# Patient Record
Sex: MALE | Race: WHITE | NOT HISPANIC OR LATINO | Employment: OTHER | URBAN - METROPOLITAN AREA
[De-identification: names, ages, dates, MRNs, and addresses within clinical notes are randomized per-mention and may not be internally consistent; named-entity substitution may affect disease eponyms.]

---

## 2020-04-09 ENCOUNTER — TELEPHONE (OUTPATIENT)
Dept: UROLOGY | Facility: CLINIC | Age: 60
End: 2020-04-09

## 2020-04-09 NOTE — TELEPHONE ENCOUNTER
[facility] Call Center    Phone Message    May a detailed message be left on voicemail: yes     Reason for Call: Other: Pt wife wants to know if our doctors do fusion-biopsy.  Pt wife is an MD and is looking for a doctor for her  who has abnormal PSA but has several very specific questions regarding our doctors.  Please call Pt wife asap tp try to answer as many as possible.      Action Taken: Message routed to:  [facility] urology    Travel Screening: Not Applicable

## 2020-04-13 NOTE — TELEPHONE ENCOUNTER
Spoke with patient's sister not wife who is a Dr and got patient scheduled with Dr Price next Monday for a video conference. This would be a 2nd opinion. Gave her the angella that needs to be downloaded.

## 2020-04-14 NOTE — TELEPHONE ENCOUNTER
MEDICAL RECORDS REQUEST   [facility]  Urology Clinic  [address] [place] [address]  PHONE: [phone]  Fax: [phone]        FUTURE VISIT INFORMATION                                                   Adam Sahni, : 1960 scheduled for future visit at [facility]    APPOINTMENT INFORMATION:    Date: 20 3PM     Provider:  Steven Price MD    Reason for Visit/Diagnosis: Discuss fusion BX    REFERRAL INFORMATION:    Referring provider:  Self    Specialty: N/A    Referring providers clinic:  N/A    Clinic contact number:  N/A    RECORDS REQUESTED FOR VISIT                                                     NOTES  STATUS/DETAILS   OFFICE NOTE from referring provider  no   OFFICE NOTE from other specialist  yes   DISCHARGE SUMMARY from hospital  no   DISCHARGE REPORT from the ER  no   OPERATIVE REPORT  no   MEDICATION LIST  yes   LABS     URINALYSIS (UA)/ PSA  yes- CE     PRE-VISIT CHECKLIST      [facility]   Appointment appropriately scheduled           (right time/right provider) Yes   MyChart activation If no, please explain: In process    Questionnaire complete If no, please explain: In process      Completed by: Ruchi Kang

## 2020-04-20 ENCOUNTER — PRE VISIT (OUTPATIENT)
Dept: UROLOGY | Facility: CLINIC | Age: 60
End: 2020-04-20

## 2020-04-20 ENCOUNTER — TELEPHONE (OUTPATIENT)
Dept: UROLOGY | Facility: CLINIC | Age: 60
End: 2020-04-20

## 2020-04-20 NOTE — TELEPHONE ENCOUNTER
Chief Complaint : Discuss fusion biopsy     Records/Orders: self referring     Pt Contacted: YES left a message to talk abut appointment     At Rooming: video appointment

## 2020-04-23 ENCOUNTER — DOCUMENTATION ONLY (OUTPATIENT)
Dept: CARE COORDINATION | Facility: CLINIC | Age: 60
End: 2020-04-23

## 2020-05-19 NOTE — TELEPHONE ENCOUNTER
MEDICAL RECORDS REQUEST   [facility]  Urology Clinic  [address] [place] [address]  PHONE: [phone]  Fax: [phone]        FUTURE VISIT INFORMATION                                                   Adam Sahni, : 1960 scheduled for future visit at [facility]    APPOINTMENT INFORMATION:    Date: 20 3PM     Provider:  Steven Felix MD    Reason for Visit/Diagnosis: Discuss fusion BX    REFERRAL INFORMATION:    Referring provider:  Self    Specialty: N/A    Referring providers clinic:  N/A    Clinic contact number:  N/A    RECORDS REQUESTED FOR VISIT                                                     NOTES  STATUS/DETAILS   OFFICE NOTE from referring provider  no   OFFICE NOTE from other specialist  yes   DISCHARGE SUMMARY from hospital  no   DISCHARGE REPORT from the ER  no   OPERATIVE REPORT  no   MEDICATION LIST  yes   LABS     URINALYSIS (UA)/ PSA  Pathology slides  yes- CE  In process      PRE-VISIT CHECKLIST      Record collection complete Yes- [facility] recs in CE  IMGS IN [facility] PACS  NM 20  CT 20  MR 20  Fax to [facility] to fed -ex BX's -  2:10PM  (Case: [id number])    Tracking #: [id number]     Appointment appropriately scheduled           (right time/right provider) Yes   MyChart activation If no, please explain: In process    Questionnaire complete If no, please explain: In process      Action Ruchi 20 10:13AM   Action Taken CSS called [facility] Path lab to check on status on BX's , Per path lab staff they have not received req. Re-fax to alt fax [phone]     Action 20 10:08 AM - Linda   Action Taken  Imaging disc received from [facility] and sent to be reviewed with filled out pathology form.       Completed by: Ruchi Kang

## 2020-05-26 ENCOUNTER — PRE VISIT (OUTPATIENT)
Dept: UROLOGY | Facility: CLINIC | Age: 60
End: 2020-05-26

## 2020-06-03 PROCEDURE — 00000346 ZZHCL STATISTIC REVIEW OUTSIDE SLIDES TC 88321: Performed by: UROLOGY

## 2020-06-04 ENCOUNTER — PRE VISIT (OUTPATIENT)
Dept: UROLOGY | Facility: CLINIC | Age: 60
End: 2020-06-04

## 2020-06-04 ENCOUNTER — VIRTUAL VISIT (OUTPATIENT)
Dept: UROLOGY | Facility: CLINIC | Age: 60
End: 2020-06-04
Payer: COMMERCIAL

## 2020-06-04 DIAGNOSIS — C61 PROSTATE CANCER (H): Primary | ICD-10-CM

## 2020-06-04 LAB — COPATH REPORT: NORMAL

## 2020-06-04 RX ORDER — SERTRALINE HYDROCHLORIDE 100 MG/1
200 TABLET, FILM COATED ORAL DAILY
COMMUNITY
Start: 2020-03-06 | End: 2023-09-02

## 2020-06-04 RX ORDER — GABAPENTIN 300 MG/1
300 CAPSULE ORAL
Status: ON HOLD | COMMUNITY
Start: 2020-03-06 | End: 2023-06-09

## 2020-06-04 RX ORDER — ASPIRIN 81 MG/1
81 TABLET ORAL
Status: ON HOLD | COMMUNITY
Start: 2020-03-06 | End: 2023-06-09

## 2020-06-04 RX ORDER — ERGOCALCIFEROL 1.25 MG/1
50000 CAPSULE, LIQUID FILLED ORAL
Status: ON HOLD | COMMUNITY
Start: 2020-02-21 | End: 2023-06-09

## 2020-06-04 RX ORDER — ATORVASTATIN CALCIUM 80 MG/1
80 TABLET, FILM COATED ORAL AT BEDTIME
COMMUNITY
Start: 2020-03-06 | End: 2023-09-02

## 2020-06-04 RX ORDER — CILOSTAZOL 100 MG/1
100 TABLET ORAL 2 TIMES DAILY
COMMUNITY
Start: 2020-03-06 | End: 2024-08-22

## 2020-06-04 ASSESSMENT — PAIN SCALES - GENERAL: PAINLEVEL: NO PAIN (0)

## 2020-06-04 NOTE — PROGRESS NOTES
"Adam Sahni is a 59 year old male who is being evaluated via a billable video visit.      The patient has been notified of following:     \"This video visit will be conducted via a call between you and your physician/provider. We have found that certain health care needs can be provided without the need for an in-person physical exam.  This service lets us provide the care you need with a video conversation.  If a prescription is necessary we can send it directly to your pharmacy.  If lab work is needed we can place an order for that and you can then stop by our lab to have the test done at a later time.    Video visits are billed at different rates depending on your insurance coverage.  Please reach out to your insurance provider with any questions.    If during the course of the call the physician/provider feels a video visit is not appropriate, you will not be charged for this service.\"    Patient has given verbal consent for Video visit? Yes    How would you like to obtain your AVS? Mail a copy    Patient would like the video invitation sent by: Send to e-mail at: [email or web address]    Will anyone else be joining your video visit? Sis was on call with him        Video-Visit Details    Type of service:  Video Visit    Video Start Time: Video time was 53 minues    Originating Location (pt. Location): Home    Distant Location (provider location):  [facility] UROLOGY AND [facility] FOR PROSTATE AND UROLOGIC CANCERS     Platform used for Video Visit: Juvencio Felix MD      "

## 2020-06-04 NOTE — LETTER
"6/4/2020       RE: Adam Sahni  [address]   [facility] [address]     Dear Colleague,    Thank you for referring your patient, Adam Sahni, to the [facility] UROLOGY AND [facility] FOR PROSTATE AND UROLOGIC CANCERS at [facility]. Please see a copy of my visit note below.    Adam Sahni is a 59 year old male who is being evaluated via a billable video visit.      The patient has been notified of following:     \"This video visit will be conducted via a call between you and your physician/provider. We have found that certain health care needs can be provided without the need for an in-person physical exam.  This service lets us provide the care you need with a video conversation.  If a prescription is necessary we can send it directly to your pharmacy.  If lab work is needed we can place an order for that and you can then stop by our lab to have the test done at a later time.    Video visits are billed at different rates depending on your insurance coverage.  Please reach out to your insurance provider with any questions.    If during the course of the call the physician/provider feels a video visit is not appropriate, you will not be charged for this service.\"    Patient has given verbal consent for Video visit? Yes    How would you like to obtain your AVS? Mail a copy    Patient would like the video invitation sent by: Send to e-mail at: [email or web address]    Will anyone else be joining your video visit? Sis was on call with him        Video-Visit Details    Type of service:  Video Visit    Video Start Time: Video time was 53 minues    Originating Location (pt. Location): Home    Distant Location (provider location):  [facility] UROLOGY AND [facility] FOR PROSTATE AND UROLOGIC CANCERS     Platform used for Video Visit: Juvencio Felix MD              Chief Complaint:    Localized Prostate Cancer         Consult or Referral:     Mr. Adam Sahni " is a 59 year old male seen in consultation         History of Present Illness:    Adam Sahni is a very pleasant 59 year old male who presents with a history of Prostate Cancer.   The clinical stage is cT3 vs. T4 with bladder neck invasion, with likely epe also into the NVBs on MRI, initial PSA was 34 and biopsy luis a score is 4+5 and 4+3 with some cancer in all cores.  [place] grade group 5.   The prostate size was measured and estimated to be to be ~27 grams.  PSA density is 1.3.  Bone scan negative, possible RETROPERITONEAL nodes, but unceratain    Was scoped by Dr. Asencio and noted to have invasion into the bladder, but not involving the trigone or the UO's    Scheduled to undergo a robotic radical prostatectomy with Dr. Asencio in a few weeks.    His physician sister is present for the video chat.           Past Medical History:   No past medical history on file.         Past Surgical History:   No past surgical history on file.         Medications     Current Outpatient Medications   Medication     aspirin 81 MG EC tablet     atorvastatin (LIPITOR) 80 MG tablet     cilostazol (PLETAL) 100 MG tablet     gabapentin (NEURONTIN) 300 MG capsule     sertraline (ZOLOFT) 50 MG tablet     vitamin D2 (ERGOCALCIFEROL) 10168 units (1250 mcg) capsule     No current facility-administered medications for this visit.             Family History:   No family history on file.         Social History:     Social History     Socioeconomic History     Marital status: Single     Spouse name: Not on file     Number of children: Not on file     Years of education: Not on file     Highest education level: Not on file   Occupational History     Not on file   Social Needs     Financial resource strain: Not on file     Food insecurity     Worry: Not on file     Inability: Not on file     Transportation needs     Medical: Not on file     Non-medical: Not on file   Tobacco Use     Smoking status: Not on file   Substance and Sexual Activity      Alcohol use: Not on file     Drug use: Not on file     Sexual activity: Not on file   Lifestyle     Physical activity     Days per week: Not on file     Minutes per session: Not on file     Stress: Not on file   Relationships     Social connections     Talks on phone: Not on file     Gets together: Not on file     Attends Zoroastrianism service: Not on file     Active member of club or organization: Not on file     Attends meetings of clubs or organizations: Not on file     Relationship status: Not on file     Intimate partner violence     Fear of current or ex partner: Not on file     Emotionally abused: Not on file     Physically abused: Not on file     Forced sexual activity: Not on file   Other Topics Concern     Not on file   Social History Narrative     Not on file            Allergies:   Penicillins         Review of Systems:  From intake questionnaire     Negative 14 system review except as noted on HPI, nurse's note.         Physical Exam:  VIDEO     Patient is a 59 year old  male   General Appearance Adult: Alert, no acute distress, oriented  HENT: throat/mouth:normal, good dentition  Neck: No adenopathy,masses or thyromegaly  Lungs: no respiratory distress, or pursed lip breathing  Heart: No obvious jugular venous distension present  Musculoskeltal: extremities normal, no peripheral edema noted  Skin: no suspicious lesions or rashes  Neuro: Alert, oriented, speech and mentation normal  Psych: affect and mood normal  Gait: Normal      Labs and Pathology:    I reviewed all applicable laboratory and pathology data and went over findings with patient  Significant for   PSA 34        Imaging:    I reviewed all applicable imaging and went over findings with patient.  Significant for MRI showing possible bladder invasion, possible NVB invasion, but no clearly enlarged nodes      Outside and Past Medical records:    I spent 10 minutes reviewing outside and past medical records.           Assessment and Plan:      Assessment:   High Risk Locally Advanced Prostate Cancer    Plan:  There is a very high chance he has micrometastatic disease    We discussed the options for treatment of a localized prostate cancer including active surveillance (reviewing the [place] experience), brachytherapy, external beam, and  proton beam radiation therapy, as well as surgical extirpation.  We briefly mentioned cryotherapy, but the results are not great in the primary setting and they almost uniformly lead to loss of erections.  We discussed the fact that all prostate cancer treatments leads to the loss or decrease in sexual function.  This loss usually occurs immediately with surgery and then improves as the patient heals and with radiation there is usually no effect, then it drops off at a faster than expected pace such that 2-3 years down the road, the number of men suffering from ED after treatment for their prostate cancer is approximately equal.    We also discussed the advantages and disadvantages and roles of open surgery vs. laparoscopic (and Da Marylu assisted) surgery.  I noted that though robotic surgery has been associated with shorter hospitalization, shorter time to complete recovery, fewer blood transfusions and lower risk of bladder neck contractures, in the most important domains, cancer control, preservation of urinary function and preservation of sexual function, the outcomes have been quite comparable.    The anticipated post-operative course was explained, including the anticipated hospital stay.  With surgery we discussed the need for a catheter post-operatively for between 7-10 days to serve as a scaffolding for the bladder neck to heal to the urethra.  We also discussed the risk of post operative urinary incontinence once the cathter is removed.  We discussed that we would recommend pelvic floor physical therapy and that sometimes urinary recovery takes several months to up to a year to recover control.   Approximately 90-95% of men are continent at one year after surgery, but most men describe that their continence is different after surgery.    I suggested a referral to radiation oncology which I believe is helpful to get adequate information to make the best decision that is best for the patient.  He is not interested in a radiation consult at this time, but I suggested almost certainly will need adjuvant or salvage radiation and hormones.  We discussed national survey results suggesting that for identical patients, surgeons and radiation oncologists suggest their respective method of treating prostate cancer as the most appropriate the majority of the time.  But for most cases of prostate cancer there appears to be clinical equipoise between these approaches and this allows the patient preferences to be expressed.      Given the high risk nature of his prostate cancer, I would  that he best option is to get surgery and radiation and hormones and the easiest way to sequence these treatments is surgery first.    I reassured him Dr. Asencio and Michael are great surgeons and he should feel comfortable with him    Post surgery would consider a Axumin PET or PSMA if available.    Also would recommend good full lymph node dissection including nodes in and around the external iliac vessels to the internal and genitalfermoral nerve.    He's planning to return and have surgery at [facility]    Steven Felix MD  Department of Urology Staff  [facility]    Note, dictation software may have been used for this note and the content was not proofread for accuracy    CC  Patient Care Team:  Steven Felix MD as MD (Urology)  Catrachita Mar, RN as Registered Nurse (Oncology)  SELF, REFERRED    Copy to patient  JONATHAN BABB  [address] [facility]   [facility] [id number]

## 2020-06-04 NOTE — PROGRESS NOTES
Chief Complaint:    Localized Prostate Cancer         Consult or Referral:     Mr. Adam Sahni is a 59 year old male seen in consultation         History of Present Illness:    Adam Sahni is a very pleasant 59 year old male who presents with a history of Prostate Cancer.   The clinical stage is cT3 vs. T4 with bladder neck invasion, with likely epe also into the NVBs on MRI, initial PSA was 34 and biopsy luis a score is 4+5 and 4+3 with some cancer in all cores.  [place] grade group 5.   The prostate size was measured and estimated to be to be ~27 grams.  PSA density is 1.3.  Bone scan negative, possible RETROPERITONEAL nodes, but unceratain    Was scoped by Dr. Asencio and noted to have invasion into the bladder, but not involving the trigone or the UO's    Scheduled to undergo a robotic radical prostatectomy with Dr. Asencio in a few weeks.    His physician sister is present for the video chat.           Past Medical History:   No past medical history on file.         Past Surgical History:   No past surgical history on file.         Medications     Current Outpatient Medications   Medication     aspirin 81 MG EC tablet     atorvastatin (LIPITOR) 80 MG tablet     cilostazol (PLETAL) 100 MG tablet     gabapentin (NEURONTIN) 300 MG capsule     sertraline (ZOLOFT) 50 MG tablet     vitamin D2 (ERGOCALCIFEROL) 51774 units (1250 mcg) capsule     No current facility-administered medications for this visit.             Family History:   No family history on file.         Social History:     Social History     Socioeconomic History     Marital status: Single     Spouse name: Not on file     Number of children: Not on file     Years of education: Not on file     Highest education level: Not on file   Occupational History     Not on file   Social Needs     Financial resource strain: Not on file     Food insecurity     Worry: Not on file     Inability: Not on file     Transportation needs     Medical: Not on file      Non-medical: Not on file   Tobacco Use     Smoking status: Not on file   Substance and Sexual Activity     Alcohol use: Not on file     Drug use: Not on file     Sexual activity: Not on file   Lifestyle     Physical activity     Days per week: Not on file     Minutes per session: Not on file     Stress: Not on file   Relationships     Social connections     Talks on phone: Not on file     Gets together: Not on file     Attends Zoroastrianism service: Not on file     Active member of club or organization: Not on file     Attends meetings of clubs or organizations: Not on file     Relationship status: Not on file     Intimate partner violence     Fear of current or ex partner: Not on file     Emotionally abused: Not on file     Physically abused: Not on file     Forced sexual activity: Not on file   Other Topics Concern     Not on file   Social History Narrative     Not on file            Allergies:   Penicillins         Review of Systems:  From intake questionnaire     Negative 14 system review except as noted on HPI, nurse's note.         Physical Exam:  VIDEO     Patient is a 59 year old  male   General Appearance Adult: Alert, no acute distress, oriented  HENT: throat/mouth:normal, good dentition  Neck: No adenopathy,masses or thyromegaly  Lungs: no respiratory distress, or pursed lip breathing  Heart: No obvious jugular venous distension present  Musculoskeltal: extremities normal, no peripheral edema noted  Skin: no suspicious lesions or rashes  Neuro: Alert, oriented, speech and mentation normal  Psych: affect and mood normal  Gait: Normal      Labs and Pathology:    I reviewed all applicable laboratory and pathology data and went over findings with patient  Significant for   PSA 34        Imaging:    I reviewed all applicable imaging and went over findings with patient.  Significant for MRI showing possible bladder invasion, possible NVB invasion, but no clearly enlarged nodes      Outside and Past Medical  records:    I spent 10 minutes reviewing outside and past medical records.           Assessment and Plan:     Assessment:   High Risk Locally Advanced Prostate Cancer    Plan:  There is a very high chance he has micrometastatic disease    We discussed the options for treatment of a localized prostate cancer including active surveillance (reviewing the [place] experience), brachytherapy, external beam, and  proton beam radiation therapy, as well as surgical extirpation.  We briefly mentioned cryotherapy, but the results are not great in the primary setting and they almost uniformly lead to loss of erections.  We discussed the fact that all prostate cancer treatments leads to the loss or decrease in sexual function.  This loss usually occurs immediately with surgery and then improves as the patient heals and with radiation there is usually no effect, then it drops off at a faster than expected pace such that 2-3 years down the road, the number of men suffering from ED after treatment for their prostate cancer is approximately equal.    We also discussed the advantages and disadvantages and roles of open surgery vs. laparoscopic (and Da Marylu assisted) surgery.  I noted that though robotic surgery has been associated with shorter hospitalization, shorter time to complete recovery, fewer blood transfusions and lower risk of bladder neck contractures, in the most important domains, cancer control, preservation of urinary function and preservation of sexual function, the outcomes have been quite comparable.    The anticipated post-operative course was explained, including the anticipated hospital stay.  With surgery we discussed the need for a catheter post-operatively for between 7-10 days to serve as a scaffolding for the bladder neck to heal to the urethra.  We also discussed the risk of post operative urinary incontinence once the cathter is removed.  We discussed that we would recommend pelvic floor physical therapy  and that sometimes urinary recovery takes several months to up to a year to recover control.  Approximately 90-95% of men are continent at one year after surgery, but most men describe that their continence is different after surgery.    I suggested a referral to radiation oncology which I believe is helpful to get adequate information to make the best decision that is best for the patient.  He is not interested in a radiation consult at this time, but I suggested almost certainly will need adjuvant or salvage radiation and hormones.  We discussed national survey results suggesting that for identical patients, surgeons and radiation oncologists suggest their respective method of treating prostate cancer as the most appropriate the majority of the time.  But for most cases of prostate cancer there appears to be clinical equipoise between these approaches and this allows the patient preferences to be expressed.      Given the high risk nature of his prostate cancer, I would  that he best option is to get surgery and radiation and hormones and the easiest way to sequence these treatments is surgery first.    I reassured him Dr. Asencio and Michael are great surgeons and he should feel comfortable with him    Post surgery would consider a Axumin PET or PSMA if available.    Also would recommend good full lymph node dissection including nodes in and around the external iliac vessels to the internal and genitalfermoral nerve.    He's planning [place] [facility]    Steven Felix MD  Department of Urology Staff  [facility]    Note, dictation software may have been used for this note and the content was not proofread for accuracy    CC  Patient Care Team:  Steven Felix MD as MD (Urology)  Catrachita Mar, RN as Registered Nurse (Oncology)  SELF, REFERRED    Copy to patient  JONATHAN BABB  [address] [facility]   [facility] [id number]

## 2020-06-09 ENCOUNTER — PRE VISIT (OUTPATIENT)
Dept: UROLOGY | Facility: CLINIC | Age: 60
End: 2020-06-09

## 2020-06-09 NOTE — TELEPHONE ENCOUNTER
Chief Complaint : Consult    Hx/Sx: Prostate cancer    Records/Orders: Available    Pt Contacted: N/a    At Rooming: In person    Keven Saba, EMT

## 2020-06-15 ENCOUNTER — PRE VISIT (OUTPATIENT)
Dept: UROLOGY | Facility: CLINIC | Age: 60
End: 2020-06-15

## 2020-09-23 NOTE — TELEPHONE ENCOUNTER
ONCOLOGY INTAKE: Records Information      APPT INFORMATION:  Referring provider:  N/a  Referring provider s clinic:  N/a  Reason for visit/diagnosis: Prostate Cancer  Has patient been notified of appointment date and time?: Yes    RECORDS INFORMATION:  Were the records received with the referral (via [employer])? No    Has patient been seen for any external appt for this diagnosis? No    If yes, where? N/a    Has patient had any imaging or procedures outside of [facility] for this condition? No      If Yes, where? N/a    ADDITIONAL INFORMATION:  Per IB Request     No

## 2021-01-10 ENCOUNTER — HEALTH MAINTENANCE LETTER (OUTPATIENT)
Age: 61
End: 2021-01-10

## 2021-06-19 ENCOUNTER — APPOINTMENT (OUTPATIENT)
Dept: ULTRASOUND IMAGING | Facility: CLINIC | Age: 61
End: 2021-06-19
Attending: EMERGENCY MEDICINE
Payer: COMMERCIAL

## 2021-06-19 ENCOUNTER — HOSPITAL ENCOUNTER (EMERGENCY)
Facility: CLINIC | Age: 61
Discharge: HOME | End: 2021-06-19
Attending: EMERGENCY MEDICINE | Admitting: EMERGENCY MEDICINE
Payer: COMMERCIAL

## 2021-06-19 VITALS
OXYGEN SATURATION: 98 % | HEART RATE: 94 BPM | BODY MASS INDEX: 27.92 KG/M2 | TEMPERATURE: 98.6 F | RESPIRATION RATE: 18 BRPM | WEIGHT: 195 LBS | DIASTOLIC BLOOD PRESSURE: 105 MMHG | HEIGHT: 70 IN | SYSTOLIC BLOOD PRESSURE: 139 MMHG

## 2021-06-19 DIAGNOSIS — I73.9 PAD (PERIPHERAL ARTERY DISEASE) (H): ICD-10-CM

## 2021-06-19 LAB
ANION GAP SERPL CALCULATED.3IONS-SCNC: 9 MMOL/L (ref 3–14)
BASOPHILS # BLD AUTO: 0 10E9/L (ref 0–0.2)
BASOPHILS NFR BLD AUTO: 0.5 %
BUN SERPL-MCNC: 18 MG/DL (ref 7–30)
CALCIUM SERPL-MCNC: 9 MG/DL (ref 8.5–10.1)
CHLORIDE SERPL-SCNC: 104 MMOL/L (ref 94–109)
CO2 SERPL-SCNC: 25 MMOL/L (ref 20–32)
CREAT SERPL-MCNC: 0.82 MG/DL (ref 0.66–1.25)
D DIMER PPP FEU-MCNC: 1.9 UG/ML FEU (ref 0–0.5)
DIFFERENTIAL METHOD BLD: ABNORMAL
EOSINOPHIL # BLD AUTO: 0.1 10E9/L (ref 0–0.7)
EOSINOPHIL NFR BLD AUTO: 1.7 %
ERYTHROCYTE [DISTWIDTH] IN BLOOD BY AUTOMATED COUNT: 14.3 % (ref 10–15)
GFR SERPL CREATININE-BSD FRML MDRD: >90 ML/MIN/{1.73_M2}
GLUCOSE SERPL-MCNC: 94 MG/DL (ref 70–99)
HCT VFR BLD AUTO: 38.3 % (ref 40–53)
HGB BLD-MCNC: 12.7 G/DL (ref 13.3–17.7)
IMM GRANULOCYTES # BLD: 0 10E9/L (ref 0–0.4)
IMM GRANULOCYTES NFR BLD: 0.4 %
LYMPHOCYTES # BLD AUTO: 2.2 10E9/L (ref 0.8–5.3)
LYMPHOCYTES NFR BLD AUTO: 27.1 %
MCH RBC QN AUTO: 30.2 PG (ref 26.5–33)
MCHC RBC AUTO-ENTMCNC: 33.2 G/DL (ref 31.5–36.5)
MCV RBC AUTO: 91 FL (ref 78–100)
MONOCYTES # BLD AUTO: 0.8 10E9/L (ref 0–1.3)
MONOCYTES NFR BLD AUTO: 10 %
NEUTROPHILS # BLD AUTO: 5 10E9/L (ref 1.6–8.3)
NEUTROPHILS NFR BLD AUTO: 60.3 %
NRBC # BLD AUTO: 0 10*3/UL
NRBC BLD AUTO-RTO: 0 /100
PLATELET # BLD AUTO: 223 10E9/L (ref 150–450)
POTASSIUM SERPL-SCNC: 4.1 MMOL/L (ref 3.4–5.3)
RBC # BLD AUTO: 4.21 10E12/L (ref 4.4–5.9)
SODIUM SERPL-SCNC: 138 MMOL/L (ref 133–144)
WBC # BLD AUTO: 8.2 10E9/L (ref 4–11)

## 2021-06-19 PROCEDURE — 80048 BASIC METABOLIC PNL TOTAL CA: CPT | Performed by: EMERGENCY MEDICINE

## 2021-06-19 PROCEDURE — 85379 FIBRIN DEGRADATION QUANT: CPT | Performed by: EMERGENCY MEDICINE

## 2021-06-19 PROCEDURE — 99284 EMERGENCY DEPT VISIT MOD MDM: CPT | Mod: 25

## 2021-06-19 PROCEDURE — 93971 EXTREMITY STUDY: CPT | Mod: LT

## 2021-06-19 PROCEDURE — 85025 COMPLETE CBC W/AUTO DIFF WBC: CPT | Performed by: EMERGENCY MEDICINE

## 2021-06-19 RX ORDER — POLYETHYLENE GLYCOL 3350 17 G/17G
1 POWDER, FOR SOLUTION ORAL DAILY
Status: ON HOLD | COMMUNITY
End: 2023-06-09

## 2021-06-19 RX ORDER — ALENDRONATE SODIUM 70 MG/1
70 TABLET ORAL
COMMUNITY

## 2021-06-19 ASSESSMENT — ENCOUNTER SYMPTOMS
SHORTNESS OF BREATH: 0
PALPITATIONS: 0
SLEEP DISTURBANCE: 1

## 2021-06-19 ASSESSMENT — MIFFLIN-ST. JEOR: SCORE: 1700.76

## 2021-06-20 NOTE — ED TRIAGE NOTES
Patient having right foot and leg pain. Pt states that this is an ongoing issue but that the pain is getting worse. Patient's left foot is red in color and the veins are protruding more than the right leg. Pt concerned for DVT. Pt denies any SOB.

## 2021-06-20 NOTE — ED PROVIDER NOTES
"  History   Chief Complaint:  Leg Pain    HPI   Adam Sahni is a 60 year old male with a history of PAD, hyperlipidemia, and prostate cancer who presents for evaluation of right foot and leg pain. The patient reports that his pain has been especially bothering him for the last 2 weeks. Over the past few days, his pain has worsened making it difficult for him to walk. He also reports difficulty sleeping due to pain. He notes that his left foot is red in color and veins are protruding more than the right. He is most concerned about DVT. He denies chest pain, shortness of breath, or palpitations. He currently smokes 1/2 pack per day.     Review of Systems   Respiratory: Negative for shortness of breath.    Cardiovascular: Positive for leg swelling. Negative for chest pain and palpitations.   Musculoskeletal: Positive for gait problem.        Right leg and foot pain   Psychiatric/Behavioral: Positive for sleep disturbance.   All other systems reviewed and are negative.    Allergies:  Penicillins    Medications:  Fosamax  Aspirin 81 mg  Lipitor  Pletal   Neurontin  Zoloft    Past Medical History:    Prostate cancer  Stricture of male urethra  Chronic intractable headache  Major depression  PAD   Adenomatous polyp of colon  Hyperlipidemia  Tobacco dependence  Lumbosacral radiculopathy at L5  Rotator cuff injury   Migraine  Nephrolithiasis    Past Surgical History:    Prostate biopsy   Cystoscopy   Appendectomy     Family History:    Dementia - father  CABG - mother  Osteoporosis - father    Social History:  Presents to the ED: with his sisterShelby  Current Every Day Smoker, 1PPD    Physical Exam     Patient Vitals for the past 24 hrs:   BP Temp Temp src Pulse Resp SpO2 Height Weight   06/19/21 2026 (!) 139/105 98.6  F (37  C) Oral 94 18 98 % 1.778 m (5' 10\") 88.5 kg (195 lb)       Physical Exam  Constitutional: Well developed, forlorn nontox appearance  Head: Atraumatic.   Neck:  no stridor  Eyes: no scleral " icterus  Cardiovascular: RRR, 2+ bilat radial pulses  Pulmonary/Chest: nml resp effort  Ext: Warm, well perfused, mild LLE edema   LLE: full ROM, warm and well perfused, sensation intact  Neurological: A&O, symmetric facies, moves ext x4, steady gait  Skin: Skin is warm and dry.   Psychiatric: Behavior is normal. Thought content normal.   Nursing note and vitals reviewed.       Emergency Department Course     Imaging:  US Lower Extremity Venous Duplex Left  No evidence of thrombus in the major veins of the left lower extremity.     Reading per radiology.    Laboratory:  CBC: WBC: 8.2, HGB: 12.7 (L), PLT: 223    BMP: Glucose 94, o/w WNL (Creatinine: 0.82)    D-dimer: 1.9 (H)    Emergency Department Course:    Reviewed:  I reviewed the patient's nursing notes, vitals and past medical history.     Assessments:  2130 I briefly spoke with the patient and his sister who is bedside.  2315 I performed an exam of the patient in room ED15 as documented above. I updated patient on results and discussed plan of care.     Disposition:  The patient was discharged to home.     Impression & Plan     CMS Diagnoses: None    Medical Decision Making:  Adam Sahni is a 60 year old male presenting w/ Left leg pain, concern for DVT     Differential diagnosis includes peripheral arterial disease, arterial claudication, arterial insufficiency, the patient has no significant pain at rest, motor function is intact, doubt acute ischemic limb.  DVT ultrasound negative.  He has follow-up ultrasounds scheduled from cardiology clinic visit on 6/15/2021 for further evaluation management.  At this time feel the patient is safe for discharge with plan for outpatient follow-up.   Recommendations given regarding follow up as previously instructed and return to the emergency department as needed for new or worsening symptoms.  He was also counseled on smoking cessation.  Pt counseled on all results, disposition and diagnosis.  They are understanding  and agreeable to plan. Patient discharged in stable condition.      Diagnosis:    ICD-10-CM    1. PAD (peripheral artery disease) (H)  I73.9      Scribe Disclosure:  I, Khadijah Jamil, am serving as a scribe at 11:09 PM on 6/19/2021 to document services personally performed by Steven Jiménez MD based on my observations and the provider's statements to me.    This note was completed in part using Dragon voice recognition software. Although reviewed after completion, some word and grammatical errors may occur.      Steven Jiménez MD  06/20/21 0107

## 2021-06-20 NOTE — DISCHARGE INSTRUCTIONS
Please follow-up as previously scheduled to have your ultrasounds done.    Please follow-up with your primary team at [facility] for further evaluation and management.    Please return to the emergency department as needed for new or worsening symptoms including severe and uncontrollable pain, loss of motor function that persists, cold limb, any other concerning symptoms.    Please stop smoking.

## 2021-07-21 ENCOUNTER — APPOINTMENT (OUTPATIENT)
Dept: CT IMAGING | Facility: CLINIC | Age: 61
End: 2021-07-21
Attending: EMERGENCY MEDICINE
Payer: COMMERCIAL

## 2021-07-21 ENCOUNTER — HOSPITAL ENCOUNTER (EMERGENCY)
Facility: CLINIC | Age: 61
Discharge: HOME | End: 2021-07-22
Attending: EMERGENCY MEDICINE | Admitting: EMERGENCY MEDICINE
Payer: COMMERCIAL

## 2021-07-21 DIAGNOSIS — R10.84 ABDOMINAL PAIN, GENERALIZED: ICD-10-CM

## 2021-07-21 LAB
ALBUMIN SERPL-MCNC: 3.4 G/DL (ref 3.4–5)
ALP SERPL-CCNC: 159 U/L (ref 40–150)
ALT SERPL W P-5'-P-CCNC: 35 U/L (ref 0–70)
ANION GAP SERPL CALCULATED.3IONS-SCNC: 5 MMOL/L (ref 3–14)
AST SERPL W P-5'-P-CCNC: 18 U/L (ref 0–45)
BILIRUB SERPL-MCNC: 0.6 MG/DL (ref 0.2–1.3)
BUN SERPL-MCNC: 15 MG/DL (ref 7–30)
CALCIUM SERPL-MCNC: 9.4 MG/DL (ref 8.5–10.1)
CHLORIDE BLD-SCNC: 105 MMOL/L (ref 94–109)
CO2 SERPL-SCNC: 29 MMOL/L (ref 20–32)
CREAT SERPL-MCNC: 0.97 MG/DL (ref 0.66–1.25)
GFR SERPL CREATININE-BSD FRML MDRD: 84 ML/MIN/1.73M2
GLUCOSE BLD-MCNC: 115 MG/DL (ref 70–99)
LACTATE SERPL-SCNC: 1 MMOL/L (ref 0.7–2)
LIPASE SERPL-CCNC: 67 U/L (ref 73–393)
POTASSIUM BLD-SCNC: 3.9 MMOL/L (ref 3.4–5.3)
PROT SERPL-MCNC: 7.4 G/DL (ref 6.8–8.8)
SODIUM SERPL-SCNC: 139 MMOL/L (ref 133–144)

## 2021-07-21 PROCEDURE — 83690 ASSAY OF LIPASE: CPT | Performed by: EMERGENCY MEDICINE

## 2021-07-21 PROCEDURE — 250N000011 HC RX IP 250 OP 636: Performed by: EMERGENCY MEDICINE

## 2021-07-21 PROCEDURE — 83605 ASSAY OF LACTIC ACID: CPT | Performed by: EMERGENCY MEDICINE

## 2021-07-21 PROCEDURE — 85004 AUTOMATED DIFF WBC COUNT: CPT | Performed by: EMERGENCY MEDICINE

## 2021-07-21 PROCEDURE — 82374 ASSAY BLOOD CARBON DIOXIDE: CPT | Performed by: EMERGENCY MEDICINE

## 2021-07-21 PROCEDURE — 250N000009 HC RX 250: Performed by: EMERGENCY MEDICINE

## 2021-07-21 PROCEDURE — 99285 EMERGENCY DEPT VISIT HI MDM: CPT | Mod: 25

## 2021-07-21 PROCEDURE — 258N000003 HC RX IP 258 OP 636: Performed by: EMERGENCY MEDICINE

## 2021-07-21 PROCEDURE — 82040 ASSAY OF SERUM ALBUMIN: CPT | Performed by: EMERGENCY MEDICINE

## 2021-07-21 PROCEDURE — 36415 COLL VENOUS BLD VENIPUNCTURE: CPT | Performed by: PHYSICIAN ASSISTANT

## 2021-07-21 PROCEDURE — 71260 CT THORAX DX C+: CPT

## 2021-07-21 RX ORDER — IOPAMIDOL 755 MG/ML
80 INJECTION, SOLUTION INTRAVASCULAR ONCE
Status: COMPLETED | OUTPATIENT
Start: 2021-07-21 | End: 2021-07-21

## 2021-07-21 RX ADMIN — IOPAMIDOL 80 ML: 755 INJECTION, SOLUTION INTRAVENOUS at 23:57

## 2021-07-21 RX ADMIN — SODIUM CHLORIDE 1000 ML: 9 INJECTION, SOLUTION INTRAVENOUS at 23:30

## 2021-07-21 RX ADMIN — SODIUM CHLORIDE 80 ML: 9 INJECTION, SOLUTION INTRAVENOUS at 23:57

## 2021-07-22 VITALS
WEIGHT: 195 LBS | SYSTOLIC BLOOD PRESSURE: 109 MMHG | OXYGEN SATURATION: 97 % | DIASTOLIC BLOOD PRESSURE: 75 MMHG | BODY MASS INDEX: 27.98 KG/M2 | RESPIRATION RATE: 14 BRPM | TEMPERATURE: 96.7 F | HEART RATE: 89 BPM

## 2021-07-22 LAB
ALBUMIN UR-MCNC: NEGATIVE MG/DL
APPEARANCE UR: CLEAR
BASOPHILS # BLD AUTO: 0 10E3/UL (ref 0–0.2)
BASOPHILS NFR BLD AUTO: 1 %
BILIRUB UR QL STRIP: NEGATIVE
COLOR UR AUTO: YELLOW
EOSINOPHIL # BLD AUTO: 0.2 10E3/UL (ref 0–0.7)
EOSINOPHIL NFR BLD AUTO: 3 %
ERYTHROCYTE [DISTWIDTH] IN BLOOD BY AUTOMATED COUNT: 14.3 % (ref 10–15)
GLUCOSE UR STRIP-MCNC: NEGATIVE MG/DL
HCT VFR BLD AUTO: 34.6 % (ref 40–53)
HGB BLD-MCNC: 11.8 G/DL (ref 13.3–17.7)
HGB UR QL STRIP: NEGATIVE
HOLD SPECIMEN: NORMAL
IMM GRANULOCYTES # BLD: 0 10E3/UL
IMM GRANULOCYTES NFR BLD: 1 %
KETONES UR STRIP-MCNC: NEGATIVE MG/DL
LEUKOCYTE ESTERASE UR QL STRIP: NEGATIVE
LYMPHOCYTES # BLD AUTO: 1.5 10E3/UL (ref 0.8–5.3)
LYMPHOCYTES NFR BLD AUTO: 22 %
MCH RBC QN AUTO: 30.7 PG (ref 26.5–33)
MCHC RBC AUTO-ENTMCNC: 34.1 G/DL (ref 31.5–36.5)
MCV RBC AUTO: 90 FL (ref 78–100)
MONOCYTES # BLD AUTO: 0.5 10E3/UL (ref 0–1.3)
MONOCYTES NFR BLD AUTO: 8 %
MUCOUS THREADS #/AREA URNS LPF: PRESENT /LPF
NEUTROPHILS # BLD AUTO: 4.2 10E3/UL (ref 1.6–8.3)
NEUTROPHILS NFR BLD AUTO: 65 %
NITRATE UR QL: NEGATIVE
NRBC # BLD AUTO: 0 10E3/UL
NRBC BLD AUTO-RTO: 0 /100
PH UR STRIP: 5.5 [PH] (ref 5–7)
PLAT MORPH BLD: NORMAL
PLATELET # BLD AUTO: 235 10E3/UL (ref 150–450)
RBC # BLD AUTO: 3.84 10E6/UL (ref 4.4–5.9)
RBC MORPH BLD: NORMAL
RBC URINE: 2 /HPF
SP GR UR STRIP: 1.02 (ref 1–1.03)
SQUAMOUS EPITHELIAL: <1 /HPF
UROBILINOGEN UR STRIP-MCNC: NORMAL MG/DL
WBC # BLD AUTO: 6.5 10E3/UL (ref 4–11)
WBC URINE: 0 /HPF

## 2021-07-22 PROCEDURE — 81001 URINALYSIS AUTO W/SCOPE: CPT | Performed by: EMERGENCY MEDICINE

## 2021-07-22 RX ORDER — ONDANSETRON 4 MG/1
4 TABLET, ORALLY DISINTEGRATING ORAL EVERY 8 HOURS PRN
Qty: 10 TABLET | Refills: 0 | Status: SHIPPED | OUTPATIENT
Start: 2021-07-22 | End: 2021-07-25

## 2021-07-22 NOTE — ED PROVIDER NOTES
History   Chief Complaint:  Abdominal Pain       HPI   Adam Sahni is a 61 year old male with history of recent iliac stent placement who presents with abdominal pain.  Patient had recent iliac stent placement as well as TPA at [facility].  Patient comes into the emergency department today with abdominal pain.  Has nausea but no vomiting.  No fever.  No urinary symptoms.  No change in bowel movements.  Feels that his left leg is improved after the procedure.      Review of Systems  +abdominal pain, nausea  Denies fever, urinary symptoms, fever  10 point review of systems was obtained and negative other than mentioned above.    Allergies:    Penicillins    Medications:    alendronate (FOSAMAX) 35 MG tablet  aspirin 81 MG EC tablet  atorvastatin (LIPITOR) 80 MG tablet  cilostazol (PLETAL) 100 MG tablet  gabapentin (NEURONTIN) 300 MG capsule  polyethylene glycol (MIRALAX) 17 g packet  sertraline (ZOLOFT) 50 MG tablet  vitamin D2 (ERGOCALCIFEROL) 10932 units (1250 mcg) capsule      Past Medical History:    Atherosclerosis    Past Surgical History:    Recent angiogram and stent placement    Family History:    No pertinent family history    Social History:  +brought in by sister    Physical Exam     Patient Vitals for the past 24 hrs:   BP Temp Temp src Pulse Resp SpO2 Weight   07/22/21 0015 117/73 -- -- 90 17 98 % --   07/21/21 2345 131/64 -- -- 90 21 97 % --   07/21/21 2330 (!) 109/96 -- -- 93 17 97 % --   07/21/21 2315 99/68 -- -- 104 18 95 % --   07/21/21 2217 138/85 (!) 96.7  F (35.9  C) Temporal (!) 121 18 98 % 88.5 kg (195 lb)       Physical Exam  General: Sitting up in bed  Eyes:  The pupils are equal and round    Conjunctivae and sclerae are normal  ENT:    Wearing a mask  Neck:  Normal range of motion  CV:  Regular rate, regular rhythm     Skin warm and well perfused   Resp:  Non labored breathing on room air    No tachypnea    No cough heard    Lungs clear bilaterally  GI:  Abdomen is soft, there is no  rigidity    No distension    No rebound tenderness     Mild diffuse abdominal tenderness    Ecchymosis on right groin with no swelling    Ecchymosis and mild swelling on left groin, no pulsatile mass  MS:  Normal muscular tone, bilateral LE have good color, warm lower extremities  Skin:  No rash or acute skin lesions noted  Neuro:   Awake, alert.      Speech is normal and fluent.    Face is symmetric.     Moves all extremities equally    SILT on bilateral LE  Psych: Normal affect.  Appropriate interactions.    Emergency Department Course     Imaging:  CT Aortic Survey w Contrast   Final Result   Addendum 1 of 1   At the request of Dr. Leblanc, this study was reviewed to exclude    pseudoaneurysm. There is evidence of bilateral common femoral approach    catheterization. No pseudoaneurysm is seen. No active extravasation. Small    amount of post catheterization hematoma and    stranding.       Final   IMPRESSION:   1.  No aortic dissection, aneurysm, or pulmonary embolus.   2.  Status post right aortoiliac stent graft placement with patent vasculature. Nonspecific stranding about the iliac vessels, likely postprocedural.   3.  Short segment occlusion of the left proximal deep femoral artery with distal reconstitution.   4.  Atherosclerotic vascular disease.   5.  Diverticulosis. No diverticulitis, colitis, or obstruction.           Emergency Department Course:    Reviewed:    I reviewed nursing notes, vitals and past medical history    Assessments:   I obtained history and examined the patient as noted above.     Disposition:  The patient was discharged to home.       Impression & Plan     Medical Decision Making:  Adam Sahni is a 61-year-old male who presented to the emergency department with abdominal pain.  Has mild diffuse abdominal tenderness.  Initially tachycardic but this resolved on own.  Lactate is normal.  Hemoglobin slightly lower than most recent one though not significant drop in hemoglobin. UA  showed no infection. CT aortic survey done given recent procedure. This showed stents in place. Has some mild stranding that is expected post procedure. No pseudoaneurysm seen. No acute findings on imaging. Recommended follow-up with PCP     Diagnosis:    ICD-10-CM    1. Abdominal pain, generalized  R10.84          Scribe Disclosure:  I, Marilyn Leblanc MD, am serving as a scribe at 12:29 AM on 7/22/2021 to document services personally performed by Marilyn Leblanc MD based on my observations and the provider's statements to me.            Marilyn Leblanc MD  07/22/21 0756

## 2021-07-22 NOTE — DISCHARGE INSTRUCTIONS
Watch for fever, worsening pain, worsening swelling in groins  Use zofran for nausea as needed

## 2021-10-23 ENCOUNTER — HEALTH MAINTENANCE LETTER (OUTPATIENT)
Age: 61
End: 2021-10-23

## 2022-01-24 ENCOUNTER — APPOINTMENT (OUTPATIENT)
Dept: GENERAL RADIOLOGY | Facility: CLINIC | Age: 62
End: 2022-01-24
Attending: EMERGENCY MEDICINE
Payer: COMMERCIAL

## 2022-01-24 ENCOUNTER — HOSPITAL ENCOUNTER (EMERGENCY)
Facility: CLINIC | Age: 62
Discharge: HOME | End: 2022-01-24
Attending: EMERGENCY MEDICINE | Admitting: EMERGENCY MEDICINE
Payer: COMMERCIAL

## 2022-01-24 VITALS
DIASTOLIC BLOOD PRESSURE: 78 MMHG | TEMPERATURE: 98.7 F | SYSTOLIC BLOOD PRESSURE: 133 MMHG | HEART RATE: 105 BPM | OXYGEN SATURATION: 99 % | RESPIRATION RATE: 15 BRPM

## 2022-01-24 DIAGNOSIS — S05.12XA PERIORBITAL CONTUSION OF LEFT EYE, INITIAL ENCOUNTER: ICD-10-CM

## 2022-01-24 DIAGNOSIS — S01.112A LACERATION OF LEFT EYEBROW, INITIAL ENCOUNTER: ICD-10-CM

## 2022-01-24 PROCEDURE — 73562 X-RAY EXAM OF KNEE 3: CPT | Mod: RT

## 2022-01-24 PROCEDURE — 12011 RPR F/E/E/N/L/M 2.5 CM/<: CPT

## 2022-01-24 PROCEDURE — 99283 EMERGENCY DEPT VISIT LOW MDM: CPT

## 2022-01-24 ASSESSMENT — ENCOUNTER SYMPTOMS
FACIAL SWELLING: 1
WOUND: 1

## 2022-01-24 NOTE — ED TRIAGE NOTES
Patient was carrying a bucket of water when he fell into the wall, injuring his right knee and left eye. Pt's glasses caused a laceration lateral to left eye, laceration also noted above left eye. Denies LOC. Denies vision changes, no redness or tearing noted to left eye.

## 2022-01-24 NOTE — ED PROVIDER NOTES
History   Chief Complaint:  Facial Laceration    The history is provided by the patient.   History supplemented by electronic chart review       Adam Sahni is a 61 year old male with history of idiopathic peripheral neuropathy who presents with facial laceration. He was carrying a bucket of water at home when the bucket of water hit the back of his leg which he did not feel well due to baseline neuropathy. The bucket cause him to fall face first into a plaster wall. His glasses broke causing him to have a laceration to his left eye. He used neosporin before coming to the ED.  He does not think he sustained any serious new injury to his leg, and remains ambulatory since the accident.  No eye pain or visual disturbance.  No blood thinner use beyond aspirin.    Review of Systems   HENT: Positive for facial swelling.    Skin: Positive for wound.   All other systems reviewed and are negative.    Allergies:  Penicillins    Medications:  alendronate   aspirin 81 MG   atorvastatin   cilostazol   gabapentin   sertraline    Past Medical History:     Major depression, single episode  PAD (peripheral artery disease)  Hyperlipidemia   Lumbosacral radiculopathy  Idiopathic peripheral neuropathy    Past Surgical History:    LUMBAR FORAMINOTOMY  APPENDECTOMY    Family History:    Dementia  Myocardial infarction    Social History:  Presents alone  PCP: No Ref-Primary, Physician    Physical Exam     Patient Vitals for the past 24 hrs:   BP Temp Temp src Pulse Resp SpO2   01/24/22 1715 133/78 -- -- 105 -- 99 %   01/24/22 1344 -- 98.7  F (37.1  C) Oral -- -- --   01/24/22 1342 (!) 147/71 -- -- 86 15 99 %       Physical Exam  General: Nontoxic-appearing male sitting upright in room 3  Eyes: PERRL, no proptosis, no scleral injection, no evidence of actual globe injury  CV: rate as above, no active bleeding, extremities well perfused  Resp: normal respiratory effort  MSK: no surrounding bony tenderness to face, good range of motion  of both knees without acute bony deformity  Skin: 1.5 cm linear slightly gaping laceration to left lateral eyebrow, no visible bone or tendon or foreign body  Small avulsion injury, very superficial, lateral to left eye without active bleeding  Small amount of ecchymosis to left eyebrow  Neuro: alert, surrounding sensation and motor function intact  Psych: cooperative      Emergency Department Course     Imaging:  XR Knee Right 3 Views   Final Result   IMPRESSION:   Anatomic alignment right knee. No acute displaced right knee fracture.   Mild medial and patellofemoral compartment right knee osteoarthritis.   No significant right knee joint effusion or anterior knee soft tissue   swelling. Arterial calcification.      ESTHELA FERNANDEZ MD            SYSTEM ID:  [id number]        Report per radiology    Laboratory:  Labs Ordered and Resulted from Time of ED Arrival to Time of ED Departure - No data to display     Procedures    Procedure Note: Laceration Repair   Performed by: Agusto Lee MD    Verbal consent given by patient who confirms understanding of the procedure being performed after discussing the risks, benefits and alternatives.    Preparation: Patient was prepped and draped in usual sterile fashion, with assistance from ERT.  Irrigation solution: saline    Body area: L eyebrow  Laceration length: 1.5 cm  Contamination: The wound is not grossly contaminated.  Foreign bodies: none  Tendon involvement: none  Anesthesia: Local    Local anesthetic: Bupivacaine 0.5% with epinephrine    Debridement: none   Skin closure: Closed with 3 x 5.0 Ethilon     Technique: interrupted  Approximation: close  Approximation difficulty: simple    Patient tolerated the procedure well with no immediate complications.      Emergency Department Course:    Reviewed:  I reviewed nursing notes, vitals and past medical history    Assessments:  1619 I obtained history and examined the patient as noted above.   1655 I placed  performed a laceration procedure.     Disposition:  The patient was discharged to home.     Impression & Plan     Medical Decision Making:  His eyebrow laceration was sutured with good wound edge approximation achieved, hemostasis maintained.  He has a small avulsion injury lateral to the left eye, and the skin here was so thin that I did not feel suture would hold, nor was indicated.  Skin glue considered but risk-benefit favors avoiding this due to risk of getting it in his eye.  This was discussed with him.  I think it will heal satisfactorily with supportive care but no formal wound closure.  X-ray of the knee sent from triage.  No evidence of acute major knee injury.  Follow-up through clinic in 5 to 7 days for suture removal and wound recheck, sooner than expected event of acute worsening.  No evidence of actual ocular injury or imminent threat to vision.  Discharged home after discussion of the above.    Diagnosis:    ICD-10-CM    1. Laceration of left eyebrow, initial encounter  S01.112A    2. Periorbital contusion of left eye, initial encounter  S05.12XA      Scribe Disclosure:  I, Jose Armando Constantino, am serving as a scribe at 4:19 PM on 1/24/2022 to document services personally performed by Agusto Lee MD based on my observations and the provider's statements to me.     This note was completed in part using Dragon voice recognition software. Although reviewed after completion, some word and grammatical errors may occur.             Agusto Lee MD  01/24/22 1959

## 2022-02-12 ENCOUNTER — HEALTH MAINTENANCE LETTER (OUTPATIENT)
Age: 62
End: 2022-02-12

## 2022-10-09 ENCOUNTER — HEALTH MAINTENANCE LETTER (OUTPATIENT)
Age: 62
End: 2022-10-09

## 2023-02-18 ENCOUNTER — HEALTH MAINTENANCE LETTER (OUTPATIENT)
Age: 63
End: 2023-02-18

## 2023-05-01 ENCOUNTER — HOSPITAL ENCOUNTER (EMERGENCY)
Facility: CLINIC | Age: 63
Discharge: HOME | End: 2023-05-01
Attending: PHYSICIAN ASSISTANT | Admitting: PHYSICIAN ASSISTANT
Payer: COMMERCIAL

## 2023-05-01 ENCOUNTER — APPOINTMENT (OUTPATIENT)
Dept: GENERAL RADIOLOGY | Facility: CLINIC | Age: 63
End: 2023-05-01
Attending: EMERGENCY MEDICINE
Payer: COMMERCIAL

## 2023-05-01 VITALS
HEART RATE: 98 BPM | BODY MASS INDEX: 30.36 KG/M2 | TEMPERATURE: 96.9 F | WEIGHT: 205 LBS | HEIGHT: 69 IN | SYSTOLIC BLOOD PRESSURE: 132 MMHG | RESPIRATION RATE: 18 BRPM | OXYGEN SATURATION: 100 % | DIASTOLIC BLOOD PRESSURE: 85 MMHG

## 2023-05-01 DIAGNOSIS — S52.502A CLOSED FRACTURE OF DISTAL END OF LEFT RADIUS, UNSPECIFIED FRACTURE MORPHOLOGY, INITIAL ENCOUNTER: ICD-10-CM

## 2023-05-01 PROCEDURE — 73110 X-RAY EXAM OF WRIST: CPT | Mod: LT

## 2023-05-01 PROCEDURE — 99284 EMERGENCY DEPT VISIT MOD MDM: CPT

## 2023-05-01 ASSESSMENT — ACTIVITIES OF DAILY LIVING (ADL): ADLS_ACUITY_SCORE: 35

## 2023-05-01 NOTE — ED TRIAGE NOTES
Pt complains of left writst pain and swelling after a mechanical fall yesterday.     Triage Assessment     Row Name 05/01/23 7252       Triage Assessment (Adult)    Airway WDL WDL       Respiratory WDL    Respiratory WDL WDL       Skin Circulation/Temperature WDL    Skin Circulation/Temperature WDL WDL       Cardiac WDL    Cardiac WDL WDL       Peripheral/Neurovascular WDL    Peripheral Neurovascular WDL WDL       Cognitive/Neuro/Behavioral WDL    Cognitive/Neuro/Behavioral WDL WDL

## 2023-05-01 NOTE — ED NOTES
Emergency Department Attending Supervision Note  5/1/2023  6:55 PM      I evaluated this patient in conjunction with Tomi Self      Briefly, the patient presented with fall on outstretched left wrist.      On my exam, ecchymosis over the volar surface.  No normal distal capillary refill and sensation.    Results:    ED course:    My impression is nondisplaced distal radius fracture recommend splint in the form of Velcro wrist splint and follow-up with orthopedics for reassessment.        Diagnosis    ICD-10-CM    1. Closed fracture of distal end of left radius, unspecified fracture morphology, initial encounter  S52.502A             Tomi Self, *        Sree Tavares MD  05/13/23 2057

## 2023-05-01 NOTE — ED PROVIDER NOTES
"  Chief Complaint:  Wrist Pain       HPI   Adam Sahni is a 62 year old male who presents with L wrist injury.  The patient lost his balance and fell onto his wrist 2 days ago.  He is not sure exactly which way it bent. No other injuries.  He is not on blood thinners.  No numbness and he can still feel and move the fingers normally.    Independent Historian: None    Review of external notes: None    Allergies:  Penicillins     Medications:    alendronate (FOSAMAX) 35 MG tablet  aspirin 81 MG EC tablet  atorvastatin (LIPITOR) 80 MG tablet  cilostazol (PLETAL) 100 MG tablet  gabapentin (NEURONTIN) 300 MG capsule  polyethylene glycol (MIRALAX) 17 g packet  sertraline (ZOLOFT) 50 MG tablet  vitamin D2 (ERGOCALCIFEROL) 15537 units (1250 mcg) capsule        Past Medical History:    No past medical history on file.    There are no problems to display for this patient.       Past Surgical History:    No past surgical history on file.     Family History:    family history is not on file.    Social History:   reports that he has never smoked. He has never used smokeless tobacco.  Review of Systems    Physical Exam     Patient Vitals for the past 24 hrs:   BP Temp Temp src Pulse Resp SpO2 Height Weight   05/01/23 1448 132/85 96.9  F (36.1  C) Temporal 98 18 100 % 1.753 m (5' 9\") 93 kg (205 lb)        Physical Exam  General: Alert and oriented.    Head: Normocephalic.  External ears and nose normal.    Eyes: Pupils equal and round.  Normal tracking.    Pulmonary/Chest: Effort and rate normal    MSK: There is bruising with mild swelling and tenderness over the distal left radius.  Ranging wrist, elbow normally.  No tenderness to palpation over anatomic snuffbox or base of thenar eminence.  No pain with axial loading of the thumb.    SKIN:  Warm and dry with strong radial pulse and cap refill <2 seconds    NEURO:  Normal strength of flexion and extension at MCP, PIP, and DIP joints.  Normal sensation to touch BL in " fingers.    PSYCH:  Normal affect    Emergency Department Course   No results found for this or any previous visit.    Imaging:  XR Wrist Left G/E 3 Views   Final Result   IMPRESSION:   1.  Minimally displaced fracture of the left distal radius dorsal lip.   2.  Normal radiocarpal joint spacing and alignment.   3.  Moderate thumb CMC degenerative arthrosis.   4.  Soft tissue swelling in the dorsal wrist.      MAKI ZAYAS MD            SYSTEM ID:  [id number]           Laboratory:  Labs Ordered and Resulted from Time of ED Arrival to Time of ED Departure - No data to display       Independent Interpretation (X-rays, CTs, rhythm strip):  I independently reviewed the patient's wrist x-ray and note evidence of minimally displaced distal radius fracture.    Consultations/Discussion of Management or Tests:  none     Social Determinants of Health affecting care:  none    Assessments:  I performed an evaluation of the patient as above.    Disposition:  discharged    Impression & Plan      Medical Decision Makin year old male presents with L wrist pain and bruising after mechanical fall.  X-rays demonstrate evidence of non-displaced distal radius fracture.  Placed in velcro wrist splint.  CMS intact, no evidence of neurovascular compromise.  No indication for emergent reduction or orthopedic consultation from the ED.  Head to toe trauma exam otherwise non-concerning.  Discussed orthopedic follow-up and patient will call to schedule appointment for follow-up.  Discussed RICE and pain control.  Strict return precautions given for signs of compartment syndrome or neurovascular compromise and these were provided in writing.    Critical Care time:  0 minutes.    Diagnosis:    ICD-10-CM    1. Closed fracture of distal end of left radius, unspecified fracture morphology, initial encounter  S52.502A            Discharge Medications:  New Prescriptions    No medications on file        2023   Tomi Self, *           Tomi Self PA-C  05/01/23 6266

## 2023-06-09 ENCOUNTER — APPOINTMENT (OUTPATIENT)
Dept: GENERAL RADIOLOGY | Facility: CLINIC | Age: 63
End: 2023-06-09
Attending: EMERGENCY MEDICINE
Payer: COMMERCIAL

## 2023-06-09 ENCOUNTER — APPOINTMENT (OUTPATIENT)
Dept: ULTRASOUND IMAGING | Facility: CLINIC | Age: 63
End: 2023-06-09
Attending: NURSE PRACTITIONER
Payer: COMMERCIAL

## 2023-06-09 ENCOUNTER — HOSPITAL ENCOUNTER (INPATIENT)
Facility: CLINIC | Age: 63
LOS: 2 days | Discharge: HOME | End: 2023-06-11
Attending: EMERGENCY MEDICINE | Admitting: HOSPITALIST
Payer: COMMERCIAL

## 2023-06-09 ENCOUNTER — APPOINTMENT (OUTPATIENT)
Dept: CARDIOLOGY | Facility: CLINIC | Age: 63
End: 2023-06-09
Attending: NURSE PRACTITIONER
Payer: COMMERCIAL

## 2023-06-09 ENCOUNTER — APPOINTMENT (OUTPATIENT)
Dept: GENERAL RADIOLOGY | Facility: CLINIC | Age: 63
End: 2023-06-09
Attending: NURSE PRACTITIONER
Payer: COMMERCIAL

## 2023-06-09 DIAGNOSIS — E78.5 HYPERLIPIDEMIA WITH TARGET LOW DENSITY LIPOPROTEIN (LDL) CHOLESTEROL LESS THAN 100 MG/DL: ICD-10-CM

## 2023-06-09 DIAGNOSIS — F17.200 TOBACCO DEPENDENCE: Primary | ICD-10-CM

## 2023-06-09 DIAGNOSIS — I21.3 ST ELEVATION MI (STEMI) (H): ICD-10-CM

## 2023-06-09 DIAGNOSIS — I73.9 PERIPHERAL ARTERY DISEASE (H): ICD-10-CM

## 2023-06-09 DIAGNOSIS — I21.3 ST ELEVATION MYOCARDIAL INFARCTION (STEMI), UNSPECIFIED ARTERY (H): ICD-10-CM

## 2023-06-09 DIAGNOSIS — Z71.6 ENCOUNTER FOR TOBACCO USE CESSATION COUNSELING: ICD-10-CM

## 2023-06-09 PROBLEM — G60.9 IDIOPATHIC PERIPHERAL NEUROPATHY: Status: ACTIVE | Noted: 2023-06-09

## 2023-06-09 PROBLEM — F33.2 SEVERE EPISODE OF RECURRENT MAJOR DEPRESSIVE DISORDER, WITHOUT PSYCHOTIC FEATURES (H): Status: ACTIVE | Noted: 2023-03-09

## 2023-06-09 PROBLEM — R97.20 ELEVATED PSA, GREATER THAN OR EQUAL TO 20 NG/ML: Status: ACTIVE | Noted: 2020-03-24

## 2023-06-09 LAB
ACT BLD: 228 SECONDS (ref 74–150)
ACT BLD: 318 SECONDS (ref 74–150)
ACT BLD: 348 SECONDS (ref 74–150)
ALBUMIN SERPL BCG-MCNC: 3.2 G/DL (ref 3.5–5.2)
ALBUMIN SERPL BCG-MCNC: 4.1 G/DL (ref 3.5–5.2)
ALBUMIN UR-MCNC: NEGATIVE MG/DL
ALP SERPL-CCNC: 101 U/L (ref 40–129)
ALP SERPL-CCNC: 134 U/L (ref 40–129)
ALT SERPL W P-5'-P-CCNC: 51 U/L (ref 10–50)
ALT SERPL W P-5'-P-CCNC: 62 U/L (ref 10–50)
ANION GAP SERPL CALCULATED.3IONS-SCNC: 13 MMOL/L (ref 7–15)
ANION GAP SERPL CALCULATED.3IONS-SCNC: 16 MMOL/L (ref 7–15)
APPEARANCE UR: CLEAR
AST SERPL W P-5'-P-CCNC: 278 U/L (ref 10–50)
AST SERPL W P-5'-P-CCNC: 369 U/L (ref 10–50)
ATRIAL RATE - MUSE: 70 BPM
ATRIAL RATE - MUSE: 73 BPM
ATRIAL RATE - MUSE: 79 BPM
ATRIAL RATE - MUSE: 89 BPM
BASOPHILS # BLD AUTO: 0 10E3/UL (ref 0–0.2)
BASOPHILS # BLD AUTO: 0 10E3/UL (ref 0–0.2)
BASOPHILS NFR BLD AUTO: 0 %
BASOPHILS NFR BLD AUTO: 0 %
BILIRUB SERPL-MCNC: 0.6 MG/DL
BILIRUB SERPL-MCNC: 0.7 MG/DL
BILIRUB UR QL STRIP: NEGATIVE
BUN SERPL-MCNC: 10.2 MG/DL (ref 8–23)
BUN SERPL-MCNC: 12.2 MG/DL (ref 8–23)
CA-I BLD-MCNC: 4.3 MG/DL (ref 4.4–5.2)
CALCIUM SERPL-MCNC: 8.6 MG/DL (ref 8.8–10.2)
CALCIUM SERPL-MCNC: 9.4 MG/DL (ref 8.8–10.2)
CHLORIDE SERPL-SCNC: 101 MMOL/L (ref 98–107)
CHLORIDE SERPL-SCNC: 93 MMOL/L (ref 98–107)
CHOLEST SERPL-MCNC: 194 MG/DL
CHOLEST SERPL-MCNC: 247 MG/DL
COLOR UR AUTO: NORMAL
CREAT SERPL-MCNC: 0.79 MG/DL (ref 0.67–1.17)
CREAT SERPL-MCNC: 0.87 MG/DL (ref 0.67–1.17)
DEPRECATED HCO3 PLAS-SCNC: 20 MMOL/L (ref 22–29)
DEPRECATED HCO3 PLAS-SCNC: 21 MMOL/L (ref 22–29)
DIASTOLIC BLOOD PRESSURE - MUSE: NORMAL MMHG
EOSINOPHIL # BLD AUTO: 0 10E3/UL (ref 0–0.7)
EOSINOPHIL # BLD AUTO: 0 10E3/UL (ref 0–0.7)
EOSINOPHIL NFR BLD AUTO: 0 %
EOSINOPHIL NFR BLD AUTO: 0 %
ERYTHROCYTE [DISTWIDTH] IN BLOOD BY AUTOMATED COUNT: 14.6 % (ref 10–15)
ERYTHROCYTE [DISTWIDTH] IN BLOOD BY AUTOMATED COUNT: 14.7 % (ref 10–15)
GFR SERPL CREATININE-BSD FRML MDRD: >90 ML/MIN/1.73M2
GFR SERPL CREATININE-BSD FRML MDRD: >90 ML/MIN/1.73M2
GLUCOSE SERPL-MCNC: 118 MG/DL (ref 70–99)
GLUCOSE SERPL-MCNC: 153 MG/DL (ref 70–99)
GLUCOSE UR STRIP-MCNC: NEGATIVE MG/DL
HBA1C MFR BLD: 5.7 %
HCT VFR BLD AUTO: 32.8 % (ref 40–53)
HCT VFR BLD AUTO: 38.2 % (ref 40–53)
HDLC SERPL-MCNC: 35 MG/DL
HDLC SERPL-MCNC: 39 MG/DL
HGB BLD-MCNC: 10.5 G/DL (ref 13.3–17.7)
HGB BLD-MCNC: 10.9 G/DL (ref 13.3–17.7)
HGB BLD-MCNC: 13 G/DL (ref 13.3–17.7)
HGB UR QL STRIP: NEGATIVE
HOLD SPECIMEN: NORMAL
HOLD SPECIMEN: NORMAL
IMM GRANULOCYTES # BLD: 0 10E3/UL
IMM GRANULOCYTES # BLD: 0 10E3/UL
IMM GRANULOCYTES NFR BLD: 0 %
IMM GRANULOCYTES NFR BLD: 0 %
INTERPRETATION ECG - MUSE: NORMAL
KETONES UR STRIP-MCNC: NEGATIVE MG/DL
LACTATE SERPL-SCNC: 1.6 MMOL/L (ref 0.7–2)
LDLC SERPL CALC-MCNC: 144 MG/DL
LDLC SERPL CALC-MCNC: 160 MG/DL
LEUKOCYTE ESTERASE UR QL STRIP: NEGATIVE
LVEF ECHO: NORMAL
LYMPHOCYTES # BLD AUTO: 1.1 10E3/UL (ref 0.8–5.3)
LYMPHOCYTES # BLD AUTO: 1.2 10E3/UL (ref 0.8–5.3)
LYMPHOCYTES NFR BLD AUTO: 11 %
LYMPHOCYTES NFR BLD AUTO: 12 %
MAGNESIUM SERPL-MCNC: 1.7 MG/DL (ref 1.7–2.3)
MCH RBC QN AUTO: 30 PG (ref 26.5–33)
MCH RBC QN AUTO: 30.5 PG (ref 26.5–33)
MCHC RBC AUTO-ENTMCNC: 33.3 G/DL (ref 31.5–36.5)
MCHC RBC AUTO-ENTMCNC: 34 G/DL (ref 31.5–36.5)
MCV RBC AUTO: 88 FL (ref 78–100)
MCV RBC AUTO: 90 FL (ref 78–100)
MONOCYTES # BLD AUTO: 0.7 10E3/UL (ref 0–1.3)
MONOCYTES # BLD AUTO: 0.9 10E3/UL (ref 0–1.3)
MONOCYTES NFR BLD AUTO: 7 %
MONOCYTES NFR BLD AUTO: 8 %
NEUTROPHILS # BLD AUTO: 8.2 10E3/UL (ref 1.6–8.3)
NEUTROPHILS # BLD AUTO: 8.4 10E3/UL (ref 1.6–8.3)
NEUTROPHILS NFR BLD AUTO: 80 %
NEUTROPHILS NFR BLD AUTO: 82 %
NITRATE UR QL: NEGATIVE
NONHDLC SERPL-MCNC: 159 MG/DL
NONHDLC SERPL-MCNC: 208 MG/DL
NRBC # BLD AUTO: 0 10E3/UL
NRBC # BLD AUTO: 0 10E3/UL
NRBC BLD AUTO-RTO: 0 /100
NRBC BLD AUTO-RTO: 0 /100
P AXIS - MUSE: 37 DEGREES
P AXIS - MUSE: 42 DEGREES
P AXIS - MUSE: 60 DEGREES
P AXIS - MUSE: 72 DEGREES
PH UR STRIP: 5 [PH] (ref 5–7)
PHOSPHATE SERPL-MCNC: 2.4 MG/DL (ref 2.5–4.5)
PLATELET # BLD AUTO: 161 10E3/UL (ref 150–450)
PLATELET # BLD AUTO: 195 10E3/UL (ref 150–450)
PLATELET # BLD AUTO: 216 10E3/UL (ref 150–450)
POTASSIUM SERPL-SCNC: 3.7 MMOL/L (ref 3.4–5.3)
POTASSIUM SERPL-SCNC: 3.9 MMOL/L (ref 3.4–5.3)
PR INTERVAL - MUSE: 176 MS
PR INTERVAL - MUSE: 178 MS
PR INTERVAL - MUSE: 180 MS
PR INTERVAL - MUSE: 190 MS
PROT SERPL-MCNC: 6 G/DL (ref 6.4–8.3)
PROT SERPL-MCNC: 7.2 G/DL (ref 6.4–8.3)
QRS DURATION - MUSE: 86 MS
QRS DURATION - MUSE: 88 MS
QRS DURATION - MUSE: 92 MS
QRS DURATION - MUSE: 92 MS
QT - MUSE: 364 MS
QT - MUSE: 382 MS
QT - MUSE: 392 MS
QT - MUSE: 404 MS
QTC - MUSE: 417 MS
QTC - MUSE: 423 MS
QTC - MUSE: 445 MS
QTC - MUSE: 464 MS
R AXIS - MUSE: 13 DEGREES
R AXIS - MUSE: 14 DEGREES
R AXIS - MUSE: 21 DEGREES
R AXIS - MUSE: 8 DEGREES
RBC # BLD AUTO: 3.64 10E6/UL (ref 4.4–5.9)
RBC # BLD AUTO: 4.33 10E6/UL (ref 4.4–5.9)
SODIUM SERPL-SCNC: 130 MMOL/L (ref 136–145)
SODIUM SERPL-SCNC: 134 MMOL/L (ref 136–145)
SODIUM SERPL-SCNC: 137 MMOL/L (ref 136–145)
SP GR UR STRIP: 1.03 (ref 1–1.03)
SYSTOLIC BLOOD PRESSURE - MUSE: NORMAL MMHG
T AXIS - MUSE: -27 DEGREES
T AXIS - MUSE: -43 DEGREES
T AXIS - MUSE: -51 DEGREES
T AXIS - MUSE: -57 DEGREES
TRIGL SERPL-MCNC: 242 MG/DL
TRIGL SERPL-MCNC: 76 MG/DL
TROPONIN T SERPL HS-MCNC: 6127 NG/L
TROPONIN T SERPL HS-MCNC: 7340 NG/L
TROPONIN T SERPL HS-MCNC: 7977 NG/L
TROPONIN T SERPL HS-MCNC: 9570 NG/L
UROBILINOGEN UR STRIP-MCNC: NORMAL MG/DL
VENTRICULAR RATE- MUSE: 70 BPM
VENTRICULAR RATE- MUSE: 73 BPM
VENTRICULAR RATE- MUSE: 79 BPM
VENTRICULAR RATE- MUSE: 89 BPM
WBC # BLD AUTO: 10 10E3/UL (ref 4–11)
WBC # BLD AUTO: 10.5 10E3/UL (ref 4–11)

## 2023-06-09 PROCEDURE — 82330 ASSAY OF CALCIUM: CPT | Performed by: NURSE PRACTITIONER

## 2023-06-09 PROCEDURE — 84450 TRANSFERASE (AST) (SGOT): CPT | Performed by: NURSE PRACTITIONER

## 2023-06-09 PROCEDURE — 85347 COAGULATION TIME ACTIVATED: CPT

## 2023-06-09 PROCEDURE — 99291 CRITICAL CARE FIRST HOUR: CPT | Performed by: NURSE PRACTITIONER

## 2023-06-09 PROCEDURE — 36415 COLL VENOUS BLD VENIPUNCTURE: CPT | Performed by: INTERNAL MEDICINE

## 2023-06-09 PROCEDURE — 93005 ELECTROCARDIOGRAM TRACING: CPT | Mod: 76

## 2023-06-09 PROCEDURE — 93454 CORONARY ARTERY ANGIO S&I: CPT | Performed by: INTERNAL MEDICINE

## 2023-06-09 PROCEDURE — 36415 COLL VENOUS BLD VENIPUNCTURE: CPT | Performed by: EMERGENCY MEDICINE

## 2023-06-09 PROCEDURE — 96375 TX/PRO/DX INJ NEW DRUG ADDON: CPT

## 2023-06-09 PROCEDURE — 99207 PR NO BILLABLE SERVICE THIS VISIT: CPT | Performed by: HOSPITALIST

## 2023-06-09 PROCEDURE — C1874 STENT, COATED/COV W/DEL SYS: HCPCS | Performed by: INTERNAL MEDICINE

## 2023-06-09 PROCEDURE — 99153 MOD SED SAME PHYS/QHP EA: CPT | Performed by: INTERNAL MEDICINE

## 2023-06-09 PROCEDURE — 85025 COMPLETE CBC W/AUTO DIFF WBC: CPT | Performed by: EMERGENCY MEDICINE

## 2023-06-09 PROCEDURE — 250N000011 HC RX IP 250 OP 636: Performed by: EMERGENCY MEDICINE

## 2023-06-09 PROCEDURE — 84484 ASSAY OF TROPONIN QUANT: CPT | Mod: 91 | Performed by: HOSPITALIST

## 2023-06-09 PROCEDURE — 93010 ELECTROCARDIOGRAM REPORT: CPT | Mod: 77 | Performed by: INTERNAL MEDICINE

## 2023-06-09 PROCEDURE — 84484 ASSAY OF TROPONIN QUANT: CPT | Performed by: EMERGENCY MEDICINE

## 2023-06-09 PROCEDURE — 84155 ASSAY OF PROTEIN SERUM: CPT | Performed by: NURSE PRACTITIONER

## 2023-06-09 PROCEDURE — 250N000011 HC RX IP 250 OP 636: Performed by: NURSE PRACTITIONER

## 2023-06-09 PROCEDURE — 93970 EXTREMITY STUDY: CPT

## 2023-06-09 PROCEDURE — C1887 CATHETER, GUIDING: HCPCS | Performed by: INTERNAL MEDICINE

## 2023-06-09 PROCEDURE — 210N000001 HC R&B IMCU HEART CARE

## 2023-06-09 PROCEDURE — 71045 X-RAY EXAM CHEST 1 VIEW: CPT

## 2023-06-09 PROCEDURE — 71045 X-RAY EXAM CHEST 1 VIEW: CPT | Mod: 77

## 2023-06-09 PROCEDURE — 36415 COLL VENOUS BLD VENIPUNCTURE: CPT | Performed by: NURSE PRACTITIONER

## 2023-06-09 PROCEDURE — 96374 THER/PROPH/DIAG INJ IV PUSH: CPT

## 2023-06-09 PROCEDURE — 272N000001 HC OR GENERAL SUPPLY STERILE: Performed by: INTERNAL MEDICINE

## 2023-06-09 PROCEDURE — 258N000003 HC RX IP 258 OP 636: Performed by: EMERGENCY MEDICINE

## 2023-06-09 PROCEDURE — 84484 ASSAY OF TROPONIN QUANT: CPT | Mod: 91 | Performed by: INTERNAL MEDICINE

## 2023-06-09 PROCEDURE — 250N000011 HC RX IP 250 OP 636: Performed by: INTERNAL MEDICINE

## 2023-06-09 PROCEDURE — C9600 PERC DRUG-EL COR STENT SING: HCPCS | Performed by: INTERNAL MEDICINE

## 2023-06-09 PROCEDURE — 250N000013 HC RX MED GY IP 250 OP 250 PS 637: Performed by: EMERGENCY MEDICINE

## 2023-06-09 PROCEDURE — 96361 HYDRATE IV INFUSION ADD-ON: CPT

## 2023-06-09 PROCEDURE — 93799 UNLISTED CV SVC/PROCEDURE: CPT | Performed by: INTERNAL MEDICINE

## 2023-06-09 PROCEDURE — 250N000009 HC RX 250: Performed by: INTERNAL MEDICINE

## 2023-06-09 PROCEDURE — C1769 GUIDE WIRE: HCPCS | Performed by: INTERNAL MEDICINE

## 2023-06-09 PROCEDURE — 85049 AUTOMATED PLATELET COUNT: CPT | Performed by: INTERNAL MEDICINE

## 2023-06-09 PROCEDURE — 81003 URINALYSIS AUTO W/O SCOPE: CPT | Performed by: NURSE PRACTITIONER

## 2023-06-09 PROCEDURE — 84295 ASSAY OF SERUM SODIUM: CPT | Performed by: NURSE PRACTITIONER

## 2023-06-09 PROCEDURE — 85049 AUTOMATED PLATELET COUNT: CPT | Performed by: NURSE PRACTITIONER

## 2023-06-09 PROCEDURE — 80061 LIPID PANEL: CPT | Performed by: HOSPITALIST

## 2023-06-09 PROCEDURE — C1757 CATH, THROMBECTOMY/EMBOLECT: HCPCS | Performed by: INTERNAL MEDICINE

## 2023-06-09 PROCEDURE — 93005 ELECTROCARDIOGRAM TRACING: CPT

## 2023-06-09 PROCEDURE — 250N000013 HC RX MED GY IP 250 OP 250 PS 637: Performed by: NURSE PRACTITIONER

## 2023-06-09 PROCEDURE — 4A023N7 MEASUREMENT OF CARDIAC SAMPLING AND PRESSURE, LEFT HEART, PERCUTANEOUS APPROACH: ICD-10-PCS | Performed by: INTERNAL MEDICINE

## 2023-06-09 PROCEDURE — 80053 COMPREHEN METABOLIC PANEL: CPT | Performed by: EMERGENCY MEDICINE

## 2023-06-09 PROCEDURE — 83735 ASSAY OF MAGNESIUM: CPT | Performed by: NURSE PRACTITIONER

## 2023-06-09 PROCEDURE — 83036 HEMOGLOBIN GLYCOSYLATED A1C: CPT | Performed by: HOSPITALIST

## 2023-06-09 PROCEDURE — 85018 HEMOGLOBIN: CPT | Performed by: NURSE PRACTITIONER

## 2023-06-09 PROCEDURE — 83605 ASSAY OF LACTIC ACID: CPT | Performed by: NURSE PRACTITIONER

## 2023-06-09 PROCEDURE — 250N000013 HC RX MED GY IP 250 OP 250 PS 637: Performed by: INTERNAL MEDICINE

## 2023-06-09 PROCEDURE — C1894 INTRO/SHEATH, NON-LASER: HCPCS | Performed by: INTERNAL MEDICINE

## 2023-06-09 PROCEDURE — 027034Z DILATION OF CORONARY ARTERY, ONE ARTERY WITH DRUG-ELUTING INTRALUMINAL DEVICE, PERCUTANEOUS APPROACH: ICD-10-PCS | Performed by: INTERNAL MEDICINE

## 2023-06-09 PROCEDURE — 99291 CRITICAL CARE FIRST HOUR: CPT | Mod: 25

## 2023-06-09 PROCEDURE — 250N000013 HC RX MED GY IP 250 OP 250 PS 637: Performed by: HOSPITALIST

## 2023-06-09 PROCEDURE — B2111ZZ FLUOROSCOPY OF MULTIPLE CORONARY ARTERIES USING LOW OSMOLAR CONTRAST: ICD-10-PCS | Performed by: INTERNAL MEDICINE

## 2023-06-09 PROCEDURE — C1725 CATH, TRANSLUMIN NON-LASER: HCPCS | Performed by: INTERNAL MEDICINE

## 2023-06-09 PROCEDURE — 93010 ELECTROCARDIOGRAM REPORT: CPT | Performed by: INTERNAL MEDICINE

## 2023-06-09 PROCEDURE — 96376 TX/PRO/DX INJ SAME DRUG ADON: CPT

## 2023-06-09 PROCEDURE — 99152 MOD SED SAME PHYS/QHP 5/>YRS: CPT | Performed by: INTERNAL MEDICINE

## 2023-06-09 PROCEDURE — 99291 CRITICAL CARE FIRST HOUR: CPT | Mod: 25 | Performed by: INTERNAL MEDICINE

## 2023-06-09 PROCEDURE — 258N000003 HC RX IP 258 OP 636: Performed by: HOSPITALIST

## 2023-06-09 PROCEDURE — 84100 ASSAY OF PHOSPHORUS: CPT | Performed by: NURSE PRACTITIONER

## 2023-06-09 DEVICE — STENT CORONARY DES SYNERGY XD MR US 3.50X38MM H7493941838350: Type: IMPLANTABLE DEVICE | Status: FUNCTIONAL

## 2023-06-09 RX ORDER — NITROGLYCERIN 0.4 MG/1
0.4 TABLET SUBLINGUAL EVERY 5 MIN PRN
Status: DISCONTINUED | OUTPATIENT
Start: 2023-06-09 | End: 2023-06-11 | Stop reason: HOSPADM

## 2023-06-09 RX ORDER — METOPROLOL TARTRATE 1 MG/ML
5 INJECTION, SOLUTION INTRAVENOUS
Status: DISCONTINUED | OUTPATIENT
Start: 2023-06-09 | End: 2023-06-11 | Stop reason: HOSPADM

## 2023-06-09 RX ORDER — FERROUS SULFATE 325(65) MG
325 TABLET ORAL EVERY OTHER DAY
Status: DISCONTINUED | OUTPATIENT
Start: 2023-06-09 | End: 2023-06-11 | Stop reason: HOSPADM

## 2023-06-09 RX ORDER — NALOXONE HYDROCHLORIDE 0.4 MG/ML
0.4 INJECTION, SOLUTION INTRAMUSCULAR; INTRAVENOUS; SUBCUTANEOUS
Status: ACTIVE | OUTPATIENT
Start: 2023-06-09 | End: 2023-06-09

## 2023-06-09 RX ORDER — CILOSTAZOL 100 MG/1
100 TABLET ORAL 2 TIMES DAILY
Status: DISCONTINUED | OUTPATIENT
Start: 2023-06-09 | End: 2023-06-11 | Stop reason: HOSPADM

## 2023-06-09 RX ORDER — NALOXONE HYDROCHLORIDE 0.4 MG/ML
0.2 INJECTION, SOLUTION INTRAMUSCULAR; INTRAVENOUS; SUBCUTANEOUS
Status: ACTIVE | OUTPATIENT
Start: 2023-06-09 | End: 2023-06-09

## 2023-06-09 RX ORDER — ATORVASTATIN CALCIUM 40 MG/1
40 TABLET, FILM COATED ORAL DAILY
Status: DISCONTINUED | OUTPATIENT
Start: 2023-06-09 | End: 2023-06-09 | Stop reason: DRUGHIGH

## 2023-06-09 RX ORDER — ONDANSETRON 2 MG/ML
4 INJECTION INTRAMUSCULAR; INTRAVENOUS EVERY 6 HOURS PRN
Status: DISCONTINUED | OUTPATIENT
Start: 2023-06-09 | End: 2023-06-11 | Stop reason: HOSPADM

## 2023-06-09 RX ORDER — ASPIRIN 81 MG/1
81 TABLET, CHEWABLE ORAL ONCE
Status: DISCONTINUED | OUTPATIENT
Start: 2023-06-09 | End: 2023-06-09

## 2023-06-09 RX ORDER — ASCORBIC ACID 125 MG
1 TABLET,CHEWABLE ORAL DAILY
COMMUNITY
End: 2023-09-02

## 2023-06-09 RX ORDER — ASPIRIN 81 MG/1
324 TABLET, CHEWABLE ORAL ONCE
Status: COMPLETED | OUTPATIENT
Start: 2023-06-09 | End: 2023-06-09

## 2023-06-09 RX ORDER — ASPIRIN 81 MG/1
81 TABLET ORAL DAILY
Status: DISCONTINUED | OUTPATIENT
Start: 2023-06-10 | End: 2023-06-11 | Stop reason: HOSPADM

## 2023-06-09 RX ORDER — CHLORAL HYDRATE 500 MG
2 CAPSULE ORAL DAILY
COMMUNITY
End: 2023-09-02

## 2023-06-09 RX ORDER — ACETAMINOPHEN 325 MG/1
650 TABLET ORAL EVERY 4 HOURS PRN
Status: DISCONTINUED | OUTPATIENT
Start: 2023-06-09 | End: 2023-06-11 | Stop reason: HOSPADM

## 2023-06-09 RX ORDER — ONDANSETRON 4 MG/1
4 TABLET, ORALLY DISINTEGRATING ORAL EVERY 6 HOURS PRN
Status: DISCONTINUED | OUTPATIENT
Start: 2023-06-09 | End: 2023-06-09

## 2023-06-09 RX ORDER — ATROPINE SULFATE 0.1 MG/ML
0.5 INJECTION INTRAVENOUS
Status: ACTIVE | OUTPATIENT
Start: 2023-06-09 | End: 2023-06-09

## 2023-06-09 RX ORDER — CALCIUM GLUCONATE 20 MG/ML
1 INJECTION, SOLUTION INTRAVENOUS ONCE
Status: COMPLETED | OUTPATIENT
Start: 2023-06-09 | End: 2023-06-09

## 2023-06-09 RX ORDER — HYDRALAZINE HYDROCHLORIDE 20 MG/ML
10 INJECTION INTRAMUSCULAR; INTRAVENOUS EVERY 4 HOURS PRN
Status: DISCONTINUED | OUTPATIENT
Start: 2023-06-09 | End: 2023-06-11 | Stop reason: HOSPADM

## 2023-06-09 RX ORDER — FLUMAZENIL 0.1 MG/ML
0.2 INJECTION, SOLUTION INTRAVENOUS
Status: ACTIVE | OUTPATIENT
Start: 2023-06-09 | End: 2023-06-09

## 2023-06-09 RX ORDER — FENTANYL CITRATE 50 UG/ML
25 INJECTION, SOLUTION INTRAMUSCULAR; INTRAVENOUS
Status: DISCONTINUED | OUTPATIENT
Start: 2023-06-09 | End: 2023-06-11 | Stop reason: HOSPADM

## 2023-06-09 RX ORDER — PREGABALIN 75 MG/1
75 CAPSULE ORAL 2 TIMES DAILY
Status: DISCONTINUED | OUTPATIENT
Start: 2023-06-09 | End: 2023-06-11 | Stop reason: HOSPADM

## 2023-06-09 RX ORDER — LIDOCAINE 40 MG/G
CREAM TOPICAL
Status: DISCONTINUED | OUTPATIENT
Start: 2023-06-09 | End: 2023-06-11 | Stop reason: HOSPADM

## 2023-06-09 RX ORDER — ATORVASTATIN CALCIUM 80 MG/1
80 TABLET, FILM COATED ORAL AT BEDTIME
Status: DISCONTINUED | OUTPATIENT
Start: 2023-06-09 | End: 2023-06-11 | Stop reason: HOSPADM

## 2023-06-09 RX ORDER — FERROUS SULFATE 325(65) MG
325 TABLET ORAL EVERY OTHER DAY
COMMUNITY

## 2023-06-09 RX ORDER — GABAPENTIN 600 MG/1
600 TABLET ORAL 3 TIMES DAILY PRN
Status: DISCONTINUED | OUTPATIENT
Start: 2023-06-09 | End: 2023-06-09

## 2023-06-09 RX ORDER — FENTANYL CITRATE 50 UG/ML
INJECTION, SOLUTION INTRAMUSCULAR; INTRAVENOUS
Status: DISCONTINUED | OUTPATIENT
Start: 2023-06-09 | End: 2023-06-09 | Stop reason: HOSPADM

## 2023-06-09 RX ORDER — EPTIFIBATIDE 2 MG/ML
INJECTION, SOLUTION INTRAVENOUS
Status: DISCONTINUED | OUTPATIENT
Start: 2023-06-09 | End: 2023-06-09 | Stop reason: HOSPADM

## 2023-06-09 RX ORDER — IOPAMIDOL 755 MG/ML
INJECTION, SOLUTION INTRAVASCULAR
Status: DISCONTINUED | OUTPATIENT
Start: 2023-06-09 | End: 2023-06-09 | Stop reason: HOSPADM

## 2023-06-09 RX ORDER — GABAPENTIN 600 MG/1
600 TABLET ORAL 3 TIMES DAILY PRN
COMMUNITY
End: 2023-09-02

## 2023-06-09 RX ORDER — SODIUM CHLORIDE 9 MG/ML
INJECTION, SOLUTION INTRAVENOUS CONTINUOUS
Status: DISCONTINUED | OUTPATIENT
Start: 2023-06-09 | End: 2023-06-10

## 2023-06-09 RX ORDER — ONDANSETRON 2 MG/ML
4 INJECTION INTRAMUSCULAR; INTRAVENOUS EVERY 6 HOURS PRN
Status: DISCONTINUED | OUTPATIENT
Start: 2023-06-09 | End: 2023-06-09

## 2023-06-09 RX ORDER — ONDANSETRON 2 MG/ML
4 INJECTION INTRAMUSCULAR; INTRAVENOUS ONCE
Status: COMPLETED | OUTPATIENT
Start: 2023-06-09 | End: 2023-06-09

## 2023-06-09 RX ORDER — AMOXICILLIN 250 MG
2 CAPSULE ORAL 2 TIMES DAILY PRN
Status: DISCONTINUED | OUTPATIENT
Start: 2023-06-09 | End: 2023-06-11 | Stop reason: HOSPADM

## 2023-06-09 RX ORDER — SODIUM CHLORIDE 9 MG/ML
INJECTION, SOLUTION INTRAVENOUS CONTINUOUS
Status: ACTIVE | OUTPATIENT
Start: 2023-06-09 | End: 2023-06-09

## 2023-06-09 RX ORDER — ADENOSINE 3 MG/ML
INJECTION, SOLUTION INTRAVENOUS
Status: DISCONTINUED | OUTPATIENT
Start: 2023-06-09 | End: 2023-06-09 | Stop reason: HOSPADM

## 2023-06-09 RX ORDER — VERAPAMIL HYDROCHLORIDE 2.5 MG/ML
INJECTION, SOLUTION INTRAVENOUS
Status: DISCONTINUED | OUTPATIENT
Start: 2023-06-09 | End: 2023-06-09 | Stop reason: HOSPADM

## 2023-06-09 RX ORDER — ONDANSETRON 4 MG/1
4 TABLET, ORALLY DISINTEGRATING ORAL EVERY 6 HOURS PRN
Status: DISCONTINUED | OUTPATIENT
Start: 2023-06-09 | End: 2023-06-11 | Stop reason: HOSPADM

## 2023-06-09 RX ORDER — ASPIRIN 81 MG/1
81 TABLET, CHEWABLE ORAL DAILY
Qty: 30 TABLET | Refills: 3 | Status: SHIPPED | OUTPATIENT
Start: 2023-06-10 | End: 2023-06-11

## 2023-06-09 RX ORDER — PREGABALIN 75 MG/1
75 CAPSULE ORAL 2 TIMES DAILY
COMMUNITY
End: 2023-09-02

## 2023-06-09 RX ORDER — AMOXICILLIN 250 MG
1 CAPSULE ORAL 2 TIMES DAILY PRN
Status: DISCONTINUED | OUTPATIENT
Start: 2023-06-09 | End: 2023-06-11 | Stop reason: HOSPADM

## 2023-06-09 RX ORDER — NITROGLYCERIN 5 MG/ML
VIAL (ML) INTRAVENOUS
Status: DISCONTINUED | OUTPATIENT
Start: 2023-06-09 | End: 2023-06-09 | Stop reason: HOSPADM

## 2023-06-09 RX ORDER — HEPARIN SODIUM 1000 [USP'U]/ML
INJECTION, SOLUTION INTRAVENOUS; SUBCUTANEOUS
Status: DISCONTINUED | OUTPATIENT
Start: 2023-06-09 | End: 2023-06-09 | Stop reason: HOSPADM

## 2023-06-09 RX ADMIN — TICAGRELOR 90 MG: 90 TABLET ORAL at 16:38

## 2023-06-09 RX ADMIN — ONDANSETRON 4 MG: 2 INJECTION INTRAMUSCULAR; INTRAVENOUS at 04:35

## 2023-06-09 RX ADMIN — PREGABALIN 75 MG: 75 CAPSULE ORAL at 14:28

## 2023-06-09 RX ADMIN — ACETAMINOPHEN 650 MG: 325 TABLET ORAL at 07:09

## 2023-06-09 RX ADMIN — PREGABALIN 75 MG: 75 CAPSULE ORAL at 21:10

## 2023-06-09 RX ADMIN — TICAGRELOR 180 MG: 90 TABLET ORAL at 04:33

## 2023-06-09 RX ADMIN — ATORVASTATIN CALCIUM 40 MG: 40 TABLET, FILM COATED ORAL at 09:07

## 2023-06-09 RX ADMIN — ONDANSETRON 4 MG: 2 INJECTION INTRAMUSCULAR; INTRAVENOUS at 03:14

## 2023-06-09 RX ADMIN — SODIUM CHLORIDE: 9 INJECTION, SOLUTION INTRAVENOUS at 16:33

## 2023-06-09 RX ADMIN — CALCIUM GLUCONATE 1 G: 20 INJECTION, SOLUTION INTRAVENOUS at 12:25

## 2023-06-09 RX ADMIN — HEPARIN SODIUM 6000 UNITS: 1000 INJECTION INTRAVENOUS; SUBCUTANEOUS at 04:33

## 2023-06-09 RX ADMIN — FERROUS SULFATE TAB 325 MG (65 MG ELEMENTAL FE) 325 MG: 325 (65 FE) TAB at 14:29

## 2023-06-09 RX ADMIN — SERTRALINE HYDROCHLORIDE 150 MG: 50 TABLET ORAL at 14:28

## 2023-06-09 RX ADMIN — ATORVASTATIN CALCIUM 80 MG: 80 TABLET, FILM COATED ORAL at 21:10

## 2023-06-09 RX ADMIN — ASPIRIN 81 MG CHEWABLE TABLET 324 MG: 81 TABLET CHEWABLE at 04:26

## 2023-06-09 RX ADMIN — SODIUM CHLORIDE 1000 ML: 9 INJECTION, SOLUTION INTRAVENOUS at 03:15

## 2023-06-09 ASSESSMENT — ACTIVITIES OF DAILY LIVING (ADL)
ADLS_ACUITY_SCORE: 18
ADLS_ACUITY_SCORE: 20
ADLS_ACUITY_SCORE: 18
ADLS_ACUITY_SCORE: 20
ADLS_ACUITY_SCORE: 20
ADLS_ACUITY_SCORE: 35
ADLS_ACUITY_SCORE: 35
ADLS_ACUITY_SCORE: 18

## 2023-06-09 NOTE — ED TRIAGE NOTES
"Pt reports abd pain x2 days and has been unable to keep anything down.\" Pt reports nausea and vomiting.      Triage Assessment     Row Name 06/09/23 0256       Triage Assessment (Adult)    Airway WDL WDL       Respiratory WDL    Respiratory WDL WDL       Cardiac WDL    Cardiac WDL WDL       Cognitive/Neuro/Behavioral WDL    Cognitive/Neuro/Behavioral WDL WDL              "

## 2023-06-09 NOTE — PRE-PROCEDURE
GENERAL PRE-PROCEDURE:   Procedure:  Cor angio possible PCI  Date/Time:  6/9/2023 5:04 AM    Verbal consent obtained?: Yes    Written consent obtained?: No    Emergent situation    Risks and benefits: Risks, benefits and alternatives were discussed    Consent given by:  Patient  Patient states understanding of procedure being performed: Yes    Patient's understanding of procedure matches consent: Yes    Procedure consent matches procedure scheduled: Yes    Expected level of sedation:  Moderate  Appropriately NPO:  Yes  ASA Class:  4  Mallampati  :  Grade 2- soft palate, base of uvula, tonsillar pillars, and portion of posterior pharyngeal wall visible  Lungs:  Lungs clear with good breath sounds bilaterally  Heart:  Normal heart sounds and rate  History & Physical reviewed:  Abbreviated history and physical done prior to moderate sedation  Statement of review:  I have reviewed the lab findings, diagnostic data, medications, and the plan for sedation

## 2023-06-09 NOTE — H&P
[facility]    History and Physical  Hospitalist       Date of Admission:  6/9/2023    Assessment & Plan   Adam Sahni is a 62 year old male with a known history of peripheral artery disease, nephrolithiasis, prostate cancer and hyperlipidemia who presentED to the ER with complaints of severe right-sided chest pain and right arm pain over the last 2 to 3 days.  He was also complaining of significant nausea and abdominal discomfort.  He he had numbness involving his right hand radiating up into his shoulder as well.  Endorses right-sided abdominal pain.  He thought that his pain was related to swallowing too much chlorine while swimming.  His nausea and vomiting started in the last 24 hours.    He dealt with nausea the whole of yesterday and any kind of movement made it worse.  He finally presented to the ER where EKG showed inferior lead infarct with ST elevation MI.  His initial troponin was elevated at 7340.  STEMI was activated patient was diagnosed with late STEMI presentation.  He was given a dose of aspirin 325 mg and Brilinta 180 mg.  Patient did throw up after receiving Brilinta.  Unclear if he threw up the medication as well at that time.  He was    #1 inferior wall ST elevation MI  -Patient presented with over 30 hours of symptoms including right arm pain, right chest pain and right abdominal pain with nausea and vomiting.  -Significantly elevated troponin with EKG suggestive of inferior infarct with ST elevations.  -Taken to the Cath Lab.  Found to have 100% stenosis of the RCA with significant clot burden for which he underwent drug-eluting stent placement.  Final cath report pending at this time.  Patient tolerated the procedure well.  Per verbal report from cardiology there was some residual disease on the left system that has not been intervened on.  -He did get transiently hypotensive he was given nitro during the procedure but is currently stable with systolics in the  "100s.  -Being admitted to cardiac floor with ongoing monitoring.  -Cardiology team started patient on aspirin 81 daily, Brilinta 90 mg every 12 hours, Lipitor 40 mg daily.  Continue cardiac monitoring.  -Trend troponin.  -Echocardiogram ordered.  -Advance diet as tolerated.  -HbA1c and lipid panel ordered.  -Patient is having soft blood pressures.  Will place on normal saline 100 cc an hour.    2.  Peripheral vascular disease    -Patient underwent IR angiography in 2021 which showed significant thrombus and stenosis of the left common iliac artery for which he underwent kissing iliac stent placement.    -On aspirin 81 daily, atorvastatin 80 mg daily, cilostazol 100 mg daily.  Restart after med rec is completed.    3.  Peripheral neuropathy-on gabapentin 300 mg daily.  Restart after med rec is completed.    4.  Depression-on sertraline 50 mg daily prior to admission.  Waiting for med rec.    5.  Prostate cancer-patient has stage IV prostate adenocarcinoma s/p external beam radiation therapy, on 28 months of androgen deprivation therapy.    6.  Elevated LFTs-likely secondary to STEMI with elevated troponin.  Will monitor closely and trend numbers.    #7  Ongoing tobacco abuse-smokes 7 cigarettes a day.  .  Counseled about the need to quit smoking.    Clinically Significant Risk Factors Present on Admission                # Drug Induced Platelet Defect: home medication list includes an antiplatelet medication        # Overweight: Estimated body mass index is 29.13 kg/m  as calculated from the following:    Height as of this encounter: 1.778 m (5' 10\").    Weight as of this encounter: 92.1 kg (203 lb).             DVT Prophylaxis: Pneumatic Compression Devices  Code Status: Full Code    Disposition: Expected discharge in 2-3 days     Nelda Nettles MD, MD  [phone] (p)  [phone] (c)    Primary Care Physician   Physician No Ref-Primary    Chief Complaint   STEMI    History is obtained from the patient and review " of medical records.    History of Present Illness   Adam Sahni is a 62 year old male with a known history of peripheral artery disease, nephrolithiasis, prostate cancer and hyperlipidemia who presentED to the ER with complaints of severe right-sided chest pain and right arm pain over the last 2 to 3 days.  He was also complaining of significant nausea and abdominal discomfort.  He he had numbness involving his right hand radiating up into his shoulder as well.  Endorses right-sided abdominal pain.  He thought that his pain was related to swallowing too much chlorine while swimming.  His nausea and vomiting started in the last 24 hours.    He dealt with nausea the whole of yesterday and any kind of movement made it worse.  He finally presented to the ER where EKG showed inferior lead infarct with ST elevation MI.  His initial troponin was elevated at 7340.  STEMI was activated patient was diagnosed with late STEMI presentation.  He was given a dose of aspirin 325 mg and Brilinta 180 mg.  Patient did throw up after receiving Brilinta.  Unclear if he threw up the medication as well at that time.  He was    Patient was taken to the Cath Lab where he was found to have RCA disease for which he underwent drug-eluting stent placement and is being transferred to the floor.  Hospitalist team contacted to admit the patient.    Past Medical History    I have reviewed this patient's medical history and updated it with pertinent information if needed.   No past medical history on file.  Past Surgical History   I have reviewed this patient's surgical history and updated it with pertinent information if needed.  No past surgical history on file.  Prior to Admission Medications   Prior to Admission Medications   Prescriptions Last Dose Informant Patient Reported? Taking?   alendronate (FOSAMAX) 35 MG tablet   Yes No   Sig: Take 35 mg by mouth every 7 days   aspirin 81 MG EC tablet   Yes No   Sig: Take 81 mg by mouth    atorvastatin (LIPITOR) 80 MG tablet   Yes No   Sig: Take 80 mg by mouth   cilostazol (PLETAL) 100 MG tablet   Yes No   Sig: Take 100 mg by mouth   gabapentin (NEURONTIN) 300 MG capsule   Yes No   Sig: Take 300 mg by mouth   polyethylene glycol (MIRALAX) 17 g packet   Yes No   Sig: Take 1 packet by mouth daily   sertraline (ZOLOFT) 50 MG tablet   Yes No   Sig: Take 50 mg by mouth   vitamin D2 (ERGOCALCIFEROL) 53118 units (1250 mcg) capsule   Yes No   Sig: Take 50,000 Units by mouth      Facility-Administered Medications: None     Allergies   Allergies   Allergen Reactions     Penicillins Hives, Shortness Of Breath and Swelling     Reports also having throat swelling- took medication as a child.       Social History   I have reviewed this patient's social history and updated it with pertinent information if needed.   Adam  reports that he has never smoked. He has never used smokeless tobacco. He     Family History   I have reviewed this patient's family history and updated it with pertinent information if needed.    No data available        Review of Systems   The 10 point Review of Systems is negative other than noted in the HPI or here.     Physical Exam   Temp: 99.3  F (37.4  C) Temp src: Oral BP: 105/72 Pulse: 82   Resp: 20 SpO2: 98 % O2 Device: Nasal cannula Oxygen Delivery: 2 LPM  Vital Signs with Ranges  Temp:  [97.8  F (36.6  C)-99.3  F (37.4  C)] 99.3  F (37.4  C)  Pulse:  [65-98] 82  Resp:  [12-21] 20  BP: ()/(51-72) 105/72  SpO2:  [98 %] 98 %  203 lbs 0 oz    Physical Exam  Constitutional:       Comments: Sleepy but awake    Eyes:      Pupils: Pupils are equal, round, and reactive to light.   Cardiovascular:      Rate and Rhythm: Normal rate and regular rhythm.      Pulses: Normal pulses.      Heart sounds: Normal heart sounds.   Pulmonary:      Effort: Pulmonary effort is normal. No respiratory distress.      Breath sounds: Normal breath sounds.   Abdominal:      General: Abdomen is flat. Bowel  sounds are normal. There is no distension.      Tenderness: There is no abdominal tenderness. There is no guarding.   Musculoskeletal:         General: Normal range of motion.      Cervical back: Normal range of motion.   Skin:     General: Skin is warm and dry.         Data   Data reviewed today:  I personally reviewed the EKG tracing showing .  Infarct with ST changes.  Recent Labs   Lab 06/09/23  0309   WBC 10.5   HGB 13.0*   MCV 88      *   POTASSIUM 3.9   CHLORIDE 93*   CO2 21*   BUN 12.2   CR 0.87   ANIONGAP 16*   OJ 9.4   *   ALBUMIN 4.1   PROTTOTAL 7.2   BILITOTAL 0.7   ALKPHOS 134*   ALT 62*   *       Recent Results (from the past 24 hour(s))   XR Chest Port 1 View    Narrative    EXAM: XR CHEST PORT 1 VIEW  LOCATION: [facility]  DATE/TIME: 6/9/2023 4:28 AM CDT    INDICATION: chest pain  COMPARISON: None.      Impression    IMPRESSION: Negative chest.   Cardiac Catheterization    Narrative       Prox Cx lesion is 70% stenosed.     Mid LAD lesion is 45% stenosed.     Ramus lesion is 60% stenosed.     Lat Ramus lesion is 50% stenosed.     Mid LM to Dist LM lesion is 20% stenosed.     Prox RCA to Mid RCA lesion is 100% stenosed.    Late-presentation inferior MI due to thrombotic occlusion of the proximal   right coronary artery.  Severe 70% stenosis of the proximal LCx which is a small to medium vessel.  Moderate to severe stenosis of two branches of the large ramus   intermedius.   Moderate stenosis of the midLAD.  PCI of the proximal to midRCA with aspiration thrombectomy and Cutting   Balloon angioplasty and placement of a 3.5 x 38 mm Synergy drug-eluting   stent which was postdilated to 4.0 mm.

## 2023-06-09 NOTE — PLAN OF CARE
"Goal Outcome Evaluation:      Post angio cath lab transfer to CCU this am. Obtained EKG. A&O x4, VSS ex low SBP 77, recovered to 80s-low 100 on recheck per resource RN. Asymptomatic. R wrist CDI, TR band in place, no hematoma/bleeding. CMS intact. +pulse. Hospitalist at bedside at the time of pt c/o RLE pain \"numbness\", gave prn tylenol. Will monitor closely.          "

## 2023-06-09 NOTE — ED PROVIDER NOTES
"  History     Chief Complaint:  Abdominal Pain and Chest Pain       HPI     Patient is a poor historian.    Adam Sahni is a 62 year old male with a history of PAD, nephrolithiasis, prostate cancer, and hyperlipidemia with multiple complaints. He has been experiencing waxing and waning right-sided chest pain for the past 2-3 days. He describes the pain as a pressure or a \"cancer.\" He additionally mentions that he has numbness in his right hand that radiates up to his shoulder. He has been experiencing 2 days of right-sided abdominal pain as well, and mentions he regularly has been fasting from food and swimming in the pool. He thinks he swallowed too much chlorine when swimming. He has associated nausea and vomiting that began approximately 30 hours ago. He sat at home all day Thursday due to the nausea, and he mentions movement made the nausea worse. He took over a leftover colonoscopy prep medication in attempt to clear his bowels, but he did not have any bowel movement. Here, he denies any active chest pain. He denies shortness of breath or history of heart disease.    Independent Historian:   None - Patient Only    Review of External Notes:   Yes-I reviewed his prior admission for peripheral artery disease and requiring a iliac stent.  I also reviewed care everywhere to find evidence of a prior EKG for comparison to his EKG today.      Medications:    Fosamax  Aspirin  Lipitor  Pletal  Chantix  Lyrica  Neurontin  Omnipaque  Miralax  Zoloft    Past Medical History:    Prostate cancer  Depression  PAD  Colon polyps  Hyperlipidemia  Nephrolithiasis  Osteopenia  Anxiety    Past Surgical History:    Appendectomy  Colonoscopy  GI colon with snare x4  EGD  Appendectomy  Cystoscopy x2  L5-S1 fusion  GI colon with polyp biopsy     Physical Exam     Patient Vitals for the past 24 hrs:   BP Temp Temp src Pulse Resp SpO2 Height Weight   06/09/23 0439 (!) 86/51 -- -- 81 16 98 % -- --   06/09/23 0345 99/67 -- -- 85 15 -- " "-- --   06/09/23 0330 92/66 -- -- 65 21 -- -- --   06/09/23 0257 119/68 97.8  F (36.6  C) Temporal 98 20 98 % 1.778 m (5' 10\") 88.5 kg (195 lb)        Physical Exam  Eye:  Pupils are equal, round, and reactive.  Extraocular movements intact.    ENT:  No rhinorrhea.  Moist mucus membranes.  Normal tongue and tonsil.    Cardiac:  Regular rate and rhythm.  No murmurs, gallops, or rubs.    Pulmonary:  Clear to auscultation bilaterally.  No wheezes, rales, or rhonchi.    Abdomen:  Mild tenderness throughout the right side of the abdomen without rebound or guarding.    Musculoskeletal:  Normal movement of all extremities without evidence for deficit.    Skin:  Warm and dry without rashes.    Neurologic:  Non-focal exam without asymmetric weakness or numbness.     Psychiatric:  Normal affect with appropriate interaction with examiner.    Emergency Department Course   EKG:  ECG results from 06/09/23   EKG 12 lead     Value    Systolic Blood Pressure     Diastolic Blood Pressure     Ventricular Rate 79    Atrial Rate 79    TX Interval 176    QRS Duration 88        QTc 417    P Axis 60    R AXIS 21    T Axis -27    Interpretation ECG      Critical Test Result: STEMI  Sinus rhythm with sinus arrhythmia  Inferior infarct , possibly acute  ** ** ACUTE MI / STEMI ** **  Consider right ventricular involvement in acute inferior infarct  Abnormal ECG  No previous ECGs available  Confirmed by GENERATED REPORT, COMPUTER ([id number]),  Dl Bloom ([id number]) on 6/9/2023 3:11:24 AM     EKG 12-lead, tracing only     Value    Systolic Blood Pressure     Diastolic Blood Pressure     Ventricular Rate 70    Atrial Rate 70    TX Interval 178    QRS Duration 92        QTc 423    P Axis 42    R AXIS 13    T Axis -43    Interpretation ECG      Sinus rhythm  Inferior infarct (cited on or before 09-JUN-2023)  Abnormal ECG  When compared with ECG of 09-JUN-2023 03:05,  No significant change was found  Confirmed by GENERATED REPORT, " COMPUTER ([id number]),  Dl Bloom ([id number]) on 6/9/2023 3:35:10 AM           Imaging:  XR Chest Port 1 View   Final Result   IMPRESSION: Negative chest.      Cardiac Catheterization    (Results Pending)      Report per radiology    Laboratory:  Labs Ordered and Resulted from Time of ED Arrival to Time of ED Departure   COMPREHENSIVE METABOLIC PANEL - Abnormal       Result Value    Sodium 130 (*)     Potassium 3.9      Chloride 93 (*)     Carbon Dioxide (CO2) 21 (*)     Anion Gap 16 (*)     Urea Nitrogen 12.2      Creatinine 0.87      Calcium 9.4      Glucose 118 (*)     Alkaline Phosphatase 134 (*)      (*)     ALT 62 (*)     Protein Total 7.2      Albumin 4.1      Bilirubin Total 0.7      GFR Estimate >90     CBC WITH PLATELETS AND DIFFERENTIAL - Abnormal    WBC Count 10.5      RBC Count 4.33 (*)     Hemoglobin 13.0 (*)     Hematocrit 38.2 (*)     MCV 88      MCH 30.0      MCHC 34.0      RDW 14.6      Platelet Count 216      % Neutrophils 80      % Lymphocytes 12      % Monocytes 8      % Eosinophils 0      % Basophils 0      % Immature Granulocytes 0      NRBCs per 100 WBC 0      Absolute Neutrophils 8.4 (*)     Absolute Lymphocytes 1.2      Absolute Monocytes 0.9      Absolute Eosinophils 0.0      Absolute Basophils 0.0      Absolute Immature Granulocytes 0.0      Absolute NRBCs 0.0     TROPONIN T, HIGH SENSITIVITY - Abnormal    Troponin T, High Sensitivity 7,340 (*)         Emergency Department Course & Assessments:    Interventions:  Medications   ondansetron (ZOFRAN) injection 4 mg (4 mg Intravenous $Given 6/9/23 0314)   0.9% sodium chloride BOLUS (0 mLs Intravenous Stopped 6/9/23 0430)   aspirin (ASA) chewable tablet 324 mg (324 mg Oral $Given 6/9/23 0426)   ticagrelor (BRILINTA) tablet 180 mg (180 mg Oral $Given 6/9/23 0433)   heparin (porcine) injection 1000 units/mL (rounds in 500 unit increments) (6,000 Units Intravenous $Given 6/9/23 0433)   ondansetron (ZOFRAN) injection 4 mg (4 mg  Intravenous $Given 6/9/23 0435)        Assessments:  0309 I obtained history and examined the patient, as noted above.  0413 I rechecked and updated the patient. His troponin is 7,340. I activated the Cath Lab at this time.    Independent Interpretation (X-rays, CTs, rhythm strip):  None    Consultations/Discussion of Management or Tests:  0432 I spoke with Dr. Pacheco from cardiology about the patient's presentation, findings, and plan of care.    Social Determinants of Health affecting care:   None    Disposition:  The patient  was transferred to cath lab.    Impression & Plan    CMS Diagnoses: The patient has a STEMI     The patient was evaluated at 0309   Cath lab transfer was delayed due to atypical presentation   ASA given in the ER    Medical Decision Making:  This 62-year-old man presents to the ER in the early morning hours with concerns of having abdominal discomfort and vomiting.  The patient was a very difficult historian.  It is unclear what his baseline mental state is, but he only speaks is someone with Asperger's disease or on some spectrum of autism.  He jumps from physical ailment to physical ailment with a very difficult time focusing on a specific concern.    As I attempted to get him to focus on his presentation at 3 in the morning, I urged him to provide a distinct timeline of what occurred.  From what I could determine, he was [place] drinking milk approximately 33 hours ago.  He thought that the milk tasted fine, but then he described feeling nausea after drinking it.  He had an episode of vomiting, occurring at approximately 8 PM.  He also describes some cramping in his abdomen that was mostly in the right side of the abdomen.  He has no history of gallstones or other issues with abdominal pain.  Nonetheless, with this he elected to go inside and rest.  He denied having any chest pain or shortness of breath associated with this.  He went to sleep and noted that  "he slept fairly well.  However, on waking yesterday, he states that he felt \"not right.\"  He described feeling tired and feeling the discomfort in his abdomen.  He tried to eat a few times but felt very nauseous when he did and came close to vomiting but denied having any true emesis.  Again, review of systems questioning showed negative for chest pain, shortness of breath, lightheadedness, or this discomfort being any worse with exertion, more typically being worse with eating or with standing.  When asking why he finally came in the middle of the night, he stated that he was worried that his \"gallbladder was acting up\" or also noting that \"I know my aorta runs in my stomach and I wonder if there is something wrong there.\"    He was triaged in the waiting room.  Fortunately, we were not on a wait and he was brought back to the room.  While he was walking back to the room, he mentioned to the nurse that sometimes he feels some discomfort into his right lateral chest and right shoulder.  However, he did not necessarily think that this was related to his stomach pain or vomiting.  By the time he arrived to the room, he denied having any ongoing chest discomfort or any significant abdominal pain.    With his complaint of chest pain, nursing started an IV and requested that an EKG be done.  This EKG was brought to me with concerning signs for ST elevations to the inferior leads with some reciprocal changes in aVL.  I immediately went to the bedside to assess the patient.  The patient was sitting comfortably across from a.  Reviewing his vital signs, he is mildly hypotensive at 90s over 60s.  Otherwise, he appeared well and was rather verbose, talking about the above issues of drinking milk, having some nausea, and having concerns for gallbladder pain.  On my exam, he is somewhat tender in the abdomen, especially in the right upper quadrant and intra-abdominal pathology seem to be his highest on the differential.  As I " "tried to pin him down on his chest discomfort, he denies having any active discomfort.  He noted that he sometimes could feel some discomfort in his chest if he was swimming, though he always felt this was due to having a bad shoulder and moving into his shoulder and holding his breath that he would sometimes have discomfort and the pain is similar.  Otherwise, he seems minimally concerned about the chest discomfort as it is not currently present and was less than his other issues of nausea and abdominal pain.    I asked to have a second EKG obtained.  This continues to show some elevations to the inferior leads, but with such an unusual presentation for STEMI, I felt it seemed prudent to wait for his troponin, especially considering that his symptoms had already been going on for greater than 30 hours and he was not having any active discomfort.  In the meantime, I reviewed his chart, attempting to find an EKG in the system.  While I was unable to physically see an EKG that was obtained in 2021 at an outside hospital, the computer read was that it was \"normal EKG.\"  This is somewhat concerning compared to what I was seeing on his EKG here.  CBC and comprehensive metabolic panel returned normal.  Finally, we received a call from the lab, stating that his troponin was greater than 7000.    With this, I was convinced that this unusual presentation with intermittent nausea and vomiting with right-sided abdominal pain was actually his anginal equivalent and I activated the Cath Lab.  We repeated 1 more EKG which continues to show stable but worrisome findings of Q waves in the inferior leads with some ST elevation.  Chest x-ray showed a narrow mediastinum and I felt this seemed to be unlikely to be representative of a aortic dissection and therefore heparin, aspirin, and Brilinta were all initiated.  I spoke with Dr. Cedeño of the interventional cardiology team who agrees to take the patient to the Cath Lab " emergently.  The patient was updated on the situation and he was almost incredulous, asking if we were going to get an ultrasound of his gallbladder or review other abdominal issues.  I explained to him that he may undergo further work-up, though at this juncture, all signs point toward his symptoms being related to angina.  The patient continued to be borderline hypotensive, and received some fluid.  This is likely due to right-sided involvement but there is no indication for pressors at this time.  Patient was transferred to the Cath Lab in stable condition for diagnostic and interventional assessment.    Critical Care time:  was 40 minutes for this patient excluding procedures.    Diagnosis:    ICD-10-CM    1. ST elevation myocardial infarction (STEMI), unspecified artery (H)  I21.3       2. ST elevation MI (STEMI) (H)  I21.3 Cardiac Catheterization     Cardiac Catheterization             Scribe Disclosure:  Alex ROD, am serving as a scribe at 3:09 AM on 6/9/2023 to document services personally performed by Trierweiler, Chad A, MD based on my observations and the provider's statements to me.      Trierweiler, Chad A, MD  06/09/23 [id number]

## 2023-06-09 NOTE — Clinical Note
The first balloon was inserted into the right coronary artery.Max pressure = 14 deejay. Total duration = 20 seconds.     Max pressure = 10 deejay. Total duration = 6 seconds.    Balloon reinflated a second time: Max pressure = 10 deejay. Total duration = 6 seconds.  Balloon reinflated a third time: Max pressure = 14 deejay. Total duration = 17 seconds.  Balloon reinflated a fourth time:

## 2023-06-09 NOTE — CONSULTS
NUTRITION BRIEF NOTE    Reason for Nutrition Consult:  Reason for Consult: Provider Order - specify Reason    Reason for Consult: Dietitian to see for Heart Healthy Diet education        Unable to complete nutrition assessment this time, currently CLD.   Will follow up for teaching prior to discharge as appropriate and/or when diet advanced.    Felicia Mccarthy RD, LD

## 2023-06-09 NOTE — Clinical Note
Balloon removed intact. Follow up with Art Teague in 1 year.   Both ears cleaned today.   Ears look good, no signs of infection.

## 2023-06-09 NOTE — PROGRESS NOTES
Patient is seen and examined this morning.  No angina.  No neurological deficit.  Reports feeling significantly better than prior to angiogram and PCI.  Some SOB with Brillanta. We will continue to monitor and perform medical management as appropriate.  He is known to have blood pressure on the low normal side.  Unless he is symptomatic we will hold off on intervening.  Will monitor.    Jaren Gonzalez MD  Cardiology

## 2023-06-09 NOTE — PROGRESS NOTES
"Hospitalist Brief Progress Note  6/9/2023  0800    Adam Sahni is a 62 year old male with a known history of peripheral artery disease, nephrolithiasis, prostate cancer and hyperlipidemia who presented to the ER with complaints of severe right-sided chest pain and right arm pain over the last 2 to 3 days, found to have a late presentation MI.     Acute Hypotension  Trending soft since cath, but abruptly dropped mid morning. Anginal symptoms resolved and has not recurred. Pt denies dizziness, lightheadedness, nausea/vomiting, has very tangential affect, difficult to assess HPI (pt seems almost euphoric). Left side lower than right.   DDx:   - acute in-stent occlusion? EKG shows SR w/ ST/TW changes consistent with recent MI, but no new evolving changes (discussed with Cardiology MODE, no significant worsening in EKG)  - cardiogenic shock? echocardiogram and lactic acid STAT to eval for cardiogenic shock (EF 48% on estimation) --> Results -  reassuring lactic acid (1.9), echo showing stable EF.  * Pt had total of 1L crystalloid during RRT, seems volume responsive.   - NICOM shows CO7.0, CI 3.3, SVI 53, TPRI 1729m SVI 28% indicating the patient is fluid responsive. Will order additional 500cc IV x 1 LR  - PE? echo and LE dopplers to eval for DVT (negative) or right sided changes to suggest PE   - sepsis? low suspicion, denies chills, afebrile, denies resp symptoms, GI distress, urinary symptoms. Will obtain CXR and UA to further eval   - labs: CMP, CBC, ionized calcium (4.3), Mag (1.7), phos (2.4) and - Hgb STAT to eval for acute blood loss  - replete electrolytes w/ phos and calcium gluconate     Addendum 1600  Pt is overall improved. Mental state seems significantly improved, sentences clear, and conversant. Pt endorses feeling much \"more coherent\". Denies any dyspnea, chest pain, and has a much better appetite. Urine output adequate. Pt has received a total of 2L crystalloid throughout AM/early afternoon on 6/9. " "    BP 95/64   Pulse 66   Temp 99.3  F (37.4  C) (Oral)   Resp 22   Ht 1.778 m (5' 10\")   Wt 92.1 kg (203 lb)   SpO2 98%   BMI 29.13 kg/m      Urinary Retention  Pt reports he has not urinated since 6/8 evening. Bladder scan found to have over 1L via bladder scanner   - able to void ~400cc   - UA pending    Acute on Chronic Anemia  Hgb 10.9 (13 at 0309) on arrival, pt had 2+ gram drop over 5 hours, however was volume resuscitated. Drop could represent blood loss vs dilutional effect from volume resuscitation. Of note, pt had intracoronary bolus of Integrilin due to heavy thrombus burden (recheck plts now)  - serial hemoglobin x 24 hours, 1400 and 2000    Hyponatremia  Na 130 on admission, suspect hypovolemia in etiology d/t 3 days of vomiting prior to arrival. After volume resuscitation overnight, Na 134.   - continue maintenance fluids and recheck this evening w/ hgb recheck (2000)  - BMP tomorrow AM    Hypocalcemia, mild  Ionized calcium 4.3  - calcium gluconate 1 gram x 1    Inferior STEMI s/p PCI w/ UGO to RCA, 6/9/23  CAD w/ residual disease to LAD   * New meds initiated post cath - aspirin 81 daily, Brilinta 90 mg every 12 hours, Lipitor 40 mg daily  * Trops trending - 7340-->7977  - mildly hypotensive, thought to be hypovolemic from poor oral intake and intractable nausea/vomiting - IVF initiated post cath   - echo pending   * HgbA1c 5.7 6/9/23  - will recommend healthy lifestyle adjustments to lower risk profile  - patient instructions added to discharge     PVD s/p IR angio s/p stent placement, 2021  IR angio demonstrates significant thrombus and stenosis of the left common iliac artery for which he underwent kissing iliac stent placement.  - continue PTA aspirin 81 daily, atorvastatin 80 mg daily,  - hold cilostazol 100 mg daily due to concern of bleeding (in the setting of DAPT)    Elevated LFTs  Suspect secondary to STEMI.   - CMP 6/10 to trend     Peripheral neuropathy  [PTA gabapentin]  - " continue gabapentin    Depression  - continue PTA sertraline    Prostate cancer s/p external beam radiation therapy  - noted   - continue on androgen deprivation (28 months total)    Tobacco Abuse, current   Smokes ~7 cigarettes/day.   - tobacco cessation consult placed     Pt was seen/examined, meds reconciled.     Physical Exam  Vitals and nursing note reviewed.   HENT:      Mouth/Throat:      Mouth: Mucous membranes are moist.      Dentition: Abnormal dentition.      Comments: No obvious signs of infection, no acute pain  Eyes:      General: No visual field deficit.     Pupils: Pupils are equal, round, and reactive to light.   Cardiovascular:      Rate and Rhythm: Normal rate and regular rhythm.      Heart sounds: Normal heart sounds. No murmur heard.  Pulmonary:      Effort: Pulmonary effort is normal. No respiratory distress.      Breath sounds: Normal breath sounds. No wheezing.   Abdominal:      General: Abdomen is flat. There is no distension.      Palpations: Abdomen is soft.      Tenderness: There is no abdominal tenderness.   Musculoskeletal:      Right lower leg: No edema.      Left lower leg: No edema.   Skin:     General: Skin is warm and dry.   Neurological:      General: No focal deficit present.      Mental Status: He is alert. Mental status is at baseline.      GCS: GCS eye subscore is 4. GCS verbal subscore is 5. GCS motor subscore is 6.      Cranial Nerves: No cranial nerve deficit, dysarthria or facial asymmetry.      Sensory: No sensory deficit.   Psychiatric:         Mood and Affect: Mood is elated.         Speech: Speech is tangential.       FIDEL Guerin CNP  [facility]  Securely message with the Vocera Web Console (learn more here)  Text page via [employer] Paging/Directory

## 2023-06-09 NOTE — PROVIDER NOTIFICATION
Karlee completed. Qian Rangel NP updated. Pt found to be fluid responsive with an increase in SVI of 28.9%. Orders followed for fluid bolus of 500 LR following 1gm of ca. csccrn

## 2023-06-09 NOTE — Clinical Note
The first balloon was inserted into the right coronary artery.Max pressure = 6 deejay. Total duration = 12 seconds.     Max pressure = 10 deejay. Total duration = 8 seconds.    Balloon reinflated a second time: Max pressure = 10 deejay. Total duration = 8 seconds.  Balloon reinflated a third time: Max pressure = 12 deejay. Total duration = 12 seconds.

## 2023-06-09 NOTE — PHARMACY-ADMISSION MEDICATION HISTORY
Pharmacist Admission Medication History    Admission medication history is complete. The information provided in this note is only as accurate as the sources available at the time of the update.    Medication reconciliation/reorder completed by provider prior to medication history? No    Information Source(s): Patient and CareEverywhere/SureScripts via in-person    Pertinent Information: Patient reported is out of iron prescription and will need to get refill at discharge.    Changes made to PTA medication list:    Added: iron, Lyrica, fish oil, berberine, vit K2    Deleted: aspirin, Miralax    Changed:   o Alendronate 35mg weekly --> 75mg weekly (does not have a specific day of the week that he takes med; it varies)  o Gabapentin 300mg TID --> 600mg TID prn (rarely uses now that he is on Lyrica)  o Zoloft 50mg daily --> 150mg daily  o Vit D2 50,000 units weekly --> 5,000 units daily    Medication Affordability:  Not including over the counter (OTC) medications, was there a time in the past 3 months when you did not take your medications as prescribed because of cost?: No    Allergies reviewed with patient and updates made in EHR: yes    Medication History Completed By: Piper Delaney RPH 6/9/2023 10:36 AM    Prior to Admission medications    Medication Sig Last Dose Taking? Auth Provider Long Term End Date   alendronate (FOSAMAX) 70 MG tablet Take 70 mg by mouth every 7 days  Yes Unknown, Entered By History Yes    aspirin (ASA) 81 MG chewable tablet Take 1 tablet (81 mg) by mouth daily Starting tomorrow.  Yes Rosenda Pacheco MD     atorvastatin (LIPITOR) 80 MG tablet Take 80 mg by mouth At Bedtime  Yes Reported, Patient Yes    Barberry-Oreg Grape-Goldenseal (BERBERINE COMPLEX) 200-200-50 MG CAPS Take 2 capsules by mouth daily  Yes Unknown, Entered By History     cholecalciferol (VITAMIN D3) 125 mcg (5000 units) capsule Take 125 mcg by mouth daily  Yes Unknown, Entered By History     cilostazol (PLETAL) 100 MG  tablet Take 100 mg by mouth 2 times daily  Yes Reported, Patient Yes    ferrous sulfate (FEROSUL) 325 (65 Fe) MG tablet Take 325 mg by mouth every other day  Yes Unknown, Entered By History     fish oil-omega-3 fatty acids 1000 MG capsule Take 2 g by mouth daily  Yes Unknown, Entered By History     gabapentin (NEURONTIN) 600 MG tablet Take 600 mg by mouth 3 times daily as needed for neuropathic pain  Yes Unknown, Entered By History No    Menaquinone-7 (VITAMIN K2) 100 MCG CAPS Take 1 capsule by mouth daily  Yes Unknown, Entered By History     pregabalin (LYRICA) 75 MG capsule Take 75 mg by mouth 2 times daily  Yes Unknown, Entered By History Yes    sertraline (ZOLOFT) 100 MG tablet Take 150 mg by mouth daily  Yes Reported, Patient Yes

## 2023-06-09 NOTE — CONSULTS
[facility]    Cardiology Consultation     Date of Admission:  6/9/2023    Assessment & Plan   Adam Sahni is a 62 year old male who was admitted on 6/9/2023.    1. Late presentation inferior STEMI due to thrombotic occlusion of the proximal to mid right coronary artery.  2. PCI of the right coronary artery with drug-eluting stent x1.  3. Residual CAD with severe stenosis of the proximal circumflex, moderate to severe stenosis of the ramus intermediate branches and moderate stenosis of the mid LAD.  4. Dyslipidemia  5. Tobacco abuse    -Continue aspirin 81 mg p.o. daily indefinitely  -Continue ticagrelor 90 mg p.o. twice daily.  The patient vomited 10 minutes after his initial dose of ticagrelor.  No tablets were seen in the emesis however due to uncertainty as to whether the ticagrelor was absorbed please administer a second 90 mg of ticagrelor now.  -Stat platelet count postprocedure due to administration of a single intracoronary bolus of Integrilin due to heavy thrombus burden.  -statin  -echo  -Defer initiation of beta-blocker due to relative hypotension (systoli mostly 100s, but lower the nitroglycerin which was administered due to coronary vasospasm) during the procedure.  -smoking cessation recommended and was specifically discussed with the patient.    Addendum: PCI of the small to medium size LCx can be considered on an outpatient elective basis vs managed medically with close follow up.       Critical Care: Total critical care time spent: 35    Rosenda Pacheco MD, MD    Primary Care Physician   Physician No Ref-Primary    Reason for Consult   Reason for consult: I was asked by Dr. Treierweiler to evaluate this patient for STEMI.    History of Present Illness   62-year-old male smoker with history of dyslipidemia and peripheral arterial disease with history of left iliac stent presented to the emergency department with 2 days of nausea and vomiting and right arm pain.  2 days  prior to presentation he had an episode of severe right arm pain and nausea.  He continued to have intermittent symptoms which worsened again yesterday and he presented early in the morning today Friday, June 9, 2023 with nausea symptoms.  Initial EKG showed sinus arrhythmia with inferior MI with ST elevation and Q waves and biphasic T waves and lateral T wave inversion.  Due to atypical presentation the Cath Lab was not initially activated but initial high-sensitivity troponin was greater than 7000 at that point Cath Lab was activated.    He received aspirin, Brilinta, and heparin in the emergency department.  On transfer to the Cath Lab he was receiving IV fluids for blood pressures which were initially in the 90s and he was complaining of right arm pain and nausea.    Past Medical History   Dyslipidemia  Depression  Peripheral arterial disease  He has history of left iliac thrombosis and stent in 2021    Past Surgical History   History of left iliac stent  Colonoscopy    Prior to Admission Medications   Prior to Admission Medications   Prescriptions Last Dose Informant Patient Reported? Taking?   alendronate (FOSAMAX) 35 MG tablet   Yes No   Sig: Take 35 mg by mouth every 7 days   aspirin 81 MG EC tablet   Yes No   Sig: Take 81 mg by mouth   atorvastatin (LIPITOR) 80 MG tablet   Yes No   Sig: Take 80 mg by mouth   cilostazol (PLETAL) 100 MG tablet   Yes No   Sig: Take 100 mg by mouth   gabapentin (NEURONTIN) 300 MG capsule   Yes No   Sig: Take 300 mg by mouth   polyethylene glycol (MIRALAX) 17 g packet   Yes No   Sig: Take 1 packet by mouth daily   sertraline (ZOLOFT) 50 MG tablet   Yes No   Sig: Take 50 mg by mouth   vitamin D2 (ERGOCALCIFEROL) 08895 units (1250 mcg) capsule   Yes No   Sig: Take 50,000 Units by mouth      Facility-Administered Medications: None     Current Facility-Administered Medications   Medication Dose Route Frequency     aspirin  81 mg Oral Once     [START ON 6/10/2023] aspirin  81 mg Oral  "Daily     atorvastatin  40 mg Oral Daily     ticagrelor  90 mg Oral Q12H     Current Facility-Administered Medications   Medication Last Rate     Percutaneous Coronary Intervention orders placed (this is information for BPA alerting)       BETA BLOCKER NOT PRESCRIBED       sodium chloride       Allergies   Allergies   Allergen Reactions     Penicillins Hives, Shortness Of Breath and Swelling     Reports also having throat swelling- took medication as a child.         Social History   He is a current smoker    Family History   Both parents  of heart disease       Review of Systems   A comprehensive review of system was performed and is negative other than that noted in the HPI or here.     Physical Exam   Vital Signs with Ranges  Temp:  [97.8  F (36.6  C)] 97.8  F (36.6  C)  Pulse:  [65-98] 81  Resp:  [12-21] 14  BP: ()/(51-68) 86/51  SpO2:  [98 %] 98 %  Wt Readings from Last 4 Encounters:   23 88.5 kg (195 lb)   23 93 kg (205 lb)   21 88.5 kg (195 lb)   21 88.5 kg (195 lb)     No intake/output data recorded.      Vitals: BP (!) 86/51   Pulse 81   Temp 97.8  F (36.6  C) (Temporal)   Resp 14   Ht 1.778 m (5' 10\")   Wt 88.5 kg (195 lb)   SpO2 98%   BMI 27.98 kg/m      Physical Exam:   General - Alert and oriented to time place and person in no acute distress  Eyes - No scleral icterus  HEENT - Neck supple, moist mucous membranes  Cardiovascular -regular rate and rhythm no appreciable murmur  Extremities - There is no peripheral edema  Respiratory -lungs are clear bilaterally  Skin - No pallor or cyanosis  Gastrointestinal - Non tender and non distended without rebound or guarding  Psych - Appropriate affect   Neurological - No gross motor neurological focal deficits    No lab results found in last 7 days.    Invalid input(s): TROPONINIES    Recent Labs   Lab 23  0309   WBC 10.5   HGB 13.0*   MCV 88      *   POTASSIUM 3.9   CHLORIDE 93*   CO2 21*   BUN 12.2 "   CR 0.87   GFRESTIMATED >90   ANIONGAP 16*   OJ 9.4   *   ALBUMIN 4.1   PROTTOTAL 7.2   BILITOTAL 0.7   ALKPHOS 134*   ALT 62*   *     No results for input(s): CHOL, HDL, LDL, TRIG, CHOLHDLRATIO in the last 02955 hours.  Recent Labs   Lab 06/09/23  0309   WBC 10.5   HGB 13.0*   HCT 38.2*   MCV 88        No results for input(s): PH, PHV, PO2, PO2V, SAT, PCO2, PCO2V, HCO3, HCO3V in the last 168 hours.  No results for input(s): NTBNPI, NTBNP in the last 168 hours.  No results for input(s): DD in the last 168 hours.  No results for input(s): SED, CRP in the last 168 hours.  Recent Labs   Lab 06/09/23  0309        No results for input(s): TSH in the last 168 hours.  No results for input(s): COLOR, APPEARANCE, URINEGLC, URINEBILI, URINEKETONE, SG, UBLD, URINEPH, PROTEIN, UROBILINOGEN, NITRITE, LEUKEST, RBCU, WBCU in the last 168 hours.    Imaging:  Recent Results (from the past 48 hour(s))   XR Chest Port 1 View    Narrative    EXAM: XR CHEST PORT 1 VIEW  LOCATION: [facility]  DATE/TIME: 6/9/2023 4:28 AM CDT    INDICATION: chest pain  COMPARISON: None.      Impression    IMPRESSION: Negative chest.   Cardiac Catheterization    Narrative       Prox Cx lesion is 70% stenosed.     Mid LAD lesion is 45% stenosed.     Ramus lesion is 60% stenosed.     Lat Ramus lesion is 50% stenosed.     Mid LM to Dist LM lesion is 20% stenosed.     Prox RCA to Mid RCA lesion is 100% stenosed.    Late-presentation inferior MI due to thrombotic occlusion of the proximal   right coronary artery.  Severe 70% stenosis of the proximal LCx which is a small to medium vessel.  Moderate to severe stenosis of two branches of the large ramus   intermedius.   Moderate stenosis of the midLAD.  PCI of the proximal to midRCA with aspiration thrombectomy and Cutting   Balloon angioplasty and placement of a 3.5 x 38 mm Synergy drug-eluting   stent which was postdilated to 4.0 mm.         Echo:  No  "results found for this or any previous visit (from the past 4320 hour(s)).    Clinically Significant Risk Factors Present on Admission                # Drug Induced Platelet Defect: home medication list includes an antiplatelet medication        # Overweight: Estimated body mass index is 27.98 kg/m  as calculated from the following:    Height as of this encounter: 1.778 m (5' 10\").    Weight as of this encounter: 88.5 kg (195 lb).         Shock: Cardiogenic shock  Hyponatremia               "

## 2023-06-09 NOTE — PLAN OF CARE
"Goal Outcome Evaluation: Pt has been hypotensive all shift, asymptomatic however. Pt c/o \"floaties\" blue and red intermittently so RRT called. Fluids given, echo done with 45-50% EF. SBP 70-90. Pt denies pain, N&V. Does have some SOB which may be attributed to Brilinta. Blayne LE US done which was negative for DVT. UA sent per order. Nycom done to determine fluid receptive. Right wrist site CDI but slightly ecchymotic. A&Ox4. Hgb 10.9, will do serial hgb.                        "

## 2023-06-09 NOTE — Clinical Note
The first balloon was inserted into the right coronary artery.Max pressure = 6 deejay. Total duration = 17 seconds.     Max pressure = 10 deejay. Total duration = 18 seconds.    Balloon reinflated a second time: Max pressure = 10 deejay. Total duration = 18 seconds.  Balloon reinflated a third time: Max pressure = 10 deejay. Total duration = 16 seconds.

## 2023-06-10 ENCOUNTER — APPOINTMENT (OUTPATIENT)
Dept: PHYSICAL THERAPY | Facility: CLINIC | Age: 63
End: 2023-06-10
Attending: HOSPITALIST
Payer: COMMERCIAL

## 2023-06-10 ENCOUNTER — APPOINTMENT (OUTPATIENT)
Dept: GENERAL RADIOLOGY | Facility: CLINIC | Age: 63
End: 2023-06-10
Attending: HOSPITALIST
Payer: COMMERCIAL

## 2023-06-10 ENCOUNTER — APPOINTMENT (OUTPATIENT)
Dept: PHYSICAL THERAPY | Facility: CLINIC | Age: 63
End: 2023-06-10
Payer: COMMERCIAL

## 2023-06-10 LAB
ALBUMIN SERPL BCG-MCNC: 3.3 G/DL (ref 3.5–5.2)
ALP SERPL-CCNC: 100 U/L (ref 40–129)
ALT SERPL W P-5'-P-CCNC: 50 U/L (ref 10–50)
ANION GAP SERPL CALCULATED.3IONS-SCNC: 11 MMOL/L (ref 7–15)
AST SERPL W P-5'-P-CCNC: 158 U/L (ref 10–50)
BILIRUB DIRECT SERPL-MCNC: <0.2 MG/DL (ref 0–0.3)
BILIRUB SERPL-MCNC: 0.5 MG/DL
BUN SERPL-MCNC: 11.2 MG/DL (ref 8–23)
CALCIUM SERPL-MCNC: 8.5 MG/DL (ref 8.8–10.2)
CHLORIDE SERPL-SCNC: 107 MMOL/L (ref 98–107)
CREAT SERPL-MCNC: 0.76 MG/DL (ref 0.67–1.17)
DEPRECATED HCO3 PLAS-SCNC: 20 MMOL/L (ref 22–29)
ERYTHROCYTE [DISTWIDTH] IN BLOOD BY AUTOMATED COUNT: 14.9 % (ref 10–15)
GFR SERPL CREATININE-BSD FRML MDRD: >90 ML/MIN/1.73M2
GLUCOSE SERPL-MCNC: 99 MG/DL (ref 70–99)
HCT VFR BLD AUTO: 30.6 % (ref 40–53)
HGB BLD-MCNC: 10.1 G/DL (ref 13.3–17.7)
MCH RBC QN AUTO: 29.9 PG (ref 26.5–33)
MCHC RBC AUTO-ENTMCNC: 33 G/DL (ref 31.5–36.5)
MCV RBC AUTO: 91 FL (ref 78–100)
PLATELET # BLD AUTO: 159 10E3/UL (ref 150–450)
POTASSIUM SERPL-SCNC: 3.8 MMOL/L (ref 3.4–5.3)
PROT SERPL-MCNC: 6 G/DL (ref 6.4–8.3)
RBC # BLD AUTO: 3.38 10E6/UL (ref 4.4–5.9)
SODIUM SERPL-SCNC: 138 MMOL/L (ref 136–145)
WBC # BLD AUTO: 8.7 10E3/UL (ref 4–11)

## 2023-06-10 PROCEDURE — 93010 ELECTROCARDIOGRAM REPORT: CPT | Performed by: INTERNAL MEDICINE

## 2023-06-10 PROCEDURE — 250N000013 HC RX MED GY IP 250 OP 250 PS 637: Performed by: NURSE PRACTITIONER

## 2023-06-10 PROCEDURE — 93005 ELECTROCARDIOGRAM TRACING: CPT

## 2023-06-10 PROCEDURE — 258N000003 HC RX IP 258 OP 636: Performed by: HOSPITALIST

## 2023-06-10 PROCEDURE — 80053 COMPREHEN METABOLIC PANEL: CPT | Performed by: HOSPITALIST

## 2023-06-10 PROCEDURE — 99233 SBSQ HOSP IP/OBS HIGH 50: CPT | Performed by: HOSPITALIST

## 2023-06-10 PROCEDURE — 250N000013 HC RX MED GY IP 250 OP 250 PS 637: Performed by: INTERNAL MEDICINE

## 2023-06-10 PROCEDURE — 71045 X-RAY EXAM CHEST 1 VIEW: CPT

## 2023-06-10 PROCEDURE — 97110 THERAPEUTIC EXERCISES: CPT | Mod: GP

## 2023-06-10 PROCEDURE — 85014 HEMATOCRIT: CPT | Performed by: HOSPITALIST

## 2023-06-10 PROCEDURE — 210N000001 HC R&B IMCU HEART CARE

## 2023-06-10 PROCEDURE — 97530 THERAPEUTIC ACTIVITIES: CPT | Mod: GP

## 2023-06-10 PROCEDURE — 97161 PT EVAL LOW COMPLEX 20 MIN: CPT | Mod: GP

## 2023-06-10 PROCEDURE — 250N000013 HC RX MED GY IP 250 OP 250 PS 637: Performed by: HOSPITALIST

## 2023-06-10 PROCEDURE — 82248 BILIRUBIN DIRECT: CPT | Performed by: HOSPITALIST

## 2023-06-10 PROCEDURE — 99232 SBSQ HOSP IP/OBS MODERATE 35: CPT | Performed by: INTERNAL MEDICINE

## 2023-06-10 PROCEDURE — 36415 COLL VENOUS BLD VENIPUNCTURE: CPT | Performed by: HOSPITALIST

## 2023-06-10 RX ORDER — PANTOPRAZOLE SODIUM 40 MG/1
40 TABLET, DELAYED RELEASE ORAL
Status: COMPLETED | OUTPATIENT
Start: 2023-06-10 | End: 2023-06-10

## 2023-06-10 RX ORDER — CLOPIDOGREL 300 MG/1
300 TABLET, FILM COATED ORAL ONCE
Status: COMPLETED | OUTPATIENT
Start: 2023-06-10 | End: 2023-06-10

## 2023-06-10 RX ORDER — CLOPIDOGREL BISULFATE 75 MG/1
75 TABLET ORAL DAILY
Status: DISCONTINUED | OUTPATIENT
Start: 2023-06-11 | End: 2023-06-11 | Stop reason: HOSPADM

## 2023-06-10 RX ADMIN — METOPROLOL TARTRATE 12.5 MG: 25 TABLET, FILM COATED ORAL at 21:18

## 2023-06-10 RX ADMIN — SERTRALINE HYDROCHLORIDE 150 MG: 50 TABLET ORAL at 08:20

## 2023-06-10 RX ADMIN — TICAGRELOR 90 MG: 90 TABLET ORAL at 05:55

## 2023-06-10 RX ADMIN — PREGABALIN 75 MG: 75 CAPSULE ORAL at 08:20

## 2023-06-10 RX ADMIN — ATORVASTATIN CALCIUM 80 MG: 80 TABLET, FILM COATED ORAL at 21:18

## 2023-06-10 RX ADMIN — PANTOPRAZOLE SODIUM 40 MG: 40 TABLET, DELAYED RELEASE ORAL at 10:39

## 2023-06-10 RX ADMIN — SODIUM CHLORIDE: 9 INJECTION, SOLUTION INTRAVENOUS at 02:59

## 2023-06-10 RX ADMIN — CLOPIDOGREL BISULFATE 300 MG: 300 TABLET, FILM COATED ORAL at 16:26

## 2023-06-10 RX ADMIN — PREGABALIN 75 MG: 75 CAPSULE ORAL at 21:18

## 2023-06-10 RX ADMIN — ASPIRIN 81 MG: 81 TABLET, COATED ORAL at 08:20

## 2023-06-10 RX ADMIN — METOPROLOL TARTRATE 12.5 MG: 25 TABLET, FILM COATED ORAL at 08:20

## 2023-06-10 ASSESSMENT — ACTIVITIES OF DAILY LIVING (ADL)
ADLS_ACUITY_SCORE: 19
ADLS_ACUITY_SCORE: 19
ADLS_ACUITY_SCORE: 18
ADLS_ACUITY_SCORE: 18
ADLS_ACUITY_SCORE: 19
ADLS_ACUITY_SCORE: 18
ADLS_ACUITY_SCORE: 18
ADLS_ACUITY_SCORE: 19
ADLS_ACUITY_SCORE: 18
ADLS_ACUITY_SCORE: 18

## 2023-06-10 NOTE — SIGNIFICANT EVENT
Significant Event Note    Time of event: 7:06 AM Rita 10, 2023    Description of event:  I spoke with the patient's sister Dr Sahni. She informed me that she is extremely concerned about the patient's management on his current unit. She does not believe that he is receiving close enough monitoring and would like him moved to a higher level of care. Her primary concern is that her brother may be getting fluid overloaded. He has received over 2L of fluid since his rapid response on 6/9. Additionally she verbalized concerns that the patient's intake and outputs are being monitored closely enough. I spoke with the charge nurse on Mr Sahni's unit and informed her of these concerns. Ins and outs can be monitored on his current unit and nursing is aware to monitor them. Additionally I requested that his cardiologist be notified and be made aware of Dr Sahni's concerns and request for a higher level of care.     Discussed with: patient's family/emergency contact and bedside nurse    Swathi Malik MD

## 2023-06-10 NOTE — PLAN OF CARE
06/10/23 0908   Appointment Info   Signing Clinician's Name / Credentials (PT) Alva Stoner DPT   Living Environment   People in Home alone   Current Living Arrangements apartment   Living Environment Comments elevator access   Self-Care   Usual Activity Tolerance fair   Current Activity Tolerance fair   Fall history within last six months yes   Number of times patient has fallen within last six months 1   Activity/Exercise/Self-Care Comment reports IND at baseline, hx lx fusion 07/22; hx of LE stents. Pt c/o heaviness in BLE limiting mobility which has been present > 1 yr. Baseline pt with limited activity tolerance, needs to rest after ambulating 2-3 blocks due to fatigue in BLE. enjoys pool exercises at his apartment complex. current smoker 7 cigarettes/day per pt report.   General Information   Onset of Illness/Injury or Date of Surgery 06/09/23   Referring Physician Nelda Nettles MD   Pertinent History of Current Problem (include personal factors and/or comorbidities that impact the POC) Adam Sahni is a 62 year old male with a known history of peripheral artery disease, nephrolithiasis, prostate cancer and hyperlipidemia who presented to the ER with complaints of severe right-sided chest pain and right arm pain over the last 2 to 3 days, found to have a late presentation MI.   Existing Precautions/Restrictions fall   Cognition   Cognitive Status Comments Pleasant, perseverates on discomfort in lower legs, needs some redirection during session   Pain Assessment   Patient Currently in Pain Yes, see Vital Sign flowsheet  (reporting discomfort in lower legs with ambulation)   Posture    Posture Forward head position   Range of Motion (ROM)   ROM Comment WFL   Strength (Manual Muscle Testing)   Strength Comments Gross functional weakness   Bed Mobility   Comment, (Bed Mobility) IND   Transfers   Comment, (Transfers) STS IND   Gait/Stairs (Locomotion)   Comment, (Gait/Stairs) IND household  "distances, WBOS, reporting numbness/tingling in BLE chronic   Balance   Balance Comments impaired high level dynamic balance   Sensory Examination   Sensory Perception Comments Numbness/tingling in BLE. Feeling of \"tightness\" in BLE. BLE ROM WFL. BLE heaviness and fatigue with ambulation described as lactic acid buildup. RN aware   Clinical Impression   Criteria for Skilled Therapeutic Intervention Yes, treatment indicated   PT Diagnosis (PT) impaired activity tolerance, impaired high level dynamic balance   Influenced by the following impairments Activity tolerance, dynamic balance   Functional limitations due to impairments impaired IND with mobility from baseline   Clinical Presentation (PT Evaluation Complexity) Stable/Uncomplicated   Clinical Presentation Rationale clinical judgement   Clinical Decision Making (Complexity) low complexity   Planned Therapy Interventions (PT) balance training;gait training;home exercise program;patient/family education;strengthening;stair training;progressive activity/exercise;risk factor education;home program guidelines   Risk & Benefits of therapy have been explained evaluation/treatment results reviewed;care plan/treatment goals reviewed;risks/benefits reviewed;patient   PT Total Evaluation Time   PT Eval, Low Complexity Minutes (80345) 12   Physical Therapy Goals   PT Frequency 2x/day   PT Predicted Duration/Target Date for Goal Attainment 06/17/23   PT Goals Cardiac Phase 1   PT: Understanding of cardiac education to maximize quality of life, condition management, and health outcomes Patient;Verbalize   PT: Perform aerobic activity with stable cardiovascular response intermittent;15 minutes;ambulation;NuStep   PT: Functional/aerobic ambulation tolerance with stable cardiovascular response in order to return to home and community environment Independent;100 feet;Modified independent;200 feet;Straight cane   PT: Navigation of stairs simulating home set up with stable " cardiovascular response in order to return to home and community environment 1 stair;Assistive device;Modified independent  (curb step for community ambulation)   PT Discharge Planning   PT Plan Progress ambulation trial SEC, LE strength, curbtep for community ambulation, nustep?, MI packet   PT Discharge Recommendation (DC Rec) home;home with outpatient cardiac rehab   PT Rationale for DC Rec Pt mobilizing near baseline, has chronc LE numbness/tingling, reports chronic fatigue in BLE when ambulating short distances. Recommend DC to home when medically stable with OP CR phase II to progress activity tolerance and strength   PT Brief overview of current status SBA   Total Session Time   Total Session Time (sum of timed and untimed services) 12

## 2023-06-10 NOTE — PROGRESS NOTES
[facility] Cardiology Progress Note    Date of Admission:  6/9/2023  Reason for Consult: STEMI    Subjective   Overnight, the patient has been feeling well though continues to have mild shortness of breath.  He had some lower systolic blood pressures and commenced on IV fluids.  This morning he has not had any chest pain or tachypalpitations.  He did also note some acute on chronic right lower extremity pain.    Objective   INTAKE / OUTPUT     Intake/Output Summary (Last 24 hours) at 6/10/2023 0719  Last data filed at 6/10/2023 0211  Gross per 24 hour   Intake 1005 ml   Output 1550 ml   Net -545 ml       VITAL SIGNS  Temp:  [98.6  F (37  C)-99.7  F (37.6  C)] 98.6  F (37  C)  Pulse:  [59-90] 67  Resp:  [16-40] 16  BP: ()/(52-76) 109/76  SpO2:  [96 %-100 %] 96 %  209 lbs 0 oz    PHYSICAL EXAM   Constitutional: Awake, alert, cooperative, no apparent distress.  Neck: No JVD  Cardiovascular: Regular rate and rhythm, normal S1 and S2, and no murmurs/rubs/gallops noted.  Respiratory: Clear to auscultation bilaterally, no crackles or wheezing.  Extremities: No lower extremity edema. Warm.   Vascular: Radial pulses 4+ and symmetric bilaterally.  Right radial access site clean, dry and intact without hematoma.    Data   Most Recent 3 CBC's:  Recent Labs   Lab Test 06/10/23  0623 06/09/23 2223 06/09/23  1507 06/09/23  1124 06/09/23  0736 06/09/23  0309   WBC 8.7  --   --  10.0  --  10.5   HGB 10.1* 10.5* 10.9* 10.9*  10.9*  --  13.0*   MCV 91  --   --  90  --  88     --   --  161 195 216     Most Recent 3  BMP's:  Recent Labs   Lab Test 06/09/23  2223 06/09/23  1124 06/09/23  0309 07/21/21  2244    134* 130* 139   POTASSIUM  --  3.7 3.9 3.9   CHLORIDE  --  101 93* 105   CO2  --  20* 21* 29   BUN  --  10.2 12.2 15   CR  --  0.79 0.87 0.97   ANIONGAP  --  13 16* 5   OJ  --  8.6* 9.4 9.4   GLC  --  153* 118* 115*     Most Recent 3 BNP's:No lab results found.    Most Recent Cholesterol Panel:  Recent Labs   Lab Test 06/09/23  0623   CHOL 194   *   HDL 35*   TRIG 76       Most Recent Transthoracic Echocardiogram:  Echo result w/o MOPS: Interpretation Summary Mildly decreased left ventricular systolic functionThe visual ejection fraction is 45-50%.Severe hypokinesis of the mid-apical inferior, mid anteroseptal, inferoseptal,apical septal wall segments.The right ventricle is normal in structure, function and size.No significant valve dysfunction. Aortic sclerosis.The inferior vena cava was normal in size with preserved respiratoryvariability.There is no pericardial effusion. No prior study for comparison.    Most Recent Cardiac Catheterization:  Cardiac Catheterization    Result Date: 6/9/2023     Prox Cx lesion is 70% stenosed.     Mid LAD lesion is 45% stenosed.     Ramus lesion is 60% stenosed.     Lat Ramus lesion is 50% stenosed.     Mid LM to Dist LM lesion is 20% stenosed.     Prox RCA to Mid RCA lesion is 100% stenosed.    Late-presentation inferior MI due to thrombotic occlusion of the proximal   right coronary artery.  Severe 70% stenosis of the proximal LCx which is a small to medium vessel.  Moderate to severe stenosis of two branches of the large ramus   intermedius.   Moderate stenosis of the midLAD.  PCI of the proximal to midRCA with aspiration thrombectomy and Cutting   Balloon angioplasty and placement of a 3.5 x 38 mm Synergy drug-eluting   stent which was postdilated to 4.0 mm.      Assessment & Plan   Mr. Sahni is a pleasant 62 year old year old male admitted with a late presenting inferior STEMI status post PCI to the RCA with resultant ischemic cardiomyopathy and multivessel CAD.    1.  Inferior STEMI, late presenting, status post PCI to the RCA 6/9/2023  2.  Multi vessel coronary artery disease including proximal circumflex, ramus intermedius and mid LAD  3.  Ischemic cardiomyopathy, LVEF 45 to 50%  4.  Hyperlipidemia,  (6/8/23)  5.   Nicotine dependence  6.  Transaminitis  7.  Peripheral arterial disease with prior iliac artery stenting    The patient is recovering from his inferior STEMI and known multivessel coronary artery disease.  He has had some mild shortness of breath which I suspect may be due to the ticagrelor and hence we will switch him over to Plavix for today.  I would like to begin a low-dose beta-blocker as well for secondary prevention.  I extensively discussed with him he has multiple cardiac risk factors including dyslipidemia and active nicotine dependence (half pack per day) which he is trying to address.  I would like to watch him in hospital for at least 48 hours post revascularization given the high risk features and he will need close outpatient follow-up and potential consideration of a staged procedure to his circumflex.    Plan:    Medical therapy: Stop ticagrelor.  Plavix 300 Mg oral once and Protonix 40 Mg once.  Aspirin 81 Mg daily, Plavix 75 mg daily, atorvastatin 80 mg daily.  Cautiously observe LFT trend giving addition of statin therapy.  We will repeat this as an outpatient in 6 to 8 weeks time as well.    As blood pressure allows we will introduce low-dose beta-blocker (metoprolol tartrate 12.5 mg BID, transition to Toprol near dismissal) and potentially ACE inhibitor given mildly reduced ejection fraction (currently hypotensive) receiving IV fluids    Nicotine cessation and cardiac rehabilitation    Upon outpatient follow-up we will consider PCI to the circumflex    Thank you for involving us in the care of this patient. We will continue to follow.     Han Monzon M.D.   6/10/2023

## 2023-06-10 NOTE — PLAN OF CARE
Goal Outcome Evaluation:Pt is A&Ox4, denies pain, VSS and on tele SR, on RA and LS clear, NS at 100 ml/hr, R radial site ecchymotic and good pulse and CMS, voiding in urinal and had BM. HGB 10.9 ar 4 pm and 10.5 at 2200. Serial troponin pending.       Plan of Care Reviewed With: patient    Overall Patient Progress: no changeOverall Patient Progress: no change

## 2023-06-10 NOTE — PROVIDER NOTIFICATION
MD Notification    Notified Person: MD    Notified Person Name:Dr. Malik    Notification Date/Time:6/10/23 0650    Notification Interaction:text/page    Purpose of Notification:Please call 276's family member at [phone], she would like to speak with you before you leave. Thanks    Orders Received:    Comments:

## 2023-06-10 NOTE — PROGRESS NOTES
[facility]    Medicine Progress Note - Hospitalist Service    Date of Admission:  6/9/2023    Assessment & Plan   Adam Sahni is a 62 year old male with a known history of peripheral artery disease, nephrolithiasis, prostate cancer and hyperlipidemia who presented to the ER with complaints of severe right-sided chest pain and right arm pain over the last 2 to 3 days, found to have a late presentation MI.      Inferior STEMI s/p PCI w/ UGO to RCA, 6/9/23  Presented with chest/abdominal pain, nausea, vomiting; atypical/late presentation. EKG abnormal, troponin elevated. Cath lab was activated.  S/p angiogram on 6/9/23 with PCI with UGO to 100% RCA occlusion. Has residual disease in the proximal circumflex, ramus intermediate branches, and mid LAD. See cath report for further details.    Admitted to inpatient.    Cardiology consulted, appreciate their assistance.    Has had intermittent shortness of breath since the procedure, concern that it may be related to ticagrelor. Ticagrelor switched to plavix on 6/10/23.    RRT on 6/9/23 due to hypotension, see RRT note for details.    Continue aspirin and atorvastatin.    Low dose metoprolol added on 6/10/23.    Consider ACE-I/ARB as blood pressure allows.    TTE showed LVEF 45-50%, regional wall motion abnormalities, no significant valve dysfunction. See report for further details.    Hemoglobin A1c 5.7 on 6/9/23.    Lipid panel: total cholesterol 247, triglycerides 242, , HDL 39.    Plan for medical management vs outpatient PCI to residual circumflex disease.    Counseled regarding management of cardiac risk factors.    Cardiac rehab.    Acute Hypotension  Trended soft since cath, but abruptly dropped mid morning on 6/9/23. Anginal symptoms had resolved and did not recur. Patient denied dizziness, lightheadedness, nausea/vomiting, but had very tangential affect, difficult to assess history, (patient seemed almost euphoric).    RRT  called, appreciate their assistance, see note for details.    Broad differential considered:    Acute in-stent occlusion? EKG shows SR w/ ST/TW changes consistent with recent MI, but no new evolving changes (discussed with Cardiology MODE, no significant worsening in EKG)    Cardiogenic shock? echocardiogram and lactic acid STAT to eval for cardiogenic shock (EF 48% on estimation) --> Results -  reassuring lactic acid (1.9), echo showing stable EF.    PE? echo and LE dopplers to eval for DVT (negative) or right sided changes to suggest PE (none).     Sepsis? low suspicion, denies chills, afebrile, denies resp symptoms, GI distress, urinary symptoms. UA normal. CXR negative.    Patient had total of 1L crystalloid during RRT, seemed volume responsive.     NICOM showed CO7.0, CI 3.3, SVI 53, TPRI 1729m SVI 28% indicating the patient is fluid responsive. Received additional LR bolus.    Blood pressure improved 6/10/23, SBP low 100s. Appears to be asymptomatic. Stop IV fluids.    Weight up 5-6 pounds today, likely related to fluid resuscitation on 6/9/23.  No evidence of CHF, does not need diuresis at this time.  Will continue to monitor volume status closely.     Urinary Retention  Patient reported he had not urinated since 6/8 evening. Bladder scan found to have over 1L via bladder scanner. Subsequent able to urinate.    Able to void on his own this morning. Post-void residual was 0.    UA normal.     Acute on Chronic Anemia  Hgb 10.9 (13 at 0309) on arrival, pt had 2+ gram drop over 5 hours, however was volume resuscitated. Drop could represent blood loss vs dilutional effect from volume resuscitation. Of note, pt had intracoronary bolus of Integrilin due to heavy thrombus burden (recheck plts OK).    Hemoglobin lowly drifting down, has gone from 10.9 - 10.1 over the course of the past 24 hours.    No signs/symptoms of bleeding noted.    Recheck CBC in AM.     Hyponatremia  Na 130 on admission, suspected hypovolemic in  etiology due to 3 days of vomiting prior to arrival. After volume resuscitation overnight, Na 134.  Sodium up to 138 on 6/10/23.    Stop IV fluids.    Recheck labs in AM.     Hypocalcemia, mild  Ionized calcium 4.3    Replaced with IV calcium.    Recheck in AM with ionized calcium.    Hypophosphatemia  Replaced.    Recheck in AM.    Peripheral vascular disease s/p IR angio s/p stent placement, 2021  Followed at [facility].  S/p bilateral kissing iliac stents after thrombolytics 07/16-/07/17/21 for severe claudication and complete occlusion of the left common iliac artery with tandem occlusions and/or high grade stenoses of the distal SFA and popliteal artery.    PTA pletal on hold for now, will discussed with cardiology regarding future plan for this.    On aspirin, atorvastatin, and plavix as noted above.     Elevated LFTs  Suspect secondary to STEMI.     LFTs improved on 6/10/23, recheck in AM.     Peripheral neuropathy  Lumbar radiculopathy  Follows with a pain clinic through [facility]. Had a caudal epidural steroid injection on 6/1/23.    Continue PTA gabapentin.    Increase activity with cardiac rehab as able.     Depression    Continue PTA sertraline.     Prostate cancer s/p external beam radiation therapy  Follows with Dr. Deo So in Radiation Oncology at [facility].  Status post external beam radiotherapy and 28 months of androgen deprivation.    Plan for outpatient follow-up with Dr. So as previously directed.     Tobacco Abuse, current   Smokes ~7 cigarettes/day.    Briefly counseled on tobacco cessation.    Tobacco cessation team consulted.       Diet: Regular Diet Adult    DVT Prophylaxis: Pneumatic Compression Devices  Nova Catheter: Not present  Lines: PRESENT             Cardiac Monitoring: ACTIVE order. Indication: Post- PCI/Angiogram (24 hours)  Code Status: Full Code      Clinically Significant Risk Factors          # Hypocalcemia: Lowest iCa = 4.3 mg/dL in last 2 days, will monitor and replace as  "appropriate     # Hypoalbuminemia: Lowest albumin = 3.2 g/dL at 6/9/2023 11:24 AM, will monitor as appropriate            # Overweight: Estimated body mass index is 29.89 kg/m  as calculated from the following:    Height as of this encounter: 1.778 m (5' 10\").    Weight as of this encounter: 94.5 kg (208 lb 4.8 oz)., PRESENT ON ADMISSION          Disposition Plan     Expected Discharge Date: 06/11/2023                  Glen Padilla MD  Hospitalist Service  [facility]  Securely message with virocyt (more info)  Text page via [facility] Paging/Directory   ______________________________________________________________________    Interval History   Adam Sahni was seen this morning.  He feels pretty good.  Main complaint is discomfort in his back and right leg and associated difficulty walking.  This has been an ongoing issue for him for several years and he has had multiple surgeries on his back in the past.  Symptoms are not any worse today than they have been in the last several months.  He was a little short of breath earlier this morning, resolved now.  Denies any chest pain, nausea, abdominal pain.    Updated patient's sister by phone.  She was initially quite confrontational and rude.  She was concerned that his stent may have clotted off and he may be developing congestive heart failure.  Discussed EKG results from earlier today.  Patient denied any chest pain or difficulty breathing when I saw him.  He is currently satting in the upper 90s on room air, has clear lung sounds, and has no edema.  She was mad that I&Os were not accurate, discussed that I agreed that I&Os are important and would discuss this with nursing, however not appropriate to place a Nova at this time as we can monitor I&O through other means without exposing him to the risk of CAUTI.  She seemed to be agreeable with current plan of care by the end of the discussion.  She is out of town today, but will be " returning tonight and plans to see her brother in the hospital tomorrow, will try to time my rounding so that I can meet with her tomorrow.    Physical Exam   Vital Signs: Temp: 98.6  F (37  C) Temp src: Oral BP: 101/60 Pulse: 78   Resp: 19 SpO2: 100 % O2 Device: None (Room air) Oxygen Delivery: 2 LPM  Weight: 208 lbs 4.8 oz    Constitutional: awake, alert, cooperative, no apparent distress, sitting up in a chair  Respiratory: no increased work of breathing, clear to auscultation bilaterally, no crackles or wheezing  Cardiovascular: regular rate and rhythm, normal S1 and S2, no murmur noted  GI: normal bowel sounds, soft, non-distended, non-tender  Skin: warm, dry  Musculoskeletal: no lower extremity pitting edema present  Neurologic: awake, alert, answers questions appropriately, moves ally extremities    Medical Decision Making       75 MINUTES SPENT BY ME on the date of service doing chart review, history, exam, documentation & further activities per the note.      Data     I have personally reviewed the following data over the past 24 hrs:    8.7  \   10.1 (L)   / 159     138 107 11.2 /  99   3.8 20 (L) 0.76 \       ALT: 50 AST: 158 (H) AP: 100 TBILI: 0.5   ALB: 3.3 (L) TOT PROTEIN: 6.0 (L) LIPASE: N/A       Trop: 6,127 (HH) BNP: N/A       Imaging results reviewed over the past 24 hrs:   Recent Results (from the past 24 hour(s))   XR Chest Port 1 View    Narrative    EXAM: XR CHEST PORT 1 VIEW  LOCATION: [facility]  DATE/TIME: 06/10/2023, 12:52 PM CDT    INDICATION: Shortness of breath.  COMPARISON: 06/09/2023.      Impression    IMPRESSION: Negative chest.

## 2023-06-10 NOTE — PLAN OF CARE
A&OX4, 2 L NC applied in the morning, BP soft, up with assist of 1, IVF infusing. LS diminished, denies pain. Resting between recliner and bed. Tele- SR, low urine output.

## 2023-06-11 ENCOUNTER — APPOINTMENT (OUTPATIENT)
Dept: PHYSICAL THERAPY | Facility: CLINIC | Age: 63
End: 2023-06-11
Payer: COMMERCIAL

## 2023-06-11 VITALS
DIASTOLIC BLOOD PRESSURE: 68 MMHG | HEIGHT: 70 IN | OXYGEN SATURATION: 98 % | TEMPERATURE: 98.7 F | RESPIRATION RATE: 17 BRPM | SYSTOLIC BLOOD PRESSURE: 96 MMHG | BODY MASS INDEX: 29.49 KG/M2 | WEIGHT: 206 LBS | HEART RATE: 55 BPM

## 2023-06-11 LAB
ALBUMIN SERPL BCG-MCNC: 3.2 G/DL (ref 3.5–5.2)
ALP SERPL-CCNC: 104 U/L (ref 40–129)
ALT SERPL W P-5'-P-CCNC: 40 U/L (ref 10–50)
ANION GAP SERPL CALCULATED.3IONS-SCNC: 10 MMOL/L (ref 7–15)
AST SERPL W P-5'-P-CCNC: 74 U/L (ref 10–50)
BILIRUB SERPL-MCNC: 0.5 MG/DL
BUN SERPL-MCNC: 11.3 MG/DL (ref 8–23)
CALCIUM SERPL-MCNC: 8.3 MG/DL (ref 8.8–10.2)
CHLORIDE SERPL-SCNC: 105 MMOL/L (ref 98–107)
CREAT SERPL-MCNC: 0.79 MG/DL (ref 0.67–1.17)
DEPRECATED HCO3 PLAS-SCNC: 22 MMOL/L (ref 22–29)
ERYTHROCYTE [DISTWIDTH] IN BLOOD BY AUTOMATED COUNT: 15 % (ref 10–15)
GFR SERPL CREATININE-BSD FRML MDRD: >90 ML/MIN/1.73M2
GLUCOSE SERPL-MCNC: 114 MG/DL (ref 70–99)
HCT VFR BLD AUTO: 29.8 % (ref 40–53)
HGB BLD-MCNC: 9.8 G/DL (ref 13.3–17.7)
MAGNESIUM SERPL-MCNC: 1.9 MG/DL (ref 1.7–2.3)
MCH RBC QN AUTO: 29.6 PG (ref 26.5–33)
MCHC RBC AUTO-ENTMCNC: 32.9 G/DL (ref 31.5–36.5)
MCV RBC AUTO: 90 FL (ref 78–100)
PHOSPHATE SERPL-MCNC: 2.7 MG/DL (ref 2.5–4.5)
PLATELET # BLD AUTO: 184 10E3/UL (ref 150–450)
POTASSIUM SERPL-SCNC: 3.9 MMOL/L (ref 3.4–5.3)
PROT SERPL-MCNC: 6 G/DL (ref 6.4–8.3)
RBC # BLD AUTO: 3.31 10E6/UL (ref 4.4–5.9)
SODIUM SERPL-SCNC: 137 MMOL/L (ref 136–145)
WBC # BLD AUTO: 8.3 10E3/UL (ref 4–11)

## 2023-06-11 PROCEDURE — 99232 SBSQ HOSP IP/OBS MODERATE 35: CPT | Performed by: INTERNAL MEDICINE

## 2023-06-11 PROCEDURE — 250N000013 HC RX MED GY IP 250 OP 250 PS 637: Performed by: NURSE PRACTITIONER

## 2023-06-11 PROCEDURE — 97110 THERAPEUTIC EXERCISES: CPT | Mod: GP

## 2023-06-11 PROCEDURE — 80053 COMPREHEN METABOLIC PANEL: CPT | Performed by: HOSPITALIST

## 2023-06-11 PROCEDURE — 250N000013 HC RX MED GY IP 250 OP 250 PS 637: Performed by: HOSPITALIST

## 2023-06-11 PROCEDURE — 36415 COLL VENOUS BLD VENIPUNCTURE: CPT | Performed by: HOSPITALIST

## 2023-06-11 PROCEDURE — 83735 ASSAY OF MAGNESIUM: CPT | Performed by: HOSPITALIST

## 2023-06-11 PROCEDURE — 250N000013 HC RX MED GY IP 250 OP 250 PS 637: Performed by: INTERNAL MEDICINE

## 2023-06-11 PROCEDURE — 97530 THERAPEUTIC ACTIVITIES: CPT | Mod: GP

## 2023-06-11 PROCEDURE — 84100 ASSAY OF PHOSPHORUS: CPT | Performed by: HOSPITALIST

## 2023-06-11 PROCEDURE — 99239 HOSP IP/OBS DSCHRG MGMT >30: CPT | Performed by: HOSPITALIST

## 2023-06-11 PROCEDURE — 85027 COMPLETE CBC AUTOMATED: CPT | Performed by: HOSPITALIST

## 2023-06-11 RX ORDER — ASPIRIN 81 MG/1
81 TABLET, CHEWABLE ORAL DAILY
Qty: 30 TABLET | Refills: 3 | COMMUNITY
Start: 2023-06-11

## 2023-06-11 RX ORDER — BUPROPION HYDROCHLORIDE 150 MG/1
TABLET, EXTENDED RELEASE ORAL
Qty: 57 TABLET | Refills: 0 | Status: SHIPPED | OUTPATIENT
Start: 2023-06-12 | End: 2023-09-02

## 2023-06-11 RX ORDER — NITROGLYCERIN 0.4 MG/1
TABLET SUBLINGUAL
Qty: 25 TABLET | Refills: 0 | Status: SHIPPED | OUTPATIENT
Start: 2023-06-11

## 2023-06-11 RX ORDER — METOPROLOL SUCCINATE 25 MG/1
25 TABLET, EXTENDED RELEASE ORAL DAILY
Qty: 30 TABLET | Refills: 0 | Status: ON HOLD | OUTPATIENT
Start: 2023-06-11 | End: 2023-06-20

## 2023-06-11 RX ORDER — BUPROPION HYDROCHLORIDE 150 MG/1
150 TABLET, EXTENDED RELEASE ORAL DAILY
Status: DISCONTINUED | OUTPATIENT
Start: 2023-06-11 | End: 2023-06-11 | Stop reason: HOSPADM

## 2023-06-11 RX ORDER — METOPROLOL TARTRATE 25 MG/1
25 TABLET, FILM COATED ORAL 2 TIMES DAILY
Status: DISCONTINUED | OUTPATIENT
Start: 2023-06-11 | End: 2023-06-11

## 2023-06-11 RX ORDER — CLOPIDOGREL BISULFATE 75 MG/1
75 TABLET ORAL DAILY
Qty: 30 TABLET | Refills: 0 | Status: SHIPPED | OUTPATIENT
Start: 2023-06-12 | End: 2023-09-02

## 2023-06-11 RX ADMIN — BUPROPION HYDROCHLORIDE 150 MG: 150 TABLET, EXTENDED RELEASE ORAL at 11:05

## 2023-06-11 RX ADMIN — CLOPIDOGREL BISULFATE 75 MG: 75 TABLET ORAL at 10:16

## 2023-06-11 RX ADMIN — METOPROLOL TARTRATE 12.5 MG: 25 TABLET, FILM COATED ORAL at 10:15

## 2023-06-11 RX ADMIN — SERTRALINE HYDROCHLORIDE 150 MG: 50 TABLET ORAL at 10:16

## 2023-06-11 RX ADMIN — ASPIRIN 81 MG: 81 TABLET, COATED ORAL at 10:16

## 2023-06-11 RX ADMIN — FERROUS SULFATE TAB 325 MG (65 MG ELEMENTAL FE) 325 MG: 325 (65 FE) TAB at 10:15

## 2023-06-11 RX ADMIN — PREGABALIN 75 MG: 75 CAPSULE ORAL at 10:16

## 2023-06-11 ASSESSMENT — ACTIVITIES OF DAILY LIVING (ADL)
ADLS_ACUITY_SCORE: 19

## 2023-06-11 NOTE — DISCHARGE SUMMARY
"[facility]  Hospitalist Discharge Summary      Date of Admission:  6/9/2023  Date of Discharge:  6/11/2023  Discharging Provider: Glen Padilla MD  Discharge Service: Hospitalist Service    Discharge Diagnoses   Inferior STEMI s/p PCI w/ UGO to RCA, 6/9/23  Coronary artery disease  Hyperlipidemia  Prediabetes  Acute Hypotension  Urinary Retention  Acute on Chronic Anemia  Hyponatremia  Hypocalcemia, mild  Hypophosphatemia  Peripheral vascular disease s/p IR angio s/p stent placement, 2021  Elevated LFTs  Peripheral neuropathy  Lumbar radiculopathy  Depression  Prostate cancer s/p external beam radiation therapy  Tobacco Abuse, current     Clinically Significant Risk Factors     # Overweight: Estimated body mass index is 29.56 kg/m  as calculated from the following:    Height as of this encounter: 1.778 m (5' 10\").    Weight as of this encounter: 93.4 kg (206 lb).       Follow-ups Needed After Discharge   Follow-up Appointments     Follow-up and recommended labs and tests       Follow up with primary care provider within 7 days for hospital follow-   up.  The following labs/tests are recommended: CBC and CMP.             Discharge Disposition   Discharged to home  Condition at discharge: Stable    Hospital Course   Adam Sahni is a 62 year old male with a known history of peripheral artery disease, nephrolithiasis, prostate cancer and hyperlipidemia who presented to the ER with complaints of severe right-sided chest pain and right arm pain over the last 2 to 3 days, found to have a late presentation MI.      Inferior STEMI s/p PCI w/ UGO to RCA, 6/9/23  Coronary artery disease  Hyperlipidemia  Prediabetes  Presented with chest/abdominal pain, nausea, vomiting; atypical/late presentation. EKG abnormal, troponin elevated. Cath lab was activated.  S/p angiogram on 6/9/23 with PCI with UGO to 100% RCA occlusion. Has residual disease in the proximal circumflex, ramus intermediate " branches, and mid LAD. See cath report for further details.    Admitted to inpatient.    Cardiology consulted, appreciate their assistance.    Had intermittent shortness of breath after the procedure, concern that it may be related to ticagrelor. Ticagrelor switched to plavix on 6/10/23. No further complaints of shortness of breath since the medication was changed.    RRT on 6/9/23 due to hypotension, see RRT note for details.    Continue aspirin and atorvastatin.    Low dose metoprolol added on 6/10/23, will switch to Toprol XL upon discharge.    Consider ACE-I/ARB as an outpatient as blood pressure allows.    TTE showed LVEF 45-50%, regional wall motion abnormalities, no significant valve dysfunction. See report for further details.    Hemoglobin A1c 5.7 on 6/9/23. Consistent with prediabetes. Discussed dietary modifications. Follow-up with PCP.    Lipid panel: total cholesterol 247, triglycerides 242, , HDL 39. Started on atorvastatin as noted above.    Plan for medical management vs outpatient PCI to residual circumflex disease.    Counseled regarding management of cardiac risk factors.    Cardiac rehab.    Discharge home today.    Follow-up with PCP, cardiology, and cardiac rehab.    Discussed reasons to seek medical attention prior to follow-up appointments.     Discussed plan of care with patient's sister on the morning of discharge. I was in his room for about an hour, cardiology was present for about 30 minutes as well and we discussed hospital course to date, answered questions, discussed recommendations, etc. Sister was quite pleasant today and was very appreciative of the care provided to her brother.    Acute Hypotension  Trended soft since cath, but then abruptly dropped mid morning on 6/9/23. Anginal symptoms had resolved and did not recur. Patient denied dizziness, lightheadedness, nausea/vomiting, but had very tangential affect, difficult to assess history, (patient seemed almost  euphoric).    RRT called, appreciate their assistance, see note for details.    Broad differential considered:    Acute in-stent occlusion? EKG shows SR w/ ST/TW changes consistent with recent MI, but no new evolving changes (discussed with Cardiology OMDE, no significant worsening in EKG)    Cardiogenic shock? echocardiogram and lactic acid STAT to eval for cardiogenic shock (EF 48% on estimation) --> Results -  reassuring lactic acid (1.9), echo showing stable EF.    PE? echo and LE dopplers to eval for DVT (negative) or right sided changes to suggest PE (none).     Sepsis? low suspicion, denies chills, afebrile, denies resp symptoms, GI distress, urinary symptoms. UA normal. CXR negative.    Patient had total of 1L crystalloid during RRT, seemed volume responsive.     NICOM showed CO7.0, CI 3.3, SVI 53, TPRI 1729m SVI 28% indicating the patient is fluid responsive. Received additional LR bolus.    Blood pressure improved 6/10/23, SBP low 100s. Appeared asymptomatic. Stopped IV fluids.    Weight up 5-6 pounds on 6/10, down by two pounds on 6/11. Weight 206 pounds on the day of discharge.  No evidence of CHF, does not need diuresis at this time.     Urinary Retention  Patient reported he had not urinated since 6/8 evening. Bladder scan found to have over 1L via bladder scanner. Subsequent able to urinate.    Able to void on his own this morning. Post-void residual was 0.    UA normal.    No further issues with urinary retention.     Acute on Chronic Anemia  Hgb 10.9 (13 at 0309) on arrival, pt had 2+ gram drop over 5 hours, however was volume resuscitated. Drop could represent blood loss vs dilutional effect from volume resuscitation. Of note, pt had intracoronary bolus of Integrilin due to heavy thrombus burden (recheck plts OK).    Hemoglobin slowly drifting down, has gone from 10.9 - 9.8 over the past two days.    No signs/symptoms of bleeding noted.    Continue iron supplement.    Follow-up with PCP with repeat  CBC.     Hyponatremia  Na 130 on admission, suspected hypovolemic in etiology due to 3 days of vomiting prior to arrival. After volume resuscitation overnight, Na 134.  Sodium up to 138 on 6/10/23.    Resolved with IV fluids.    Sodium 137 on the day of discharge.     Hypocalcemia, mild  Ionized calcium 4.3    Replaced with IV calcium.    Hypophosphatemia  Replaced.    Resolved with replacement.    Peripheral vascular disease s/p IR angio s/p stent placement, 2021  Followed at [facility].  S/p bilateral kissing iliac stents after thrombolytics 07/16-/07/17/21 for severe claudication and complete occlusion of the left common iliac artery with tandem occlusions and/or high grade stenoses of the distal SFA and popliteal artery.    PTA pletal held initially. Discussed with cardiology, plan to resume upon discharge pending outpatient follow-up.    On aspirin, atorvastatin, and plavix as noted above.     Elevated LFTs  Suspect secondary to STEMI.     LFTs improved on 6/11/23.    Recheck at outpatient follow-up.     Peripheral neuropathy  Lumbar radiculopathy  Follows with a pain clinic through [facility]. Had a caudal epidural steroid injection on 6/1/23.    Continue PTA gabapentin.    Increase activity with cardiac rehab.    Follow up with pain clinic as previously arranged.     Depression    Continue PTA sertraline.    Wellbutrin added as noted below for smoking cessation.     Prostate cancer s/p external beam radiation therapy  Follows with Dr. Deo So in Radiation Oncology at [facility].  Status post external beam radiotherapy and 28 months of androgen deprivation.    Plan for outpatient follow-up with Dr. So as previously directed.     Tobacco Abuse, current   Smokes ~7 cigarettes/day.    Briefly counseled on tobacco cessation.    Tobacco cessation team consulted.    Discussed medications to assist with smoking cessation, patient would like to try Wellbutrin. He has used this in the past and tolerated it well, but was  not successful quitting smoking. STEMI has motivated him to stop smoking and he would like to give Wellbutrin a try again. Discussed potential side effects. Plan follow-up with PCP.    Consultations This Hospital Stay   HOSPITALIST IP CONSULT  NUTRITION SERVICES ADULT IP CONSULT  CARDIAC REHAB IP CONSULT  CARDIOLOGY IP CONSULT  PHARMACY IP CONSULT  SMOKING CESSATION PROGRAM IP CONSULT  CARDIOLOGY IP CONSULT  SMOKING CESSATION PROGRAM IP CONSULT    Code Status   Full Code    Time Spent on this Encounter   I, Glen Padilla MD, personally saw the patient today and spent greater than 30 minutes discharging this patient.       Glen Padilla MD  [facility]  [address]  [facility] [address]  Phone: [phone]  ______________________________________________________________________    Physical Exam   Vital Signs: Temp: 98.7  F (37.1  C) Temp src: Oral BP: 101/68 Pulse: 67   Resp: 17 SpO2: 98 % O2 Device: None (Room air)    Weight: 206 lbs 0 oz  Constitutional: awake, alert, cooperative, no apparent distress, laying in the hospital bed  Respiratory: no increased work of breathing, clear to auscultation bilaterally, no crackles or wheezing  Cardiovascular: regular rate and rhythm, normal S1 and S2, no murmur noted  GI: normal bowel sounds, soft, non-distended, non-tender  Skin: warm, dry  Musculoskeletal: no lower extremity pitting edema present  Neurologic: awake, alert, answers questions appropriately, moves all extremities       Primary Care Physician   Physician No Ref-Primary    Discharge Orders      Comprehensive metabolic panel     CBC with platelets     CARDIAC REHAB REFERRAL      Follow-Up with Cardiology      EKG 12-lead complete w/read  (to be scheduled)     Medication Instructions - Anticoagulants    Do NOT stop your aspirin or platelet inhibitor unless directed by your Cardiologist.  These medications help to prevent platelets in your blood from  sticking together and forming a clot.  Examples of these medications are:  Ticagrelor (Brilinta), Clopidigrel (Plavix), Prasugrel (Effient)     When to call - Contact the Heart Clinic    You may experience symptoms that require follow-up before your scheduled appointment. Contact the Heart Clinic if you develop: Fever over 100.4o Fahrenheit, that lasts more than one day; Redness, heat, or pus at the puncture site; Change in color or temperature in your hand or arm.     When to call - Reasons to Call 911    If your wrist puncture site starts bleeding after discharge, sit down and apply firm pressure with your thumb against the puncture site and fingers against the back of the wrist for 10 minutes. If the bleeding stops, continue to rest, keeping your wrist still for 2 hours. Notify your doctor as soon as possible.  IF BLEEDING DOES NOT STOP OR THERE IS A LARGER AMOUNT OF BLEEDING OR SPURTING CALL 9-1-1 immediately.DO NOT drive yourself to the hospital.     Precautions - Lifting    DO NOT lift more than 5 pounds with affected arm for 48 hours     Precautions - Household Activities    Avoid excessive bending or movement of your wrist for 72 hours.  Do not subject hand/arm to any forceful movements for 24 hours, such as supporting weight when rising from a chair or bed.     Remove the band-aid on the puncture site after 24 hours and leave open to air. If minor oozing, you may apply a band-aid and remove after 12 hours.     Precautions - Active Sports Activities    DO NOT engage in vigorous exercise using your affected arm for 3 days after discharge.  This includes golf, tennis or swimming.     Precautions - Operating yard equipment or vehicles    Do not operate a chainsaw, lawnmower, motorcycle, or all-terrain vehicle for 48 hours after the procedure.     Precautions - Elective Dental Work    NO elective dental work for 6 weeks after receiving a stent.     Comfort and Pain Management - Bruising after Surgery    Expect  mild tingling of hand and tenderness at the wrist puncture site for up to 3 days. You may take Tylenol or a pain medicine recommended by your doctor.     Activity - Cardiac Rehab    You are encouraged to enroll in an Outpatient Cardiac Rehab program after discharge from the hospital.  Our Cardiac Rehab staff may visit briefly with you while you're in the hospital.  If they miss you, someone will contact you after you are home.     Return to Driving    Driving is NOT permitted for 24 hours after surgery     Return to work    You may return to work after 72 hours if you are feeling well and your job does not involve heavy lifting.     Shower / Bathing    You may shower on the day after your procedure.  DO NOT soak of wrist with the puncture site in water for 3 days to prevent infection. DO NOT take a tub bath or wash dishes for 3 days after the procedure     Dressing Removal    Remove the band-aid on the puncture site after 24 hours and leave open to air. If minor oozing, you may apply a band-aid and remove after 12 hours     Reason Lipid Lowering Medications not prescribed from this order set    Patient already has home rx. Defer to inpatient team     Reason for your hospital stay    Inferior STEMI     Follow-up and recommended labs and tests     Follow up with primary care provider within 7 days for hospital follow- up.  The following labs/tests are recommended: CBC and CMP.     Activity    Your activity upon discharge: as directed by cardiac rehab     Diet    Follow this diet upon discharge: low fat, sodium less than 2400 mg/day     Significant Results and Procedures   Most Recent 3 CBC's:Recent Labs   Lab Test 06/11/23  0529 06/10/23  0623 06/09/23  2223 06/09/23  1507 06/09/23  1124   WBC 8.3 8.7  --   --  10.0   HGB 9.8* 10.1* 10.5*   < > 10.9*  10.9*   MCV 90 91  --   --  90    159  --   --  161    < > = values in this interval not displayed.     Most Recent 3 BMP's:Recent Labs   Lab Test 06/11/23 0529  06/10/23  0623 06/09/23  2223 06/09/23  1124    138 137 134*   POTASSIUM 3.9 3.8  --  3.7   CHLORIDE 105 107  --  101   CO2 22 20*  --  20*   BUN 11.3 11.2  --  10.2   CR 0.79 0.76  --  0.79   ANIONGAP 10 11  --  13   OJ 8.3* 8.5*  --  8.6*   * 99  --  153*     Most Recent 2 LFT's:Recent Labs   Lab Test 06/11/23  0529 06/10/23  0623   AST 74* 158*   ALT 40 50   ALKPHOS 104 100   BILITOTAL 0.5 0.5     Most Recent Cholesterol Panel:Recent Labs   Lab Test 06/09/23 0623   CHOL 194   *   HDL 35*   TRIG 76     Most Recent Hemoglobin A1c:Recent Labs   Lab Test 06/09/23  0309   A1C 5.7*     Results for orders placed or performed during the hospital encounter of 06/09/23   XR Chest Port 1 View    Narrative    EXAM: XR CHEST PORT 1 VIEW  LOCATION: [facility]  DATE/TIME: 6/9/2023 4:28 AM CDT    INDICATION: chest pain  COMPARISON: None.      Impression    IMPRESSION: Negative chest.   XR Chest Port 1 View    Narrative    XR CHEST PORT 1 VIEW  6/9/2023 11:55 AM       INDICATION: new hypotension - eval for infiltrates, pulm etiology  and/or eval for volume overload post cor angio  COMPARISON: 6/9/2023       Impression    IMPRESSION: Negative chest.    KIRAN SONI MD         SYSTEM ID:  [id number]   US Lower Extremity Venous Duplex Bilateral    Narrative    ULTRASOUND VENOUS LOWER EXTREMITY BILATERAL   6/9/2023 12:21 PM     HISTORY: Bilateral lower extremity pain, swelling and edema. concern  for DVT Hypotension    COMPARISON: None.    TECHNIQUE: Ultrasound gray scale, Color Doppler flow, and spectral  Doppler waveform analysis performed.    FINDINGS:   The bilateral common femoral, superficial femoral, popliteal and  segmentally visualized calf veins are patent, fully compressible and  demonstrate antegrade Doppler flow. The visualized cephalad great  saphenous vein is negative for thrombus.      Impression    IMPRESSION:   The bilateral lower extremities are negative for deep  venous  thrombosis.    ERROL ALVAREZ MD         SYSTEM ID:  [id number]   XR Chest Port 1 View    Narrative    EXAM: XR CHEST PORT 1 VIEW  LOCATION: [facility]  DATE/TIME: 06/10/2023, 12:52 PM CDT    INDICATION: Shortness of breath.  COMPARISON: 2023.      Impression    IMPRESSION: Negative chest.       Echocardiogram Complete     Value    LVEF  45-50%    Narrative    [id number]  [id number]  [id number]  [id number]^GISELA^WILTON^KENTRELL     [facility]  Echocardiography Laboratory  [address] [place] [id number]     Name: JONATHAN BABB  MRN: [id number]  : 1960  Study Date: 2023 11:18 AM  Age: 62 yrs  Gender: Male  Patient Location: [facility]  Reason For Study: CAD  Ordering Physician: WILTON HIGGINS  Referring Physician: WILTON HIGGINS  Performed By: Rebecca Irving     BSA: 2.1 m2  Height: 70 in  Weight: 203 lb  HR: 65  BP: 83/53 mmHg  ______________________________________________________________________________  Procedure  Complete Portable Echo Adult. Optison (NDC #[id number]) given intravenously.  ______________________________________________________________________________  Interpretation Summary     Mildly decreased left ventricular systolic function  The visual ejection fraction is 45-50%.  Severe hypokinesis of the mid-apical inferior, mid anteroseptal, inferoseptal,  apical septal wall segments.  The right ventricle is normal in structure, function and size.  No significant valve dysfunction. Aortic sclerosis.  The inferior vena cava was normal in size with preserved respiratory  variability.  There is no pericardial effusion.     No prior study for comparison.  ______________________________________________________________________________  Left Ventricle  There is normal left ventricular wall thickness. Mildly decreased left  ventricular systolic function. The visual ejection fraction is 45-50%. Severe  hypokinesis of the mid-apical inferior,  mid anteroseptal, inferoseptal, apical  septal wall segments.     Right Ventricle  The right ventricle is normal in structure, function and size.     Atria  Normal left atrial size. Right atrial size is normal.     Mitral Valve  The mitral valve is normal in structure and function. There is mild (1+)  mitral regurgitation.     Tricuspid Valve  The tricuspid valve is normal in structure and function. There is mild (1+)  tricuspid regurgitation. Right ventricular systolic pressure could not be  approximated due to inadequate tricuspid regurgitation.     Aortic Valve  There is mild trileaflet aortic sclerosis.     Pulmonic Valve  The pulmonic valve is not well seen, but is grossly normal.     Vessels  The aortic root is normal size. Normal size ascending aorta. The inferior vena  cava was normal in size with preserved respiratory variability.     Pericardium  There is no pericardial effusion.     ______________________________________________________________________________  MMode/2D Measurements & Calculations  IVSd: 0.90 cm  LVIDd: 3.4 cm  LVIDs: 2.7 cm  LVPWd: 1.0 cm  FS: 20.6 %  LV mass(C)d: 91.8 grams  LV mass(C)dI: 43.7 grams/m2     Ao root diam: 3.4 cm  LA dimension: 3.9 cm  asc Aorta Diam: 3.3 cm  LA/Ao: 1.1  LVOT diam: 2.2 cm  LVOT area: 3.8 cm2  LA Volume (BP): 42.6 ml  LA Volume Index (BP): 20.3 ml/m2  RWT: 0.59  TAPSE: 2.0 cm     Doppler Measurements & Calculations  MV E max brett: 85.4 cm/sec  MV A max brett: 85.9 cm/sec  MV E/A: 0.99     MV dec time: 0.24 sec  Ao V2 max: 145.0 cm/sec  Ao max P.0 mmHg  Ao V2 mean: 95.6 cm/sec  Ao mean P.5 mmHg  Ao V2 VTI: 28.1 cm  DARREN(I,D): 2.3 cm2  DARREN(V,D): 2.6 cm2  LV V1 max PG: 3.8 mmHg  LV V1 max: 97.5 cm/sec  LV V1 VTI: 16.8 cm  SV(LVOT): 63.9 ml  SI(LVOT): 30.4 ml/m2  PA acc time: 0.11 sec  AV Brett Ratio (DI): 0.67  DARREN Index (cm2/m2): 1.1  E/E' av.9  Lateral E/e': 7.3  Medial E/e': 10.5  RV S Brett: 11.5 cm/sec      ______________________________________________________________________________  Report approved by: Teo Sandoval 06/09/2023 12:23 PM         Cardiac Catheterization    Narrative       Prox Cx lesion is 70% stenosed.     Mid LAD lesion is 45% stenosed.     Ramus lesion is 60% stenosed.     Lat Ramus lesion is 50% stenosed.     Mid LM to Dist LM lesion is 20% stenosed.     Prox RCA to Mid RCA lesion is 100% stenosed.    Late-presentation inferior MI due to thrombotic occlusion of the proximal   right coronary artery.  Severe 70% stenosis of the proximal LCx which is a small to medium vessel.  Moderate to severe stenosis of two branches of the large ramus   intermedius.   Moderate stenosis of the midLAD.  PCI of the proximal to midRCA with aspiration thrombectomy and Cutting   Balloon angioplasty and placement of a 3.5 x 38 mm Synergy drug-eluting   stent which was postdilated to 4.0 mm.       Discharge Medications   Current Discharge Medication List      START taking these medications    Details   aspirin (ASA) 81 MG chewable tablet Take 1 tablet (81 mg) by mouth daily  Qty: 30 tablet, Refills: 3    Associated Diagnoses: ST elevation myocardial infarction (STEMI), unspecified artery (H)      buPROPion (WELLBUTRIN SR) 150 MG 12 hr tablet Take 1 tablet (150 mg) by mouth daily for 3 days, THEN 1 tablet (150 mg) 2 times daily for 27 days.  Qty: 57 tablet, Refills: 0    Associated Diagnoses: Encounter for tobacco use cessation counseling      clopidogrel (PLAVIX) 75 MG tablet Take 1 tablet (75 mg) by mouth daily  Qty: 30 tablet, Refills: 0    Associated Diagnoses: ST elevation myocardial infarction (STEMI), unspecified artery (H)      metoprolol succinate ER (TOPROL XL) 25 MG 24 hr tablet Take 1 tablet (25 mg) by mouth daily  Qty: 30 tablet, Refills: 0    Associated Diagnoses: ST elevation myocardial infarction (STEMI), unspecified artery (H)      nitroGLYcerin (NITROSTAT) 0.4 MG sublingual tablet For chest  pain place 1 tablet under the tongue every 5 minutes for 3 doses. If symptoms persist 5 minutes after 1st dose call 911.  Qty: 25 tablet, Refills: 0    Associated Diagnoses: ST elevation myocardial infarction (STEMI), unspecified artery (H)         CONTINUE these medications which have NOT CHANGED    Details   alendronate (FOSAMAX) 70 MG tablet Take 70 mg by mouth every 7 days      atorvastatin (LIPITOR) 80 MG tablet Take 80 mg by mouth At Bedtime      Barberry-Oreg Grape-Goldenseal (BERBERINE COMPLEX) 200-200-50 MG CAPS Take 2 capsules by mouth daily      cholecalciferol (VITAMIN D3) 125 mcg (5000 units) capsule Take 125 mcg by mouth daily      cilostazol (PLETAL) 100 MG tablet Take 100 mg by mouth 2 times daily      ferrous sulfate (FEROSUL) 325 (65 Fe) MG tablet Take 325 mg by mouth every other day      fish oil-omega-3 fatty acids 1000 MG capsule Take 2 g by mouth daily      gabapentin (NEURONTIN) 600 MG tablet Take 600 mg by mouth 3 times daily as needed for neuropathic pain      Menaquinone-7 (VITAMIN K2) 100 MCG CAPS Take 1 capsule by mouth daily      pregabalin (LYRICA) 75 MG capsule Take 75 mg by mouth 2 times daily      sertraline (ZOLOFT) 100 MG tablet Take 150 mg by mouth daily           Allergies   Allergies   Allergen Reactions     Penicillins Hives, Shortness Of Breath and Swelling     Reports also having throat swelling- took medication as a child.

## 2023-06-11 NOTE — PROGRESS NOTES
[facility] Cardiology Progress Note    Date of Admission:  6/9/2023  Reason for Consult: STEMI    Subjective   Yesterday, the patient was able to ambulate without any cardiopulmonary symptoms.  He noted mild orthostasis yesterday when getting up.  This morning when visiting the patient in the presence of his sister, who is a physician and OB/GYN we had a long discussion about his hospital course, lifestyle occasions and medical management.  They asked very intelligent questions.    He did endorse continued intermittent right lower extremity pain.    Objective   INTAKE / OUTPUT     Intake/Output Summary (Last 24 hours) at 6/10/2023 0719  Last data filed at 6/10/2023 0211  Gross per 24 hour   Intake 1005 ml   Output 1550 ml   Net -545 ml       VITAL SIGNS  Temp:  [98.6  F (37  C)-99.4  F (37.4  C)] 98.7  F (37.1  C)  Pulse:  [60-82] 67  Resp:  [16-18] 17  BP: ()/(58-86) 101/68  SpO2:  [97 %-100 %] 98 %  206 lbs 0 oz    PHYSICAL EXAM   Constitutional: Awake, alert, cooperative, no apparent distress.  Neck: No JVD  Cardiovascular: Regular rate and rhythm, normal S1 and S2, and no murmurs/rubs/gallops noted.  Respiratory: Clear to auscultation bilaterally, no crackles or wheezing.  Extremities: No lower extremity edema. Warm.   Vascular: Radial pulses 4+ and symmetric bilaterally.  Right radial access site clean, dry and intact without hematoma.    Data   Most Recent 3 CBC's:  Recent Labs   Lab Test 06/11/23  0529 06/10/23  0623 06/09/23 2223 06/09/23  1507 06/09/23  1124   WBC 8.3 8.7  --   --  10.0   HGB 9.8* 10.1* 10.5*   < > 10.9*  10.9*   MCV 90 91  --   --  90    159  --   --  161    < > = values in this interval not displayed.     Most Recent 3  BMP's:  Recent Labs   Lab Test 06/11/23  0529 06/10/23  0623 06/09/23 2223 06/09/23  1124    138 137 134*   POTASSIUM 3.9 3.8  --  3.7   CHLORIDE 105 107  --  101   CO2 22 20*  --  20*   BUN 11.3 11.2   --  10.2   CR 0.79 0.76  --  0.79   ANIONGAP 10 11  --  13   OJ 8.3* 8.5*  --  8.6*   * 99  --  153*     Most Recent 3 BNP's:No lab results found.   Most Recent Cholesterol Panel:  Recent Labs   Lab Test 06/09/23  0623   CHOL 194   *   HDL 35*   TRIG 76       Most Recent Transthoracic Echocardiogram:  Echo result w/o MOPS: Interpretation Summary Mildly decreased left ventricular systolic functionThe visual ejection fraction is 45-50%.Severe hypokinesis of the mid-apical inferior, mid anteroseptal, inferoseptal,apical septal wall segments.The right ventricle is normal in structure, function and size.No significant valve dysfunction. Aortic sclerosis.The inferior vena cava was normal in size with preserved respiratoryvariability.There is no pericardial effusion. No prior study for comparison.    Most Recent Cardiac Catheterization:  Cardiac Catheterization    Result Date: 6/9/2023     Prox Cx lesion is 70% stenosed.     Mid LAD lesion is 45% stenosed.     Ramus lesion is 60% stenosed.     Lat Ramus lesion is 50% stenosed.     Mid LM to Dist LM lesion is 20% stenosed.     Prox RCA to Mid RCA lesion is 100% stenosed.    Late-presentation inferior MI due to thrombotic occlusion of the proximal   right coronary artery.  Severe 70% stenosis of the proximal LCx which is a small to medium vessel.  Moderate to severe stenosis of two branches of the large ramus   intermedius.   Moderate stenosis of the midLAD.  PCI of the proximal to midRCA with aspiration thrombectomy and Cutting   Balloon angioplasty and placement of a 3.5 x 38 mm Synergy drug-eluting   stent which was postdilated to 4.0 mm.      Assessment & Plan   Mr. Sahni is a pleasant 62 year old year old male admitted with a late presenting inferior STEMI status post PCI to the RCA with resultant ischemic cardiomyopathy and multivessel CAD.    1.  Inferior STEMI, late presenting, status post PCI to the RCA 6/9/2023  2.  Multi vessel coronary artery  disease including proximal circumflex, ramus intermedius and mid LAD  3.  Ischemic cardiomyopathy, LVEF 45 to 50%  4.  Hyperlipidemia,  (6/8/23)  5.  Nicotine dependence  6.  Transaminitis  7.  Peripheral arterial disease with prior iliac artery stenting    He continues to do well and is now greater than 48 hours post revascularization.  On telemetry review he has not had any ventricular arrhythmias and is asymptomatic from a cardiopulmonary perspective.  With the transition from ticagrelor to Plavix he is not endorsing any further shortness of breath.  We will leave him on aspirin/Plavix and high intensity statin on hospital dismissal.  I was hopeful to uptitrate his beta-blocker though his blood pressures have been prohibitive and additionally it is difficult to add on an ACE inhibitor in this context.  He and his sister did expressed interest in trialing some medical therapy for nicotine cessation and the primary team is going to look into this for him.  It will be very important he cuts back/completely eliminate his nicotine use and engages in cardiac rehabilitation.  If clinically stable, I think he could dismiss from the hospital late afternoon today.  Of note, the patient is on cilostazol for his PAD which is contraindicated in heart failure.  We will have to revisit this closely on his outpatient follow-up as he is still having ongoing leg pain so we did not discontinue this and if he certainly has reduction in his ventricular function I would stop the medication.    Plan:    Medical therapy: Aspirin 81 Mg daily, Plavix 75 mg daily, atorvastatin 80 mg daily.  Noted ticagrelor stopped given shortness of breath.  Cautiously observe LFT trend giving addition of statin therapy.  We will repeat this as an outpatient in 6 to 8 weeks time as well with a lipid panel for goal LDL ideally less than 70    Continue metoprolol tartrate 12.5 Mg twice daily and transition to Toprol 25 mg daily on dismissal.   Consider uptitration/ACE inhibitor initiation in outpatient setting as blood pressure allows.    Nicotine cessation and cardiac rehabilitation.  Primary team going to look into Wellbutrin/Chantix/nicotine products for him.    Upon outpatient follow-up we will consider PCI to the circumflex    Assess cilostazol use and claudication symptoms in outpatient setting    We will have the patient follow-up in cardiology clinic in 2 to 4 weeks and he should see his primary within the week to assess medical tolerance and up titration of medical therapy.    Thank you for involving us in the care of this patient. We will continue to follow.     Han Monzon M.D.   6/10/2023

## 2023-06-11 NOTE — PLAN OF CARE
A&OX4, RA, VSS, up with SBA. Tele- SR with inverted T-Waves, denies pain. Pt report not to be getting enough rest in the hospital. Activity minimized so pt can rest overnight.

## 2023-06-11 NOTE — PLAN OF CARE
Juan A is alert, oriented, cooperative with tangential speech. VSS on room air. Lungs clear. Denies chest pain/pressure, jaw/shoulder/arm pain, nausea. Reported some shortness of breath. CXR clear. Changed from Brilinta to Plavix.     No signs of bleeding. Reports that he has urinated in the toilet despite being asked to use the urinal. Reports he will use the urinal consistently.    Ambulating in the hallway. Denies shortness of breath. Has chronic right leg/back pain and numbness.

## 2023-06-11 NOTE — PLAN OF CARE
Juan A is discharging to home from CCU after STEMI/PCI to [facility]. He has been ambulating independently in the hallways without chest pain, nausea, dyspnea. His main complaint is related to his chronic right leg neuropathy/pain/numbness. Encouraged pt to follow up with his primary pain clinic.    He is enrolled with cardiac rehab. He will  meds at an outside pharmacy and schedule an appointment with his primary care. Reports he is motivated to quit smoking.     Discharging with his belongings, including phone, glasses, keys.

## 2023-06-11 NOTE — DISCHARGE INSTRUCTIONS
Going Home after ANGIOPLASTY OR STENT PLACEMENT        Name: Adam Sahni  Medical Record Number:  [id number]  Today's Date: June 11, 2023        For 24 hours:        Have an adult stay with you for 24 hours.        Relax and take it easy.        Drink plenty of fluids.        You may eat your normal diet, unless your doctor tells you otherwise.        Do NOT make any important or legal decisions.        Do NOT drive or operate machines at home or at work.        Do NOT drink alcohol.      Do NOT smoke.     Medicines:        If you have begun Plavix (clopidogrel), Effient (prasugrel), or Brilinta (ticagrelor), do not stop taking it until you talk to your heart doctor (cardiologist).        If you are on metformin (Glucophage), do not restart it until you have blood tests (within 2 to 3 days after discharge). When your doctor tells you it is safe, you may restart the metformin.        If you have stopped any other medicines, check with your nurse or provider about when to restart them.        Call 911 right away if you have bleeding that is heavy or does not stop.     Call your doctor if:        You have a large or growing hard lump around the site.        The site is red, swollen, hot or tender.        Blood or fluid is draining from the site.        You have chills or a fever greater than 101 F (38 C).        Your leg or arm turns bluish, feels numb or cool.        You have hives, a rash or unusual itching.       [facility] :   [phone] (7 days a week)

## 2023-06-12 ENCOUNTER — TELEPHONE (OUTPATIENT)
Dept: CARDIOLOGY | Facility: CLINIC | Age: 63
End: 2023-06-12
Payer: COMMERCIAL

## 2023-06-12 NOTE — PLAN OF CARE
Physical Therapy Discharge Summary    Reason for therapy discharge:    All goals and outcomes met, no further needs identified.    Progress towards therapy goal(s). See goals on Care Plan in [facility] electronic health record for goal details.  Goals met    Therapy recommendation(s):    Continued therapy is recommended.  Rationale/Recommendations:  OP CR phase II to improve functional activity tolerance. Pt enrolled in OP CR .

## 2023-06-12 NOTE — TELEPHONE ENCOUNTER
Patient was admitted to [facility] on 6/9/23 with 2 days of nausea and vomiting and right arm pain. Initial EKG showed sinus arrhythmia with inferior MI with ST elevation and Q waves and biphasic T waves and lateral T wave inversion.     PMH: HLD, peripheral artery disease, nephrolithiasis, prostate cancer, smoker, back surgery, peripheral neuropathy.    6/9/23: Echo showed EF of 45-50%. Severe hypokinesis of the mid-apical inferior, mid anteroseptal, inferoseptal, apical septal wall segments. The right ventricle is normal in structure, function and size. No significant valve dysfunction. Aortic sclerosis.    6/9/23: Coronary angiogram via RRA showed late-presentation inferior MI due to thrombotic occlusion of the proximal right coronary artery. Severe 70% stenosis of the proximal LCx which is a small to medium vessel. Moderate to severe stenosis of two branches of the large ramus intermedius. Moderate stenosis of the mid LAD. PCI of the proximal to mid RCA with aspiration thrombectomy and cutting balloon angioplasty and placement of a 3.5 x 38 mm Synergy drug-eluting stent which was post dilated to 4.0 mm. Plan for medical management vs outpatient PCI to residual circumflex disease.    Pt was started on ASA, Wellbutrin, Plavix, Metoprolol and NTG at time of discharge.    Called patient to discuss any post hospital d/c questions, review medication changes, and confirm f/u appts. Patient denied any questions regarding new medications or changes to PTA medications.     RN confirmed with patient that he was d/c with an adequate supply of the antiplatelet Plavix, and reminded of importance of taking without interruption.     Pt has an Rx for PRN SL Nitroglycerin.     Patient denied any SOB, chest pain or light headedness.     RRA cardiac cath site is without bleeding, swelling, redness or signs of infection.     RN confirmed with patient that he needs to be scheduled for labs and cardiology MODE OV as ordered. Scheduling and  Dr. Pacheco's Team RN phone number provided.    Cardiac rehab is scheduled on 6/20/23 at 1245 at [facility]. Pt asking if this could be scheduled in [place]. Cardiac rehab scheduling phone number provided.    Patient advised to call clinic with any cardiac related questions or concerns prior to this angella't. Patient verbalized understanding and agreed with plan. GEORGIA Ibrahim RN.

## 2023-06-13 LAB
ATRIAL RATE - MUSE: 62 BPM
ATRIAL RATE - MUSE: 63 BPM
DIASTOLIC BLOOD PRESSURE - MUSE: NORMAL MMHG
DIASTOLIC BLOOD PRESSURE - MUSE: NORMAL MMHG
INTERPRETATION ECG - MUSE: NORMAL
INTERPRETATION ECG - MUSE: NORMAL
P AXIS - MUSE: 41 DEGREES
P AXIS - MUSE: 87 DEGREES
PR INTERVAL - MUSE: 184 MS
PR INTERVAL - MUSE: 190 MS
QRS DURATION - MUSE: 80 MS
QRS DURATION - MUSE: 80 MS
QT - MUSE: 416 MS
QT - MUSE: 418 MS
QTC - MUSE: 422 MS
QTC - MUSE: 427 MS
R AXIS - MUSE: 0 DEGREES
R AXIS - MUSE: 20 DEGREES
SYSTOLIC BLOOD PRESSURE - MUSE: NORMAL MMHG
SYSTOLIC BLOOD PRESSURE - MUSE: NORMAL MMHG
T AXIS - MUSE: -38 DEGREES
T AXIS - MUSE: -52 DEGREES
VENTRICULAR RATE- MUSE: 62 BPM
VENTRICULAR RATE- MUSE: 63 BPM

## 2023-06-14 ENCOUNTER — TELEPHONE (OUTPATIENT)
Dept: CARDIOLOGY | Facility: CLINIC | Age: 63
End: 2023-06-14
Payer: COMMERCIAL

## 2023-06-14 NOTE — TELEPHONE ENCOUNTER
Received call from pt wanting to discuss pain in his right leg. Pt was admitted 6/9/23-6/11/23 for STEMI. Per pt this pain started prior to his heart attack on 6/9/23 and has continued since then, pt believes it is worsening and he is having trouble walking. Pt stated he has a history of neuropathy and he is unsure if this is worsening neuropathy or something different. Per review of chart pt has also has a history of PAD and has been treated for this at [facility], records in Care Everywhere. Pt asked if he was checked for blood clots in the hospital, advised pt had a lower extremity ultrasound that showed no evidence of DVT on 6/9/23. Pt denied any swelling, redness, warmth or temperature change in his leg. Per pt the pain is bearable at rest but worse with movement or when he is driving. Recommended pt try and see his PCP urgently for evaluation or if pain is worsening and/or he is unable to walk pt should go to ER or urgent care. Pt verbalized understanding and agreement with plan.     Discussed pt also needs to schedule follow up with Cardiology, provided phone number for scheduling. Pt will call to schedule.

## 2023-06-16 ENCOUNTER — APPOINTMENT (OUTPATIENT)
Dept: ULTRASOUND IMAGING | Facility: CLINIC | Age: 63
End: 2023-06-16
Attending: EMERGENCY MEDICINE
Payer: COMMERCIAL

## 2023-06-16 ENCOUNTER — APPOINTMENT (OUTPATIENT)
Dept: CT IMAGING | Facility: CLINIC | Age: 63
End: 2023-06-16
Attending: EMERGENCY MEDICINE
Payer: COMMERCIAL

## 2023-06-16 ENCOUNTER — HOSPITAL ENCOUNTER (INPATIENT)
Facility: CLINIC | Age: 63
LOS: 4 days | Discharge: HOME | End: 2023-06-20
Attending: EMERGENCY MEDICINE | Admitting: INTERNAL MEDICINE
Payer: COMMERCIAL

## 2023-06-16 ENCOUNTER — APPOINTMENT (OUTPATIENT)
Dept: GENERAL RADIOLOGY | Facility: CLINIC | Age: 63
End: 2023-06-16
Attending: EMERGENCY MEDICINE
Payer: COMMERCIAL

## 2023-06-16 DIAGNOSIS — I70.90 ARTERIAL OCCLUSION: ICD-10-CM

## 2023-06-16 DIAGNOSIS — I21.3 ST ELEVATION MYOCARDIAL INFARCTION (STEMI), UNSPECIFIED ARTERY (H): Primary | ICD-10-CM

## 2023-06-16 LAB
ALBUMIN SERPL BCG-MCNC: 3.9 G/DL (ref 3.5–5.2)
ALP SERPL-CCNC: 146 U/L (ref 40–129)
ALT SERPL W P-5'-P-CCNC: 33 U/L (ref 0–70)
ANION GAP SERPL CALCULATED.3IONS-SCNC: 13 MMOL/L (ref 7–15)
AST SERPL W P-5'-P-CCNC: 18 U/L (ref 0–45)
BASOPHILS # BLD AUTO: 0 10E3/UL (ref 0–0.2)
BASOPHILS NFR BLD AUTO: 0 %
BILIRUB SERPL-MCNC: 0.5 MG/DL
BUN SERPL-MCNC: 12.3 MG/DL (ref 8–23)
CALCIUM SERPL-MCNC: 9.5 MG/DL (ref 8.8–10.2)
CHLORIDE SERPL-SCNC: 102 MMOL/L (ref 98–107)
CK SERPL-CCNC: 147 U/L (ref 39–308)
CREAT SERPL-MCNC: 0.87 MG/DL (ref 0.67–1.17)
DEPRECATED HCO3 PLAS-SCNC: 23 MMOL/L (ref 22–29)
EOSINOPHIL # BLD AUTO: 0.1 10E3/UL (ref 0–0.7)
EOSINOPHIL NFR BLD AUTO: 1 %
ERYTHROCYTE [DISTWIDTH] IN BLOOD BY AUTOMATED COUNT: 14.7 % (ref 10–15)
GFR SERPL CREATININE-BSD FRML MDRD: >90 ML/MIN/1.73M2
GLUCOSE BLDC GLUCOMTR-MCNC: 95 MG/DL (ref 70–99)
GLUCOSE SERPL-MCNC: 89 MG/DL (ref 70–99)
HCT VFR BLD AUTO: 34.2 % (ref 40–53)
HGB BLD-MCNC: 11.4 G/DL (ref 13.3–17.7)
IMM GRANULOCYTES # BLD: 0 10E3/UL
IMM GRANULOCYTES NFR BLD: 1 %
INR PPP: 0.98 (ref 0.85–1.15)
LACTATE SERPL-SCNC: 1.3 MMOL/L (ref 0.7–2)
LYMPHOCYTES # BLD AUTO: 1.8 10E3/UL (ref 0.8–5.3)
LYMPHOCYTES NFR BLD AUTO: 24 %
MCH RBC QN AUTO: 30 PG (ref 26.5–33)
MCHC RBC AUTO-ENTMCNC: 33.3 G/DL (ref 31.5–36.5)
MCV RBC AUTO: 90 FL (ref 78–100)
MONOCYTES # BLD AUTO: 0.6 10E3/UL (ref 0–1.3)
MONOCYTES NFR BLD AUTO: 8 %
NEUTROPHILS # BLD AUTO: 5 10E3/UL (ref 1.6–8.3)
NEUTROPHILS NFR BLD AUTO: 66 %
NRBC # BLD AUTO: 0 10E3/UL
NRBC BLD AUTO-RTO: 0 /100
PLATELET # BLD AUTO: 324 10E3/UL (ref 150–450)
POTASSIUM SERPL-SCNC: 4.2 MMOL/L (ref 3.4–5.3)
PROT SERPL-MCNC: 7.6 G/DL (ref 6.4–8.3)
RBC # BLD AUTO: 3.8 10E6/UL (ref 4.4–5.9)
SODIUM SERPL-SCNC: 138 MMOL/L (ref 136–145)
WBC # BLD AUTO: 7.7 10E3/UL (ref 4–11)

## 2023-06-16 PROCEDURE — 250N000011 HC RX IP 250 OP 636: Performed by: EMERGENCY MEDICINE

## 2023-06-16 PROCEDURE — 99222 1ST HOSP IP/OBS MODERATE 55: CPT | Mod: GC | Performed by: SURGERY

## 2023-06-16 PROCEDURE — 73630 X-RAY EXAM OF FOOT: CPT | Mod: RT

## 2023-06-16 PROCEDURE — 83605 ASSAY OF LACTIC ACID: CPT | Performed by: EMERGENCY MEDICINE

## 2023-06-16 PROCEDURE — 80053 COMPREHEN METABOLIC PANEL: CPT | Performed by: EMERGENCY MEDICINE

## 2023-06-16 PROCEDURE — 93926 LOWER EXTREMITY STUDY: CPT | Mod: RT

## 2023-06-16 PROCEDURE — 85610 PROTHROMBIN TIME: CPT | Performed by: EMERGENCY MEDICINE

## 2023-06-16 PROCEDURE — 36415 COLL VENOUS BLD VENIPUNCTURE: CPT | Performed by: EMERGENCY MEDICINE

## 2023-06-16 PROCEDURE — 93971 EXTREMITY STUDY: CPT | Mod: RT

## 2023-06-16 PROCEDURE — 99285 EMERGENCY DEPT VISIT HI MDM: CPT | Mod: 25

## 2023-06-16 PROCEDURE — 120N000013 HC R&B IMCU

## 2023-06-16 PROCEDURE — 99223 1ST HOSP IP/OBS HIGH 75: CPT | Mod: AI | Performed by: INTERNAL MEDICINE

## 2023-06-16 PROCEDURE — 250N000009 HC RX 250: Performed by: EMERGENCY MEDICINE

## 2023-06-16 PROCEDURE — 75635 CT ANGIO ABDOMINAL ARTERIES: CPT

## 2023-06-16 PROCEDURE — 85025 COMPLETE CBC W/AUTO DIFF WBC: CPT | Performed by: EMERGENCY MEDICINE

## 2023-06-16 PROCEDURE — 82550 ASSAY OF CK (CPK): CPT | Performed by: EMERGENCY MEDICINE

## 2023-06-16 PROCEDURE — 250N000013 HC RX MED GY IP 250 OP 250 PS 637: Performed by: INTERNAL MEDICINE

## 2023-06-16 RX ORDER — CILOSTAZOL 100 MG/1
100 TABLET ORAL 2 TIMES DAILY
Status: DISCONTINUED | OUTPATIENT
Start: 2023-06-16 | End: 2023-06-20 | Stop reason: HOSPADM

## 2023-06-16 RX ORDER — CLOPIDOGREL BISULFATE 75 MG/1
75 TABLET ORAL DAILY
Status: DISCONTINUED | OUTPATIENT
Start: 2023-06-17 | End: 2023-06-20 | Stop reason: HOSPADM

## 2023-06-16 RX ORDER — ATORVASTATIN CALCIUM 40 MG/1
80 TABLET, FILM COATED ORAL AT BEDTIME
Status: DISCONTINUED | OUTPATIENT
Start: 2023-06-16 | End: 2023-06-20 | Stop reason: HOSPADM

## 2023-06-16 RX ORDER — LIDOCAINE 40 MG/G
CREAM TOPICAL
Status: DISCONTINUED | OUTPATIENT
Start: 2023-06-16 | End: 2023-06-20

## 2023-06-16 RX ORDER — BUPROPION HYDROCHLORIDE 150 MG/1
150 TABLET, EXTENDED RELEASE ORAL 2 TIMES DAILY
Status: DISCONTINUED | OUTPATIENT
Start: 2023-06-16 | End: 2023-06-20 | Stop reason: HOSPADM

## 2023-06-16 RX ORDER — IOPAMIDOL 755 MG/ML
100 INJECTION, SOLUTION INTRAVASCULAR ONCE
Status: COMPLETED | OUTPATIENT
Start: 2023-06-16 | End: 2023-06-16

## 2023-06-16 RX ORDER — HEPARIN SODIUM 10000 [USP'U]/100ML
0-5000 INJECTION, SOLUTION INTRAVENOUS CONTINUOUS
Status: DISCONTINUED | OUTPATIENT
Start: 2023-06-16 | End: 2023-06-16

## 2023-06-16 RX ORDER — ONDANSETRON 4 MG/1
4 TABLET, ORALLY DISINTEGRATING ORAL EVERY 6 HOURS PRN
Status: DISCONTINUED | OUTPATIENT
Start: 2023-06-16 | End: 2023-06-20 | Stop reason: HOSPADM

## 2023-06-16 RX ORDER — NITROGLYCERIN 0.4 MG/1
0.4 TABLET SUBLINGUAL EVERY 5 MIN PRN
Status: DISCONTINUED | OUTPATIENT
Start: 2023-06-16 | End: 2023-06-20

## 2023-06-16 RX ORDER — GABAPENTIN 600 MG/1
600 TABLET ORAL 3 TIMES DAILY PRN
Status: DISCONTINUED | OUTPATIENT
Start: 2023-06-16 | End: 2023-06-20 | Stop reason: HOSPADM

## 2023-06-16 RX ORDER — LIDOCAINE 40 MG/G
CREAM TOPICAL
Status: DISCONTINUED | OUTPATIENT
Start: 2023-06-16 | End: 2023-06-16

## 2023-06-16 RX ORDER — ASPIRIN 81 MG/1
81 TABLET, CHEWABLE ORAL DAILY
Status: DISCONTINUED | OUTPATIENT
Start: 2023-06-17 | End: 2023-06-20 | Stop reason: HOSPADM

## 2023-06-16 RX ORDER — ONDANSETRON 2 MG/ML
4 INJECTION INTRAMUSCULAR; INTRAVENOUS EVERY 6 HOURS PRN
Status: DISCONTINUED | OUTPATIENT
Start: 2023-06-16 | End: 2023-06-20 | Stop reason: HOSPADM

## 2023-06-16 RX ORDER — PREGABALIN 75 MG/1
75 CAPSULE ORAL 2 TIMES DAILY
Status: DISCONTINUED | OUTPATIENT
Start: 2023-06-16 | End: 2023-06-20 | Stop reason: HOSPADM

## 2023-06-16 RX ADMIN — PREGABALIN 75 MG: 75 CAPSULE ORAL at 23:29

## 2023-06-16 RX ADMIN — CILOSTAZOL 100 MG: 100 TABLET ORAL at 23:30

## 2023-06-16 RX ADMIN — IOPAMIDOL 100 ML: 755 INJECTION, SOLUTION INTRAVENOUS at 19:42

## 2023-06-16 RX ADMIN — HEPARIN SODIUM AND DEXTROSE 1650 UNITS/HR: 10000; 5 INJECTION INTRAVENOUS at 19:58

## 2023-06-16 RX ADMIN — SODIUM CHLORIDE 80 ML: 9 INJECTION, SOLUTION INTRAVENOUS at 19:43

## 2023-06-16 RX ADMIN — BUPROPION HYDROCHLORIDE 150 MG: 150 TABLET, EXTENDED RELEASE ORAL at 23:30

## 2023-06-16 RX ADMIN — ATORVASTATIN CALCIUM 80 MG: 40 TABLET, FILM COATED ORAL at 23:30

## 2023-06-16 RX ADMIN — Medication 1 MG: at 23:29

## 2023-06-16 ASSESSMENT — ACTIVITIES OF DAILY LIVING (ADL)
TRANSFERRING: 1-->ASSISTANCE (EQUIPMENT/PERSON) NEEDED (NOT DEVELOPMENTALLY APPROPRIATE)
DIFFICULTY_EATING/SWALLOWING: NO
NUMBER_OF_TIMES_PATIENT_HAS_FALLEN_WITHIN_LAST_SIX_MONTHS: 2
FALL_HISTORY_WITHIN_LAST_SIX_MONTHS: YES
ADLS_ACUITY_SCORE: 37
DRESSING/BATHING_DIFFICULTY: NO
WEAR_GLASSES_OR_BLIND: YES
WALKING_OR_CLIMBING_STAIRS_DIFFICULTY: YES
CONCENTRATING,_REMEMBERING_OR_MAKING_DECISIONS_DIFFICULTY: NO
TOILETING_ISSUES: NO
WALKING_OR_CLIMBING_STAIRS: AMBULATION DIFFICULTY, REQUIRES EQUIPMENT
EQUIPMENT_CURRENTLY_USED_AT_HOME: CANE, STRAIGHT
DOING_ERRANDS_INDEPENDENTLY_DIFFICULTY: NO
ADLS_ACUITY_SCORE: 35
CHANGE_IN_FUNCTIONAL_STATUS_SINCE_ONSET_OF_CURRENT_ILLNESS/INJURY: YES
TRANSFERRING: 1-->ASSISTANCE (EQUIPMENT/PERSON) NEEDED
VISION_MANAGEMENT: WEARS GLASSES
ADLS_ACUITY_SCORE: 35

## 2023-06-16 NOTE — ED NOTES
PIT/Triage Evaluation    Patient presents for evaluation of right leg pain, numbness, weakness.  He was seen here last week for a STEMI and treated and discharged.  He states that he noticed that his right leg was slightly numb and weak at that same time, however, heart attack took precedence and seems like he forgot about it.  It seems to be worsening now and noticed that he has a right foot drop.  He is having difficulty using it and feeling it in injured it today because he cannot feel it and now has bruising on some toes.    Exam is notable for:  Constitutional: Well appearing.  HEENT: Atraumatic.   Moist mucous membranes.  Respiratory: No respiratory distress.  Musculoskeletal: Right foot is slightly cool to the touch.  Unable to palpate distal pulses.  Cap refill is sluggish.  Very slow distal anterior pulse heard without biphasic waveform with Doppler.  Second and third toe are bruised and slightly swollen.  Normal range of motion.  Neurologic: Alert and oriented x3.  Normal tone and bulk.  Reported decreased sensation to light touch throughout the entire right foot with sensation of pain on the second and third foot due to injury he feels.  Psych: Normal affect.  Normal behavior.      Appropriate interventions for symptom management were initiated if applicable.  Appropriate diagnostic tests were initiated if indicated.    Important information for subsequent clinician:  Emergent arterial ultrasound, venous ultrasound, labs.    I briefly evaluated the patient and developed an initial plan of care. I discussed this plan and explained that this brief interaction does not constitute a full evaluation. Patient/family understands that they should wait to be fully evaluated and discuss any test results with another clinician prior to leaving the hospital.       Dominick Garcia MD  06/16/23 [id number]

## 2023-06-16 NOTE — ED TRIAGE NOTES
Pt seen in ED last Friday. Trop was 9000 per pt report. Pt admitted for CV lab, 70% occlusion, stent placed. Today pt reports right leg is painful and tingling. Started last Friday. No facial droop, equal strength upper and lower extrem. No drift.

## 2023-06-16 NOTE — ED PROVIDER NOTES
History     Chief Complaint:  Leg Pain       HPI   Adam Sahni is a 62 year old male whopresents for evaluation of right leg pain, numbness, weakness.  He was seen here last week for a STEMI and treated and discharged.  He states that he noticed that his right leg was slightly numb and weak at that same time, however, heart attack took precedence and seems like he forgot about it.  It seems to be worsening now and noticed that he has a right foot drop.  He is having difficulty using it and feeling it in injured it today because he cannot feel it and now has bruising on some toes.  The symptoms also began over a week ago when his chest pain began.  He has noticed foot drop evidence right and states that is been ongoing since his chest pain and leg pain began 1 week ago.       Independent Historian:   None - Patient Only    Review of External Notes:   Discharge summary dated 6/11/2023.  He was admitted for acute myocardial infarction with significantly elevated troponin.      Medications:    alendronate (FOSAMAX) 70 MG tablet  aspirin (ASA) 81 MG chewable tablet  atorvastatin (LIPITOR) 80 MG tablet  Barberry-Oreg Grape-Goldenseal (BERBERINE COMPLEX) 200-200-50 MG CAPS  buPROPion (WELLBUTRIN SR) 150 MG 12 hr tablet  cholecalciferol (VITAMIN D3) 125 mcg (5000 units) capsule  cilostazol (PLETAL) 100 MG tablet  clopidogrel (PLAVIX) 75 MG tablet  ferrous sulfate (FEROSUL) 325 (65 Fe) MG tablet  fish oil-omega-3 fatty acids 1000 MG capsule  gabapentin (NEURONTIN) 600 MG tablet  Menaquinone-7 (VITAMIN K2) 100 MCG CAPS  metoprolol succinate ER (TOPROL XL) 25 MG 24 hr tablet  nitroGLYcerin (NITROSTAT) 0.4 MG sublingual tablet  pregabalin (LYRICA) 75 MG capsule  sertraline (ZOLOFT) 100 MG tablet        Past Medical History:    No past medical history on file.    Past Surgical History:    Past Surgical History:   Procedure Laterality Date     CV CORONARY ANGIOGRAM N/A 6/9/2023    Procedure: Coronary Angiogram;  Surgeon:  Rosenda Pacheco MD;  Location:  [facility]        Physical Exam     Patient Vitals for the past 24 hrs:   BP Temp Temp src Pulse Resp SpO2 Weight   06/16/23 1814 -- -- -- 56 21 -- --   06/16/23 1809 (!) 123/104 -- -- 60 26 -- --   06/16/23 1633 98/62 97.4  F (36.3  C) Temporal 65 16 98 % 93 kg (205 lb)        Physical Exam  Constitutional: Well appearing.  HEENT: Atraumatic.  Moist mucous membranes.  Neck: Soft.  Supple.  No JVD.  Cardiac: Regular rate and rhythm.  No murmur or rub.  Respiratory: Clear to auscultation bilaterally.  No respiratory distress.  No wheezing, rhonchi, or rales.  Abdomen: Soft and nontender.  Nondistended.  Musculoskeletal: Right foot is cool to the touch.  I cannot palpate or Doppler any pulses in the right foot.  Cap refill is sluggish and refills at 4 to 5 seconds.  He reports no sensation in his right foot.  There is bruising of the second and third toes without deformity.  He states he can feel pain when I palpate those.  No edema.  Normal range of motion.  Neurologic: Alert and oriented x3.  Normal tone and bulk.  Cranial nerves II through XII intact.  5/5 strength in bilateral upper and left lower extremities.  He has 3 out of 5 strength with right ankle dorsiflexion but 5 out of 5 strength with plantarflexion.  He has 5 out of 5 strength in the right hip and right knee. Sensation to light touch intact throughout.  Normal gait.  Skin: No rashes.  No edema.  Psych: Normal affect.  Normal behavior.      Emergency Department Course             Imaging:  US Carotid Bilateral   Final Result   IMPRESSION:   1.  Mild plaque formation, velocities consistent with less than 50% stenosis in the right internal carotid artery.   2.  Mild plaque formation, velocities most consistent with less than 50% stenosis in the left internal carotid artery.   3.  Flow within the vertebral arteries is antegrade.      CTA Abdomen Pelvis Bilat Leg Runoff w Contr   Final Result   IMPRESSION:    1.  Bilateral kissing common iliac artery stents are patent. The right external iliac artery is occluded in its entire length. There is reconstitution of flow into the right common femoral artery. The left external iliac artery is patent.   2.  RIGHT LEG: Right common femoral artery is patent. Right profunda femoral artery is patent. Multi segmental stenosis involving the proximal to mid right SFA. The distal right SFA is occluded. There is reconstitution of flow in the below knee popliteal    artery. There is probable two-vessel runoff via the right PTA and peroneal artery. Difficult to assess patency of the right anterior tibial artery past the distal calf/ankle   3.  LEFT LEG: Left common femoral artery is patent. Left profunda femoral artery at the mid segment is occluded. Left SFA is occluded at the mid to distal segments. There is reconstitution of flow in the left popliteal artery. There may be three-vessel    runoff below the knee into the foot. However the distal anterior tibial artery at the ankle/distal calf is difficult to assess due to poor contrast opacification.       Foot  XR, G/E 3 views, right   Final Result   IMPRESSION: No acute fracture or dislocation. Mild hallux valgus and minimal degenerative arthritis of the first MTP joint. Accessory navicular bone. Arterial catheter location.      US Lower Extremity Venous Duplex Right   Final Result   IMPRESSION:      No deep venous thrombosis in the visualized right lower extremity.      US Lower Extremity Arterial Duplex Right   Final Result   IMPRESSION:   1.  Severe atherosclerotic disease with moderate to severe stenosis in the common femoral artery. Given monophasic waveforms in the common femoral artery itself, severe proximal stenosis is possible although not directly visualized.   2.  Occlusion at the level of the distal superficial femoral artery. Minimal low resistance collateral flow seen throughout the remainder of the leg at and below the  knee.            IR Lower Extremity Angiogram Right    (Results Pending)      Report per radiology    Laboratory:  Labs Ordered and Resulted from Time of ED Arrival to Time of ED Departure   COMPREHENSIVE METABOLIC PANEL - Abnormal       Result Value    Sodium 138      Potassium 4.2      Chloride 102      Carbon Dioxide (CO2) 23      Anion Gap 13      Urea Nitrogen 12.3      Creatinine 0.87      Calcium 9.5      Glucose 89      Alkaline Phosphatase 146 (*)     AST 18      ALT 33      Protein Total 7.6      Albumin 3.9      Bilirubin Total 0.5      GFR Estimate >90     CBC WITH PLATELETS AND DIFFERENTIAL - Abnormal    WBC Count 7.7      RBC Count 3.80 (*)     Hemoglobin 11.4 (*)     Hematocrit 34.2 (*)     MCV 90      MCH 30.0      MCHC 33.3      RDW 14.7      Platelet Count 324      % Neutrophils 66      % Lymphocytes 24      % Monocytes 8      % Eosinophils 1      % Basophils 0      % Immature Granulocytes 1      NRBCs per 100 WBC 0      Absolute Neutrophils 5.0      Absolute Lymphocytes 1.8      Absolute Monocytes 0.6      Absolute Eosinophils 0.1      Absolute Basophils 0.0      Absolute Immature Granulocytes 0.0      Absolute NRBCs 0.0     INR - Normal    INR 0.98     LACTIC ACID WHOLE BLOOD - Normal    Lactic Acid 1.3     CK TOTAL - Normal              Procedures       Emergency Department Course & Assessments:             Interventions:  Medications   heparin ANTICOAGULANT Loading dose for HIGH INTENSITY TREATMENT * Give BEFORE starting heparin infusion (has no administration in time range)   heparin infusion 25,000 units in D5W 250 mL ANTICOAGULANT (has no administration in time range)        Assessments:    Independent Interpretation (X-rays, CTs, rhythm strip):  Right foot x-ray without visible fracture or dislocation    Consultations/Discussion of Management or Tests:  Vascular surgery and hospitalist service       Social Determinants of Health affecting care:   None    Disposition:  The patient was  admitted to the hospital under the care of Dr. Rico.     Impression & Plan      Medical Decision Making:  Adam Sahni is a 62-year-old man who is afebrile and hemodynamically stable.  I cannot palpate a pulse or dopplerable pulse on the right.  He has weakness of right dorsiflexion but can plantarflex without difficulty.  He tells me he is numb throughout his entire right foot.  His capillary fill is present but sluggish.  Arterial ultrasound is noted as above.  I contacted vascular surgery who is in agreement starting IV heparin and obtaining a CTA of the abdomen with runoff bilaterally as it is unclear what his symptoms actually began and if they have been ongoing for the last week, there is likely no need for acute intervention.  I discussed with the patient and he is in agreement.  We discussed admission to the hospital for further evaluation and IV heparin and he is in agreement.  Vascular surgery consulted and will see the patient emergency room.  He was in stable condition at time of admission.    Diagnosis:    ICD-10-CM    1. Arterial occlusion  I70.90          6/16/2023   Dominick Garcia MD Salay, Nicholas J, MD  06/20/23 1036

## 2023-06-17 ENCOUNTER — APPOINTMENT (OUTPATIENT)
Dept: ULTRASOUND IMAGING | Facility: CLINIC | Age: 63
End: 2023-06-17
Attending: SURGERY
Payer: COMMERCIAL

## 2023-06-17 LAB
ANION GAP SERPL CALCULATED.3IONS-SCNC: 13 MMOL/L (ref 7–15)
BUN SERPL-MCNC: 15.7 MG/DL (ref 8–23)
CALCIUM SERPL-MCNC: 9.2 MG/DL (ref 8.8–10.2)
CHLORIDE SERPL-SCNC: 100 MMOL/L (ref 98–107)
CREAT SERPL-MCNC: 0.92 MG/DL (ref 0.67–1.17)
DEPRECATED HCO3 PLAS-SCNC: 22 MMOL/L (ref 22–29)
ERYTHROCYTE [DISTWIDTH] IN BLOOD BY AUTOMATED COUNT: 14.6 % (ref 10–15)
GFR SERPL CREATININE-BSD FRML MDRD: >90 ML/MIN/1.73M2
GLUCOSE SERPL-MCNC: 95 MG/DL (ref 70–99)
HCT VFR BLD AUTO: 30.2 % (ref 40–53)
HGB BLD-MCNC: 9.9 G/DL (ref 13.3–17.7)
MCH RBC QN AUTO: 29.8 PG (ref 26.5–33)
MCHC RBC AUTO-ENTMCNC: 32.8 G/DL (ref 31.5–36.5)
MCV RBC AUTO: 91 FL (ref 78–100)
PLATELET # BLD AUTO: 299 10E3/UL (ref 150–450)
POTASSIUM SERPL-SCNC: 3.9 MMOL/L (ref 3.4–5.3)
RBC # BLD AUTO: 3.32 10E6/UL (ref 4.4–5.9)
SODIUM SERPL-SCNC: 135 MMOL/L (ref 136–145)
WBC # BLD AUTO: 7 10E3/UL (ref 4–11)

## 2023-06-17 PROCEDURE — 99222 1ST HOSP IP/OBS MODERATE 55: CPT | Performed by: SURGERY

## 2023-06-17 PROCEDURE — 250N000013 HC RX MED GY IP 250 OP 250 PS 637: Performed by: INTERNAL MEDICINE

## 2023-06-17 PROCEDURE — 258N000003 HC RX IP 258 OP 636: Performed by: INTERNAL MEDICINE

## 2023-06-17 PROCEDURE — 120N000013 HC R&B IMCU

## 2023-06-17 PROCEDURE — 82310 ASSAY OF CALCIUM: CPT | Performed by: INTERNAL MEDICINE

## 2023-06-17 PROCEDURE — 93880 EXTRACRANIAL BILAT STUDY: CPT

## 2023-06-17 PROCEDURE — 250N000013 HC RX MED GY IP 250 OP 250 PS 637: Performed by: SURGERY

## 2023-06-17 PROCEDURE — 85041 AUTOMATED RBC COUNT: CPT | Performed by: INTERNAL MEDICINE

## 2023-06-17 PROCEDURE — 36415 COLL VENOUS BLD VENIPUNCTURE: CPT | Performed by: INTERNAL MEDICINE

## 2023-06-17 PROCEDURE — 85027 COMPLETE CBC AUTOMATED: CPT | Performed by: INTERNAL MEDICINE

## 2023-06-17 PROCEDURE — 99232 SBSQ HOSP IP/OBS MODERATE 35: CPT | Performed by: HOSPITALIST

## 2023-06-17 RX ORDER — NALOXONE HYDROCHLORIDE 0.4 MG/ML
0.4 INJECTION, SOLUTION INTRAMUSCULAR; INTRAVENOUS; SUBCUTANEOUS
Status: DISCONTINUED | OUTPATIENT
Start: 2023-06-17 | End: 2023-06-20 | Stop reason: HOSPADM

## 2023-06-17 RX ORDER — NALOXONE HYDROCHLORIDE 0.4 MG/ML
0.2 INJECTION, SOLUTION INTRAMUSCULAR; INTRAVENOUS; SUBCUTANEOUS
Status: DISCONTINUED | OUTPATIENT
Start: 2023-06-17 | End: 2023-06-20 | Stop reason: HOSPADM

## 2023-06-17 RX ORDER — OXYCODONE HYDROCHLORIDE 5 MG/1
10 TABLET ORAL EVERY 4 HOURS PRN
Status: DISCONTINUED | OUTPATIENT
Start: 2023-06-17 | End: 2023-06-20 | Stop reason: HOSPADM

## 2023-06-17 RX ADMIN — CILOSTAZOL 100 MG: 100 TABLET ORAL at 20:58

## 2023-06-17 RX ADMIN — Medication 1 MG: at 20:58

## 2023-06-17 RX ADMIN — SODIUM CHLORIDE, POTASSIUM CHLORIDE, SODIUM LACTATE AND CALCIUM CHLORIDE 500 ML: 600; 310; 30; 20 INJECTION, SOLUTION INTRAVENOUS at 04:48

## 2023-06-17 RX ADMIN — CILOSTAZOL 100 MG: 100 TABLET ORAL at 09:07

## 2023-06-17 RX ADMIN — ATORVASTATIN CALCIUM 80 MG: 40 TABLET, FILM COATED ORAL at 20:57

## 2023-06-17 RX ADMIN — SERTRALINE HYDROCHLORIDE 150 MG: 100 TABLET ORAL at 09:07

## 2023-06-17 RX ADMIN — BUPROPION HYDROCHLORIDE 150 MG: 150 TABLET, EXTENDED RELEASE ORAL at 09:07

## 2023-06-17 RX ADMIN — ASPIRIN 81 MG CHEWABLE TABLET 81 MG: 81 TABLET CHEWABLE at 09:07

## 2023-06-17 RX ADMIN — BUPROPION HYDROCHLORIDE 150 MG: 150 TABLET, EXTENDED RELEASE ORAL at 20:58

## 2023-06-17 RX ADMIN — OXYCODONE HYDROCHLORIDE 10 MG: 5 TABLET ORAL at 11:44

## 2023-06-17 RX ADMIN — GABAPENTIN 600 MG: 600 TABLET, FILM COATED ORAL at 09:21

## 2023-06-17 RX ADMIN — GABAPENTIN 600 MG: 600 TABLET, FILM COATED ORAL at 20:58

## 2023-06-17 RX ADMIN — PREGABALIN 75 MG: 75 CAPSULE ORAL at 20:58

## 2023-06-17 RX ADMIN — OXYCODONE HYDROCHLORIDE 10 MG: 5 TABLET ORAL at 16:23

## 2023-06-17 RX ADMIN — OXYCODONE HYDROCHLORIDE 10 MG: 5 TABLET ORAL at 20:58

## 2023-06-17 RX ADMIN — CLOPIDOGREL BISULFATE 75 MG: 75 TABLET ORAL at 09:27

## 2023-06-17 RX ADMIN — PREGABALIN 75 MG: 75 CAPSULE ORAL at 09:04

## 2023-06-17 ASSESSMENT — ACTIVITIES OF DAILY LIVING (ADL)
ADLS_ACUITY_SCORE: 24

## 2023-06-17 NOTE — PLAN OF CARE
Goal Outcome Evaluation:    COGNITION/MENTATION: A/O x 4. Anxious  NEURO/CMS: No doppler pulses RLE, LLE post tibial pulse present w/doppler, reports numbness and tingling bilateral feet. RLE cold.   ABNL. VITALS: Hypotension, received 500cc bolus of LR. Bradycardic in 50's  CARDIAC/TELE: NSR  RESPIRATORY: LS clear, on RA  GI: BS+, no BM this shift   : AUO, voiding in urinal   PAIN: To RLE    SKIN: slight mottling to RLE  DRAINS/LINES: PIV SL  ACTIVITY: A of 1, Cane at baseline  DIET:  NPO since midnight for vascular consult     B

## 2023-06-17 NOTE — PHARMACY-ADMISSION MEDICATION HISTORY
Pharmacist Admission Medication History    Admission medication history is complete. The information provided in this note is only as accurate as the sources available at the time of the update.    Medication reconciliation/reorder completed by provider prior to medication history? No    Information Source(s): Patient and CareEverywhere/SureScripts via in-person    Pertinent Information:   - Patient reports that he has not started taking his metoprolol yet (recent script as of 6/11/2023)    Changes made to PTA medication list:    Added: None    Deleted: None    Changed: None    Medication Affordability:  Not including over the counter (OTC) medications, was there a time in the past 3 months when you did not take your medications as prescribed because of cost?: No    Allergies reviewed with patient and updates made in EHR: yes    Medication History Completed By: Shae Adam RPH 6/16/2023 10:02 PM    Prior to Admission medications    Medication Sig Last Dose Taking? Auth Provider Long Term End Date   alendronate (FOSAMAX) 70 MG tablet Take 70 mg by mouth every 7 days Past Week Yes Unknown, Entered By History Yes    aspirin (ASA) 81 MG chewable tablet Take 1 tablet (81 mg) by mouth daily Past Week Yes Glen Padilla MD     atorvastatin (LIPITOR) 80 MG tablet Take 80 mg by mouth At Bedtime 6/15/2023 Yes Reported, Patient Yes    Barberry-Oreg Grape-Goldenseal (BERBERINE COMPLEX) 200-200-50 MG CAPS Take 2 capsules by mouth daily Past Week Yes Unknown, Entered By History     buPROPion (WELLBUTRIN SR) 150 MG 12 hr tablet Take 1 tablet (150 mg) by mouth daily for 3 days, THEN 1 tablet (150 mg) 2 times daily for 27 days. 6/16/2023 Yes Glen Padilla MD Yes 7/12/23   cholecalciferol (VITAMIN D3) 125 mcg (5000 units) capsule Take 125 mcg by mouth daily Past Week Yes Unknown, Entered By History     cilostazol (PLETAL) 100 MG tablet Take 100 mg by mouth 2 times daily Past Month Yes Reported, Patient Yes     clopidogrel (PLAVIX) 75 MG tablet Take 1 tablet (75 mg) by mouth daily 6/16/2023 Yes Glen Padilla MD Yes    ferrous sulfate (FEROSUL) 325 (65 Fe) MG tablet Take 325 mg by mouth every other day Past Month Yes Unknown, Entered By History     fish oil-omega-3 fatty acids 1000 MG capsule Take 2 g by mouth daily Past Week Yes Unknown, Entered By History     gabapentin (NEURONTIN) 600 MG tablet Take 600 mg by mouth 3 times daily as needed for neuropathic pain Past Month Yes Unknown, Entered By History No    Menaquinone-7 (VITAMIN K2) 100 MCG CAPS Take 1 capsule by mouth daily Past Week Yes Unknown, Entered By History     nitroGLYcerin (NITROSTAT) 0.4 MG sublingual tablet For chest pain place 1 tablet under the tongue every 5 minutes for 3 doses. If symptoms persist 5 minutes after 1st dose call 911.  at prn Yes Glen Padilla MD Yes    pregabalin (LYRICA) 75 MG capsule Take 75 mg by mouth 2 times daily 6/16/2023 Yes Unknown, Entered By History Yes    sertraline (ZOLOFT) 100 MG tablet Take 150 mg by mouth daily 6/16/2023 Yes Reported, Patient Yes    metoprolol succinate ER (TOPROL XL) 25 MG 24 hr tablet Take 1 tablet (25 mg) by mouth daily  Patient not taking: Reported on 6/16/2023 Not Taking  Glen Padilla MD Yes

## 2023-06-17 NOTE — PROVIDER NOTIFICATION
MD Notification    Notified Person: MD    Notified Person Name: Bryan London     Notification Date/Time: 6/17/2023 0436    Notification Interaction: vocera msg    BP 80's/50's, map 63, npo for vascular, asymptomatic     Orders Received: LR 500cc bolus     Comments:

## 2023-06-17 NOTE — CONSULTS
VASCULAR SURGERY    Adam Sahni was admitted last evening due to ongoing issues primarily with his right leg.  Patient was seen by Dr. Abundio Pitt last evening.  [place] to be stable and admitted for further observation and work-up.  I was asked to reexamine this morning.    Patient reports that his leg discomfort is stable.  Discomfort with movement but sensory and motor remains intact.  Right leg is more bothersome than the left.    Significant history of a STEMI with stenting performed last week on 6/9/2023.  He had chronic changes to his legs presents with known PAD but this worsened at the time of his MI.    Past vascular history: #1.  Bilateral common iliac artery stenting on 7/17/2021 at [facility]       #2.  Known chronic occlusion of right distal SFA and AK pop with apparently normal runoff on previous arterial evaluations last year.  Also known left distal SFA occlusion.  Most recent ALEN 0.83 on the right and 0.6 4 on the left.     #3.  Cardiac disease as described above with recent stenting.  More Pan systemic cardiac issues with stenosis being followed.     #4.  No known cerebrovascular disease.    Patient is a chronic smoker.  He has had this leg discomfort and they felt it was due to lumbar radiculopathy with multiple injections that have not helped.  ??  Discomfort due to PAD.    CTA from last evening reviewed.  Mild aortic disease.  Patent celiac and SMA.  Renal arteries are normal.  Both common iliac stents are patent.  Right extrailiac artery is occluded with reconstituted flow in the common femoral artery which has mild disease.  Left external ocular artery has no high-grade stenosis.  Right internal iliac artery is patent.    Proximal  and mid stenosis right SFA with distal SFA occluded.  Below-knee popliteal artery reconstituted with probable PT and peroneal runoff.    Patent left common femoral artery.  Mid profundus femoral occluded.  Mid and distal SFA occluded with reconstituted PT and  three-vessel runoff.      Anemia with hemoglobin= 9.9.  SCr= 0.92 with GFR> 90    Exam: Patient is alert and appropriate.  Sitting up in chair.   Vital signs stable.   Chest= clear   Abdomen= soft, nontender   Extremities= moving both legs easily.  Normal sensation.  No ulcers.  Very poor distal pulses particular on the right.    IMPRESSION:   #1.  Significant underlying PAD with known prior common iliac artery stents which remained patent and bilateral SFA proximal popliteal occlusions.  This certainly could explain his leg discomfort that he has been dealing with with multiple back injections have not helped.     #2.  No acute changes at.  I suspect that he occluded his right external iliac artery at that time.    Patient has none limb-threatening ischemia.  I agree with  that any open procedure at this time is a high risk due to his recent MI and UGO placement.  Questions whether there is a possibility with our interventional radiologist to recanalize the right external iliac  artery to improve the blood flow to baseline and this will be evaluated--since this can be performed percutaneous without the risk of an anesthetic.    Eventually patient would require femoral-popliteal bypass grafts but this is not emergent.     #3.  Risk factor control is critical.  He needs to quit smoking completely.  We will perform carotid duplex ultrasound due to risk of cerebrovascular disease.  No history of diabetes but markedly elevated LDL= 144 at time of MI.      Discussed at length with patient.  Carotid duplex today.    Over 60 minutes with patient.      Dorian Gamble MD

## 2023-06-17 NOTE — H&P
[facility]    History and Physical - Hospitalist Service       Date of Admission:  6/16/2023    Assessment & Plan      Adam Sahni is a 62 year old male with PMHx of CAD with recent STEMI s/p UGO to the RCA, hyperlipidemia, PAD s/p angiogram and stent placement in 2021, hyperlipidemia, chronic anemia, prediabetes, peripheral neuropathy, lumbar radiculopathy, depression, prostate cancer s/p treatment and tobacco use who presents emergency room for evaluation of right leg pain numbness and foot drop.    He was recently hospitalized at [facility] from 6/9/23 to 6/11/23 for with an inferior STEMI for which he underwent PCI with UGO to the RCA on 6/9/23. Hospitalization was complicated by intermittent shortness of breath post procedure which was attributed to Brilinta. Brilinta was subsequently changed to Plavix. On 6/9/23 and RRT was called for hypotension. He was given 1 L of fluid and his blood pressures improved. Echo that stay showed EF of 45 to 50%. His statin was continued without changes. He was ultimately discharged home. Additionally during that stay he had some issues with your urinary retention however this had resolved prior to discharge. He was also noted to have acute on chronic anemia. Hemoglobin was stable at 10 prior to discharge. His LFTs were elevated this was suspected secondary to his STEMI    Concern for RLE arterial occlusion, unclear chronicity but favor chronic  PAD, with hx of iliac stent placement in 2021  * Followed by [facility]. S/p bilateral kissing iliac stents after thrombolytics in 7/2021 for severe claudication and complete occlusion of the left common iliac artery with tandem occlusions and/or high grade stenoses of the distal SFA and popliteal artery. Manages with Pletal, ASA and statin.   * Since discharge home from recent hospital stay, patient reported ongoing right lower extremity pain with numbness and foot drop. This ultimately led to him presenting back to the  emergency room this evening.  * In the ED, was afebrile, BPs 90s systolic, HRs stable. O2 sat stable on RA. Unable to palpate distal pulses. Neurologic exam was stable with no deficits in the RLE.  Labs were stable. Stat imaging was obtained.  X-ray of the foot was negative for acute fracture or dislocation. Right lower extremity venous duplex was negative for DVT. Right lower extremity arterial duplex showed severe atherosclerotic disease with moderate to there is severe stenosis in the common femoral artery severe proximal stenosis was thought to be possible but not directly visualized.  Additionally there was an occlusion at the level of the distal superficial femoral artery.  Patient was initiated on a heparin gtt in the ED while awaiting additional imaging and vascular surgery input. CTA abd/pelvis with bilateral leg runoff was obtained. In brief, bilateral kissing common iliac artery stents were patent, the right external iliac artery appeared occluded though there was noted reconstitution of flow into the right common femoral artery. Additionally was noted to have multiple segments of stenosis involving the proximal to mid right SFA. The distal right FSA was occluded, but there is reconstitution of flow below the knee popliteal artery.   * Vascular surgery service was consulted and patient was seen by Dr. Pitt in the ED. He reviewed imaging studies and discussed plan of care with admitting hospitalist. It was felt that the findings on his imaging tonight may be more chronic in nature, and patient does not give a reliable history in regards to clear onset of timing of his symptoms that indicate a more acute picture. As such, it was felt that heparin drip could be stopped. Will continue DAPT with ASA and Plavix.    -- admitted to [facility] status  -- will stop heparin gtt  -- cont DAPT with ASA and Plavix  -- cont statin  -- serial vascular/neuro checks q4h, if any change in condition should notify vascular  "surgery  -- will keep NPO at midnight in the event that surgery or other procedures are indicated tomorrow.    CAD, with inferior STEMI s/p PCI w/ UGO to RCA on 6/9/23  Hyperlipidemia  * See discharge summary for specifics of recent hospitalization. Brief summary as above. Discharged on ASA 81mg daily, Plavix, metoprolol XL 25mg daily, atorvastatin 80mg HS.  * Per admission med rec per pharmacy, patient stated he had not been taking metoprolol, unclear why. HRs/BPs presently stable  -- cont DAPT, statin    Chronic Anemia  * Hgb stable at 11 on admission.     Prediabetes  * A1c 5.7 on 6/9/23.      Peripheral neuropathy  Lumbar radiculopathy, with hx of lumbar fusion in 2022  * Follows with a pain clinic through [facility]. Had a caudal epidural steroid injection on 6/1/23.  * Chronic and stable on Lyrica and gabapentin.     Depression  * Chronic and stable on sertraline. Wellbutrin added during recent stay to help with tobacco cessation.      Prostate cancer s/p external beam radiation therapy  * Follows with [facility]. S/p ext beam radiotherapy and 28 months of androgen deprivation.  * No acute concern this stay. OP follow up.     Tobacco Abuse, current   * Smokes 1/2-1ppd. Encouraged cessation.        Diet:  NPO at midnight  DVT Prophylaxis: ambulate  Nova Catheter: Not present  Lines: None     Cardiac Monitoring: None  Code Status:  Full code    Clinically Significant Risk Factors Present on Admission                # Drug Induced Platelet Defect: home medication list includes an antiplatelet medication        # Overweight: Estimated body mass index is 29.41 kg/m  as calculated from the following:    Height as of 6/9/23: 1.778 m (5' 10\").    Weight as of this encounter: 93 kg (205 lb).            Disposition Plan      Expected Discharge Date: 06/18/2023                  Kalyn Rico DO  Hospitalist Service  [facility]  Securely message with [employer] [facility] (more info)  Text page " via [facility] Paging/Directory     ______________________________________________________________________    Chief Complaint   R leg pain, numbness and foot drop    History is obtained from the patient, chart review    History of Present Illness   Adam Sahni is a 62 year old male with past medical history of coronary artery disease with recent STEMI status post UGO to the RCA, hyperlipidemia, peripheral vascular disease status post angiogram and stent placement in 2021, chronic anemia, prediabetes, peripheral neuropathy, lumbar radiculopathy, depression, prostate cancer status posttreatment and tobacco abuse who presents emergency room for evaluation of right leg pain numbness and foot drop.    He was recently hospitalized at [facility] from 6/9/23 to 6/11/23 for with an inferior STEMI for which he underwent PCI with UGO to the RCA on 6/9/23.  Hospitalization was complicated by intermittent shortness of breath post procedure which was attributed to Brilinta.  Brilinta was subsequently changed to Plavix.  On 6/9/2023 and RRT was called for hypotension.  He was given 1 L of fluid and his blood pressures improved.  Echo that stay showed EF of 45 to 50%.  He was started on atorvastatin.  He was ultimately discharged home.  Additionally during that stay he had some issues with your urinary retention however this had resolved prior to discharge.  He was also noted to have acute on chronic anemia.  Hemoglobin was stable at 10 prior to discharge.  His LFTs were elevated this was suspected secondary to his STEMI    Since discharge home patient reported ongoing right lower extremity pain with numbness and foot drop. He provides a somewhat tangential history, so it's unclear when his symptoms exactly worsened. This ultimately led to him presenting back to the emergency room this evening.    In the emergency room he was afebrile, blood pressures were in the 90s systolic but heart rates were stable.  O2 sat stable on room air.  Unable  to palpate distal pulses.  Labs were stable.  Stat imaging was obtained.  X-ray of the foot was negative for acute fracture or dislocation. Right lower extremity venous duplex was negative for DVT.  Right lower extremity arterial duplex showed severe atherosclerotic disease with moderate to there is severe stenosis in the common femoral artery severe proximal stenosis was thought to be possible but not directly visualized.  Additionally there was an occlusion at the level of the distal superficial femoral artery.  CTA abd/pelvis with bilateral leg runoff was obtained. In brief, bilateral kissing common iliac artery stents were patent, the right external iliac artery appeared occluded though there was noted reconstitution of flow into the right common femoral artery. Additionally was noted to have multiple segments of stenosis involving the proximal to mid right SFA. The distal right FSA was occluded, but there is reconstitution of flow below the knee popliteal artery.  Patient was initiated on a heparin drip. Vascular surgery service was consulted and patient was seen by Dr. Pitt in the ED. he reviewed imaging studies and we discussed plan of care. On separate neurologic exams completed by Dr. Pitt and myself, patient had no focal motor or sensory deficits in his right lower extremity. It was felt that the findings on his imaging tonight may be more chronic in nature and patient does not give a reliable history in regards to clear onset of timing of his symptoms that indicate a more acute picture.  As such it was felt that heparin drip could be stopped.  We will continue dual antiplatelet therapy with aspirin and Plavix.  We will monitor serial neuro exams overnight. Patient will be kept n.p.o. in the event that surgery or other procedures are indicated tomorrow.    Past Medical History    No past medical history on file.    Past Surgical History   Past Surgical History:   Procedure Laterality Date     CV  CORONARY ANGIOGRAM N/A 6/9/2023    Procedure: Coronary Angiogram;  Surgeon: Rosenda Pacheco MD;  Location:  [facility]       Prior to Admission Medications       Prior to Admission medications    Medication Sig Last Dose Taking? Auth Provider Long Term End Date   alendronate (FOSAMAX) 70 MG tablet Take 70 mg by mouth every 7 days Past Week Yes Unknown, Entered By History Yes     aspirin (ASA) 81 MG chewable tablet Take 1 tablet (81 mg) by mouth daily Past Week Yes Glen Padilla MD       atorvastatin (LIPITOR) 80 MG tablet Take 80 mg by mouth At Bedtime 6/15/2023 Yes Reported, Patient Yes     Barberry-Oreg Grape-Goldenseal (BERBERINE COMPLEX) 200-200-50 MG CAPS Take 2 capsules by mouth daily Past Week Yes Unknown, Entered By History       buPROPion (WELLBUTRIN SR) 150 MG 12 hr tablet Take 1 tablet (150 mg) by mouth daily for 3 days, THEN 1 tablet (150 mg) 2 times daily for 27 days. 6/16/2023 Yes Glen Padilla MD Yes 7/12/23   cholecalciferol (VITAMIN D3) 125 mcg (5000 units) capsule Take 125 mcg by mouth daily Past Week Yes Unknown, Entered By History       cilostazol (PLETAL) 100 MG tablet Take 100 mg by mouth 2 times daily Past Month Yes Reported, Patient Yes     clopidogrel (PLAVIX) 75 MG tablet Take 1 tablet (75 mg) by mouth daily 6/16/2023 Yes Glen Padilla MD Yes     ferrous sulfate (FEROSUL) 325 (65 Fe) MG tablet Take 325 mg by mouth every other day Past Month Yes Unknown, Entered By History       fish oil-omega-3 fatty acids 1000 MG capsule Take 2 g by mouth daily Past Week Yes Unknown, Entered By History       gabapentin (NEURONTIN) 600 MG tablet Take 600 mg by mouth 3 times daily as needed for neuropathic pain Past Month Yes Unknown, Entered By History No     Menaquinone-7 (VITAMIN K2) 100 MCG CAPS Take 1 capsule by mouth daily Past Week Yes Unknown, Entered By History       nitroGLYcerin (NITROSTAT) 0.4 MG sublingual tablet For chest pain place 1 tablet  under the tongue every 5 minutes for 3 doses. If symptoms persist 5 minutes after 1st dose call 911.  at prn Yes Glen Padilla MD Yes     pregabalin (LYRICA) 75 MG capsule Take 75 mg by mouth 2 times daily 6/16/2023 Yes Unknown, Entered By History Yes     sertraline (ZOLOFT) 100 MG tablet Take 150 mg by mouth daily 6/16/2023 Yes Reported, Patient Yes     metoprolol succinate ER (TOPROL XL) 25 MG 24 hr tablet Take 1 tablet (25 mg) by mouth daily  Patient not taking: Reported on 6/16/2023 Not Taking   Glen Padilla MD Yes         Review of Systems    The 10 point Review of Systems is negative other than noted in the HPI or here.      Physical Exam   Vital Signs: Temp: 97.4  F (36.3  C) Temp src: Temporal BP: (!) 107/22 Pulse: 58   Resp: 12 SpO2: 98 %      Weight: 205 lbs 0 oz    General Appearance: Well-nourished well-developed male, alert and conversing appropriately, no acute distress  Respiratory: CTAB, no wheeze/rales/rhonchi, no increased work of breathing or accessory muscle use  Cardiovascular: HRRR, no MGR, no LE edema  GI: S, NT, ND, + BS  Skin: Warm, dry  Other:     Medical Decision Making       75 MINUTES SPENT BY ME on the date of service doing chart review, history, exam, documentation & further activities per the note.      Data   ------------------------- PAST 24 HR DATA REVIEWED -----------------------------------------------    I have personally reviewed the following data over the past 24 hrs:    7.7  \   11.4 (L)   / 324     138 102 12.3 /  89   4.2 23 0.87 \       ALT: 33 AST: 18 AP: 146 (H) TBILI: 0.5   ALB: 3.9 TOT PROTEIN: 7.6 LIPASE: N/A       Procal: N/A CRP: N/A Lactic Acid: 1.3       INR:  0.98 PTT:  N/A   D-dimer:  N/A Fibrinogen:  N/A       Imaging results reviewed over the past 24 hrs:   Recent Results (from the past 24 hour(s))   US Lower Extremity Arterial Duplex Right    Narrative    EXAM: US LOWER EXTREMITY ARTERIAL DUPLEX RIGHT  LOCATION: [facility]  DATE: 6/16/2023    INDICATION: Right leg pain, unable to dopple pulses, began when experience heart attack a few days ago  COMPARISON: CT angiogram 07/21/2021  TECHNIQUE: Duplex utilizing 2D gray-scale imaging, Doppler interrogation with color-flow and spectral waveform analysis.    FINDINGS: Extensive calcified atherosclerosis with moderate to severe stenosis at the level of the common femoral artery. Markedly low resistance, parvus tardus waveforms below this level. There is occlusion of the distal superficial femoral artery with   minimal collateral flow visualized below this level as described below.    RIGHT LOWER EXTREMITY ARTERIAL ASSESSMENT:  Common femoral artery: 234 cm/s, monophasic  Profunda femoris artery: 53 cm/s, monophasic  SFA (proximal): 43 cm/s, monophasic  SFA (mid): 39 cm/s, monophasic  SFA (distal): 0 cm/s, occluded  Popliteal artery: 18 cm/s, monophasic  Anterior tibial artery: 0 cm/s, occluded  Posterior tibial artery: 12 cm/s, monophasic  Dorsalis pedis artery: 0 cm/s, occluded      Impression    IMPRESSION:  1.  Severe atherosclerotic disease with moderate to severe stenosis in the common femoral artery. Given monophasic waveforms in the common femoral artery itself, severe proximal stenosis is possible although not directly visualized.  2.  Occlusion at the level of the distal superficial femoral artery. Minimal low resistance collateral flow seen throughout the remainder of the leg at and below the knee.       US Lower Extremity Venous Duplex Right    Narrative    EXAM: US LOWER EXTREMITY VENOUS DUPLEX RIGHT  LOCATION: [facility]  DATE: 6/16/2023    INDICATION: Right lower extremity pain after recent myocardial infarction.  COMPARISON: 06/09/2023.  TECHNIQUE: Venous Duplex ultrasound of the right lower extremity with and without compression, augmentation and duplex. Color flow and spectral Doppler with waveform analysis performed.    FINDINGS:  Exam includes the common femoral, femoral, popliteal, and contralateral common femoral veins as well as segmentally visualized deep calf veins and greater saphenous vein.     RIGHT: No deep vein thrombosis. No superficial thrombophlebitis.       Impression    IMPRESSION:    No deep venous thrombosis in the visualized right lower extremity.   Foot  XR, G/E 3 views, right    Narrative    EXAM: XR FOOT RIGHT G/E 3 VIEWS  LOCATION: [facility]  DATE: 6/16/2023    INDICATION: pain in toes, can't feel foot after fall  COMPARISON: None      Impression    IMPRESSION: No acute fracture or dislocation. Mild hallux valgus and minimal degenerative arthritis of the first MTP joint. Accessory navicular bone. Arterial catheter location.   CTA Abdomen Pelvis Bilat Leg Runoff w Contr    Narrative    EXAM: CTA ABDOMEN PELVIS BILAT LEG RUNOFF W CONTR  LOCATION: [facility]  DATE: 6/16/2023    INDICATION: Right leg arterial occlusion  COMPARISON: 07/21/2021  TECHNIQUE: Helical acquisition through the abdomen, pelvis, and bilateral lower extremities was performed during the arterial phase of contrast enhancement using IV Contrast. 2D and 3D reconstructions were performed by the CT technologist. Dose reduction   techniques were used.   CONTRAST: 100 mL Isovue 370    FINDINGS:  AORTA: Abdominal aorta is patent and of normal size. Celiac artery and SMA are patent. Bilateral renal arteries are patent. Kissing bilateral common iliac artery stents are patent. Right external iliac artery is occluded. There is reconstitution of flow   in the right common femoral artery. Left external iliac artery is patent without significant stenosis.    RIGHT LEG: Right common femoral artery is patent. Right profunda femoral artery is patent. I segmental stenosis involving the proximal to mid right SFA. The distal right SFA is occluded. There is reconstitution of flow in the below knee popliteal artery.    There is probable two-vessel runoff via the right PTA and peroneal artery.    LEFT LEG: Left common femoral artery is patent. Left profunda femoral artery at the mid segment is occluded. Left SFA is occluded at the mid to distal segments. There is reconstitution of flow in the left popliteal artery. There may be three-vessel   runoff below the knee into the foot. However the distal anterior tibial artery at the ankle/distal calf is difficult to assess due to poor contrast opacification. Cannot assess for patency of the dorsalis pedis artery.    LUNG BASES: Negative    ABDOMEN: Liver and spleen are normal. No calcified gallstones. Pancreas is normal. Adrenal glands are normal. Kidneys are normal.    PELVIS: Urinary bladder is normal. No dilated loops of large or small bowel. No pericolonic fat stranding. No pelvic free fluid or adenopathy.      Impression    IMPRESSION:  1.  Bilateral kissing common iliac artery stents are patent. The right external iliac artery is occluded in its entire length. There is reconstitution of flow into the right common femoral artery. The left external iliac artery is patent.  2.  RIGHT LEG: Right common femoral artery is patent. Right profunda femoral artery is patent. Multi segmental stenosis involving the proximal to mid right SFA. The distal right SFA is occluded. There is reconstitution of flow in the below knee popliteal   artery. There is probable two-vessel runoff via the right PTA and peroneal artery. Difficult to assess patency of the right anterior tibial artery past the distal calf/ankle  3.  LEFT LEG: Left common femoral artery is patent. Left profunda femoral artery at the mid segment is occluded. Left SFA is occluded at the mid to distal segments. There is reconstitution of flow in the left popliteal artery. There may be three-vessel   runoff below the knee into the foot. However the distal anterior tibial artery at the ankle/distal calf is difficult to assess due to poor  contrast opacification.

## 2023-06-17 NOTE — PROGRESS NOTES
[facility]: Margo    Discussed right iliac occlusion with Dr. Jewell of interventional radiology who reviewed CTA.    She will attempt recanalization and stenting tomorrow morning.  May have to stent into common iliac artery which is not ideal.  However, we would place separate stents so at the time of his more formal femoral to below-knee popliteal in situ bypass graft stent could be removed and formal endarterectomy performed.    Stay on aspirin/Plavix.  N.p.o. after midnight      Dorian Gamble MD

## 2023-06-17 NOTE — PROGRESS NOTES
[facility]    Medicine Progress Note - Hospitalist Service    Date of Admission:  6/16/2023    Assessment & Plan   Adam Sahni is a 62 year old male with PMHx of CAD with recent STEMI s/p UGO to the RCA, hyperlipidemia, PAD s/p angiogram and stent placement in 2021, hyperlipidemia, chronic anemia, prediabetes, peripheral neuropathy, lumbar radiculopathy, depression, prostate cancer s/p treatment and tobacco use who presents emergency room for evaluation of right leg pain numbness and foot drop.     He was recently hospitalized at [facility] from 6/9/23 to 6/11/23 for with an inferior STEMI for which he underwent PCI with UGO to the RCA on 6/9/23. Hospitalization was complicated by intermittent shortness of breath post procedure which was attributed to Brilinta. Brilinta was subsequently changed to Plavix. On 6/9/23 and RRT was called for hypotension. He was given 1 L of fluid and his blood pressures improved. Echo that stay showed EF of 45 to 50%. His statin was continued without changes. He was ultimately discharged home. Additionally during that stay he had some issues with your urinary retention however this had resolved prior to discharge. He was also noted to have acute on chronic anemia. Hemoglobin was stable at 10 prior to discharge. His LFTs were elevated this was suspected secondary to his STEMI     Concern for RLE arterial occlusion, unclear chronicity but favor chronic  PAD, with hx of iliac stent placement in 2021  * Followed by [facility]. S/p bilateral kissing iliac stents after thrombolytics in 7/2021 for severe claudication and complete occlusion of the left common iliac artery with tandem occlusions and/or high grade stenoses of the distal SFA and popliteal artery. Manages with Pletal, ASA and statin.   * Since discharge home from recent hospital stay, patient reported ongoing right lower extremity pain with numbness and foot drop. This ultimately led to him presenting back to the  emergency room this evening.  * In the ED, was afebrile, BPs 90s systolic, HRs stable. O2 sat stable on RA. Unable to palpate distal pulses. Neurologic exam was stable with no deficits in the RLE.  Labs were stable. Stat imaging was obtained.  X-ray of the foot was negative for acute fracture or dislocation. Right lower extremity venous duplex was negative for DVT. Right lower extremity arterial duplex showed severe atherosclerotic disease with moderate to there is severe stenosis in the common femoral artery severe proximal stenosis was thought to be possible but not directly visualized.  Additionally there was an occlusion at the level of the distal superficial femoral artery.  Patient was initiated on a heparin gtt in the ED while awaiting additional imaging and vascular surgery input. CTA abd/pelvis with bilateral leg runoff was obtained. In brief, bilateral kissing common iliac artery stents were patent, the right external iliac artery appeared occluded though there was noted reconstitution of flow into the right common femoral artery. Additionally was noted to have multiple segments of stenosis involving the proximal to mid right SFA. The distal right FSA was occluded, but there is reconstitution of flow below the knee popliteal artery.   * Vascular surgery service was consulted and patient was seen by Dr. Pitt in the ED. He reviewed imaging studies and discussed plan of care with admitting hospitalist. It was felt that the findings on his imaging tonight may be more chronic in nature, and patient does not give a reliable history in regards to clear onset of timing of his symptoms that indicate a more acute picture. As such, it was felt that heparin drip could be stopped. Will continue DAPT with ASA and Plavix.    -- [facility]  -- appreciate vascular surgery, they plan formal femoral to below knee popliteal in situ bypass graft ~6 weeks out from STEMI  -- check carotid duplex US  -- cont DAPT with ASA and  Plavix  -- cont statin  -- serial vascular/neuro checks q4h, if any change in condition should notify vascular surgery  -- IR plans recanalization and stenting 6/18  -- NPO at midnight     CAD, with inferior STEMI s/p PCI w/ UGO to RCA on 6/9/23  Hyperlipidemia  * See discharge summary for specifics of recent hospitalization. Brief summary as above. Discharged on ASA 81mg daily, Plavix, metoprolol XL 25mg daily, atorvastatin 80mg HS.  * Per admission med rec per pharmacy, patient stated he had not been taking metoprolol, unclear why.  -- BPs on lower end, hold metoprolol at this time  -- cont DAPT, statin     Chronic Anemia  * Hgb 11 on admit, down to 10 on 6/17  - monitor post procedure     Prediabetes  * A1c 5.7 on 6/9/23.      Peripheral neuropathy  Lumbar radiculopathy, with hx of lumbar fusion in 2022  * Follows with a pain clinic through [facility]. Had a caudal epidural steroid injection on 6/1/23.  * Chronic and stable on Lyrica and gabapentin.     Depression  * Chronic and stable on sertraline. Wellbutrin added during recent stay to help with tobacco cessation.      Prostate cancer s/p external beam radiation therapy  * Follows with rad-onc [facility]. S/p ext beam radiotherapy and 28 months of androgen deprivation.  * No acute concern this stay. OP follow up.      Tobacco Abuse, current   * Smokes 1/2-1ppd. Encouraged cessation.        Diet: Regular Diet Adult  NPO per Anesthesia Guidelines for Procedure/Surgery Except for: Meds    DVT Prophylaxis: Low Risk/Ambulatory with no VTE prophylaxis indicated  Nova Catheter: Not present  Lines: None     Cardiac Monitoring: ACTIVE order. Indication: vascular concern  Code Status: Full Code      Clinically Significant Risk Factors Present on Admission                # Drug Induced Platelet Defect: home medication list includes an antiplatelet medication        # Overweight: Estimated body mass index is 29.41 kg/m  as calculated from the following:    Height as of  "6/9/23: 1.778 m (5' 10\").    Weight as of this encounter: 93 kg (205 lb).            Disposition Plan      Expected Discharge Date: 06/20/2023                  Vivien Gama DO  Hospitalist Service  [facility]  Securely message with [employer] (more info)  Text page via [email or web address] Paging/Directory   ______________________________________________________________________    Interval History   Patient seen and examined. He reports ongoing pain in his right lower extremity, worse with activity. Discussed plan for further discussion with IR to determine procedural path this hospital stay. Able to eat today. Denies shortness of breath, chest pain, nausea, vomiting    Physical Exam   Vital Signs: Temp: 97.9  F (36.6  C) Temp src: Oral BP: 101/69 Pulse: 57   Resp: 11 SpO2: 90 % O2 Device: None (Room air)    Weight: 205 lbs 0 oz    Constitutional: Awake, alert, cooperative, no apparent distress  Respiratory: Clear to auscultation bilaterally, no crackles or wheezing  Cardiovascular: Regular rate and rhythm, normal S1 and S2, and no murmur noted  GI: Normal bowel sounds, soft, non-distended, non-tender  Skin/Integumen: No rashes, no cyanosis, no edema  Other: Sensation intact.    Medical Decision Making       35 MINUTES SPENT BY ME on the date of service doing chart review, history, exam, documentation & further activities per the note.      Data     I have personally reviewed the following data over the past 24 hrs:    7.0  \   9.9 (L)   / 299     135 (L) 100 15.7 /  95   3.9 22 0.92 \       ALT: 33 AST: 18 AP: 146 (H) TBILI: 0.5   ALB: 3.9 TOT PROTEIN: 7.6 LIPASE: N/A       Procal: N/A CRP: N/A Lactic Acid: 1.3       INR:  0.98 PTT:  N/A   D-dimer:  N/A Fibrinogen:  N/A       Imaging results reviewed over the past 24 hrs:   Recent Results (from the past 24 hour(s))   US Lower Extremity Arterial Duplex Right    Narrative    EXAM: US LOWER EXTREMITY ARTERIAL DUPLEX RIGHT  LOCATION: [facility]  DATE: 6/16/2023    INDICATION: Right leg pain, unable to dopple pulses, began when experience heart attack a few days ago  COMPARISON: CT angiogram 07/21/2021  TECHNIQUE: Duplex utilizing 2D gray-scale imaging, Doppler interrogation with color-flow and spectral waveform analysis.    FINDINGS: Extensive calcified atherosclerosis with moderate to severe stenosis at the level of the common femoral artery. Markedly low resistance, parvus tardus waveforms below this level. There is occlusion of the distal superficial femoral artery with   minimal collateral flow visualized below this level as described below.    RIGHT LOWER EXTREMITY ARTERIAL ASSESSMENT:  Common femoral artery: 234 cm/s, monophasic  Profunda femoris artery: 53 cm/s, monophasic  SFA (proximal): 43 cm/s, monophasic  SFA (mid): 39 cm/s, monophasic  SFA (distal): 0 cm/s, occluded  Popliteal artery: 18 cm/s, monophasic  Anterior tibial artery: 0 cm/s, occluded  Posterior tibial artery: 12 cm/s, monophasic  Dorsalis pedis artery: 0 cm/s, occluded      Impression    IMPRESSION:  1.  Severe atherosclerotic disease with moderate to severe stenosis in the common femoral artery. Given monophasic waveforms in the common femoral artery itself, severe proximal stenosis is possible although not directly visualized.  2.  Occlusion at the level of the distal superficial femoral artery. Minimal low resistance collateral flow seen throughout the remainder of the leg at and below the knee.       US Lower Extremity Venous Duplex Right    Narrative    EXAM: US LOWER EXTREMITY VENOUS DUPLEX RIGHT  LOCATION: [facility]  DATE: 6/16/2023    INDICATION: Right lower extremity pain after recent myocardial infarction.  COMPARISON: 06/09/2023.  TECHNIQUE: Venous Duplex ultrasound of the right lower extremity with and without compression, augmentation and duplex. Color flow and spectral Doppler with waveform analysis performed.    FINDINGS:  Exam includes the common femoral, femoral, popliteal, and contralateral common femoral veins as well as segmentally visualized deep calf veins and greater saphenous vein.     RIGHT: No deep vein thrombosis. No superficial thrombophlebitis.       Impression    IMPRESSION:    No deep venous thrombosis in the visualized right lower extremity.   Foot  XR, G/E 3 views, right    Narrative    EXAM: XR FOOT RIGHT G/E 3 VIEWS  LOCATION: [facility]  DATE: 6/16/2023    INDICATION: pain in toes, can't feel foot after fall  COMPARISON: None      Impression    IMPRESSION: No acute fracture or dislocation. Mild hallux valgus and minimal degenerative arthritis of the first MTP joint. Accessory navicular bone. Arterial catheter location.   CTA Abdomen Pelvis Bilat Leg Runoff w Contr    Narrative    EXAM: CTA ABDOMEN PELVIS BILAT LEG RUNOFF W CONTR  LOCATION: [facility]  DATE: 6/16/2023    INDICATION: Right leg arterial occlusion  COMPARISON: 07/21/2021  TECHNIQUE: Helical acquisition through the abdomen, pelvis, and bilateral lower extremities was performed during the arterial phase of contrast enhancement using IV Contrast. 2D and 3D reconstructions were performed by the CT technologist. Dose reduction   techniques were used.   CONTRAST: 100 mL Isovue 370    FINDINGS:  AORTA: Abdominal aorta is patent and of normal size. Celiac artery and SMA are patent. Bilateral renal arteries are patent. Kissing bilateral common iliac artery stents are patent. Right external iliac artery is occluded. There is reconstitution of flow   in the right common femoral artery. Left external iliac artery is patent without significant stenosis.    RIGHT LEG: Right common femoral artery is patent. Right profunda femoral artery is patent. I segmental stenosis involving the proximal to mid right SFA. The distal right SFA is occluded. There is reconstitution of flow in the below knee popliteal artery.    There is probable two-vessel runoff via the right PTA and peroneal artery.    LEFT LEG: Left common femoral artery is patent. Left profunda femoral artery at the mid segment is occluded. Left SFA is occluded at the mid to distal segments. There is reconstitution of flow in the left popliteal artery. There may be three-vessel   runoff below the knee into the foot. However the distal anterior tibial artery at the ankle/distal calf is difficult to assess due to poor contrast opacification. Cannot assess for patency of the dorsalis pedis artery.    LUNG BASES: Negative    ABDOMEN: Liver and spleen are normal. No calcified gallstones. Pancreas is normal. Adrenal glands are normal. Kidneys are normal.    PELVIS: Urinary bladder is normal. No dilated loops of large or small bowel. No pericolonic fat stranding. No pelvic free fluid or adenopathy.      Impression    IMPRESSION:  1.  Bilateral kissing common iliac artery stents are patent. The right external iliac artery is occluded in its entire length. There is reconstitution of flow into the right common femoral artery. The left external iliac artery is patent.  2.  RIGHT LEG: Right common femoral artery is patent. Right profunda femoral artery is patent. Multi segmental stenosis involving the proximal to mid right SFA. The distal right SFA is occluded. There is reconstitution of flow in the below knee popliteal   artery. There is probable two-vessel runoff via the right PTA and peroneal artery. Difficult to assess patency of the right anterior tibial artery past the distal calf/ankle  3.  LEFT LEG: Left common femoral artery is patent. Left profunda femoral artery at the mid segment is occluded. Left SFA is occluded at the mid to distal segments. There is reconstitution of flow in the left popliteal artery. There may be three-vessel   runoff below the knee into the foot. However the distal anterior tibial artery at the ankle/distal calf is difficult to assess due to poor  contrast opacification.

## 2023-06-17 NOTE — PLAN OF CARE
A/O x 4. AVSS on RA. Neuros intact. Tele SR/SB (50s). Up w/ 1 and uses cane. LS clear. Pain managed w/ gabapentin and oxycodone. DP and PT pulses absent on RLE. Pt reports numbness and tingling, no sensation on RLE. DP and PT pulses strong w/ doppler on LLE. Tolerating regular diet. NPO @ midnight for IR procedure tomorrow. Continue aspirin and plavix despite procedure tomorrow.

## 2023-06-17 NOTE — CONSULTS
Vascular Surgery Consultation    Adam Sahni MRN# [id number]   Age: 62 year old YOB: 1960     Date of Admission:  6/16/2023    Date of Consult:   06/16/23    Reason for consult: Concern for acute limb ischemia       Requesting service: Emergency Deparment; requesting provider: Dr. Garcia                   Assessment and Plan:   62 y.o. male smoker with history of kissing iliac stents, known external iliac stenosis and chronic bilateral dital SFA occlusions, chronic radiculopathy s/p multiple lumbar spien procedures, recent STEMI s/p PCI last week admitted with worsening right lower extremity pain since STEMI.    Imaging shows patent common iliac artery stents with right external iliac artery occlusion which appears to be chronic, and again demonstrated is chronic right distal occlusion AT is occluded, PT has very sluggish flow at the ankle.     Suspect his recent drop in LVEF after recent STEMI in the setting of chronic multilevel occlusive disease of the right external iliac and SFA was enough tip him into ischemic rest pain.    He complains of weakness however he remains motor and sensory intact in the lower extremities on my three separate exams over this evening.  Pain in the right toes and foot consistent with ischemic rest pain, though may have a component of neuropathic pain given his extensive history of radiculopathy. At most he is Benjamin grade I acute limb ischemia. He remains in the subacute phase after STEMI with a high risk of cardiopulmonary complications with any procedure at this time. Considering these risks and his normal strength and sensation in the right leg, there are no plans for any lower extremity intervention this admission.    Patient is admitted to the hospitalist service. As I discussed with Dr. Rico our plan is for him to be monitored with serial exam of lower extremity strength and sensation. He should remain NPO and can stop heparin drip, continuing DAPT. If he  "remains neurologically intact with continued rest pain he can follow-up in clinic to discuss possible intervention after at least 6 weeks following STEMI.      Discussed with staff, Dr. Mazariegos..    Abundio Pitt MD               Chief Complaint:   Right foot pain         History of Present Illness:   Mr. Juan A Sahni is a 62-year-old male, recently admitted last week with STEMI who is seen in consultation this evening for ongoing right lower extremity pain.  He was admitted 6/9/2023 -611/223 with STEMI for which he underwent percutaneous coronary intervention and has remained on dual antiplatelet therapy.  He began experiencing right leg pain around the time of his MI.  He also complains of right leg weakness but was unable to elucidate any more detail of his lower extremity weakness other than his leg feels \"off\".  He has pain in his toes and foot at rest and has cramping pain in his right calf with walking several steps.  His history is notable for bilateral common iliac artery stents in 2021 placed at [facility] [place] [facility] for lifestyle limiting claudication.  He has a long history of radiculopathy with resultant neuropathy and lower extremity weakness including right foot drop.  He has undergone multiple lumbar spine procedures with reported complete resolution of right lower extremity weakness after his most recent spine surgery last year.  At the time of his STEMI last week, LDL was 144, hemoglobin A1c 5.7%.  He was started on statin and quit smoking at that time.           Past Medical History:     Past Medical History:   Diagnosis Date     CAD (coronary artery disease)     with UGO to RCA on 6/9/23 for inferior STEMI     Chronic anemia      Depression      Hyperlipidemia      Lumbar radiculopathy      PAD (peripheral artery disease) (H)     S/p bilateral kissing iliac stents after thrombolytics 7/16/21-7/17/21 for severe claudication and complete occlusion of the left common iliac artery with tandem occlusions and/or high " grade stenoses of the distal SFA and popliteal artery.     Peripheral neuropathy      Prediabetes      Prostate cancer (H)     s/p ext beam radiation therapy, follows at [place]             Past Surgical History:     Past Surgical History:   Procedure Laterality Date     CV CORONARY ANGIOGRAM N/A 6/9/2023    Procedure: Coronary Angiogram;  Surgeon: Rosenda Pacheco MD;  Location:  [facility]             Social History:   Former smoker, quit last week after STEMI    Social History     Tobacco Use     Smoking status: Never     Smokeless tobacco: Never   Vaping Use     Vaping status: Not on file   Substance Use Topics     Alcohol use: Not on file             Family History:   No family history on file.             Allergies:     Allergies   Allergen Reactions     Penicillins Hives, Shortness Of Breath and Swelling     Reports also having throat swelling- took medication as a child.               Medications:     Current Facility-Administered Medications   Medication     heparin infusion 25,000 units in D5W 250 mL ANTICOAGULANT     Current Outpatient Medications   Medication Sig     alendronate (FOSAMAX) 70 MG tablet Take 70 mg by mouth every 7 days     aspirin (ASA) 81 MG chewable tablet Take 1 tablet (81 mg) by mouth daily     atorvastatin (LIPITOR) 80 MG tablet Take 80 mg by mouth At Bedtime     Barberry-Oreg Grape-Goldenseal (BERBERINE COMPLEX) 200-200-50 MG CAPS Take 2 capsules by mouth daily     buPROPion (WELLBUTRIN SR) 150 MG 12 hr tablet Take 1 tablet (150 mg) by mouth daily for 3 days, THEN 1 tablet (150 mg) 2 times daily for 27 days.     cholecalciferol (VITAMIN D3) 125 mcg (5000 units) capsule Take 125 mcg by mouth daily     cilostazol (PLETAL) 100 MG tablet Take 100 mg by mouth 2 times daily     clopidogrel (PLAVIX) 75 MG tablet Take 1 tablet (75 mg) by mouth daily     ferrous sulfate (FEROSUL) 325 (65 Fe) MG tablet Take 325 mg by mouth every other day     fish oil-omega-3 fatty acids 1000 MG  capsule Take 2 g by mouth daily     gabapentin (NEURONTIN) 600 MG tablet Take 600 mg by mouth 3 times daily as needed for neuropathic pain     Menaquinone-7 (VITAMIN K2) 100 MCG CAPS Take 1 capsule by mouth daily     metoprolol succinate ER (TOPROL XL) 25 MG 24 hr tablet Take 1 tablet (25 mg) by mouth daily     nitroGLYcerin (NITROSTAT) 0.4 MG sublingual tablet For chest pain place 1 tablet under the tongue every 5 minutes for 3 doses. If symptoms persist 5 minutes after 1st dose call 911.     pregabalin (LYRICA) 75 MG capsule Take 75 mg by mouth 2 times daily     sertraline (ZOLOFT) 100 MG tablet Take 150 mg by mouth daily              Review of Systems:   A 12 point review of systems was completed and found to be negative unless otherwise stated in the above HPI          Physical Exam:   BP (!) 107/22   Pulse 58   Temp 97.4  F (36.3  C) (Temporal)   Resp 12   Wt 93 kg (205 lb)   SpO2 98%   BMI 29.41 kg/m      No intake or output data in the 24 hours ending 06/16/23 2154    GEN: A&Ox3, no acute distress  NEURO: CN II-XII grossly intact. No gross neurologic deficits.  Intact sensation over bilateral toes, feet, and calves.  Normal and symmetric motor strength with extension and flexion of toes, plantar and dorsiflexion at the ankle.  NECK: trachea midline, no JVD  HEENT: No scleral icterus.  RESP: Nonlabored breathing on room air, no cough  CV: RRR by radial pulse, noncyanotic   ABD: soft, non-tender, nondistended. No guarding or rebound tenderness  PSYCH: cooperative   PULSES: Nonpalpable right femoral pulse.  Palpable left femoral pulse.  Absent right DP and PT Doppler signals.  Monophasic left DP and PT Doppler signals.  EXTREMITIES: Feet are symmetrically cool, pale.  No lower extremity wounds.  Bruise over the right second toe.         Data:   All laboratory data reviewed    Results:  [facility]  Recent Labs   Lab 06/16/23  1802 06/11/23  0529 06/10/23  0623 06/09/23  2223    137 138 137   POTASSIUM 4.2  3.9 3.8  --    CHLORIDE 102 105 107  --    CO2 23 22 20*  --    BUN 12.3 11.3 11.2  --    CR 0.87 0.79 0.76  --    GLC 89 114* 99  --      CBC  Recent Labs   Lab 06/16/23 1802 06/11/23  0529 06/10/23  0623 06/09/23  2223   WBC 7.7 8.3 8.7  --    HGB 11.4* 9.8* 10.1* 10.5*    184 159  --      LFT  Recent Labs   Lab 06/16/23 1802 06/11/23  0529 06/10/23  0623   AST 18 74* 158*   ALT 33 40 50   ALKPHOS 146* 104 100   BILITOTAL 0.5 0.5 0.5   ALBUMIN 3.9 3.2* 3.3*   INR 0.98  --   --      Recent Labs   Lab 06/16/23  1802 06/11/23  0529 06/10/23  0623   GLC 89 114* 99       Imaging:  CTA Abdomen Pelvis Bilat Leg Runoff w Contr    Result Date: 6/16/2023  EXAM: CTA ABDOMEN PELVIS BILAT LEG RUNOFF W CONTR LOCATION: [facility] DATE: 6/16/2023 INDICATION: Right leg arterial occlusion COMPARISON: 07/21/2021 TECHNIQUE: Helical acquisition through the abdomen, pelvis, and bilateral lower extremities was performed during the arterial phase of contrast enhancement using IV Contrast. 2D and 3D reconstructions were performed by the CT technologist. Dose reduction  techniques were used. CONTRAST: 100 mL Isovue 370 FINDINGS: AORTA: Abdominal aorta is patent and of normal size. Celiac artery and SMA are patent. Bilateral renal arteries are patent. Kissing bilateral common iliac artery stents are patent. Right external iliac artery is occluded. There is reconstitution of flow in the right common femoral artery. Left external iliac artery is patent without significant stenosis. RIGHT LEG: Right common femoral artery is patent. Right profunda femoral artery is patent. I segmental stenosis involving the proximal to mid right SFA. The distal right SFA is occluded. There is reconstitution of flow in the below knee popliteal artery.  There is probable two-vessel runoff via the right PTA and peroneal artery. LEFT LEG: Left common femoral artery is patent. Left profunda femoral artery at the mid segment is  occluded. Left SFA is occluded at the mid to distal segments. There is reconstitution of flow in the left popliteal artery. There may be three-vessel runoff below the knee into the foot. However the distal anterior tibial artery at the ankle/distal calf is difficult to assess due to poor contrast opacification. Cannot assess for patency of the dorsalis pedis artery. LUNG BASES: Negative ABDOMEN: Liver and spleen are normal. No calcified gallstones. Pancreas is normal. Adrenal glands are normal. Kidneys are normal. PELVIS: Urinary bladder is normal. No dilated loops of large or small bowel. No pericolonic fat stranding. No pelvic free fluid or adenopathy.     IMPRESSION: 1.  Bilateral kissing common iliac artery stents are patent. The right external iliac artery is occluded in its entire length. There is reconstitution of flow into the right common femoral artery. The left external iliac artery is patent. 2.  RIGHT LEG: Right common femoral artery is patent. Right profunda femoral artery is patent. Multi segmental stenosis involving the proximal to mid right SFA. The distal right SFA is occluded. There is reconstitution of flow in the below knee popliteal  artery. There is probable two-vessel runoff via the right PTA and peroneal artery. Difficult to assess patency of the right anterior tibial artery past the distal calf/ankle 3.  LEFT LEG: Left common femoral artery is patent. Left profunda femoral artery at the mid segment is occluded. Left SFA is occluded at the mid to distal segments. There is reconstitution of flow in the left popliteal artery. There may be three-vessel runoff below the knee into the foot. However the distal anterior tibial artery at the ankle/distal calf is difficult to assess due to poor contrast opacification.     Foot  XR, G/E 3 views, right    Result Date: 6/16/2023  EXAM: XR FOOT RIGHT G/E 3 VIEWS LOCATION: [facility] DATE: 6/16/2023 INDICATION: pain in toes,  can't feel foot after fall COMPARISON: None     IMPRESSION: No acute fracture or dislocation. Mild hallux valgus and minimal degenerative arthritis of the first MTP joint. Accessory navicular bone. Arterial catheter location.    US Lower Extremity Arterial Duplex Right    Result Date: 6/16/2023  EXAM: US LOWER EXTREMITY ARTERIAL DUPLEX RIGHT LOCATION: [facility] DATE: 6/16/2023 INDICATION: Right leg pain, unable to dopple pulses, began when experience heart attack a few days ago COMPARISON: CT angiogram 07/21/2021 TECHNIQUE: Duplex utilizing 2D gray-scale imaging, Doppler interrogation with color-flow and spectral waveform analysis. FINDINGS: Extensive calcified atherosclerosis with moderate to severe stenosis at the level of the common femoral artery. Markedly low resistance, parvus tardus waveforms below this level. There is occlusion of the distal superficial femoral artery with minimal collateral flow visualized below this level as described below. RIGHT LOWER EXTREMITY ARTERIAL ASSESSMENT: Common femoral artery: 234 cm/s, monophasic Profunda femoris artery: 53 cm/s, monophasic SFA (proximal): 43 cm/s, monophasic SFA (mid): 39 cm/s, monophasic SFA (distal): 0 cm/s, occluded Popliteal artery: 18 cm/s, monophasic Anterior tibial artery: 0 cm/s, occluded Posterior tibial artery: 12 cm/s, monophasic Dorsalis pedis artery: 0 cm/s, occluded     IMPRESSION: 1.  Severe atherosclerotic disease with moderate to severe stenosis in the common femoral artery. Given monophasic waveforms in the common femoral artery itself, severe proximal stenosis is possible although not directly visualized. 2.  Occlusion at the level of the distal superficial femoral artery. Minimal low resistance collateral flow seen throughout the remainder of the leg at and below the knee.     US Lower Extremity Venous Duplex Right    Result Date: 6/16/2023  EXAM: US LOWER EXTREMITY VENOUS DUPLEX RIGHT LOCATION: [facility] DATE: 6/16/2023 INDICATION: Right lower extremity pain after recent myocardial infarction. COMPARISON: 06/09/2023. TECHNIQUE: Venous Duplex ultrasound of the right lower extremity with and without compression, augmentation and duplex. Color flow and spectral Doppler with waveform analysis performed. FINDINGS: Exam includes the common femoral, femoral, popliteal, and contralateral common femoral veins as well as segmentally visualized deep calf veins and greater saphenous vein. RIGHT: No deep vein thrombosis. No superficial thrombophlebitis.     IMPRESSION: No deep venous thrombosis in the visualized right lower extremity.    XR Chest Port 1 View    Result Date: 6/10/2023  EXAM: XR CHEST PORT 1 VIEW LOCATION: [facility] DATE/TIME: 06/10/2023, 12:52 PM CDT INDICATION: Shortness of breath. COMPARISON: 06/09/2023.     IMPRESSION: Negative chest.     US Lower Extremity Venous Duplex Bilateral    Result Date: 6/9/2023  ULTRASOUND VENOUS LOWER EXTREMITY BILATERAL  6/9/2023 12:21 PM HISTORY: Bilateral lower extremity pain, swelling and edema. concern for DVT Hypotension COMPARISON: None. TECHNIQUE: Ultrasound gray scale, Color Doppler flow, and spectral Doppler waveform analysis performed. FINDINGS: The bilateral common femoral, superficial femoral, popliteal and segmentally visualized calf veins are patent, fully compressible and demonstrate antegrade Doppler flow. The visualized cephalad great saphenous vein is negative for thrombus.     IMPRESSION: The bilateral lower extremities are negative for deep venous thrombosis. ERROL ALVAREZ MD   SYSTEM ID:  [id number]    XR Chest Port 1 View    Result Date: 6/9/2023  XR CHEST PORT 1 VIEW 6/9/2023 11:55 AM    INDICATION: new hypotension - eval for infiltrates, pulm etiology and/or eval for volume overload post cor angio COMPARISON: 6/9/2023     IMPRESSION: Negative chest. KIRAN SONI MD   SYSTEM ID:   [id number]    Echocardiogram Complete    Result Date: 2023  [id number]^GISELA^WILTON^KENTRELL  [facility] [address] [place] [address]  Name: JONATHAN BABB MRN: [id number] : 1960 Study Date: 2023 11:18 AM Age: 62 yrs Gender: Male Patient Location: [facility] Reason For Study: CAD Ordering Physician: WILTON HIGGINS Referring Physician: WILTON HIGGINS Performed By: Rebecca Irving  BSA: 2.1 m2 Height: 70 in Weight: 203 lb HR: 65 BP: 83/53 mmHg ______________________________________________________________________________ Procedure Complete Portable Echo Adult. Optison (NDC #[id number]) given intravenously. ______________________________________________________________________________ Interpretation Summary  Mildly decreased left ventricular systolic function The visual ejection fraction is 45-50%. Severe hypokinesis of the mid-apical inferior, mid anteroseptal, inferoseptal, apical septal wall segments. The right ventricle is normal in structure, function and size. No significant valve dysfunction. Aortic sclerosis. The inferior vena cava was normal in size with preserved respiratory variability. There is no pericardial effusion.  No prior study for comparison. ______________________________________________________________________________ Left Ventricle There is normal left ventricular wall thickness. Mildly decreased left ventricular systolic function. The visual ejection fraction is 45-50%. Severe hypokinesis of the mid-apical inferior, mid anteroseptal, inferoseptal, apical septal wall segments.  Right Ventricle The right ventricle is normal in structure, function and size.  Atria Normal left atrial size. Right atrial size is normal.  Mitral Valve The mitral valve is normal in structure and function. There is mild (1+) mitral regurgitation.  Tricuspid Valve The tricuspid valve is normal in structure and function. There is  mild (1+) tricuspid regurgitation. Right ventricular systolic pressure could not be approximated due to inadequate tricuspid regurgitation.  Aortic Valve There is mild trileaflet aortic sclerosis.  Pulmonic Valve The pulmonic valve is not well seen, but is grossly normal.  Vessels The aortic root is normal size. Normal size ascending aorta. The inferior vena cava was normal in size with preserved respiratory variability.  Pericardium There is no pericardial effusion.  ______________________________________________________________________________ MMode/2D Measurements & Calculations IVSd: 0.90 cm LVIDd: 3.4 cm LVIDs: 2.7 cm LVPWd: 1.0 cm FS: 20.6 % LV mass(C)d: 91.8 grams LV mass(C)dI: 43.7 grams/m2  Ao root diam: 3.4 cm LA dimension: 3.9 cm asc Aorta Diam: 3.3 cm LA/Ao: 1.1 LVOT diam: 2.2 cm LVOT area: 3.8 cm2 LA Volume (BP): 42.6 ml LA Volume Index (BP): 20.3 ml/m2 RWT: 0.59 TAPSE: 2.0 cm  Doppler Measurements & Calculations MV E max brett: 85.4 cm/sec MV A max brett: 85.9 cm/sec MV E/A: 0.99  MV dec time: 0.24 sec Ao V2 max: 145.0 cm/sec Ao max P.0 mmHg Ao V2 mean: 95.6 cm/sec Ao mean P.5 mmHg Ao V2 VTI: 28.1 cm DARREN(I,D): 2.3 cm2 DARREN(V,D): 2.6 cm2 LV V1 max PG: 3.8 mmHg LV V1 max: 97.5 cm/sec LV V1 VTI: 16.8 cm SV(LVOT): 63.9 ml SI(LVOT): 30.4 ml/m2 PA acc time: 0.11 sec AV Brett Ratio (DI): 0.67 DARREN Index (cm2/m2): 1.1 E/E' av.9 Lateral E/e': 7.3 Medial E/e': 10.5 RV S Brett: 11.5 cm/sec  ______________________________________________________________________________ Report approved by: Teo Sandoval 2023 12:23 PM       Cardiac Catheterization    Result Date: 2023     Prox Cx lesion is 70% stenosed.    Mid LAD lesion is 45% stenosed.    Ramus lesion is 60% stenosed.    Lat Ramus lesion is 50% stenosed.    Mid LM to Dist LM lesion is 20% stenosed.    Prox RCA to Mid RCA lesion is 100% stenosed. Late-presentation inferior MI due to thrombotic occlusion of the proximal right coronary artery.  Severe 70% stenosis of the proximal LCx which is a small to medium vessel. Moderate to severe stenosis of two branches of the large ramus intermedius. Moderate stenosis of the midLAD. PCI of the proximal to midRCA with aspiration thrombectomy and Cutting Balloon angioplasty and placement of a 3.5 x 38 mm Synergy drug-eluting stent which was postdilated to 4.0 mm.     XR Chest Port 1 View    Result Date: 6/9/2023  EXAM: XR CHEST PORT 1 VIEW LOCATION: [facility] DATE/TIME: 6/9/2023 4:28 AM CDT INDICATION: chest pain COMPARISON: None.     IMPRESSION: Negative chest.

## 2023-06-17 NOTE — PROVIDER NOTIFICATION
MD Notification    Notified Person: MD    Notified Person Name: Kalyn Rico     Notification Date/Time: 6/16/2023 0226     Notification Interaction: paged    Purpose of Notification: Can the pt get a diet ordered until midnight?    Orders Received: regular diet til 6/17/2023 at 0000, then NPO     Comments:

## 2023-06-18 ENCOUNTER — APPOINTMENT (OUTPATIENT)
Dept: INTERVENTIONAL RADIOLOGY/VASCULAR | Facility: CLINIC | Age: 63
End: 2023-06-18
Attending: SURGERY
Payer: COMMERCIAL

## 2023-06-18 LAB
ANION GAP SERPL CALCULATED.3IONS-SCNC: 10 MMOL/L (ref 7–15)
BUN SERPL-MCNC: 18.9 MG/DL (ref 8–23)
CALCIUM SERPL-MCNC: 8.9 MG/DL (ref 8.8–10.2)
CHLORIDE SERPL-SCNC: 103 MMOL/L (ref 98–107)
CREAT SERPL-MCNC: 0.98 MG/DL (ref 0.67–1.17)
DEPRECATED HCO3 PLAS-SCNC: 24 MMOL/L (ref 22–29)
ERYTHROCYTE [DISTWIDTH] IN BLOOD BY AUTOMATED COUNT: 14.6 % (ref 10–15)
ERYTHROCYTE [DISTWIDTH] IN BLOOD BY AUTOMATED COUNT: 14.6 % (ref 10–15)
FERRITIN SERPL-MCNC: 206 NG/ML (ref 31–409)
GFR SERPL CREATININE-BSD FRML MDRD: 87 ML/MIN/1.73M2
GLUCOSE SERPL-MCNC: 112 MG/DL (ref 70–99)
HCT VFR BLD AUTO: 31 % (ref 40–53)
HCT VFR BLD AUTO: 31.3 % (ref 40–53)
HGB BLD-MCNC: 10 G/DL (ref 13.3–17.7)
HGB BLD-MCNC: 10.2 G/DL (ref 13.3–17.7)
HOLD SPECIMEN: NORMAL
IRON BINDING CAPACITY (ROCHE): 311 UG/DL (ref 240–430)
IRON SATN MFR SERPL: 16 % (ref 15–46)
IRON SERPL-MCNC: 49 UG/DL (ref 61–157)
MAGNESIUM SERPL-MCNC: 2.1 MG/DL (ref 1.7–2.3)
MCH RBC QN AUTO: 29.3 PG (ref 26.5–33)
MCH RBC QN AUTO: 29.4 PG (ref 26.5–33)
MCHC RBC AUTO-ENTMCNC: 32.3 G/DL (ref 31.5–36.5)
MCHC RBC AUTO-ENTMCNC: 32.6 G/DL (ref 31.5–36.5)
MCV RBC AUTO: 90 FL (ref 78–100)
MCV RBC AUTO: 91 FL (ref 78–100)
PHOSPHATE SERPL-MCNC: 4.2 MG/DL (ref 2.5–4.5)
PLATELET # BLD AUTO: 304 10E3/UL (ref 150–450)
PLATELET # BLD AUTO: 328 10E3/UL (ref 150–450)
POTASSIUM SERPL-SCNC: 4.3 MMOL/L (ref 3.4–5.3)
RBC # BLD AUTO: 3.41 10E6/UL (ref 4.4–5.9)
RBC # BLD AUTO: 3.47 10E6/UL (ref 4.4–5.9)
SODIUM SERPL-SCNC: 137 MMOL/L (ref 136–145)
TRANSFERRIN SERPL-MCNC: 235 MG/DL (ref 200–360)
UFH PPP CHRO-ACNC: 0.23 IU/ML
WBC # BLD AUTO: 6.2 10E3/UL (ref 4–11)
WBC # BLD AUTO: 8.3 10E3/UL (ref 4–11)

## 2023-06-18 PROCEDURE — 272N000190 HC ACCESSORY CR14

## 2023-06-18 PROCEDURE — 250N000013 HC RX MED GY IP 250 OP 250 PS 637: Performed by: SURGERY

## 2023-06-18 PROCEDURE — 84100 ASSAY OF PHOSPHORUS: CPT | Performed by: HOSPITALIST

## 2023-06-18 PROCEDURE — 37184 PRIM ART M-THRMBC 1ST VSL: CPT | Mod: RT

## 2023-06-18 PROCEDURE — 047K3DZ DILATION OF RIGHT FEMORAL ARTERY WITH INTRALUMINAL DEVICE, PERCUTANEOUS APPROACH: ICD-10-PCS | Performed by: RADIOLOGY

## 2023-06-18 PROCEDURE — 272N000196 HC ACCESSORY CR5

## 2023-06-18 PROCEDURE — 250N000009 HC RX 250: Performed by: RADIOLOGY

## 2023-06-18 PROCEDURE — 85520 HEPARIN ASSAY: CPT

## 2023-06-18 PROCEDURE — 36415 COLL VENOUS BLD VENIPUNCTURE: CPT | Performed by: HOSPITALIST

## 2023-06-18 PROCEDURE — 250N000011 HC RX IP 250 OP 636

## 2023-06-18 PROCEDURE — C1769 GUIDE WIRE: HCPCS

## 2023-06-18 PROCEDURE — 83550 IRON BINDING TEST: CPT | Performed by: HOSPITALIST

## 2023-06-18 PROCEDURE — 82728 ASSAY OF FERRITIN: CPT | Performed by: HOSPITALIST

## 2023-06-18 PROCEDURE — 04CK3ZZ EXTIRPATION OF MATTER FROM RIGHT FEMORAL ARTERY, PERCUTANEOUS APPROACH: ICD-10-PCS | Performed by: RADIOLOGY

## 2023-06-18 PROCEDURE — 272N000571 HC SHEATH CR8

## 2023-06-18 PROCEDURE — 255N000002 HC RX 255 OP 636: Performed by: RADIOLOGY

## 2023-06-18 PROCEDURE — 250N000013 HC RX MED GY IP 250 OP 250 PS 637: Performed by: INTERNAL MEDICINE

## 2023-06-18 PROCEDURE — C1757 CATH, THROMBECTOMY/EMBOLECT: HCPCS

## 2023-06-18 PROCEDURE — 37226 HC REVASCULARIZE FEM-POP ARTERY ANGIOPLASTY-STENT: CPT

## 2023-06-18 PROCEDURE — 99232 SBSQ HOSP IP/OBS MODERATE 35: CPT | Performed by: HOSPITALIST

## 2023-06-18 PROCEDURE — 80048 BASIC METABOLIC PNL TOTAL CA: CPT | Performed by: HOSPITALIST

## 2023-06-18 PROCEDURE — 047H3EZ DILATION OF RIGHT EXTERNAL ILIAC ARTERY WITH TWO INTRALUMINAL DEVICES, PERCUTANEOUS APPROACH: ICD-10-PCS | Performed by: RADIOLOGY

## 2023-06-18 PROCEDURE — 83735 ASSAY OF MAGNESIUM: CPT | Performed by: HOSPITALIST

## 2023-06-18 PROCEDURE — 99152 MOD SED SAME PHYS/QHP 5/>YRS: CPT

## 2023-06-18 PROCEDURE — 36415 COLL VENOUS BLD VENIPUNCTURE: CPT | Performed by: RADIOLOGY

## 2023-06-18 PROCEDURE — C1887 CATHETER, GUIDING: HCPCS

## 2023-06-18 PROCEDURE — 85027 COMPLETE CBC AUTOMATED: CPT | Performed by: RADIOLOGY

## 2023-06-18 PROCEDURE — 84466 ASSAY OF TRANSFERRIN: CPT | Performed by: HOSPITALIST

## 2023-06-18 PROCEDURE — 36247 INS CATH ABD/L-EXT ART 3RD: CPT

## 2023-06-18 PROCEDURE — 85347 COAGULATION TIME ACTIVATED: CPT

## 2023-06-18 PROCEDURE — 272N000570 HC SHEATH CR7

## 2023-06-18 PROCEDURE — 272N000567 HC SHEATH CR4

## 2023-06-18 PROCEDURE — C1760 CLOSURE DEV, VASC: HCPCS

## 2023-06-18 PROCEDURE — 120N000013 HC R&B IMCU

## 2023-06-18 PROCEDURE — 99231 SBSQ HOSP IP/OBS SF/LOW 25: CPT | Performed by: SURGERY

## 2023-06-18 PROCEDURE — 272N000302 HC DEVICE INFLATION CR5

## 2023-06-18 PROCEDURE — 272N000116 HC CATH CR1

## 2023-06-18 PROCEDURE — 250N000011 HC RX IP 250 OP 636: Performed by: RADIOLOGY

## 2023-06-18 PROCEDURE — 36415 COLL VENOUS BLD VENIPUNCTURE: CPT

## 2023-06-18 PROCEDURE — 85027 COMPLETE CBC AUTOMATED: CPT | Performed by: HOSPITALIST

## 2023-06-18 RX ORDER — FLUMAZENIL 0.1 MG/ML
0.2 INJECTION, SOLUTION INTRAVENOUS
Status: DISCONTINUED | OUTPATIENT
Start: 2023-06-18 | End: 2023-06-18 | Stop reason: HOSPADM

## 2023-06-18 RX ORDER — HEPARIN SODIUM 10000 [USP'U]/100ML
0-5000 INJECTION, SOLUTION INTRAVENOUS CONTINUOUS
Status: DISCONTINUED | OUTPATIENT
Start: 2023-06-18 | End: 2023-06-18

## 2023-06-18 RX ORDER — FENTANYL CITRATE 50 UG/ML
25-50 INJECTION, SOLUTION INTRAMUSCULAR; INTRAVENOUS EVERY 5 MIN PRN
Status: DISCONTINUED | OUTPATIENT
Start: 2023-06-18 | End: 2023-06-18 | Stop reason: HOSPADM

## 2023-06-18 RX ORDER — NALOXONE HYDROCHLORIDE 0.4 MG/ML
0.2 INJECTION, SOLUTION INTRAMUSCULAR; INTRAVENOUS; SUBCUTANEOUS
Status: DISCONTINUED | OUTPATIENT
Start: 2023-06-18 | End: 2023-06-18 | Stop reason: HOSPADM

## 2023-06-18 RX ORDER — FERROUS SULFATE 325(65) MG
325 TABLET ORAL DAILY
Status: DISCONTINUED | OUTPATIENT
Start: 2023-06-18 | End: 2023-06-18

## 2023-06-18 RX ORDER — IODIXANOL 320 MG/ML
100 INJECTION, SOLUTION INTRAVASCULAR ONCE
Status: COMPLETED | OUTPATIENT
Start: 2023-06-18 | End: 2023-06-18

## 2023-06-18 RX ORDER — NALOXONE HYDROCHLORIDE 0.4 MG/ML
0.4 INJECTION, SOLUTION INTRAMUSCULAR; INTRAVENOUS; SUBCUTANEOUS
Status: DISCONTINUED | OUTPATIENT
Start: 2023-06-18 | End: 2023-06-18 | Stop reason: HOSPADM

## 2023-06-18 RX ORDER — HEPARIN SODIUM 10000 [USP'U]/100ML
0-5000 INJECTION, SOLUTION INTRAVENOUS CONTINUOUS
Status: DISCONTINUED | OUTPATIENT
Start: 2023-06-18 | End: 2023-06-20

## 2023-06-18 RX ORDER — HEPARIN SODIUM 1000 [USP'U]/ML
5000 INJECTION, SOLUTION INTRAVENOUS; SUBCUTANEOUS ONCE
Status: COMPLETED | OUTPATIENT
Start: 2023-06-18 | End: 2023-06-18

## 2023-06-18 RX ORDER — HEPARIN SODIUM 200 [USP'U]/100ML
3 INJECTION, SOLUTION INTRAVENOUS CONTINUOUS PRN
Status: DISCONTINUED | OUTPATIENT
Start: 2023-06-18 | End: 2023-06-18 | Stop reason: HOSPADM

## 2023-06-18 RX ORDER — FERROUS SULFATE 325(65) MG
325 TABLET ORAL EVERY OTHER DAY
Status: DISCONTINUED | OUTPATIENT
Start: 2023-06-19 | End: 2023-06-20 | Stop reason: HOSPADM

## 2023-06-18 RX ORDER — HEPARIN SODIUM 1000 [USP'U]/ML
4000 INJECTION, SOLUTION INTRAVENOUS; SUBCUTANEOUS ONCE
Status: COMPLETED | OUTPATIENT
Start: 2023-06-18 | End: 2023-06-18

## 2023-06-18 RX ADMIN — CILOSTAZOL 100 MG: 100 TABLET ORAL at 08:43

## 2023-06-18 RX ADMIN — PREGABALIN 75 MG: 75 CAPSULE ORAL at 08:43

## 2023-06-18 RX ADMIN — BUPROPION HYDROCHLORIDE 150 MG: 150 TABLET, EXTENDED RELEASE ORAL at 08:43

## 2023-06-18 RX ADMIN — LIDOCAINE HYDROCHLORIDE 8 ML: 10 INJECTION, SOLUTION INFILTRATION; PERINEURAL at 10:26

## 2023-06-18 RX ADMIN — FENTANYL CITRATE 50 MCG: 50 INJECTION, SOLUTION INTRAMUSCULAR; INTRAVENOUS at 10:21

## 2023-06-18 RX ADMIN — MIDAZOLAM 0.5 MG: 1 INJECTION INTRAMUSCULAR; INTRAVENOUS at 11:35

## 2023-06-18 RX ADMIN — HEPARIN SODIUM 3 BAG: 200 INJECTION, SOLUTION INTRAVENOUS at 12:14

## 2023-06-18 RX ADMIN — HEPARIN SODIUM 5000 UNITS: 1000 INJECTION INTRAVENOUS; SUBCUTANEOUS at 11:08

## 2023-06-18 RX ADMIN — FENTANYL CITRATE 50 MCG: 50 INJECTION, SOLUTION INTRAMUSCULAR; INTRAVENOUS at 10:29

## 2023-06-18 RX ADMIN — MIDAZOLAM 0.5 MG: 1 INJECTION INTRAMUSCULAR; INTRAVENOUS at 12:41

## 2023-06-18 RX ADMIN — HEPARIN SODIUM 4000 UNITS: 1000 INJECTION INTRAVENOUS; SUBCUTANEOUS at 12:11

## 2023-06-18 RX ADMIN — MIDAZOLAM 1 MG: 1 INJECTION INTRAMUSCULAR; INTRAVENOUS at 10:30

## 2023-06-18 RX ADMIN — HEPARIN SODIUM 1250 UNITS/HR: 10000 INJECTION, SOLUTION INTRAVENOUS at 23:04

## 2023-06-18 RX ADMIN — FENTANYL CITRATE 25 MCG: 50 INJECTION, SOLUTION INTRAMUSCULAR; INTRAVENOUS at 11:59

## 2023-06-18 RX ADMIN — IODIXANOL 120 ML: 320 INJECTION, SOLUTION INTRAVASCULAR at 13:31

## 2023-06-18 RX ADMIN — SERTRALINE HYDROCHLORIDE 150 MG: 100 TABLET ORAL at 08:43

## 2023-06-18 RX ADMIN — HEPARIN SODIUM 1100 UNITS/HR: 10000 INJECTION, SOLUTION INTRAVENOUS at 15:38

## 2023-06-18 RX ADMIN — FENTANYL CITRATE 25 MCG: 50 INJECTION, SOLUTION INTRAMUSCULAR; INTRAVENOUS at 11:35

## 2023-06-18 RX ADMIN — MIDAZOLAM 0.5 MG: 1 INJECTION INTRAMUSCULAR; INTRAVENOUS at 11:02

## 2023-06-18 RX ADMIN — ASPIRIN 81 MG CHEWABLE TABLET 81 MG: 81 TABLET CHEWABLE at 08:42

## 2023-06-18 RX ADMIN — GABAPENTIN 600 MG: 600 TABLET, FILM COATED ORAL at 23:06

## 2023-06-18 RX ADMIN — PREGABALIN 75 MG: 75 CAPSULE ORAL at 20:42

## 2023-06-18 RX ADMIN — OXYCODONE HYDROCHLORIDE 10 MG: 5 TABLET ORAL at 15:31

## 2023-06-18 RX ADMIN — CLOPIDOGREL BISULFATE 75 MG: 75 TABLET ORAL at 08:43

## 2023-06-18 RX ADMIN — FENTANYL CITRATE 25 MCG: 50 INJECTION, SOLUTION INTRAMUSCULAR; INTRAVENOUS at 12:41

## 2023-06-18 RX ADMIN — MIDAZOLAM 1 MG: 1 INJECTION INTRAMUSCULAR; INTRAVENOUS at 10:15

## 2023-06-18 RX ADMIN — FENTANYL CITRATE 25 MCG: 50 INJECTION, SOLUTION INTRAMUSCULAR; INTRAVENOUS at 11:02

## 2023-06-18 RX ADMIN — MIDAZOLAM 0.5 MG: 1 INJECTION INTRAMUSCULAR; INTRAVENOUS at 11:59

## 2023-06-18 RX ADMIN — BUPROPION HYDROCHLORIDE 150 MG: 150 TABLET, EXTENDED RELEASE ORAL at 20:42

## 2023-06-18 RX ADMIN — CILOSTAZOL 100 MG: 100 TABLET ORAL at 20:42

## 2023-06-18 RX ADMIN — ATORVASTATIN CALCIUM 80 MG: 40 TABLET, FILM COATED ORAL at 23:04

## 2023-06-18 ASSESSMENT — ACTIVITIES OF DAILY LIVING (ADL)
ADLS_ACUITY_SCORE: 24
ADLS_ACUITY_SCORE: 24
ADLS_ACUITY_SCORE: 25
ADLS_ACUITY_SCORE: 24
ADLS_ACUITY_SCORE: 24
ADLS_ACUITY_SCORE: 25
ADLS_ACUITY_SCORE: 25

## 2023-06-18 NOTE — PLAN OF CARE
Goal Outcome Evaluation:    COGNITION/MENTATION: A/O x 4   NEURO/CMS: Cool RLE, no doppler pulses RLE, LLE doppler pulses  ABNL. VITALS: Soft BP, farheen in 50's  CARDIAC/TELE: SB  RESPIRATORY: LS clear, on RA   GI: BS+   : Voiding in urinal  PAIN: BLE pain, managed w/prn oxycodone and gabapentin  SKIN: Bruise to LFA  DRAINS/LINES: PIV SL   ACTIVITY: A of 1 GB/cane  DIET: NPO since midnight for IR angio procedure

## 2023-06-18 NOTE — PROGRESS NOTES
[facility]    Medicine Progress Note - Hospitalist Service    Date of Admission:  6/16/2023    Assessment & Plan   Adam Sahni is a 62 year old male with PMHx of CAD with recent STEMI s/p UGO to the RCA, hyperlipidemia, PAD s/p angiogram and stent placement in 2021, hyperlipidemia, chronic anemia, prediabetes, peripheral neuropathy, lumbar radiculopathy, depression, prostate cancer s/p treatment and tobacco use who presents emergency room for evaluation of right leg pain numbness and foot drop.     He was recently hospitalized at [facility] from 6/9/23 to 6/11/23 for with an inferior STEMI for which he underwent PCI with UGO to the RCA on 6/9/23. Hospitalization was complicated by intermittent shortness of breath post procedure which was attributed to Brilinta. Brilinta was subsequently changed to Plavix. On 6/9/23 and RRT was called for hypotension. He was given 1 L of fluid and his blood pressures improved. Echo that stay showed EF of 45 to 50%. His statin was continued without changes. He was ultimately discharged home. Additionally during that stay he had some issues with your urinary retention however this had resolved prior to discharge. He was also noted to have acute on chronic anemia. Hemoglobin was stable at 10 prior to discharge. His LFTs were elevated this was suspected secondary to his STEMI     Concern for RLE arterial occlusion, unclear chronicity but favor chronic  PAD, with hx of iliac stent placement in 2021  * Followed by [facility]. S/p bilateral kissing iliac stents after thrombolytics in 7/2021 for severe claudication and complete occlusion of the left common iliac artery with tandem occlusions and/or high grade stenoses of the distal SFA and popliteal artery. Manages with Pletal, ASA and statin.   * Since discharge home from recent hospital stay, patient reported ongoing right lower extremity pain with numbness and foot drop. This ultimately led to him presenting back to the  emergency room this evening.  * In the ED, was afebrile, BPs 90s systolic, HRs stable. O2 sat stable on RA. Unable to palpate distal pulses. Neurologic exam was stable with no deficits in the RLE.  Labs were stable. Stat imaging was obtained.  X-ray of the foot was negative for acute fracture or dislocation. Right lower extremity venous duplex was negative for DVT. Right lower extremity arterial duplex showed severe atherosclerotic disease with moderate to there is severe stenosis in the common femoral artery severe proximal stenosis was thought to be possible but not directly visualized.  Additionally there was an occlusion at the level of the distal superficial femoral artery.  Patient was initiated on a heparin gtt in the ED while awaiting additional imaging and vascular surgery input. CTA abd/pelvis with bilateral leg runoff was obtained. In brief, bilateral kissing common iliac artery stents were patent, the right external iliac artery appeared occluded though there was noted reconstitution of flow into the right common femoral artery. Additionally was noted to have multiple segments of stenosis involving the proximal to mid right SFA. The distal right FSA was occluded, but there is reconstitution of flow below the knee popliteal artery.   * Vascular surgery service was consulted and patient was seen by Dr. Pitt in the ED. He reviewed imaging studies and discussed plan of care with admitting hospitalist. It was felt that the findings on his imaging tonight may be more chronic in nature, and patient does not give a reliable history in regards to clear onset of timing of his symptoms that indicate a more acute picture. As such, it was felt that heparin drip could be stopped. Will continue DAPT with ASA and Plavix.    * 6/17 carotid duplex: mild plaque formation <50% stenosis in bilateral ICA. Flow within vertebral arteries antegrade  * 6/18 IR: mechanical thrombectomy of right external and proximal common  femoral artery. Stents placed into external iliac artery, prox common femoral to external iliac and prox external iliac artery to distal common iliac artery.  -- [facility] [place]  -- appreciate vascular surgery, they plan formal femoral to below knee popliteal in situ bypass graft ~6 weeks out from STEMI  -- cont DAPT with ASA and Plavix  - started on pletal 100mg BID  -- cont statin  -- serial vascular/neuro checks q4h     CAD, with inferior STEMI s/p PCI w/ UGO to RCA on 6/9/23  Hyperlipidemia  * See discharge summary for specifics of recent hospitalization. Brief summary as above. Discharged on ASA 81mg daily, Plavix, metoprolol XL 25mg daily, atorvastatin 80mg HS.  * Per admission med rec per pharmacy, patient stated he had not been taking metoprolol, unclear why.  -- BPs on lower end, hold metoprolol at this time  -- cont DAPT, statin     Chronic Anemia  * Hgb 11 on admit, down to 10 on 6/17  - monitor post procedure  - check iron studies  - iron supplement every other day added 6/18     Prediabetes  * A1c 5.7 on 6/9/23.      Peripheral neuropathy  Lumbar radiculopathy, with hx of lumbar fusion in 2022  * Follows with a pain clinic through [facility]. Had a caudal epidural steroid injection on 6/1/23.  * Chronic and stable on Lyrica and gabapentin.     Depression  * Chronic and stable on sertraline. Wellbutrin added during recent stay to help with tobacco cessation.      Prostate cancer s/p external beam radiation therapy  * Follows with rad-onc [facility]. S/p ext beam radiotherapy and 28 months of androgen deprivation.  * No acute concern this stay. OP follow up.      Tobacco Abuse, current   * Smokes 1/2-1ppd. Encouraged cessation.        Diet: Regular Diet Adult    DVT Prophylaxis: Low Risk/Ambulatory with no VTE prophylaxis indicated  Nova Catheter: Not present  Lines: None     Cardiac Monitoring: ACTIVE order. Indication: vascular concern  Code Status: Full Code      Clinically Significant Risk Factors                      "    # Overweight: Estimated body mass index is 29.41 kg/m  as calculated from the following:    Height as of 6/9/23: 1.778 m (5' 10\").    Weight as of this encounter: 93 kg (205 lb)., PRESENT ON ADMISSION          Disposition Plan     Expected Discharge Date: 06/20/2023                  Vivien Gama DO  Hospitalist Service  [facility]  Securely message with [employer] (more info)  Text page via [employer] Paging/Directory   ______________________________________________________________________    Interval History   Patient seen and examined. He is seen post procedure and is on bedrest. He reports some discomfort in his right leg and we discussed blood pressures and ensuring good BPs along with pain medications. Hopeful for discharge in next few days if pain tolerated. Discussed care plan with RN    Physical Exam   Vital Signs: Temp: 98  F (36.7  C) Temp src: Oral BP: 120/75 Pulse: 75   Resp: 25 SpO2: 96 % O2 Device: None (Room air) Oxygen Delivery: 1 LPM  Weight: 205 lbs 0 oz    Constitutional: Awake, alert, cooperative, no apparent distress  Respiratory: Clear to auscultation bilaterally, no crackles or wheezing  Cardiovascular: Regular rate and rhythm, normal S1 and S2, and no murmur noted  GI: Normal bowel sounds, soft, non-distended, non-tender  Skin/Integumen: No rashes, no cyanosis, no edema  Other: Left groin site without hematoma    Medical Decision Making       30 MINUTES SPENT BY ME on the date of service doing chart review, history, exam, documentation & further activities per the note.      Data     I have personally reviewed the following data over the past 24 hrs:    8.3  \   10.0 (L)   / 304     137 103 18.9 /  112 (H)   4.3 24 0.98 \       Imaging results reviewed over the past 24 hrs:   No results found for this or any previous visit (from the past 24 hour(s)).  "

## 2023-06-18 NOTE — PROGRESS NOTES
Vascular Surgery Progress Note    S: Overall comfortable.    O:   Vitals:  BP  Min: 92/55  Max: 131/33  Temp  Av.1  F (36.7  C)  Min: 97.8  F (36.6  C)  Max: 98.6  F (37  C)  Pulse  Av.5  Min: 53  Max: 77  I/O last 3 completed shifts:  In: -   Out: 1000 [Urine:1000]    Physical Exam: Alert and appropriate.  Comfortable.  Talkative.    Stable vascular exam to lower extremities.  AM laboratory: SCr= 0.98.  Hgb= 10.2    Assessment/Plan: #1.  Subacute occlusion of right extrailiac artery with diseased common femoral artery.  Known SFA occlusions which are quite chronic.  Plan attempted revascularization with Dr. Chyna Jewell this morning of interventional radiology.  Reviewed with patient today.     #2.  Chronic bilateral SFA occlusions.  Eventual surgical bypass but this would be at a later date due to the recent MI and stenting.     #3.  Mild anemia.  Initiate iron.      Wm. Tye MD

## 2023-06-18 NOTE — PLAN OF CARE
Goal Outcome Evaluation:         June 18, 2023  Shift: 07:00-19:30  Adam Sahni  62 year old  YOB: 1960    Reason for admission:Arterial occlusion [I70.90]    Cognition/Mentation: A&O x4, calm, cooperative, pleasant    Neuros/CMS: RLE and LLE doppler pulses;     VS: Post-IR vitals stable, can be soft at times    Cardiac: NSR, can be farheen at times    GI: BS+    : voiding in urinal    Pulmonary: LS clear    Pain: pain at groin site -- PRN oxycodone available, and scheduled gabapentin    Drains/Lines: PIV saline-locked    Skin: bruise to LFA; left groin IR access WDL    Activity: Bedrest from 14:15-17:15, then Ax1 with cane    Diet: Regular    Therapies recs: none at this time    Discharge: TBD    Shift summary: Patient had stent placement x3 in IR today in right lower leg with left groin site approach -- site WDL on routine checks, completed per provider order. Can be anxious and inquisitive at times regarding cares. Heparin drip at 1100 units/hr with anti-Xa recheck scheduled for 21:45 this evening.

## 2023-06-18 NOTE — PROCEDURES
[facility]    Procedure: Right lower extremity angiogram.     Date/Time: 6/18/2023 1:52 PM    Performed by: Chyna Jewell DO  Authorized by: Chyna Jewell DO      UNIVERSAL PROTOCOL   Site Marked: Yes  Prior Images Obtained and Reviewed:  Yes  Required items: Required blood products, implants, devices and special equipment available    Patient identity confirmed:  Verbally with patient, arm band, provided demographic data and hospital-assigned identification number  Patient was reevaluated immediately before administering moderate or deep sedation or anesthesia  Confirmation Checklist:  Patient's identity using two indicators, relevant allergies, procedure was appropriate and matched the consent or emergent situation and correct equipment/implants were available  Time out: Immediately prior to the procedure a time out was called    Universal Protocol: the Joint Commission Universal Protocol was followed    Preparation: Patient was prepped and draped in usual sterile fashion       ANESTHESIA    Anesthesia: Local infiltration  Local Anesthetic:  Lidocaine 1% without epinephrine      SEDATION  Patient Sedated: Yes    Sedation Type:  Moderate (conscious) sedation  Vital signs: Vital signs monitored during sedation    See dictated procedure note for full details.  Findings: Right lower extremity angiogram showed patent right common iliac artery with occlusion of the external iliac and proximal common femoral artery. The distal common femoral, profunda femoral proximal and mid SFA are patent. The popliteal partis is patent with 3 vessel runoff.     Successful mechanical thrombectomy of the right external and proximal common femoral artery. Given that patient cannot have surgery now due to recent MI, the decision was made between me and Dr. Gamble to place stents even though a stent would have to extend into the proximal common femoral artery. With hopes that when  patient can have surgery the stent can be removed.     Post stent placement the common femoral and external iliac portions of the stent were dilated to 7 mm. The common iliac portion of the stent was dilated to 10 mm.     Stents:   7x 10 mm Viabahn self expandable stent in the external iliac artery.   7x 50 mm Viabahn self expandable stent in the proximal common femoral to external iliac artery.   7x 39 mm Viabahn VBX balloon expandable stent from the proximal external iliac artery to the distal common iliac artery, with the common iliac artery portion dilated to 10 mm.     Specimens: none    Complications: None    Condition: Stable    Plan: Bed rest with the left leg straight for 3 hours.     Heparin drip started in 2 hours.       PROCEDURE    Patient Tolerance:  Patient tolerated the procedure well with no immediate complications  Length of time physician/provider present for 1:1 monitoring during sedation: 195

## 2023-06-18 NOTE — IR NOTE
Interventional Radiology Intra-procedural Nursing Note    Patient Name: Adam Sahni  Medical Record Number: [id number]  Today's Date: June 18, 2023    Start Time: 1024  End of procedure time: 1337  Procedure: Consented for Right Lower Leg Angiogram with possible intervention with moderate sedation  Report given to: Dave Mahajan RN and [facility] RN  Time pt departs:  1350    Other Notes: Pt into IR suite 1 via cart. Pt awake and alert. To table in supine position. Monitoring equipment applied. VSS. Tele SR. Dr. Jewell in room. Time out and procedure started. Pt tolerated procedure well. Debrief with Dr. Jewell. 350 ml EBL per Dr Jewell. Sheath removed, Perclose placed and pressure held until hemostasis achieved. Dressing CDI. No complications. Bedrest for 3 hours. Pt transferred back to [facility].    ACT:  168 at 1206  214 at 1250    Medications:    Versed 4 mg  Fentanyl 200 mcg  Lidocaine 1% 8 ml  Heparin 9,000 units    Vincent Murray RN

## 2023-06-19 LAB
ACT BLD: 168 SECONDS (ref 74–150)
ACT BLD: 214 SECONDS (ref 74–150)
ANION GAP SERPL CALCULATED.3IONS-SCNC: 13 MMOL/L (ref 7–15)
BUN SERPL-MCNC: 12.2 MG/DL (ref 8–23)
CALCIUM SERPL-MCNC: 9 MG/DL (ref 8.8–10.2)
CHLORIDE SERPL-SCNC: 102 MMOL/L (ref 98–107)
CREAT SERPL-MCNC: 0.8 MG/DL (ref 0.67–1.17)
DEPRECATED HCO3 PLAS-SCNC: 21 MMOL/L (ref 22–29)
ERYTHROCYTE [DISTWIDTH] IN BLOOD BY AUTOMATED COUNT: 14.7 % (ref 10–15)
GFR SERPL CREATININE-BSD FRML MDRD: >90 ML/MIN/1.73M2
GLUCOSE SERPL-MCNC: 114 MG/DL (ref 70–99)
HCT VFR BLD AUTO: 31.2 % (ref 40–53)
HGB BLD-MCNC: 10.3 G/DL (ref 13.3–17.7)
MAGNESIUM SERPL-MCNC: 2 MG/DL (ref 1.7–2.3)
MCH RBC QN AUTO: 29.6 PG (ref 26.5–33)
MCHC RBC AUTO-ENTMCNC: 33 G/DL (ref 31.5–36.5)
MCV RBC AUTO: 90 FL (ref 78–100)
PHOSPHATE SERPL-MCNC: 2.8 MG/DL (ref 2.5–4.5)
PLATELET # BLD AUTO: 315 10E3/UL (ref 150–450)
POTASSIUM SERPL-SCNC: 3.8 MMOL/L (ref 3.4–5.3)
RBC # BLD AUTO: 3.48 10E6/UL (ref 4.4–5.9)
SODIUM SERPL-SCNC: 136 MMOL/L (ref 136–145)
UFH PPP CHRO-ACNC: 0.18 IU/ML
UFH PPP CHRO-ACNC: 0.41 IU/ML
UFH PPP CHRO-ACNC: 0.52 IU/ML
WBC # BLD AUTO: 9.1 10E3/UL (ref 4–11)

## 2023-06-19 PROCEDURE — 85027 COMPLETE CBC AUTOMATED: CPT | Performed by: HOSPITALIST

## 2023-06-19 PROCEDURE — 250N000013 HC RX MED GY IP 250 OP 250 PS 637: Performed by: HOSPITALIST

## 2023-06-19 PROCEDURE — 999N000128 HC STATISTIC PERIPHERAL IV START W/O US GUIDANCE

## 2023-06-19 PROCEDURE — 99232 SBSQ HOSP IP/OBS MODERATE 35: CPT | Performed by: HOSPITALIST

## 2023-06-19 PROCEDURE — 258N000003 HC RX IP 258 OP 636: Performed by: HOSPITALIST

## 2023-06-19 PROCEDURE — 84100 ASSAY OF PHOSPHORUS: CPT | Performed by: HOSPITALIST

## 2023-06-19 PROCEDURE — 36415 COLL VENOUS BLD VENIPUNCTURE: CPT | Performed by: HOSPITALIST

## 2023-06-19 PROCEDURE — 85520 HEPARIN ASSAY: CPT | Performed by: HOSPITALIST

## 2023-06-19 PROCEDURE — 83735 ASSAY OF MAGNESIUM: CPT | Performed by: HOSPITALIST

## 2023-06-19 PROCEDURE — 120N000013 HC R&B IMCU

## 2023-06-19 PROCEDURE — 999N000040 HC STATISTIC CONSULT NO CHARGE VASC ACCESS

## 2023-06-19 PROCEDURE — 99231 SBSQ HOSP IP/OBS SF/LOW 25: CPT | Performed by: SURGERY

## 2023-06-19 PROCEDURE — 250N000011 HC RX IP 250 OP 636

## 2023-06-19 PROCEDURE — 250N000013 HC RX MED GY IP 250 OP 250 PS 637: Performed by: INTERNAL MEDICINE

## 2023-06-19 PROCEDURE — 80048 BASIC METABOLIC PNL TOTAL CA: CPT | Performed by: HOSPITALIST

## 2023-06-19 RX ORDER — DIAZEPAM 2 MG
2 TABLET ORAL EVERY 6 HOURS PRN
Status: DISCONTINUED | OUTPATIENT
Start: 2023-06-19 | End: 2023-06-20 | Stop reason: HOSPADM

## 2023-06-19 RX ORDER — METOPROLOL TARTRATE 1 MG/ML
2.5 INJECTION, SOLUTION INTRAVENOUS EVERY 4 HOURS PRN
Status: DISCONTINUED | OUTPATIENT
Start: 2023-06-19 | End: 2023-06-20 | Stop reason: HOSPADM

## 2023-06-19 RX ADMIN — PREGABALIN 75 MG: 75 CAPSULE ORAL at 08:12

## 2023-06-19 RX ADMIN — ASPIRIN 81 MG CHEWABLE TABLET 81 MG: 81 TABLET CHEWABLE at 08:12

## 2023-06-19 RX ADMIN — HEPARIN SODIUM 1050 UNITS/HR: 10000 INJECTION, SOLUTION INTRAVENOUS at 12:40

## 2023-06-19 RX ADMIN — CILOSTAZOL 100 MG: 100 TABLET ORAL at 21:06

## 2023-06-19 RX ADMIN — DIAZEPAM 2 MG: 2 TABLET ORAL at 12:32

## 2023-06-19 RX ADMIN — PREGABALIN 75 MG: 75 CAPSULE ORAL at 21:07

## 2023-06-19 RX ADMIN — CILOSTAZOL 100 MG: 100 TABLET ORAL at 08:12

## 2023-06-19 RX ADMIN — BUPROPION HYDROCHLORIDE 150 MG: 150 TABLET, EXTENDED RELEASE ORAL at 08:12

## 2023-06-19 RX ADMIN — ATORVASTATIN CALCIUM 80 MG: 40 TABLET, FILM COATED ORAL at 21:06

## 2023-06-19 RX ADMIN — GABAPENTIN 600 MG: 600 TABLET, FILM COATED ORAL at 12:00

## 2023-06-19 RX ADMIN — FERROUS SULFATE TAB 325 MG (65 MG ELEMENTAL FE) 325 MG: 325 (65 FE) TAB at 08:12

## 2023-06-19 RX ADMIN — SERTRALINE HYDROCHLORIDE 150 MG: 100 TABLET ORAL at 08:12

## 2023-06-19 RX ADMIN — CLOPIDOGREL BISULFATE 75 MG: 75 TABLET ORAL at 08:12

## 2023-06-19 RX ADMIN — SODIUM CHLORIDE 250 ML: 9 INJECTION, SOLUTION INTRAVENOUS at 11:10

## 2023-06-19 RX ADMIN — DIAZEPAM 2 MG: 2 TABLET ORAL at 21:07

## 2023-06-19 RX ADMIN — GABAPENTIN 600 MG: 600 TABLET, FILM COATED ORAL at 18:01

## 2023-06-19 RX ADMIN — BUPROPION HYDROCHLORIDE 150 MG: 150 TABLET, EXTENDED RELEASE ORAL at 21:07

## 2023-06-19 RX ADMIN — METOPROLOL TARTRATE 6.25 MG: 25 TABLET, FILM COATED ORAL at 21:07

## 2023-06-19 ASSESSMENT — ACTIVITIES OF DAILY LIVING (ADL)
ADLS_ACUITY_SCORE: 27

## 2023-06-19 NOTE — PLAN OF CARE
Patient is A&Ox4. Vitals are stable on room air. Tele is NSR. Heparin running at 1050. Patient states pain in R groin and leg, rated at 3. Reports tingling in left foot and numbness in right foot. Gave PRN gabapentin. Patient is up with assist of 1, has cane. Ambulated in hallway 1x on shift. Lower extremity pulses present with doppler. Sleep Quality is adequate, slept between cares. Voiding adequately in urinal, no BM on shift.

## 2023-06-19 NOTE — PROGRESS NOTES
[facility]    Medicine Progress Note - Hospitalist Service    Date of Admission:  6/16/2023    Assessment & Plan   Adam Sahni is a 62 year old male with PMHx of CAD with recent STEMI s/p UGO to the RCA, hyperlipidemia, PAD s/p angiogram and stent placement in 2021, hyperlipidemia, chronic anemia, prediabetes, peripheral neuropathy, lumbar radiculopathy, depression, prostate cancer s/p treatment and tobacco use who presents emergency room for evaluation of right leg pain numbness and foot drop.     He was recently hospitalized at [facility] from 6/9/23 to 6/11/23 for with an inferior STEMI for which he underwent PCI with UGO to the RCA on 6/9/23. Hospitalization was complicated by intermittent shortness of breath post procedure which was attributed to Brilinta. Brilinta was subsequently changed to Plavix. On 6/9/23 and RRT was called for hypotension. He was given 1 L of fluid and his blood pressures improved. Echo that stay showed EF of 45 to 50%. His statin was continued without changes. He was ultimately discharged home. Additionally during that stay he had some issues with your urinary retention however this had resolved prior to discharge. He was also noted to have acute on chronic anemia. Hemoglobin was stable at 10 prior to discharge. His LFTs were elevated this was suspected secondary to his STEMI     Concern for RLE arterial occlusion, unclear chronicity but favor chronic  PAD, with hx of iliac stent placement in 2021  * Followed by [facility]. S/p bilateral kissing iliac stents after thrombolytics in 7/2021 for severe claudication and complete occlusion of the left common iliac artery with tandem occlusions and/or high grade stenoses of the distal SFA and popliteal artery. Manages with Pletal, ASA and statin.   * Since discharge home from recent hospital stay, patient reported ongoing right lower extremity pain with numbness and foot drop. This ultimately led to him presenting back to the  emergency room this evening.  * In the ED, was afebrile, BPs 90s systolic, HRs stable. O2 sat stable on RA. Unable to palpate distal pulses. Neurologic exam was stable with no deficits in the RLE.  Labs were stable. Stat imaging was obtained.  X-ray of the foot was negative for acute fracture or dislocation. Right lower extremity venous duplex was negative for DVT. Right lower extremity arterial duplex showed severe atherosclerotic disease with moderate to there is severe stenosis in the common femoral artery severe proximal stenosis was thought to be possible but not directly visualized.  Additionally there was an occlusion at the level of the distal superficial femoral artery.  Patient was initiated on a heparin gtt in the ED while awaiting additional imaging and vascular surgery input. CTA abd/pelvis with bilateral leg runoff was obtained. In brief, bilateral kissing common iliac artery stents were patent, the right external iliac artery appeared occluded though there was noted reconstitution of flow into the right common femoral artery. Additionally was noted to have multiple segments of stenosis involving the proximal to mid right SFA. The distal right FSA was occluded, but there is reconstitution of flow below the knee popliteal artery.   * Vascular surgery service was consulted and patient was seen by Dr. Pitt in the ED. He reviewed imaging studies and discussed plan of care with admitting hospitalist. It was felt that the findings on his imaging tonight may be more chronic in nature, and patient does not give a reliable history in regards to clear onset of timing of his symptoms that indicate a more acute picture. As such, it was felt that heparin drip could be stopped. Will continue DAPT with ASA and Plavix.    * 6/17 carotid duplex: mild plaque formation <50% stenosis in bilateral ICA. Flow within vertebral arteries antegrade  * 6/18 IR: mechanical thrombectomy of right external and proximal common  femoral artery. Stents placed into external iliac artery, prox common femoral to external iliac and prox external iliac artery to distal common iliac artery.  -- [facility] [place]  -- appreciate vascular surgery, they plan formal femoral to below knee popliteal in situ bypass graft ~6 weeks out from STEMI  -- cont DAPT with ASA and Plavix  - started on pletal 100mg BID  -- cont statin  -- serial vascular/neuro checks  - heparin gtt until discharge per vasc surg    Sinus tachycardia  Initially HRs up when he was feeling lightheaded and had low BP. Given 250ml IVF bolus with some improvement of HR. Then felt anxiety and was given low dose valium.  - PRN IV metoprolol available for HR>120  - tele monitoring  - lower suspicion for acute PE given he he has been on heparin gtt  - continue to monitor     CAD, with inferior STEMI s/p PCI w/ UGO to RCA on 6/9/23  Hyperlipidemia  * See discharge summary for specifics of recent hospitalization. Brief summary as above. Discharged on ASA 81mg daily, Plavix, metoprolol XL 25mg daily, atorvastatin 80mg HS.  * Per admission med rec per pharmacy, patient stated he had not been taking metoprolol, unclear why.  -- start metoprolol tartrate 6.25mg BID with hold parameters and see how he tolerates  -- cont DAPT, statin     Chronic Anemia  * Hgb 11 on admit, down to 10 on 6/17. Iron 49, otherwise indices WNL  - monitor post procedure  - iron supplement every other day added 6/18, likely only needs for ~30 days     Prediabetes  * A1c 5.7 on 6/9/23.      Peripheral neuropathy  Lumbar radiculopathy, with hx of lumbar fusion in 2022  * Follows with a pain clinic through [facility] [place]. Had a caudal epidural steroid injection on 6/1/23.  * Chronic and stable on Lyrica and gabapentin.     Depression  * Chronic and stable on sertraline. Wellbutrin added during recent stay to help with tobacco cessation.      Prostate cancer s/p external beam radiation therapy  * Follows with rad-onc [facility]. S/p ext beam  "radiotherapy and 28 months of androgen deprivation.  * No acute concern this stay. OP follow up.      Tobacco Abuse, current   * Smokes 1/2-1ppd. Encouraged cessation.        Diet: Regular Diet Adult  Room Service    DVT Prophylaxis: heparin gtt  Nova Catheter: Not present  Lines: None     Cardiac Monitoring: ACTIVE order. Indication: vascular concern  Code Status: Full Code      Clinically Significant Risk Factors                         # Overweight: Estimated body mass index is 28.12 kg/m  as calculated from the following:    Height as of 6/9/23: 1.778 m (5' 10\").    Weight as of this encounter: 88.9 kg (195 lb 15.8 oz)., PRESENT ON ADMISSION          Disposition Plan      Expected Discharge Date: 06/20/2023,  3:00 PM                Vivien Gama DO  Hospitalist Service  [facility]  Securely message with [employer] (more info)  Text page via [facility] Paging/Directory   ______________________________________________________________________    Interval History   Patient seen and examined. He is a little lightheaded today when sitting up at bedside. SBP 89 on BP check. Given small fluid bolus. Ongoing issues with tachycardia throughout day as well as anxiety. No chest pain, shortness of breath, nausea. Discussed with RN    Physical Exam   Vital Signs: Temp: 98.6  F (37  C) Temp src: Oral BP: 110/79 Pulse: 110   Resp: 28 SpO2: 96 % O2 Device: None (Room air)    Weight: 195 lbs 15.82 oz    Constitutional: Awake, alert, cooperative, no apparent distress  Respiratory: Clear to auscultation bilaterally, no crackles or wheezing  Cardiovascular: Regular rate and rhythm, normal S1 and S2, and no murmur noted  GI: Normal bowel sounds, soft, non-distended, non-tender  Skin/Integumen: No rashes, no cyanosis, no edema  Other: Left groin site without hematoma    Medical Decision Making       40 MINUTES SPENT BY ME on the date of service doing chart review, history, exam, documentation & further activities " per the note.      Data     I have personally reviewed the following data over the past 24 hrs:    9.1  \   10.3 (L)   / 315     136 102 12.2 /  114 (H)   3.8 21 (L) 0.80 \       Ferritin:  N/A % Retic:  N/A LDH:  N/A       Imaging results reviewed over the past 24 hrs:   No results found for this or any previous visit (from the past 24 hour(s)).

## 2023-06-19 NOTE — PLAN OF CARE
Orientation: A&Ox4  Vitals/Pain: VSS ex pt occ tachy - HR in the 100's, MD aware, SBP in the 80's when sitting, one time fluid bolus given, SBP now 's. Pt on RA sating >90%, . Pt reports BLE neuropathy pain, managing with PRN gabapentin   CMS: BLE numbness and tingling (now new), warm and able to move    Tele: ST - MD aware, starting on oral metoprolol   Lines/Drains: R AC PIV with Heparin running at 1200 units/hr  LS: clear   GI/: BS +, x0 BM this shift. Pt having adequate urine output throughout shift   Labs: Abnormal/Trends, Electrolyte Replacement- Hep10a recheck at 1945   Ambulation/Assist: SBA   Skin/Wounds: L groin site dressing CDI, site is soft   Plan: continue IV heparin at this time per MD Gamble note, gave x1 fluid bolus - tolerated well     Goal Outcome Evaluation:  Plan of Care Reviewed With: patient  Overall Patient Progress: no change  Outcome Evaluation: Pt continues to be on IV heparin. No CMS change in BLE.

## 2023-06-19 NOTE — PROGRESS NOTES
Vascular Surgery Progress Note    S: Has done well since the angiogram.  Rest pain in right leg is resolved.    O: On IV heparin following angiogram  Vitals:  BP  Min: 82/61  Max: 144/88  Temp  Av.1  F (36.7  C)  Min: 97.8  F (36.6  C)  Max: 98.3  F (36.8  C)  Pulse  Av  Min: 47  Max: 95  I/O last 3 completed shifts:  In: 13 [I.V.:13]  Out: 975 [Urine:975]    Physical Exam: Alert and appropriate.  Very comfortable.  No chest pain.     Left groin access site=A     Normal CMS bilaterally.  Right foot much warmer   Hyperemic monophasic right ankle PT Doppler    -- Note minimal signal prior to angiogram    Reviewed angiogram images with patient.  Successful recanalization of occluded right external iliac artery and proximal common femoral artery.  3 stents were placed successfully.    Has been on heparin since the procedure.  Continue on aspirin/Plavix.      Assessment/Plan: #1.  Improved blood flow to his right leg with recanalization stenting of the right external iliac artery.  May discontinue IV heparin at discharge.  Continue with aspirin/Plavix     #2.  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  Will eventually need a right common femoral to below-knee popliteal in situ bypass graft.     #3.  Recent MI and stenting.  On DAPT and needs to be continued.  Delaying more definitive bypass in the right leg is appropriate and we have dealt with the more acute problem and this is been discussed with patient.      #4.  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    He will follow-up with me in the vascular clinic.  We will need to decide on timing of the leg bypass graft but definitely several months.      Wm. Tye MD

## 2023-06-19 NOTE — PROGRESS NOTES
Interventional Radiology - Progress Note  Inpatient - [facility]  6/19/2023    S:  Minimal discomfort at right groin access site    O:  BP 96/60 (BP Location: Left arm, Patient Position: Semi-Puri's, Cuff Size: Adult Regular)   Pulse 68   Temp 98.6  F (37  C) (Oral)   Resp 10   Wt 88.9 kg (195 lb 15.8 oz)   SpO2 97%   BMI 28.12 kg/m    General:  Stable.  In no acute distress.    Ext: Distal RLE warm to palpation, no swelling or edema. Lt CFA access site without swelling, tenderness, or appreciable hematoma      Labs (Last four results)  CMPRecent Labs   Lab 06/19/23  0506 06/18/23  0547 06/17/23  0534 06/16/23  2317 06/16/23  1802   POTASSIUM 3.8 4.3 3.9  --  4.2   * 112* 95 95 89   BUN 12.2 18.9 15.7  --  12.3   CR 0.80 0.98 0.92  --  0.87   GFRESTIMATED >90 87 >90  --  >90     CBC  Recent Labs   Lab 06/19/23  0506 06/18/23  1416 06/18/23  0547 06/17/23  0534   WBC 9.1 8.3 6.2 7.0   RBC 3.48* 3.41* 3.47* 3.32*   HGB 10.3* 10.0* 10.2* 9.9*   HCT 31.2* 31.0* 31.3* 30.2*    304 328 299     INR  Recent Labs   Lab 06/16/23  1802   INR 0.98       A:  Rt EIA occlusion with chronic bilateral SFA occlusion s/p angiogram with revascularization including stenting as follows:    -7x 10 mm Viabahn self expandable stent in the external iliac artery.   -7x 50 mm Viabahn self expandable stent in the proximal common femoral to external iliac artery.   -7x 39 mm Viabahn VBX balloon expandable stent from the proximal external iliac artery to the distal common iliac artery, with the common iliac artery portion dilated to 10 mm.     P:    -Defer longitudinal management to vascular surgery team  -Agree with ongoing DAPT at discharge  -Dressing may be removed tomorrow  -OK to discharge from IR standpoint when cleared by other services  -Thank you for allowing us to participate in this patient's care. IR will no longer follow. Please contact our service with any questions, concerns, or requests for further  intervention.    Adam Johnson PA-C  Interventional Radiology  *[id number]  [phone]      Total Time: 20 minutes

## 2023-06-19 NOTE — PROVIDER NOTIFICATION
MD Notification  Notified Person Name: DOMINIC Gama   Notification Date/Time: 6/19 at 1125   Notification Interaction: vocera message   RN message to MD:  FYI pt has been slightly tachy 100 max 130'0s not sustained, associated with anxious episodes. thanks!   Orders Received: PRN IV metoprolol and scheduled oral metoprolol added

## 2023-06-20 VITALS
HEART RATE: 116 BPM | WEIGHT: 195.99 LBS | OXYGEN SATURATION: 92 % | TEMPERATURE: 98.1 F | SYSTOLIC BLOOD PRESSURE: 113 MMHG | BODY MASS INDEX: 28.12 KG/M2 | DIASTOLIC BLOOD PRESSURE: 65 MMHG | RESPIRATION RATE: 26 BRPM

## 2023-06-20 LAB
ANION GAP SERPL CALCULATED.3IONS-SCNC: 12 MMOL/L (ref 7–15)
BUN SERPL-MCNC: 12.1 MG/DL (ref 8–23)
CALCIUM SERPL-MCNC: 9 MG/DL (ref 8.8–10.2)
CHLORIDE SERPL-SCNC: 102 MMOL/L (ref 98–107)
CREAT SERPL-MCNC: 0.87 MG/DL (ref 0.67–1.17)
DEPRECATED HCO3 PLAS-SCNC: 23 MMOL/L (ref 22–29)
ERYTHROCYTE [DISTWIDTH] IN BLOOD BY AUTOMATED COUNT: 14.6 % (ref 10–15)
GFR SERPL CREATININE-BSD FRML MDRD: >90 ML/MIN/1.73M2
GLUCOSE SERPL-MCNC: 120 MG/DL (ref 70–99)
HCT VFR BLD AUTO: 29.2 % (ref 40–53)
HGB BLD-MCNC: 9.9 G/DL (ref 13.3–17.7)
MAGNESIUM SERPL-MCNC: 2.1 MG/DL (ref 1.7–2.3)
MCH RBC QN AUTO: 30.1 PG (ref 26.5–33)
MCHC RBC AUTO-ENTMCNC: 33.9 G/DL (ref 31.5–36.5)
MCV RBC AUTO: 89 FL (ref 78–100)
PHOSPHATE SERPL-MCNC: 3.1 MG/DL (ref 2.5–4.5)
PLATELET # BLD AUTO: 313 10E3/UL (ref 150–450)
POTASSIUM SERPL-SCNC: 3.9 MMOL/L (ref 3.4–5.3)
RBC # BLD AUTO: 3.29 10E6/UL (ref 4.4–5.9)
SODIUM SERPL-SCNC: 137 MMOL/L (ref 136–145)
UFH PPP CHRO-ACNC: 0.33 IU/ML
WBC # BLD AUTO: 7.7 10E3/UL (ref 4–11)

## 2023-06-20 PROCEDURE — 36415 COLL VENOUS BLD VENIPUNCTURE: CPT | Performed by: INTERNAL MEDICINE

## 2023-06-20 PROCEDURE — 99231 SBSQ HOSP IP/OBS SF/LOW 25: CPT | Performed by: SURGERY

## 2023-06-20 PROCEDURE — 250N000011 HC RX IP 250 OP 636

## 2023-06-20 PROCEDURE — 84100 ASSAY OF PHOSPHORUS: CPT | Performed by: HOSPITALIST

## 2023-06-20 PROCEDURE — 99239 HOSP IP/OBS DSCHRG MGMT >30: CPT | Performed by: HOSPITALIST

## 2023-06-20 PROCEDURE — 250N000013 HC RX MED GY IP 250 OP 250 PS 637: Performed by: HOSPITALIST

## 2023-06-20 PROCEDURE — 250N000013 HC RX MED GY IP 250 OP 250 PS 637: Performed by: INTERNAL MEDICINE

## 2023-06-20 PROCEDURE — 83735 ASSAY OF MAGNESIUM: CPT | Performed by: HOSPITALIST

## 2023-06-20 PROCEDURE — 85520 HEPARIN ASSAY: CPT | Performed by: INTERNAL MEDICINE

## 2023-06-20 PROCEDURE — 85027 COMPLETE CBC AUTOMATED: CPT | Performed by: HOSPITALIST

## 2023-06-20 PROCEDURE — 80048 BASIC METABOLIC PNL TOTAL CA: CPT | Performed by: HOSPITALIST

## 2023-06-20 RX ORDER — METOPROLOL TARTRATE 25 MG/1
6.25 TABLET, FILM COATED ORAL 2 TIMES DAILY
Qty: 15 TABLET | Refills: 0 | Status: SHIPPED | OUTPATIENT
Start: 2023-06-20 | End: 2023-09-13

## 2023-06-20 RX ADMIN — CILOSTAZOL 100 MG: 100 TABLET ORAL at 09:05

## 2023-06-20 RX ADMIN — CLOPIDOGREL BISULFATE 75 MG: 75 TABLET ORAL at 09:05

## 2023-06-20 RX ADMIN — ASPIRIN 81 MG CHEWABLE TABLET 81 MG: 81 TABLET CHEWABLE at 09:05

## 2023-06-20 RX ADMIN — PREGABALIN 75 MG: 75 CAPSULE ORAL at 09:05

## 2023-06-20 RX ADMIN — METOPROLOL TARTRATE 6.25 MG: 25 TABLET, FILM COATED ORAL at 09:05

## 2023-06-20 RX ADMIN — BUPROPION HYDROCHLORIDE 150 MG: 150 TABLET, EXTENDED RELEASE ORAL at 09:05

## 2023-06-20 RX ADMIN — SERTRALINE HYDROCHLORIDE 150 MG: 100 TABLET ORAL at 09:05

## 2023-06-20 RX ADMIN — HEPARIN SODIUM 1200 UNITS/HR: 10000 INJECTION, SOLUTION INTRAVENOUS at 04:28

## 2023-06-20 ASSESSMENT — ACTIVITIES OF DAILY LIVING (ADL)
ADLS_ACUITY_SCORE: 27

## 2023-06-20 NOTE — PLAN OF CARE
IMC status discontinued. Alert and oriented x4. Vital signs stable on room air. SBA. Tolerating reglar diet. Lung sounds clear. Bowel sounds active, passing flatus, no BM during shift, adequate urine output. L groin site CDI, soft and non tender, CMS intact. Denies pain. Denies nausea. Tele SR. Hep gtt discontinued.     Pt discharged to home via personal ride approx 1300. All questions and concerns answered and addressed. All medications filled at [facility] pharmacy sent with patient. All personal belongings sent with patient.

## 2023-06-20 NOTE — PLAN OF CARE
Goal Outcome Evaluation:      Plan of Care Reviewed With: patient    Overall Patient Progress: improvingOverall Patient Progress: improving     IMC. Alert and oriented x4. Vital signs stable ex. Tachycardic. On RA. Up SBA. Tolerating diet. Lung sounds clear. BS+. BM-. Voiding adequately. L groin site, soft, CDI. BLE Pulses present with doppler. CMS intact ex. Baseline numbness and tingling. Neuros intact. Denies pain/nausea. Tele Sinus tach. Hep gtt infusing at 1200u/hr. Hep Xa recheck tomorrow 6/21 0600.

## 2023-06-20 NOTE — DISCHARGE SUMMARY
"[facility]  Hospitalist Discharge Summary      Date of Admission:  6/16/2023  Date of Discharge:  6/20/2023  Discharging Provider: Vivien Gama DO  Discharge Service: Hospitalist Service    Discharge Diagnoses   Concern for RLE arterial occlusion, unclear chronicity but favor chronic  PAD, with hx of iliac stent placement in 2021  Sinus tachycardia  CAD, with inferior STEMI s/p PCI w/ UGO to RCA on 6/9/23  Hyperlipidemia  Chronic Anemia  Prediabetes  Peripheral neuropathy  Lumbar radiculopathy, with hx of lumbar fusion in 2022  Depression  Prostate cancer s/p external beam radiation therapy  Tobacco Abuse, current     Clinically Significant Risk Factors     # Overweight: Estimated body mass index is 28.12 kg/m  as calculated from the following:    Height as of 6/9/23: 1.778 m (5' 10\").    Weight as of this encounter: 88.9 kg (195 lb 15.8 oz).       Follow-ups Needed After Discharge   Follow-up Appointments     Follow-up and recommended labs and tests       Follow up with vascular surgery as scheduled on July 20, 2023.    Follow up with cardiology, you will need to call to schedule an   appointment.             Discharge Disposition   Discharged to home  Condition at discharge: Stable    Hospital Course   Adam Sahni is a 62 year old male with PMHx of CAD with recent STEMI s/p UGO to the RCA, hyperlipidemia, PAD s/p angiogram and stent placement in 2021, hyperlipidemia, chronic anemia, prediabetes, peripheral neuropathy, lumbar radiculopathy, depression, prostate cancer s/p treatment and tobacco use who presents emergency room for evaluation of right leg pain numbness and foot drop.     He was recently hospitalized at [facility] from 6/9/23 to 6/11/23 for with an inferior STEMI for which he underwent PCI with UGO to the RCA on 6/9/23. Hospitalization was complicated by intermittent shortness of breath post procedure which was attributed to Brilinta. Brilinta was subsequently changed to " Plavix. On 6/9/23 and RRT was called for hypotension. He was given 1 L of fluid and his blood pressures improved. Echo that stay showed EF of 45 to 50%. His statin was continued without changes. He was ultimately discharged home. Additionally during that stay he had some issues with your urinary retention however this had resolved prior to discharge. He was also noted to have acute on chronic anemia. Hemoglobin was stable at 10 prior to discharge. His LFTs were elevated this was suspected secondary to his STEMI     Concern for RLE arterial occlusion, unclear chronicity but favor chronic  PAD, with hx of iliac stent placement in 2021  * Followed by [facility]. S/p bilateral kissing iliac stents after thrombolytics in 7/2021 for severe claudication and complete occlusion of the left common iliac artery with tandem occlusions and/or high grade stenoses of the distal SFA and popliteal artery. Manages with Pletal, ASA and statin.   * Since discharge home from recent hospital stay, patient reported ongoing right lower extremity pain with numbness and foot drop. This ultimately led to him presenting back to the emergency room this evening.  * In the ED, was afebrile, BPs 90s systolic, HRs stable. O2 sat stable on RA. Unable to palpate distal pulses. Neurologic exam was stable with no deficits in the RLE.  Labs were stable. Stat imaging was obtained.  X-ray of the foot was negative for acute fracture or dislocation. Right lower extremity venous duplex was negative for DVT. Right lower extremity arterial duplex showed severe atherosclerotic disease with moderate to there is severe stenosis in the common femoral artery severe proximal stenosis was thought to be possible but not directly visualized.  Additionally there was an occlusion at the level of the distal superficial femoral artery.  Patient was initiated on a heparin gtt in the ED while awaiting additional imaging and vascular surgery input. CTA abd/pelvis with bilateral leg  runoff was obtained. In brief, bilateral kissing common iliac artery stents were patent, the right external iliac artery appeared occluded though there was noted reconstitution of flow into the right common femoral artery. Additionally was noted to have multiple segments of stenosis involving the proximal to mid right SFA. The distal right FSA was occluded, but there is reconstitution of flow below the knee popliteal artery.   * Vascular surgery service was consulted and patient was seen by Dr. Pitt in the ED. He reviewed imaging studies and discussed plan of care with admitting hospitalist. It was felt that the findings on his imaging tonight may be more chronic in nature, and patient does not give a reliable history in regards to clear onset of timing of his symptoms that indicate a more acute picture. As such, it was felt that heparin drip could be stopped. Will continue DAPT with ASA and Plavix.    * 6/17 carotid duplex: mild plaque formation <50% stenosis in bilateral ICA. Flow within vertebral arteries antegrade  * 6/18 IR: mechanical thrombectomy of right external and proximal common femoral artery. Stents placed into external iliac artery, prox common femoral to external iliac and prox external iliac artery to distal common iliac artery.  -- appreciate vascular surgery, they plan formal femoral to below knee popliteal in situ bypass graft in a few months  -- Follow up with vascular surgery, Dr Gamble on July 20, 2023  -- cont DAPT with ASA and Plavix  -- continue PTA pletal 100mg BID  -- cont statin     Sinus tachycardia  Initially HRs up when he was feeling lightheaded and had low BP. Given 250ml IVF bolus with some improvement of HR. Then felt anxiety and was given low dose valium with some improvement.     CAD, with inferior STEMI s/p PCI w/ UGO to RCA on 6/9/23  Hyperlipidemia  * See discharge summary for specifics of recent hospitalization. Brief summary as above. Discharged on ASA 81mg daily,  Plavix, metoprolol XL 25mg daily, atorvastatin 80mg HS.  * Per admission med rec per pharmacy, patient stated he had not been taking metoprolol  -- started metoprolol tartrate 6.25mg BID, he tolerated this dosing very well. Encouraged him to continue this dosing on discharge if able.   -- cont DAPT, statin  -- follow up with cardiology, he needs to call and schedule an appointment     Chronic Anemia  * Hgb 11 on admit, down to 10 and stable near there. Iron 49, otherwise indices WNL  - continue iron supplement every other day (already on discharge med rec)     Prediabetes  * A1c 5.7 on 6/9/23.      Peripheral neuropathy  Lumbar radiculopathy, with hx of lumbar fusion in 2022  * Follows with a pain clinic through [facility]. Had a caudal epidural steroid injection on 6/1/23.  * Chronic and stable on Lyrica and gabapentin.     Depression  * Chronic and stable on sertraline. Wellbutrin added during recent stay to help with tobacco cessation.      Prostate cancer s/p external beam radiation therapy  * Follows with rad-on[facility]. S/p ext beam radiotherapy and 28 months of androgen deprivation.  * No acute concern this stay. OP follow up.      Tobacco Abuse, current   * Smokes 1/2-1ppd. Encouraged cessation.     Consultations This Hospital Stay   PHARMACY IP CONSULT  VASCULAR SURGERY IP CONSULT  PHARMACY IP CONSULT  PHARMACY IP CONSULT  VASCULAR ACCESS ADULT IP CONSULT  VASCULAR ACCESS ADULT IP CONSULT    Code Status   Full Code    Time Spent on this Encounter   Vivien ROD DO, personally saw the patient today and spent greater than 30 minutes discharging this patient.       Vivien Gama DO  [address] [place] [address]  Phone: [phone]  ______________________________________________________________________    Physical Exam   Vital Signs: Temp: 98.1  F (36.7  C) Temp src: Oral BP: 113/65 Pulse: 116   Resp: 26 SpO2: 92 % O2 Device: None (Room air)     Weight: 195 lbs 15.82 oz    Patient seen and examined. Able to ambulate with nursing and aide and did well. Pain much improved overall. No lightheadedness. HRs improved with BB. Discussed metoprolol dosing, he has tolerated the very low dose metoprolol here and encouraged him to continue this medication on discharge. Has follow-up in one month with vascular surgery. Stable for discharge to home.    Constitutional: Awake, alert, cooperative, no apparent distress  Respiratory: Clear to auscultation bilaterally, no crackles or wheezing  Cardiovascular: Regular rate and rhythm, normal S1 and S2, and no murmur noted  GI: Normal bowel sounds, soft, non-distended, non-tender  Skin/Integumen: No rashes, no cyanosis, no edema  Other:         Primary Care Physician   Physician No Ref-Primary    Discharge Orders      Reason for your hospital stay    Arterial occlusion in right leg requiring stenting     Follow-up and recommended labs and tests     Follow up with vascular surgery as scheduled on July 20, 2023.    Follow up with cardiology, you will need to call to schedule an appointment.     Activity    Your activity upon discharge: activity as tolerated     Discharge Instructions    You were started on low dose metoprolol. You tolerated 6.25mg twice daily and your heart rates/blood pressures were stable. If you are having trouble cutting these pills in quarters, you also tolerated half a tab of this medication during your last hospital stay and could consider just taking 12.5mg twice daily.  If you feel lightheaded, check your blood pressure. If your blood pressure is low <90, then you may need to stop taking the metoprolol.    It is very important to stop smoking and continue taking your cilostazol (twice daily), plavix, aspirin and atorvastatin.     Monitor and record    blood pressure as needed if you are feeling lightheaded     Diet    Follow this diet upon discharge:  Regular Diet Adult       Significant Results and  Procedures   Most Recent 3 CBC's:  Recent Labs   Lab Test 06/20/23  0247 06/19/23  0506 06/18/23  1416   WBC 7.7 9.1 8.3   HGB 9.9* 10.3* 10.0*   MCV 89 90 91    315 304     Most Recent 3 BMP's:  Recent Labs   Lab Test 06/20/23  0247 06/19/23  0506 06/18/23  0547    136 137   POTASSIUM 3.9 3.8 4.3   CHLORIDE 102 102 103   CO2 23 21* 24   BUN 12.1 12.2 18.9   CR 0.87 0.80 0.98   ANIONGAP 12 13 10   OJ 9.0 9.0 8.9   * 114* 112*   ,   Results for orders placed or performed during the hospital encounter of 06/16/23   US Lower Extremity Venous Duplex Right    Narrative    EXAM: US LOWER EXTREMITY VENOUS DUPLEX RIGHT  LOCATION: [facility]  DATE: 6/16/2023    INDICATION: Right lower extremity pain after recent myocardial infarction.  COMPARISON: 06/09/2023.  TECHNIQUE: Venous Duplex ultrasound of the right lower extremity with and without compression, augmentation and duplex. Color flow and spectral Doppler with waveform analysis performed.    FINDINGS: Exam includes the common femoral, femoral, popliteal, and contralateral common femoral veins as well as segmentally visualized deep calf veins and greater saphenous vein.     RIGHT: No deep vein thrombosis. No superficial thrombophlebitis.       Impression    IMPRESSION:    No deep venous thrombosis in the visualized right lower extremity.   US Lower Extremity Arterial Duplex Right    Narrative    EXAM: US LOWER EXTREMITY ARTERIAL DUPLEX RIGHT  LOCATION: [facility]  DATE: 6/16/2023    INDICATION: Right leg pain, unable to dopple pulses, began when experience heart attack a few days ago  COMPARISON: CT angiogram 07/21/2021  TECHNIQUE: Duplex utilizing 2D gray-scale imaging, Doppler interrogation with color-flow and spectral waveform analysis.    FINDINGS: Extensive calcified atherosclerosis with moderate to severe stenosis at the level of the common femoral artery. Markedly low resistance, parvus tardus  waveforms below this level. There is occlusion of the distal superficial femoral artery with   minimal collateral flow visualized below this level as described below.    RIGHT LOWER EXTREMITY ARTERIAL ASSESSMENT:  Common femoral artery: 234 cm/s, monophasic  Profunda femoris artery: 53 cm/s, monophasic  SFA (proximal): 43 cm/s, monophasic  SFA (mid): 39 cm/s, monophasic  SFA (distal): 0 cm/s, occluded  Popliteal artery: 18 cm/s, monophasic  Anterior tibial artery: 0 cm/s, occluded  Posterior tibial artery: 12 cm/s, monophasic  Dorsalis pedis artery: 0 cm/s, occluded      Impression    IMPRESSION:  1.  Severe atherosclerotic disease with moderate to severe stenosis in the common femoral artery. Given monophasic waveforms in the common femoral artery itself, severe proximal stenosis is possible although not directly visualized.  2.  Occlusion at the level of the distal superficial femoral artery. Minimal low resistance collateral flow seen throughout the remainder of the leg at and below the knee.       Foot  XR, G/E 3 views, right    Narrative    EXAM: XR FOOT RIGHT G/E 3 VIEWS  LOCATION: [facility]  DATE: 6/16/2023    INDICATION: pain in toes, can't feel foot after fall  COMPARISON: None      Impression    IMPRESSION: No acute fracture or dislocation. Mild hallux valgus and minimal degenerative arthritis of the first MTP joint. Accessory navicular bone. Arterial catheter location.   CTA Abdomen Pelvis Bilat Leg Runoff w Contr    Narrative    EXAM: CTA ABDOMEN PELVIS BILAT LEG RUNOFF W CONTR  LOCATION: [facility]  DATE: 6/16/2023    INDICATION: Right leg arterial occlusion  COMPARISON: 07/21/2021  TECHNIQUE: Helical acquisition through the abdomen, pelvis, and bilateral lower extremities was performed during the arterial phase of contrast enhancement using IV Contrast. 2D and 3D reconstructions were performed by the CT technologist. Dose reduction   techniques were  used.   CONTRAST: 100 mL Isovue 370    FINDINGS:  AORTA: Abdominal aorta is patent and of normal size. Celiac artery and SMA are patent. Bilateral renal arteries are patent. Kissing bilateral common iliac artery stents are patent. Right external iliac artery is occluded. There is reconstitution of flow   in the right common femoral artery. Left external iliac artery is patent without significant stenosis.    RIGHT LEG: Right common femoral artery is patent. Right profunda femoral artery is patent. I segmental stenosis involving the proximal to mid right SFA. The distal right SFA is occluded. There is reconstitution of flow in the below knee popliteal artery.   There is probable two-vessel runoff via the right PTA and peroneal artery.    LEFT LEG: Left common femoral artery is patent. Left profunda femoral artery at the mid segment is occluded. Left SFA is occluded at the mid to distal segments. There is reconstitution of flow in the left popliteal artery. There may be three-vessel   runoff below the knee into the foot. However the distal anterior tibial artery at the ankle/distal calf is difficult to assess due to poor contrast opacification. Cannot assess for patency of the dorsalis pedis artery.    LUNG BASES: Negative    ABDOMEN: Liver and spleen are normal. No calcified gallstones. Pancreas is normal. Adrenal glands are normal. Kidneys are normal.    PELVIS: Urinary bladder is normal. No dilated loops of large or small bowel. No pericolonic fat stranding. No pelvic free fluid or adenopathy.      Impression    IMPRESSION:  1.  Bilateral kissing common iliac artery stents are patent. The right external iliac artery is occluded in its entire length. There is reconstitution of flow into the right common femoral artery. The left external iliac artery is patent.  2.  RIGHT LEG: Right common femoral artery is patent. Right profunda femoral artery is patent. Multi segmental stenosis involving the proximal to mid right  SFA. The distal right SFA is occluded. There is reconstitution of flow in the below knee popliteal   artery. There is probable two-vessel runoff via the right PTA and peroneal artery. Difficult to assess patency of the right anterior tibial artery past the distal calf/ankle  3.  LEFT LEG: Left common femoral artery is patent. Left profunda femoral artery at the mid segment is occluded. Left SFA is occluded at the mid to distal segments. There is reconstitution of flow in the left popliteal artery. There may be three-vessel   runoff below the knee into the foot. However the distal anterior tibial artery at the ankle/distal calf is difficult to assess due to poor contrast opacification.    US Carotid Bilateral    Narrative    EXAM: US CAROTID BILATERAL  LOCATION: [facility]  DATE: 6/17/2023    INDICATION: Peripheral arterial disease, high risk with recent emboli to the right leg.  COMPARISON: None.  TECHNIQUE: Duplex exam performed utilizing 2D gray-scale imaging, Doppler interrogation with color-flow and spectral waveform analysis. The percent diameter stenosis is determined using NASCET criteria and Society of Radiologists in Ultrasound Consensus   Criteria.    FINDINGS:    RIGHT: Mild plaque at the bifurcation. The peak systolic velocity in the ICA is less than 125 cm/sec, consistent with less than 50% stenosis. Normal velocities in the ECA. Antegrade flow within the vertebral artery.     LEFT: Mild plaque at the bifurcation. The peak systolic velocity in the ICA is less than 125 cm/sec throughout the majority of the internal carotid artery, although velocity is focally elevated in the distal internal carotid (134 cm/s), findings overall   consistent with less than 50% stenosis. Normal velocities in the ECA. Antegrade flow within the vertebral artery.    VELOCITY CHART:  CCA   Right: 92/26 cm/s   Left: 113/41 cm/s  ICA   Right: 116/35 cm/s   Left: 134/46 cm/s  ECA   Right: 115/17 cm/s    Left: 114/12 cm/s  ICA/CCA PSV Ratio   Right: 1.3   Left: 1.2      Impression    IMPRESSION:  1.  Mild plaque formation, velocities consistent with less than 50% stenosis in the right internal carotid artery.  2.  Mild plaque formation, velocities most consistent with less than 50% stenosis in the left internal carotid artery.  3.  Flow within the vertebral arteries is antegrade.       Discharge Medications   Current Discharge Medication List      START taking these medications    Details   metoprolol tartrate (LOPRESSOR) 25 MG tablet Take 0.25 tablets (6.25 mg) by mouth 2 times daily  Qty: 15 tablet, Refills: 0    Comments: Future refills by PCP  Physician No Ref-Primary with phone number None.  Associated Diagnoses: ST elevation myocardial infarction (STEMI), unspecified artery (H)         CONTINUE these medications which have NOT CHANGED    Details   alendronate (FOSAMAX) 70 MG tablet Take 70 mg by mouth every 7 days      aspirin (ASA) 81 MG chewable tablet Take 1 tablet (81 mg) by mouth daily  Qty: 30 tablet, Refills: 3    Associated Diagnoses: ST elevation myocardial infarction (STEMI), unspecified artery (H)      atorvastatin (LIPITOR) 80 MG tablet Take 80 mg by mouth At Bedtime      Barberry-Oreg Grape-Goldenseal (BERBERINE COMPLEX) 200-200-50 MG CAPS Take 2 capsules by mouth daily      buPROPion (WELLBUTRIN SR) 150 MG 12 hr tablet Take 1 tablet (150 mg) by mouth daily for 3 days, THEN 1 tablet (150 mg) 2 times daily for 27 days.  Qty: 57 tablet, Refills: 0    Associated Diagnoses: Encounter for tobacco use cessation counseling      cholecalciferol (VITAMIN D3) 125 mcg (5000 units) capsule Take 125 mcg by mouth daily      cilostazol (PLETAL) 100 MG tablet Take 100 mg by mouth 2 times daily      clopidogrel (PLAVIX) 75 MG tablet Take 1 tablet (75 mg) by mouth daily  Qty: 30 tablet, Refills: 0    Associated Diagnoses: ST elevation myocardial infarction (STEMI), unspecified artery (H)      ferrous sulfate  (FEROSUL) 325 (65 Fe) MG tablet Take 325 mg by mouth every other day      fish oil-omega-3 fatty acids 1000 MG capsule Take 2 g by mouth daily      gabapentin (NEURONTIN) 600 MG tablet Take 600 mg by mouth 3 times daily as needed for neuropathic pain      Menaquinone-7 (VITAMIN K2) 100 MCG CAPS Take 1 capsule by mouth daily      nitroGLYcerin (NITROSTAT) 0.4 MG sublingual tablet For chest pain place 1 tablet under the tongue every 5 minutes for 3 doses. If symptoms persist 5 minutes after 1st dose call 911.  Qty: 25 tablet, Refills: 0    Associated Diagnoses: ST elevation myocardial infarction (STEMI), unspecified artery (H)      pregabalin (LYRICA) 75 MG capsule Take 75 mg by mouth 2 times daily      sertraline (ZOLOFT) 100 MG tablet Take 150 mg by mouth daily         STOP taking these medications       metoprolol succinate ER (TOPROL XL) 25 MG 24 hr tablet Comments:   Reason for Stopping:             Allergies   Allergies   Allergen Reactions     Penicillins Hives, Shortness Of Breath and Swelling     Reports also having throat swelling- took medication as a child.

## 2023-06-20 NOTE — PROGRESS NOTES
Vascular Surgery Progress Note    S: Very comfortable status post recanalization of the occluded right external iliac artery with stenting.  Up walking with much less pain.    O:   Vitals:  BP  Min: 78/61  Max: 137/101  Temp  Av.6  F (37  C)  Min: 98  F (36.7  C)  Max: 99.6  F (37.6  C)  Pulse  Av.3  Min: 60  Max: 133  I/O last 3 completed shifts:  In: 1470 [P.O.:1470]  Out: 3470 [Urine:3470]    Physical Exam: Alert and appropriate.  Very comfortable.  Left groin site= A    Monophasic Doppler to right and left PT--  No Doppler to right PT preintervention.      Assessment/Plan: #1.  Successful recanalization stenting right extrailiac artery.  Adequate blood flow to both legs with no occlusion of the SFA and above-knee popliteal arteries.    #2.  Will need eventual likely bilateral femoral to popliteal in situ bypass grafts.  However, with recent cardiac stenting this will be delayed at least for several months and will discuss eventually with cardiology.    #3.  On dual antiplatelet following cardiac stenting and should continue with this with iliac stenting.    #4.  Discuss importance of risk factor control.  Admit LDL= 144 at time of MI.  This needs to be less than 70.  On Lipitor.    Definitely needs to quit smoking and this was reinforced today.    We will follow-up with patient in vascular clinic in 1 month to discuss this further.      Wm. Tye MD

## 2023-06-22 ENCOUNTER — PATIENT OUTREACH (OUTPATIENT)
Dept: CARE COORDINATION | Facility: CLINIC | Age: 63
End: 2023-06-22
Payer: COMMERCIAL

## 2023-06-22 NOTE — PROGRESS NOTES
Connected Care Resource Center Contact  [facility] [[email or web address]]/[email or web address]     Clinical Data: Transitional Care Management Outreach     Outreach attempted x 2.  Left message on patient's voicemail, providing [facility]'s 24/7 scheduling and nurse triage phone number [phone] ([phone]) for questions/concerns and/or to schedule an appt with an [facility] provider, if they do not have a PCP.      Plan:  [facility] will do no further outreaches at this time.       rAun Goldberg  Connected Care Resource Center, [facility]    *Connected Care Resource Team does NOT follow patient ongoing. Referrals are identified based on internal discharge reports and the outreach is to ensure patient has an understanding of their discharge instructions.

## 2023-06-23 ENCOUNTER — TELEPHONE (OUTPATIENT)
Dept: OTHER | Facility: CLINIC | Age: 63
End: 2023-06-23
Payer: COMMERCIAL

## 2023-06-23 NOTE — TELEPHONE ENCOUNTER
"Patient is s/p mechanical thrombectomy of right external and proximal common femoral artery. Stents placed into external iliac artery, prox common femoral to external iliac and prox external iliac artery to distal common iliac artery on 6/18/23.    Per Dr. Gamble consult note on 6/20/23-\" Will need eventual likely bilateral femoral to popliteal in situ bypass grafts.  However, with recent cardiac stenting this will be delayed at least for several months and will discuss eventually with cardiology.\"     Patient discharged from the hospital on 6/20/23.     Returned call to patient who reports he is having right thigh pain that he noticed a few days ago when he rests his guitar on his right thigh. He denies swelling, coolness, worsening pain to his right leg. I advised he not apply pressure to his right thigh if he is experiencing discomfort and that he present to the ED for new or worsening symptoms over the weekend. He verbalized understanding.    Juli HARPER, RN    [facility]  Vascular [facility]  Office: [phone]  Fax: [phone]            "

## 2023-06-23 NOTE — TELEPHONE ENCOUNTER
"[facility]    Who is the name of the provider?:  Tye    What is the location you see this provider at/preferred location?: [place]  Person calling / Facility: Adam Sahni  Phone number:  [phone]  Nurse call back needed:  YES     Reason for call:  Patient scheduled while inpatient for:    Future Appointments   Date Time Provider Department Center   7/20/2023 10:30 AM Dorian Gambel MD [facility]     Patient (now discharged) asking for help in understanding this visit high level, some info in his discharge paperwork about \"pending results of\" IR LE Angiogram, but is also citing some developing concerns with numbness in the lower extremities and is asking for help with this as well.    Pharmacy location:  n/a  Outside Imaging: n/a   Can we leave a detailed message on this number?  YES       "

## 2023-07-03 ENCOUNTER — HOSPITAL ENCOUNTER (EMERGENCY)
Facility: CLINIC | Age: 63
Discharge: HOME | End: 2023-07-03
Attending: EMERGENCY MEDICINE | Admitting: EMERGENCY MEDICINE
Payer: COMMERCIAL

## 2023-07-03 VITALS
TEMPERATURE: 97.1 F | OXYGEN SATURATION: 97 % | SYSTOLIC BLOOD PRESSURE: 140 MMHG | WEIGHT: 200 LBS | HEIGHT: 70 IN | HEART RATE: 112 BPM | BODY MASS INDEX: 28.63 KG/M2 | RESPIRATION RATE: 18 BRPM | DIASTOLIC BLOOD PRESSURE: 96 MMHG

## 2023-07-03 DIAGNOSIS — Z98.1 HISTORY OF LUMBAR FUSION: ICD-10-CM

## 2023-07-03 DIAGNOSIS — Z86.79 HISTORY OF PERIPHERAL ARTERIAL DISEASE: ICD-10-CM

## 2023-07-03 DIAGNOSIS — G62.9 PERIPHERAL POLYNEUROPATHY: ICD-10-CM

## 2023-07-03 DIAGNOSIS — Z79.02 ANTIPLATELET OR ANTITHROMBOTIC LONG-TERM USE: ICD-10-CM

## 2023-07-03 PROCEDURE — 99282 EMERGENCY DEPT VISIT SF MDM: CPT

## 2023-07-03 ASSESSMENT — ACTIVITIES OF DAILY LIVING (ADL): ADLS_ACUITY_SCORE: 33

## 2023-07-03 NOTE — ED PROVIDER NOTES
History     Chief Complaint:  Bilateral Foot Numbness     The history is provided by the patient.      Adam Sahni is a 62 year old male on Plavix with a history of lumbar radiculopathy, peripheral neuropathy, peripheral artery disease, hyperlipidemia, and STEMI who presents with ongoing numbness to bilateral feet for one month. He adds he feels like his legs are being wrapped with ACE wrap and he is feeling constant pins and needles. He notes he has had numbness since his heart attack on June 6th which has persisted despite stent placement in his legs. He was seen by a [place] anesthesiologist this morning for a scheduled appointment for his lumbar radiculopathy. He notes he has been taking his Lyrica with some missed doses for 2.5 months. He is able to ambulate and specifically denies any weakness or new gait troubles. He denies smoking since his heart attack, but then later admits that he is smoking some, does not every day anymore.  He took one Tylenol extra strength this morning.  He is frustrated by his ongoing foot numbness, and wishes that he could go away.    Independent Historian:   None - Patient Only    Review of External Notes:   I personally performed extensive electronic chart review.  I see that he was admitted here for a STEMI in early June.  He was then readmitted in the middle of June with concern for right leg arterial pathology, he underwent mechanical thrombectomy of right femoral artery and stent was placed.  Occlusion there was thought to be longstanding, and he had some collaterals.  He also has a history of lumbar fusion.  He saw a [facility] anesthesiologist today for lumbar radiculopathy, he was advised to increase his Lyrica to 150 mg twice daily.  Plan was for outpatient follow-up, he was not specifically referred to the ED at that time.  I personally reviewed the prescription monitoring program, he last filled Lyrica, 75 mg intended to be taken twice daily, 1 month supply  "on April 19.  Patient says he still has some left.    Medications:    Alendronate   Aspirin 81 MG  Atorvastatin   Barberry-Oreg Grape-Goldenseal  Bupropion   Cholecalciferol   Cilostazol   Clopidogrel   Ferrous sulfate  Fish oil-omega-3 fatty acids  Gabapentin   Menaquinone-7  Metoprolol tartrate  Nitroglycerin   Pregabalin   Sertraline     Past Medical History:   CAD  Chronic anemia  Depression  Hyperlipidemia  Lumbar radiculopathy  PAD  Peripheral neuropathy  Prediabetes  Prostate cancer   Peripheral vascular disease  Colon polyp  Nephrolithiasis  Nicotine dependence  Torn rotator cuff  Osteopenia  Lumbar disc herniation   Stricture of male urethra  Elevated PSA  Chronic intractable headache  Chronic prostatitis  Dental abscess  STEMI    Past Surgical History:    CV coronary angiogram  IR lower extremity angiogram right   Prostate biopsy  Cystoscopy  Appendectomy  Spine surgery   Artery surgery  Spine surgery  Lumbar fusion   GI colon with snare X4  Lumbar foraminotomy     Physical Exam     Patient Vitals for the past 24 hrs:   BP Temp Pulse Resp SpO2 Height Weight   07/03/23 1633 (!) 140/96 97.1  F (36.2  C) 112 18 97 % 1.778 m (5' 10\") 90.7 kg (200 lb)      Physical Exam  General: Nontoxic-appearing male sitting upright in the gurney in fast-track for  HENT: mucous membranes moist  CV: rate as above, regular rhythm, no lower extremity edema, no JVD, compartments soft throughout all extremities including legs, bilateral feet are appropriately warm and well perfused without pallor or cyanosis  Resp: normal effort, speaks in full phrases, no stridor, no cough observed  GI: abdomen soft and nontender, no guarding  MSK: no bony tenderness to lower extremities, no lumbar spine tenderness, no CVA tenderness  Skin: appropriately warm and dry, no erythema, no ecchymosis, well-healed lumbar spine scar  Neuro: alert, clear speech, oriented, decreased sensation to light touch throughout both feet, excellent dorsiflexion " plantarflexion of both ankles, good strength in proximal and distal lower extremities to flexion and extension, ambulatory with a steady gait which I witnessed during my exam, no nuchal rigidity  Psych: cooperative, anxious, frustrated, no apparent hallucinations      Emergency Department Course     Emergency Department Course & Assessments:    Independent Interpretation (X-rays, CTs, rhythm strip):  None    Assessments/Consultations/Discussion of Management or Tests:  ED Course as of 07/03/23 1817 Mon Jul 03, 2023   1713 I obtained the patient's history and examined as noted above.      Social Determinants of Health affecting care:   Healthcare Access/Compliance    Disposition:  The patient was discharged to home.     Impression & Plan      Medical Decision Making:  He has a complex history including coronary artery disease, peripheral artery disease, and prior lumbar fusion he has previously diagnosed with peripheral neuropathy and I do think that this is the primary explanation for his ongoing foot numbness.  I certainly considered acute vascular issue such as arterial occlusion no he states that symptoms have been present for the past month, including before, during, and after his recent hospitalization for arterial intervention.  There is therefore no indication for emergent vascular studies.  He does not have any weakness.  I considered lumbar spine causes, acknowledging his history of prior surgery, though he does not have any new back pain, fever, or other features of the need for emergent neuroimaging of his spine such as an MRI of the lumbar spine.  Highly doubt acute aortic pathology.  His abdominal exam is benign.  I attribute his mild tachycardia to significant anxiety which is evident on exam.  However he does not have signs of any intoxication.  He just saw an anesthesiologist in clinic today who similarly did not feel that he required further emergent work-up.  He was advised to increase his Lyrica  dosing at that time, though on my detailed interview of the patient, it turns out that he has not been taking his lower dose on a regular basis, still having some remaining pills of a 1 month supply that he last filled 2-1/2 months ago.  He was advised to take this consistently in the hopes that this will help with his previously diagnosed neuropathy.  I attempted to reassure the patient based on my evaluation of him that I do not think something immediately dangerous or new is occurring.  He is encouraged to return for acute worsening at any hour and otherwise advised to follow-up through his multiple specialists and primary care physician in the near future through clinic.    Diagnosis:    ICD-10-CM    1. Peripheral polyneuropathy  G62.9       2. History of peripheral arterial disease  Z86.79       3. Antiplatelet or antithrombotic long-term use  Z79.02       4. History of lumbar fusion  Z98.1          Scribe Disclosure:  I, Katina Ochoa, am serving as a scribe at 6:07 PM on 7/3/2023 to document services personally performed by Agusto Lee MD based on my observations and the provider's statements to me.     7/3/2023   Agusto Lee MD Reitsema, Jeffrey Alan, MD  07/04/23 2650

## 2023-07-03 NOTE — ED TRIAGE NOTES
Pt c/o of bilateral leg numbness since June after having heart attack. Pt believes he has neuropathy, pt denies being diabetic.

## 2023-07-11 NOTE — TELEPHONE ENCOUNTER
(FYI) Patient called to report new concerns with regards to pronounced RT foot numbness. Stated that this preceded/happened at the same time as his last heart attack. Can a nurse please call him back.     Adam Sahni  [phone]

## 2023-07-12 ENCOUNTER — TELEPHONE (OUTPATIENT)
Dept: OTHER | Facility: CLINIC | Age: 63
End: 2023-07-12
Payer: COMMERCIAL

## 2023-07-12 NOTE — TELEPHONE ENCOUNTER
Reviewed 6/20/23 progress notes per Dr. Gamble.      Successful recanalization stenting right extrailiac artery.  Adequate blood flow to both legs with no occlusion of the SFA and above-knee popliteal arteries.    Will need eventual likely bilateral femoral to popliteal in situ bypass grafts.  However, with recent cardiac stenting this will be delayed at least for several months and will discuss eventually with cardiology.    Pt is on gabapentin, Lyrica, Plavix and Pletal per med rec.    Returned call to patient.  He reports numbness from the right calf to right foot.  He reports legs are equal in temp and color bilaterally.  He reports the symptoms are the same as previous.  Pt denies any chest pain, SOB.  I advised patient if the symptoms have become worse he needs to return to the ED to be further evaluated.  Patient verbalized understanding and was in agreement to present to ED should his symptoms worsen.     Meme Michael, JOANNN, RN-[facility] [place]

## 2023-07-12 NOTE — TELEPHONE ENCOUNTER
Pt presented to ED on 7/3/23 with bilateral foot numbness.  Per review of visit:    Impression & Plan       Medical Decision Making:  He has a complex history including coronary artery disease, peripheral artery disease, and prior lumbar fusion he has previously diagnosed with peripheral neuropathy and I do think that this is the primary explanation for his ongoing foot numbness.  I certainly considered acute vascular issue such as arterial occlusion no he states that symptoms have been present for the past month, including before, during, and after his recent hospitalization for arterial intervention.  There is therefore no indication for emergent vascular studies.  He does not have any weakness.      Returned call to patient.  LVM stating if he was having CP or any symptoms similar to previous STEMI, he should present to ED/call 911 immediately.  Call back number provided for him to call back with any questions.    Meme Michael, JOANNN, RN-[facility] [place]

## 2023-07-12 NOTE — TELEPHONE ENCOUNTER
[facility]    Who is the name of the provider?:  Dr Davison    What is the location you see this provider at/preferred location?: [place]  Person calling / Facility: patient  Phone number:  [phone]  Nurse call back needed:  Yes , please     Reason for call:  Patient called with concerning pain in both feet and legs through calves ,numbness around hamsting area , has a dr davison visit 7/20 , recent heart attack , was having these issues before and during attack also ,     Pharmacy location:  chart  Outside Imaging: na  Can we leave a detailed message on this number?  yes

## 2023-07-13 ENCOUNTER — HOSPITAL ENCOUNTER (OUTPATIENT)
Dept: CARDIAC REHAB | Facility: CLINIC | Age: 63
Discharge: HOME | End: 2023-07-13
Attending: INTERNAL MEDICINE
Payer: COMMERCIAL

## 2023-07-13 DIAGNOSIS — I21.3 ST ELEVATION MYOCARDIAL INFARCTION (STEMI), UNSPECIFIED ARTERY (H): ICD-10-CM

## 2023-07-13 PROCEDURE — 93797 PHYS/QHP OP CAR RHAB WO ECG: CPT

## 2023-07-13 PROCEDURE — 93798 PHYS/QHP OP CAR RHAB W/ECG: CPT

## 2023-07-14 ENCOUNTER — HOSPITAL ENCOUNTER (OUTPATIENT)
Dept: CARDIAC REHAB | Facility: CLINIC | Age: 63
Discharge: HOME | End: 2023-07-14
Attending: INTERNAL MEDICINE
Payer: COMMERCIAL

## 2023-07-14 PROCEDURE — 93798 PHYS/QHP OP CAR RHAB W/ECG: CPT | Performed by: REHABILITATION PRACTITIONER

## 2023-07-17 ENCOUNTER — TELEPHONE (OUTPATIENT)
Dept: OTHER | Facility: CLINIC | Age: 63
End: 2023-07-17

## 2023-07-17 NOTE — PROGRESS NOTES
[place] [facility]    Adam Sahni comes today to discuss his vascular issues.  History of recanalization of occluded right EIA artery with stenting X3.      Has occlusion of both superficial femoral and above-knee popliteal arteries noted on the angiogram.  We discussed the possibility of a right femoral to below-knee popliteal bypass graft.    Plan DAPT due to recent MI and stenting.  Stents placed 6/9/2023.  Initiated on Plavix at that time.  He has run out of his prescription of Plavix which he definitely needs to be on.      ROS: #1.  Chronic low back pain.  Laminectomy January 2021 with good results.  Worsening condition underwent placement of rods 4/22.  Has had numbness and tingling in both of his legs ever since.  This appears to be exacerbated more in the right and left calf and foot at the time of his heart attack.     #2.  No history of diabetes to explain his peripheral neuropathy thus more likely due to his chronic back issues     #3.  Mild carotid bifurcation disease on 6/17/2023 duplex.  Right ICA PSV= 116 cm/s.  Left ICA PSV= 134 cm/s.  Recommended risk factor control along with yearly follow-up duplex.     #4.  Reports that he has quit smoking since his heart attack.     #5.  Initiated statin following angiogram with initial LDL= 144 at time of MI.  This needs to be less than 70.      Exam: Alert and appropriate.  Very verbose.   Blood pressure 114/78.  Pulse 83.   Chest= clear   Cardiovascular= regular rate   Extremities= both feet are warm and pink.  No swelling.    Some numbness noted clinically but gross sensation intact    Monophasic right AT and right PT at ankle with bedside Doppler          I reviewed the CTA and angiogram images with the patient.  Successful recanalization of occluded right extrailiac artery.  Widely patent right profundus with extensive collateralization.  Distal SFA is occluded down to the popliteal artery at the knee joint level.  Three-vessel runoff.   Distal SFA occlusion on the contralateral side also.  Successful recanalization with Viabahn stenting of the right EIA with no residual stenosis.    IMPRESSION: #1.  Successful recanalization of right extrailiac artery with improved blood flow via extensive collaterals to the below-knee popliteal artery.  1 could consider a bypass depending on his clinical situation from the distal SFA to below-knee popliteal.     #2.  His primary complaint however is his numbness and incoordination to his feet.  This is not vascular in nature and more likely related to his chronic back problems.  Thus, revascularization at this time did not improve the symptoms.     #3.  We also discussed his recent coronary stent.  Stopping Plavix before 6 months has a high risk of stent occlusion and sometimes even before 1 year.  I had to explain this extensively to him so he could understand.  He is run out of Plavix and we will refill this electronically via his pharmacy at [facility] and he needs to take this.     He also will continue with his aspirin.  Pletal may be somewhat helpful and is worth continuing.    We will plan to see him again in approximately 6 months with an ALEN with exercise and duplex of the right iliac stent.    25 minutes with patient today including chart review.    Dorian Gamble MD   This note was created using Dragon voice recognition software which may result in transcription errors.

## 2023-07-18 ENCOUNTER — HOSPITAL ENCOUNTER (OUTPATIENT)
Dept: CARDIAC REHAB | Facility: CLINIC | Age: 63
Discharge: HOME | End: 2023-07-18
Attending: INTERNAL MEDICINE
Payer: COMMERCIAL

## 2023-07-18 PROCEDURE — 93798 PHYS/QHP OP CAR RHAB W/ECG: CPT

## 2023-07-20 ENCOUNTER — OFFICE VISIT (OUTPATIENT)
Dept: OTHER | Facility: CLINIC | Age: 63
End: 2023-07-20
Attending: SURGERY
Payer: COMMERCIAL

## 2023-07-20 VITALS — HEART RATE: 83 BPM | SYSTOLIC BLOOD PRESSURE: 114 MMHG | DIASTOLIC BLOOD PRESSURE: 78 MMHG

## 2023-07-20 DIAGNOSIS — G62.9 PERIPHERAL POLYNEUROPATHY: ICD-10-CM

## 2023-07-20 DIAGNOSIS — I73.9 PAD (PERIPHERAL ARTERY DISEASE) (H): Primary | ICD-10-CM

## 2023-07-20 DIAGNOSIS — I70.90 ARTERIAL OCCLUSION: ICD-10-CM

## 2023-07-20 PROCEDURE — 99214 OFFICE O/P EST MOD 30 MIN: CPT | Performed by: SURGERY

## 2023-07-20 PROCEDURE — G0463 HOSPITAL OUTPT CLINIC VISIT: HCPCS | Mod: 25

## 2023-07-20 PROCEDURE — 93923 UPR/LXTR ART STDY 3+ LVLS: CPT

## 2023-07-20 RX ORDER — CLOPIDOGREL BISULFATE 75 MG/1
75 TABLET ORAL DAILY
Qty: 90 TABLET | Refills: 1 | Status: SHIPPED | OUTPATIENT
Start: 2023-07-20 | End: 2023-12-15

## 2023-07-20 NOTE — PROGRESS NOTES
[facility]        Patient is here for a  follow up.     Pt is currently taking Aspirin, Statin and Plavix.    /78 (BP Location: Left arm, Patient Position: Chair, Cuff Size: Adult Regular)   Pulse 83     The provider has been notified that the patient has no concerns.     Questions patient would like addressed today are: N/A.    Refills are needed: Yes, plavix    Has homecare services and agency name:  No    Fabiola Valente MA

## 2023-07-26 ENCOUNTER — TELEPHONE (OUTPATIENT)
Dept: OTHER | Facility: CLINIC | Age: 63
End: 2023-07-26
Payer: COMMERCIAL

## 2023-07-26 ENCOUNTER — HOSPITAL ENCOUNTER (OUTPATIENT)
Dept: CARDIAC REHAB | Facility: CLINIC | Age: 63
Discharge: HOME | End: 2023-07-26
Attending: INTERNAL MEDICINE
Payer: COMMERCIAL

## 2023-07-26 PROCEDURE — 93798 PHYS/QHP OP CAR RHAB W/ECG: CPT

## 2023-07-26 NOTE — TELEPHONE ENCOUNTER
[facility]    Who is the name of the provider?:  Tye    What is the location you see this provider at/preferred location?: [place]  Person calling / Facility: Adam Higginson  Phone number:  [phone]  Nurse call back needed:  YES     Reason for call:  LOV 7/20    Patient asking for help coordinating visits sooner than next year and has some questions and updates to discuss.    Pharmacy location:  n/a  Outside Imaging: n/a   Can we leave a detailed message on this number?  YES

## 2023-07-26 NOTE — TELEPHONE ENCOUNTER
I called pt to let him know that we do not take [facility] insurance. He has an upcoming appt with  AN on 4/8/20. He is wondering if Dr. BOYLE has any suggestions for who to go to.  Patient is keeping appt for now until he hears back.   Returned call to patient.  Explained follow up is due in January and he will receive a letter closer to that time to schedule follow up.  Pt verbalized understanding.  Meme Michael, JOANNN, RN-[facility]

## 2023-08-01 ENCOUNTER — HOSPITAL ENCOUNTER (OUTPATIENT)
Dept: CARDIAC REHAB | Facility: CLINIC | Age: 63
Discharge: HOME | End: 2023-08-01
Attending: INTERNAL MEDICINE
Payer: COMMERCIAL

## 2023-08-01 PROCEDURE — 93798 PHYS/QHP OP CAR RHAB W/ECG: CPT

## 2023-08-03 ENCOUNTER — HOSPITAL ENCOUNTER (OUTPATIENT)
Dept: CARDIAC REHAB | Facility: CLINIC | Age: 63
Discharge: HOME | End: 2023-08-03
Attending: INTERNAL MEDICINE
Payer: COMMERCIAL

## 2023-08-03 PROCEDURE — 93798 PHYS/QHP OP CAR RHAB W/ECG: CPT | Performed by: REHABILITATION PRACTITIONER

## 2023-08-03 NOTE — TELEPHONE ENCOUNTER
Contacted patient's wife too discuss fusion biopsies, MRIs of the prostate, all questions related to the process of fusion biopsies and which physicians in our clinic perform these. Attempted to get patient set up for a video consult with one of our providers to discuss, but patient's insurance information is not in the chart and writer is unable to put this in. Will send message to our schedulers to get patient set up with either Dr. Felix, Dr. Hernandez, Dr. Price, Dr. Dawn, or Dr. Ayoub (if he is still taking new patients at this time). Patient's wife stated understanding and agreement with plan.  Qian English LPN     Depth Of Tumor Invasion (For Histology): tumor not visualized

## 2023-08-08 ENCOUNTER — HOSPITAL ENCOUNTER (EMERGENCY)
Facility: CLINIC | Age: 63
Discharge: HOME | End: 2023-08-08
Attending: EMERGENCY MEDICINE | Admitting: EMERGENCY MEDICINE
Payer: COMMERCIAL

## 2023-08-08 ENCOUNTER — HOSPITAL ENCOUNTER (OUTPATIENT)
Dept: CARDIAC REHAB | Facility: CLINIC | Age: 63
Discharge: HOME | End: 2023-08-08
Attending: INTERNAL MEDICINE
Payer: COMMERCIAL

## 2023-08-08 VITALS
BODY MASS INDEX: 28.7 KG/M2 | WEIGHT: 200 LBS | TEMPERATURE: 97.4 F | OXYGEN SATURATION: 95 % | DIASTOLIC BLOOD PRESSURE: 69 MMHG | SYSTOLIC BLOOD PRESSURE: 96 MMHG | HEART RATE: 89 BPM | RESPIRATION RATE: 26 BRPM

## 2023-08-08 DIAGNOSIS — R00.0 SINUS TACHYCARDIA: ICD-10-CM

## 2023-08-08 DIAGNOSIS — R79.89 TROPONIN LEVEL ELEVATED: ICD-10-CM

## 2023-08-08 LAB
ANION GAP SERPL CALCULATED.3IONS-SCNC: 13 MMOL/L (ref 7–15)
APTT PPP: 27 SECONDS (ref 22–38)
ATRIAL RATE - MUSE: 93 BPM
BASOPHILS # BLD AUTO: 0 10E3/UL (ref 0–0.2)
BASOPHILS NFR BLD AUTO: 0 %
BUN SERPL-MCNC: 12.3 MG/DL (ref 8–23)
CALCIUM SERPL-MCNC: 9.1 MG/DL (ref 8.8–10.2)
CHLORIDE SERPL-SCNC: 106 MMOL/L (ref 98–107)
CREAT SERPL-MCNC: 1.01 MG/DL (ref 0.67–1.17)
DEPRECATED HCO3 PLAS-SCNC: 22 MMOL/L (ref 22–29)
DIASTOLIC BLOOD PRESSURE - MUSE: NORMAL MMHG
EOSINOPHIL # BLD AUTO: 0.1 10E3/UL (ref 0–0.7)
EOSINOPHIL NFR BLD AUTO: 1 %
ERYTHROCYTE [DISTWIDTH] IN BLOOD BY AUTOMATED COUNT: 16 % (ref 10–15)
GFR SERPL CREATININE-BSD FRML MDRD: 84 ML/MIN/1.73M2
GLUCOSE SERPL-MCNC: 125 MG/DL (ref 70–99)
HCT VFR BLD AUTO: 35.9 % (ref 40–53)
HGB BLD-MCNC: 11.4 G/DL (ref 13.3–17.7)
IMM GRANULOCYTES # BLD: 0.1 10E3/UL
IMM GRANULOCYTES NFR BLD: 1 %
INR PPP: 0.89 (ref 0.85–1.15)
INTERPRETATION ECG - MUSE: NORMAL
LYMPHOCYTES # BLD AUTO: 1.5 10E3/UL (ref 0.8–5.3)
LYMPHOCYTES NFR BLD AUTO: 21 %
MAGNESIUM SERPL-MCNC: 2 MG/DL (ref 1.7–2.3)
MCH RBC QN AUTO: 28.3 PG (ref 26.5–33)
MCHC RBC AUTO-ENTMCNC: 31.8 G/DL (ref 31.5–36.5)
MCV RBC AUTO: 89 FL (ref 78–100)
MONOCYTES # BLD AUTO: 0.5 10E3/UL (ref 0–1.3)
MONOCYTES NFR BLD AUTO: 7 %
NEUTROPHILS # BLD AUTO: 4.9 10E3/UL (ref 1.6–8.3)
NEUTROPHILS NFR BLD AUTO: 70 %
NRBC # BLD AUTO: 0 10E3/UL
NRBC BLD AUTO-RTO: 0 /100
P AXIS - MUSE: 58 DEGREES
PLATELET # BLD AUTO: 246 10E3/UL (ref 150–450)
POTASSIUM SERPL-SCNC: 4.3 MMOL/L (ref 3.4–5.3)
PR INTERVAL - MUSE: 182 MS
QRS DURATION - MUSE: 80 MS
QT - MUSE: 364 MS
QTC - MUSE: 452 MS
R AXIS - MUSE: 3 DEGREES
RBC # BLD AUTO: 4.03 10E6/UL (ref 4.4–5.9)
SODIUM SERPL-SCNC: 141 MMOL/L (ref 136–145)
SYSTOLIC BLOOD PRESSURE - MUSE: NORMAL MMHG
T AXIS - MUSE: -24 DEGREES
TROPONIN T SERPL HS-MCNC: 48 NG/L
TROPONIN T SERPL HS-MCNC: 48 NG/L
TSH SERPL DL<=0.005 MIU/L-ACNC: 2.52 UIU/ML (ref 0.3–4.2)
VENTRICULAR RATE- MUSE: 93 BPM
WBC # BLD AUTO: 7.1 10E3/UL (ref 4–11)

## 2023-08-08 PROCEDURE — 80048 BASIC METABOLIC PNL TOTAL CA: CPT | Performed by: EMERGENCY MEDICINE

## 2023-08-08 PROCEDURE — 93005 ELECTROCARDIOGRAM TRACING: CPT | Mod: 76

## 2023-08-08 PROCEDURE — 84484 ASSAY OF TROPONIN QUANT: CPT | Performed by: EMERGENCY MEDICINE

## 2023-08-08 PROCEDURE — 85730 THROMBOPLASTIN TIME PARTIAL: CPT | Performed by: EMERGENCY MEDICINE

## 2023-08-08 PROCEDURE — 83735 ASSAY OF MAGNESIUM: CPT | Performed by: EMERGENCY MEDICINE

## 2023-08-08 PROCEDURE — 85610 PROTHROMBIN TIME: CPT | Performed by: EMERGENCY MEDICINE

## 2023-08-08 PROCEDURE — 36415 COLL VENOUS BLD VENIPUNCTURE: CPT | Performed by: EMERGENCY MEDICINE

## 2023-08-08 PROCEDURE — 84443 ASSAY THYROID STIM HORMONE: CPT | Performed by: EMERGENCY MEDICINE

## 2023-08-08 PROCEDURE — 85025 COMPLETE CBC W/AUTO DIFF WBC: CPT | Performed by: EMERGENCY MEDICINE

## 2023-08-08 PROCEDURE — 99284 EMERGENCY DEPT VISIT MOD MDM: CPT | Mod: 25

## 2023-08-08 PROCEDURE — 93005 ELECTROCARDIOGRAM TRACING: CPT

## 2023-08-08 PROCEDURE — 93798 PHYS/QHP OP CAR RHAB W/ECG: CPT

## 2023-08-08 ASSESSMENT — ACTIVITIES OF DAILY LIVING (ADL)
ADLS_ACUITY_SCORE: 35
ADLS_ACUITY_SCORE: 35

## 2023-08-08 NOTE — ED PROVIDER NOTES
"    History     Chief Complaint:  Tachycardia       The history is provided by the patient.      Adam Sahni is a 63 year old male with a history of coronary artery disease status post STEMI in June 2023 as well as peripheral artery disease presenting alone from cardiac rehabilitation for tachycardia.  Adam was in his baseline state of health this morning.  He was able to drink at least 3 cups of coffee (he usually drinks 7) and swim laps without any symptoms including chest pain or shortness of breath.  He then went to cardiac rehabilitation where he was found to be tachycardic to 140s and was referred to the emergency department.  He denies any missed medications or changes to his medication regimen.  He denies alcohol use.  He denies any recent travel.  He denies presence of chest pain or shortness of breath.  He reports intermittent \"thumping\" in his chest which he tells me is a chronic issue and unchanged today.  He has no other concerns at this time but feels a little \"jumpy\".    Independent Historian:    As above    Review of External Notes:  Discharge summary 6/20/2023 and 6/9/2023.  STEMI 6/9/2023 and concern for RLE arterial occlusion during second hospitalization.  ED visit 7/3/2023 for peripheral neuropathy.  Vascular visit 7/20/2023.  Internal medicine visit 7/21/2023.  Cardiac rehab note today indicating heart rate of 144 bpm.    Medications:    alendronate (FOSAMAX) 70 MG tablet  aspirin (ASA) 81 MG chewable tablet  atorvastatin (LIPITOR) 80 MG tablet  Barberry-Oreg Grape-Goldenseal (BERBERINE COMPLEX) 200-200-50 MG CAPS  buPROPion (WELLBUTRIN SR) 150 MG 12 hr tablet  cholecalciferol (VITAMIN D3) 125 mcg (5000 units) capsule  cilostazol (PLETAL) 100 MG tablet  clopidogrel (PLAVIX) 75 MG tablet  ferrous sulfate (FEROSUL) 325 (65 Fe) MG tablet  fish oil-omega-3 fatty acids 1000 MG capsule  gabapentin (NEURONTIN) 600 MG tablet  Menaquinone-7 (VITAMIN K2) 100 MCG CAPS  metoprolol tartrate " (LOPRESSOR) 25 MG tablet  nitroGLYcerin (NITROSTAT) 0.4 MG sublingual tablet  pregabalin (LYRICA) 75 MG capsule  sertraline (ZOLOFT) 100 MG tablet    Past Medical History:    Past Medical History:   Diagnosis Date    CAD (coronary artery disease)     Chronic anemia     Depression     Hyperlipidemia     Lumbar radiculopathy     PAD (peripheral artery disease) (H)     Peripheral neuropathy     Prediabetes     Prostate cancer (H)        Past Surgical History:    Past Surgical History:   Procedure Laterality Date    CV CORONARY ANGIOGRAM N/A 6/9/2023    Procedure: Coronary Angiogram;  Surgeon: Rosenda Pacheco MD;  Location:  [facility]    IR LOWER EXTREMITY ANGIOGRAM RIGHT  6/18/2023          Physical Exam   Patient Vitals for the past 24 hrs:   BP Temp Temp src Pulse Resp SpO2 Weight   08/08/23 1357 94/60 97.4  F (36.3  C) Temporal (!) 128 20 96 % 90.7 kg (200 lb)      Vitals:    08/08/23 1645 08/08/23 1700 08/08/23 1715   BP: 92/77 116/80 96/69   Pulse: 78 96 89   Resp: 17 15 26   Temp:      SpO2: 94% 96% 95%     Physical Exam  General: Well-developed and well-nourished. Well appearing middle-aged  man. Cooperative.  Head:  Atraumatic.  Eyes:  Conjunctivae, lids, and sclerae are normal.  ENT:    Normal nose. Moist mucous membranes.  Neck:  Supple. Normal range of motion.  CV:  Intermittently mildly tachycardic rate and rhythm. Normal heart sounds with no murmurs, rubs, or gallops detected.  Resp:  No respiratory distress. Clear to auscultation bilaterally without decreased breath sounds, wheezing, rales, or rhonchi.  GI:  Soft. Non-distended. Non-tender.    MS:  Normal ROM. No bilateral lower extremity edema.  Skin:  Warm. Non-diaphoretic. No pallor.  Neuro:  Awake. A&Ox3. Normal strength.  Psych: Normal mood and affect. Normal speech.  Vitals reviewed.    Emergency Department Course   EKG #1  Indication: tachycardia  Time: 1400  Rate 121 bpm. MD interval 158. QRS duration 80. QT/QTc 314/445.    Sinus tachycardia  Inferior infarct, age undetermined  Anterolateral infarct, age undetermined  Abnormal ECG  No acute ST changes.  ST changes inferiorly are similar to prior.  Rate has increased as compared to prior, dated 6/10/23.    EKG #2  Indication: rate change  Time: 1529  Rate 93 bpm. MS interval 182. QRS duration 80. QT/QTc 364/452.   Sinus rhythm with occasional premature ventricular complexes  Inferior infarct, age undetermined  Abnormal ECG  No acute ST changes.  Rate improved as compared to prior EKG today, as above.     Laboratory:  Labs Ordered and Resulted from Time of ED Arrival to Time of ED Departure   BASIC METABOLIC PANEL - Abnormal       Result Value    Sodium 141      Potassium 4.3      Chloride 106      Carbon Dioxide (CO2) 22      Anion Gap 13      Urea Nitrogen 12.3      Creatinine 1.01      Calcium 9.1      Glucose 125 (*)     GFR Estimate 84     TROPONIN T, HIGH SENSITIVITY - Abnormal    Troponin T, High Sensitivity 48 (*)    CBC WITH PLATELETS AND DIFFERENTIAL - Abnormal    WBC Count 7.1      RBC Count 4.03 (*)     Hemoglobin 11.4 (*)     Hematocrit 35.9 (*)     MCV 89      MCH 28.3      MCHC 31.8      RDW 16.0 (*)     Platelet Count 246      % Neutrophils 70      % Lymphocytes 21      % Monocytes 7      % Eosinophils 1      % Basophils 0      % Immature Granulocytes 1      NRBCs per 100 WBC 0      Absolute Neutrophils 4.9      Absolute Lymphocytes 1.5      Absolute Monocytes 0.5      Absolute Eosinophils 0.1      Absolute Basophils 0.0      Absolute Immature Granulocytes 0.1      Absolute NRBCs 0.0     TROPONIN T, HIGH SENSITIVITY - Abnormal    Troponin T, High Sensitivity 48 (*)    MAGNESIUM - Normal    Magnesium 2.0     TSH WITH FREE T4 REFLEX - Normal    TSH 2.52     INR - Normal    INR 0.89     PARTIAL THROMBOPLASTIN TIME - Normal    aPTT 27          Emergency Department Course & Assessments:  Assessments:  1554 I updated the patient on plan for repeat troponin. Heart rate <100  "bpm.   1725 I updated the patient on repeat troponin.  Heart rate remains normal.  Appropriate for discharge.    Independent Interpretation (X-rays, CTs, rhythm strip):  Not applicable    Consultations/Discussion of Management or Tests:  Not applicable    Social Determinants of Health affecting care:  Established care providers, attends appointments.  Has a car.     Disposition:  The patient was discharged.     Impression & Plan    Medical Decision Making:  Adam is a 63 year old man with a history of STEMI in June who was sent from cardiac rehabilitation as he was noted to be tachycardic.  He denies any new symptoms.  He has intermittent \"thumping\" in his chest which is a chronic issue.  He has no chest pain or shortness of breath and was actually able to swim laps this morning without issues.  He drank his usual cups of coffee and has been taking all of his medications.  He appears well on exam with initial tachycardia to 128 bpm.  EKG confirms this to be sinus tachycardia without ischemic changes.  He does have chronic ST changes since his MI.  Troponin is elevated at 48 with repeat unchanged at 48.  His prior values were in the 9,500 range.  Particularly with lack of chest pain or shortness of breath I do not find these troponins to warrant emergent further workup.    The remainder of his workup is reassuring without kidney injury or electrolyte derangement.  TSH is normal.  Anemia is at/above baseline.  There is no leukocytosis.  He denies any infectious symptoms.  Without symptoms, pulmonary embolism is considered unlikely.  Indeed, without intervention in the emergency department he had complete resolution of his sinus tachycardia.    It is unclear to me what caused this tachycardia but it may have been a combination of him exercising this morning and drinking several cups of coffee and then going to cardiac rehab.  He is appropriate for discharge and should follow-up with cardiology and cardiac rehab as " scheduled.  He should return if he has any symptoms or new concerns.  He should be taking all of his medications and limit caffeine.  These instructions were relayed to the patient and I answered all his questions.  He verbalized understanding and is amenable to discharge.    Diagnosis:    ICD-10-CM    1. Sinus tachycardia  R00.0       2. Troponin level elevated  R77.8            Discharge Medications:  Discharge Medication List as of 8/8/2023  5:29 PM         8/8/2023   Gladis Cortez MD Dixson, Kylie S, MD  08/10/23 1027

## 2023-08-08 NOTE — DISCHARGE INSTRUCTIONS
Limit caffeine.  Take all of your medications as prescribed.  Return with shortness of breath, chest pain, worsening palpitations, or any other new or concerning symptoms.  Continue cardiac rehabilitation and follow-up with your primary care provider or cardiologist.

## 2023-08-08 NOTE — ED NOTES
Tele-PIT/Intake Evaluation      Video-Visit Details    Type of service:  Video Visit    Video Start Time (time video started): 1:55 PM  Video End Time (time video stopped): 2:00 PM   Originating Location (pt. Location): [facility]  Distant Location (provider location):  Same  Mode of Communication:  Video Conference via [place]  Patient verbally consented to [employer].    History:  63-year-old male presenting with an elevated heart rate.  Patient was at cardiac rehab this morning when they noted that his heart rate was in the 140-150 range.  They had him rest for a time and see if his heart rate improved, however it remained elevated.  He notes some very mild shortness of breath but denies any chest pain, palpitations, dizziness, weakness.  He denies any recent illnesses.  The last time he had his pulse checked was last Thursday at cardiac rehab.  He denies any previous history of atrial fibrillation or atrial flutter.  He recently had a heart attack back in June and is on Plavix.    Exam:  Physical Exam  Vitals and nursing note reviewed.   Constitutional:       General: He is not in acute distress.     Appearance: He is not ill-appearing.   HENT:      Head: Normocephalic and atraumatic.      Right Ear: External ear normal.      Left Ear: External ear normal.      Nose: Nose normal.   Pulmonary:      Effort: Pulmonary effort is normal. No respiratory distress.   Musculoskeletal:         General: No deformity or signs of injury.   Skin:     Coloration: Skin is not pale.      Findings: No rash.   Neurological:      Mental Status: He is alert.   Psychiatric:         Behavior: Behavior normal.         No data found.    Appropriate interventions for symptom management were initiated if applicable.  Appropriate diagnostic tests were initiated if indicated.    Important information for subsequent clinician:  Tachycardia and mild SOB.  Will  need EKG to determine rhythm.  Labs ordered.      I briefly evaluated the patient and developed an initial plan of care. I discussed this plan and explained that this brief interaction does not constitute a full evaluation. Patient/family understands that they should wait to be fully evaluated and discuss any test results with another clinician prior to leaving the hospital.       Mario Sellers MD  08/08/23 1957

## 2023-08-09 LAB
ATRIAL RATE - MUSE: 121 BPM
DIASTOLIC BLOOD PRESSURE - MUSE: NORMAL MMHG
INTERPRETATION ECG - MUSE: NORMAL
P AXIS - MUSE: 43 DEGREES
PR INTERVAL - MUSE: 158 MS
QRS DURATION - MUSE: 80 MS
QT - MUSE: 314 MS
QTC - MUSE: 445 MS
R AXIS - MUSE: -25 DEGREES
SYSTOLIC BLOOD PRESSURE - MUSE: NORMAL MMHG
T AXIS - MUSE: -27 DEGREES
VENTRICULAR RATE- MUSE: 121 BPM

## 2023-08-10 ENCOUNTER — HOSPITAL ENCOUNTER (OUTPATIENT)
Dept: CARDIAC REHAB | Facility: CLINIC | Age: 63
Discharge: HOME | End: 2023-08-10
Attending: INTERNAL MEDICINE
Payer: COMMERCIAL

## 2023-08-10 PROCEDURE — 93798 PHYS/QHP OP CAR RHAB W/ECG: CPT | Performed by: OCCUPATIONAL THERAPIST

## 2023-08-15 ENCOUNTER — HOSPITAL ENCOUNTER (OUTPATIENT)
Dept: CARDIAC REHAB | Facility: CLINIC | Age: 63
Discharge: HOME | End: 2023-08-15
Attending: INTERNAL MEDICINE
Payer: COMMERCIAL

## 2023-08-15 PROCEDURE — 93798 PHYS/QHP OP CAR RHAB W/ECG: CPT | Performed by: OCCUPATIONAL THERAPIST

## 2023-08-16 ENCOUNTER — TRANSFERRED RECORDS (OUTPATIENT)
Dept: HEALTH INFORMATION MANAGEMENT | Facility: CLINIC | Age: 63
End: 2023-08-16

## 2023-08-17 ENCOUNTER — HOSPITAL ENCOUNTER (OUTPATIENT)
Dept: CARDIAC REHAB | Facility: CLINIC | Age: 63
Discharge: HOME | End: 2023-08-17
Attending: INTERNAL MEDICINE
Payer: COMMERCIAL

## 2023-08-17 PROCEDURE — 93798 PHYS/QHP OP CAR RHAB W/ECG: CPT | Performed by: OCCUPATIONAL THERAPIST

## 2023-08-18 ENCOUNTER — TRANSFERRED RECORDS (OUTPATIENT)
Dept: HEALTH INFORMATION MANAGEMENT | Facility: CLINIC | Age: 63
End: 2023-08-18

## 2023-08-22 ENCOUNTER — HOSPITAL ENCOUNTER (OUTPATIENT)
Dept: CARDIAC REHAB | Facility: CLINIC | Age: 63
Discharge: HOME | End: 2023-08-22
Attending: INTERNAL MEDICINE
Payer: COMMERCIAL

## 2023-08-22 PROCEDURE — 93798 PHYS/QHP OP CAR RHAB W/ECG: CPT

## 2023-08-24 ENCOUNTER — TRANSFERRED RECORDS (OUTPATIENT)
Dept: HEALTH INFORMATION MANAGEMENT | Facility: CLINIC | Age: 63
End: 2023-08-24

## 2023-08-24 ENCOUNTER — HOSPITAL ENCOUNTER (OUTPATIENT)
Dept: CARDIAC REHAB | Facility: CLINIC | Age: 63
Discharge: HOME | End: 2023-08-24
Attending: INTERNAL MEDICINE
Payer: COMMERCIAL

## 2023-08-24 PROCEDURE — 93798 PHYS/QHP OP CAR RHAB W/ECG: CPT

## 2023-08-30 ENCOUNTER — TELEPHONE (OUTPATIENT)
Dept: OTHER | Facility: CLINIC | Age: 63
End: 2023-08-30

## 2023-08-30 NOTE — TELEPHONE ENCOUNTER
[facility] VASCULAR [facility]    Who is the name of the provider?:  Dr Gamble    What is the location you see this provider at/preferred location?: [place]  Person calling / Facility: Adam Sahni  Phone number:  [phone]  Nurse call back needed:  Yes     Reason for call:  Patient called stating he received an email from Dr Gamble and would like to talk to his to further discuss the details in the email.     Routing to RN to inform Dr Gmable    Pharmacy location:  n/a  Outside Imaging: n/a   Can we leave a detailed message on this number?  N/a

## 2023-09-02 ENCOUNTER — APPOINTMENT (OUTPATIENT)
Dept: GENERAL RADIOLOGY | Facility: CLINIC | Age: 63
End: 2023-09-02
Attending: EMERGENCY MEDICINE
Payer: COMMERCIAL

## 2023-09-02 ENCOUNTER — HOSPITAL ENCOUNTER (OUTPATIENT)
Facility: CLINIC | Age: 63
Setting detail: OBSERVATION
Discharge: HOME | End: 2023-09-04
Attending: EMERGENCY MEDICINE | Admitting: STUDENT IN AN ORGANIZED HEALTH CARE EDUCATION/TRAINING PROGRAM
Payer: COMMERCIAL

## 2023-09-02 ENCOUNTER — APPOINTMENT (OUTPATIENT)
Dept: MRI IMAGING | Facility: CLINIC | Age: 63
End: 2023-09-02
Attending: PHYSICIAN ASSISTANT
Payer: COMMERCIAL

## 2023-09-02 ENCOUNTER — APPOINTMENT (OUTPATIENT)
Dept: CT IMAGING | Facility: CLINIC | Age: 63
End: 2023-09-02
Attending: EMERGENCY MEDICINE
Payer: COMMERCIAL

## 2023-09-02 DIAGNOSIS — R29.898 LEFT ARM WEAKNESS: ICD-10-CM

## 2023-09-02 DIAGNOSIS — G60.9 IDIOPATHIC PERIPHERAL NEUROPATHY: ICD-10-CM

## 2023-09-02 DIAGNOSIS — E78.5 HYPERLIPIDEMIA WITH TARGET LOW DENSITY LIPOPROTEIN (LDL) CHOLESTEROL LESS THAN 100 MG/DL: ICD-10-CM

## 2023-09-02 DIAGNOSIS — F17.200 TOBACCO DEPENDENCE: Primary | ICD-10-CM

## 2023-09-02 DIAGNOSIS — M48.02 CERVICAL STENOSIS OF SPINAL CANAL: ICD-10-CM

## 2023-09-02 LAB
ANION GAP SERPL CALCULATED.3IONS-SCNC: 12 MMOL/L (ref 7–15)
APTT PPP: 32 SECONDS (ref 22–38)
BASOPHILS # BLD AUTO: 0 10E3/UL (ref 0–0.2)
BASOPHILS NFR BLD AUTO: 0 %
BUN SERPL-MCNC: 16.3 MG/DL (ref 8–23)
CALCIUM SERPL-MCNC: 9.6 MG/DL (ref 8.8–10.2)
CHLORIDE SERPL-SCNC: 101 MMOL/L (ref 98–107)
CREAT SERPL-MCNC: 0.96 MG/DL (ref 0.67–1.17)
DEPRECATED HCO3 PLAS-SCNC: 25 MMOL/L (ref 22–29)
EOSINOPHIL # BLD AUTO: 0 10E3/UL (ref 0–0.7)
EOSINOPHIL NFR BLD AUTO: 0 %
ERYTHROCYTE [DISTWIDTH] IN BLOOD BY AUTOMATED COUNT: 15.7 % (ref 10–15)
GFR SERPL CREATININE-BSD FRML MDRD: 89 ML/MIN/1.73M2
GLUCOSE SERPL-MCNC: 108 MG/DL (ref 70–99)
HCT VFR BLD AUTO: 41 % (ref 40–53)
HGB BLD-MCNC: 13.4 G/DL (ref 13.3–17.7)
IMM GRANULOCYTES # BLD: 0 10E3/UL
IMM GRANULOCYTES NFR BLD: 1 %
INR PPP: 0.98 (ref 0.85–1.15)
LYMPHOCYTES # BLD AUTO: 1.6 10E3/UL (ref 0.8–5.3)
LYMPHOCYTES NFR BLD AUTO: 19 %
MCH RBC QN AUTO: 28.8 PG (ref 26.5–33)
MCHC RBC AUTO-ENTMCNC: 32.7 G/DL (ref 31.5–36.5)
MCV RBC AUTO: 88 FL (ref 78–100)
MONOCYTES # BLD AUTO: 0.5 10E3/UL (ref 0–1.3)
MONOCYTES NFR BLD AUTO: 6 %
NEUTROPHILS # BLD AUTO: 6.4 10E3/UL (ref 1.6–8.3)
NEUTROPHILS NFR BLD AUTO: 74 %
NRBC # BLD AUTO: 0 10E3/UL
NRBC BLD AUTO-RTO: 0 /100
PA ADP BLD-ACNC: 181 PRU
PLATELET # BLD AUTO: 198 10E3/UL (ref 150–450)
POTASSIUM SERPL-SCNC: 4.4 MMOL/L (ref 3.4–5.3)
RBC # BLD AUTO: 4.65 10E6/UL (ref 4.4–5.9)
SODIUM SERPL-SCNC: 138 MMOL/L (ref 136–145)
TROPONIN T SERPL HS-MCNC: 35 NG/L
TROPONIN T SERPL HS-MCNC: 39 NG/L
WBC # BLD AUTO: 8.6 10E3/UL (ref 4–11)

## 2023-09-02 PROCEDURE — 70498 CT ANGIOGRAPHY NECK: CPT

## 2023-09-02 PROCEDURE — 250N000011 HC RX IP 250 OP 636: Mod: JZ | Performed by: EMERGENCY MEDICINE

## 2023-09-02 PROCEDURE — 85576 BLOOD PLATELET AGGREGATION: CPT

## 2023-09-02 PROCEDURE — 0042T CT HEAD PERFUSION W CONTRAST: CPT

## 2023-09-02 PROCEDURE — 73030 X-RAY EXAM OF SHOULDER: CPT | Mod: LT

## 2023-09-02 PROCEDURE — 85025 COMPLETE CBC W/AUTO DIFF WBC: CPT | Performed by: EMERGENCY MEDICINE

## 2023-09-02 PROCEDURE — 85730 THROMBOPLASTIN TIME PARTIAL: CPT | Performed by: EMERGENCY MEDICINE

## 2023-09-02 PROCEDURE — 255N000002 HC RX 255 OP 636: Performed by: EMERGENCY MEDICINE

## 2023-09-02 PROCEDURE — 250N000011 HC RX IP 250 OP 636: Performed by: EMERGENCY MEDICINE

## 2023-09-02 PROCEDURE — 93005 ELECTROCARDIOGRAM TRACING: CPT

## 2023-09-02 PROCEDURE — 250N000013 HC RX MED GY IP 250 OP 250 PS 637: Performed by: EMERGENCY MEDICINE

## 2023-09-02 PROCEDURE — G0378 HOSPITAL OBSERVATION PER HR: HCPCS

## 2023-09-02 PROCEDURE — 70549 MR ANGIOGRAPH NECK W/O&W/DYE: CPT

## 2023-09-02 PROCEDURE — 85610 PROTHROMBIN TIME: CPT | Performed by: EMERGENCY MEDICINE

## 2023-09-02 PROCEDURE — 70496 CT ANGIOGRAPHY HEAD: CPT

## 2023-09-02 PROCEDURE — 84484 ASSAY OF TROPONIN QUANT: CPT | Mod: 91 | Performed by: EMERGENCY MEDICINE

## 2023-09-02 PROCEDURE — A9585 GADOBUTROL INJECTION: HCPCS | Performed by: EMERGENCY MEDICINE

## 2023-09-02 PROCEDURE — 72156 MRI NECK SPINE W/O & W/DYE: CPT

## 2023-09-02 PROCEDURE — 70450 CT HEAD/BRAIN W/O DYE: CPT | Mod: XU

## 2023-09-02 PROCEDURE — 70553 MRI BRAIN STEM W/O & W/DYE: CPT

## 2023-09-02 PROCEDURE — 99285 EMERGENCY DEPT VISIT HI MDM: CPT | Mod: 25

## 2023-09-02 PROCEDURE — 250N000009 HC RX 250: Performed by: EMERGENCY MEDICINE

## 2023-09-02 PROCEDURE — 82310 ASSAY OF CALCIUM: CPT | Performed by: EMERGENCY MEDICINE

## 2023-09-02 PROCEDURE — 36415 COLL VENOUS BLD VENIPUNCTURE: CPT | Performed by: EMERGENCY MEDICINE

## 2023-09-02 PROCEDURE — 99291 CRITICAL CARE FIRST HOUR: CPT | Performed by: PHYSICIAN ASSISTANT

## 2023-09-02 PROCEDURE — 36415 COLL VENOUS BLD VENIPUNCTURE: CPT | Performed by: PHYSICIAN ASSISTANT

## 2023-09-02 PROCEDURE — 99222 1ST HOSP IP/OBS MODERATE 55: CPT | Performed by: STUDENT IN AN ORGANIZED HEALTH CARE EDUCATION/TRAINING PROGRAM

## 2023-09-02 RX ORDER — DEXAMETHASONE 4 MG/1
4 TABLET ORAL EVERY 12 HOURS SCHEDULED
Status: DISCONTINUED | OUTPATIENT
Start: 2023-09-03 | End: 2023-09-04 | Stop reason: HOSPADM

## 2023-09-02 RX ORDER — IOPAMIDOL 755 MG/ML
125 INJECTION, SOLUTION INTRAVASCULAR ONCE
Status: COMPLETED | OUTPATIENT
Start: 2023-09-02 | End: 2023-09-02

## 2023-09-02 RX ORDER — OXYCODONE HYDROCHLORIDE 5 MG/1
5 TABLET ORAL EVERY 4 HOURS PRN
Status: DISCONTINUED | OUTPATIENT
Start: 2023-09-02 | End: 2023-09-04 | Stop reason: HOSPADM

## 2023-09-02 RX ORDER — DEXAMETHASONE SODIUM PHOSPHATE 4 MG/ML
10 INJECTION, SOLUTION INTRA-ARTICULAR; INTRALESIONAL; INTRAMUSCULAR; INTRAVENOUS; SOFT TISSUE ONCE
Status: COMPLETED | OUTPATIENT
Start: 2023-09-02 | End: 2023-09-02

## 2023-09-02 RX ORDER — ATORVASTATIN CALCIUM 80 MG/1
80 TABLET, FILM COATED ORAL DAILY
COMMUNITY
End: 2023-10-27

## 2023-09-02 RX ORDER — PREGABALIN 150 MG/1
150 CAPSULE ORAL 2 TIMES DAILY
Status: ON HOLD | COMMUNITY
End: 2023-09-04

## 2023-09-02 RX ORDER — LEVETIRACETAM 500 MG/1
500 TABLET ORAL PRN
COMMUNITY
End: 2024-08-22

## 2023-09-02 RX ORDER — SERTRALINE HYDROCHLORIDE 100 MG/1
200 TABLET, FILM COATED ORAL EVERY MORNING
COMMUNITY

## 2023-09-02 RX ORDER — ATORVASTATIN CALCIUM 10 MG/1
10 TABLET, FILM COATED ORAL DAILY
COMMUNITY
End: 2023-09-02

## 2023-09-02 RX ORDER — GADOBUTROL 604.72 MG/ML
10 INJECTION INTRAVENOUS ONCE
Status: COMPLETED | OUTPATIENT
Start: 2023-09-02 | End: 2023-09-02

## 2023-09-02 RX ORDER — OXYCODONE HYDROCHLORIDE 5 MG/1
5 TABLET ORAL ONCE
Status: COMPLETED | OUTPATIENT
Start: 2023-09-02 | End: 2023-09-02

## 2023-09-02 RX ADMIN — SODIUM CHLORIDE 100 ML: 9 INJECTION, SOLUTION INTRAVENOUS at 13:23

## 2023-09-02 RX ADMIN — GADOBUTROL 10 ML: 604.72 INJECTION INTRAVENOUS at 18:38

## 2023-09-02 RX ADMIN — DEXAMETHASONE SODIUM PHOSPHATE 10 MG: 4 INJECTION, SOLUTION INTRAMUSCULAR; INTRAVENOUS at 21:31

## 2023-09-02 RX ADMIN — IOPAMIDOL 125 ML: 755 INJECTION, SOLUTION INTRAVENOUS at 13:23

## 2023-09-02 RX ADMIN — OXYCODONE HYDROCHLORIDE 5 MG: 5 TABLET ORAL at 21:31

## 2023-09-02 ASSESSMENT — ACTIVITIES OF DAILY LIVING (ADL)
ADLS_ACUITY_SCORE: 35

## 2023-09-02 NOTE — ED TRIAGE NOTES
Patient states that last night started having left arm weakness at 2000. Positive pronator drift in triage. Takes plavix. Denies injury.      Triage Assessment       Row Name 09/02/23 1257       Triage Assessment (Adult)    Airway WDL WDL       Respiratory WDL    Respiratory WDL WDL       Skin Circulation/Temperature WDL    Skin Circulation/Temperature WDL WDL       Cardiac WDL    Cardiac WDL WDL       Peripheral/Neurovascular WDL    Peripheral Neurovascular WDL --  left arm weakness       Cognitive/Neuro/Behavioral WDL    Cognitive/Neuro/Behavioral WDL WDL

## 2023-09-02 NOTE — ED PROVIDER NOTES
"  History     Chief Complaint:  One-sided Weakness       The history is provided by the patient.      Adam Sahni is a right-handed dominant  63 year old male on Plavix with a history of epilepsy, hyperlipidemia and CAD who presents for left-sided weakness and neck pain. The patient reports that around 2000 last night, he was sitting down, watching videos when he had sudden onset of neck pain when he was stretching it out as well as left-sided weakness. Patient felt light-headed after taking his Keppra medication that he was prescribed a month ago and tried to go to sleep but he did not feel better. Patient feels dizzy and is shaking. He reports that he has limited function and cannot  at alI or \"do anything\". Patient was seen here in ED on 08/08 for ongoing numbness to bilateral feet for one month . He denies fever, chills. chest pain, abdominal pain, shortness of breath, new leg numbness or weakness. Denies fall or injury.     Independent Historian:   None - Patient Only    Review of External Notes:   ED note on 08/8/23.     Medications:    Alendronate   Aspirin 81 MG  Atorvastatin   Barberry-Oreg Grape-Goldenseal  Bupropion   Cholecalciferol   Cilostazol   Clopidogrel   Ferrous sulfate  Fish oil-omega-3 fatty acids  Gabapentin   Menaquinone-7  Metoprolol tartrate  Nitroglycerin   Pregabalin   Sertraline    Past Medical History:    CAD  Chronic anemia  Depression  Hyperlipidemia  Lumbar radiculopathy  PAD  Peripheral neuropathy  Prediabetes  Prostate cancer   Peripheral vascular disease  Colon polyp  Nephrolithiasis  Nicotine dependence  Torn rotator cuff  Osteopenia  Lumbar disc herniation   Stricture of male urethra  Elevated PSA  Chronic intractable headache  Chronic prostatitis  Dental abscess  STEMI       Past Surgical History:    CV coronary angiogram  IR lower extremity angiogram right   Prostate biopsy  Cystoscopy  Appendectomy  Spine surgery   Artery surgery  Spine surgery  Lumbar fusion   GI " "colon with snare X4  Lumbar foraminotomy     Physical Exam   Patient Vitals for the past 24 hrs:   BP Temp Temp src Pulse Resp SpO2 Height Weight   09/02/23 1916 -- -- -- 61 16 -- -- --   09/02/23 1846 113/78 -- -- 64 11 -- -- --   09/02/23 1545 -- -- -- 79 17 95 % -- --   09/02/23 1500 118/75 -- -- 66 22 97 % -- --   09/02/23 1400 (!) 151/23 -- -- 69 11 97 % -- --   09/02/23 1340 -- -- -- 82 15 96 % -- --   09/02/23 1330 113/73 -- -- -- -- -- -- --   09/02/23 1303 120/75 -- -- -- -- -- -- --   09/02/23 1257 -- 97.6  F (36.4  C) Temporal 71 16 99 % 1.778 m (5' 10\") 90.7 kg (200 lb)        Physical Exam  General: Alert and cooperative with exam. Patient in no distress. Normal mentation.  Head:  Scalp is NC/AT  Eyes:  No scleral icterus, PERRL, EOMI   ENT:  The external nose and ears are normal. The oropharynx is normal and without erythema; mucus membranes are moist. Uvula midline, no evidence of deep space infection.  Neck:  Normal range of motion without rigidity.  CV:  Regular rate and rhythm    No pathologic murmur   Resp:  Breath sounds are clear bilaterally    Non-labored, no retractions or accessory muscle use  GI:  Abdomen is soft, no distension, no tenderness. No peritoneal signs  MS:  No lower extremity edema.  Tenderness to the lateral humeral head of the left shoulder without overlying skin change.     No midline cervical  tenderness  Skin:  Warm and dry, No rash or lesions noted.  Neuro: Oriented x 3.     Cranial nerves 2-12 intact.    GCS: 15   Left upper extremity ataxia on finger to nose testing . Left  weakness.  Patient is unable to raise his left arm above the level of his shoulder.  Strength and sensation intact in other extremities.    Emergency Department Course   ECG  ECG taken at 1354, ECG read at 1354  Normal sinus rhythm  Inferior infarct, age undetermined  Cannot rule out Anterior infarct, age undetermined  Abnormal ECG   Rate 70 bpm. AR interval 180 ms. QRS duration 86 ms. QT/QTc " 384/414 ms. P-R-T axes 23 4 -27.     Imaging:  MR Cervical Spine w/o & w Contrast   Final Result   IMPRESSION:   1.  At C5-C6 and C6-C7, moderate central spinal stenosis.   2.  Severe left foraminal stenosis at C4-C5 and C6-C7, more pronounced at C6-C7.   3.  Severe right foraminal stenosis at C3-C4, C5-C6 and C6-C7.      MRA Neck (Carotids) wo & w Contrast   Final Result   IMPRESSION:   HEAD MRI:    1.  Normal head MRI.      NECK MRA:   1.  Normal neck MRA.         MR Brain w/o & w Contrast   Final Result   IMPRESSION:   HEAD MRI:    1.  Normal head MRI.      NECK MRA:   1.  Normal neck MRA.         XR Shoulder Left 2 Views   Final Result   IMPRESSION: No acute fracture or dislocation. There is moderate left glenohumeral degenerative arthrosis.      CT Head Perfusion w Contrast - For Tier 2 Stroke   Final Result   IMPRESSION:       CT PERFUSION:   1.  Normal cerebral perfusion.      CTA Head Neck with Contrast   Final Result   IMPRESSION:       HEAD CTA:    1.  Normal CTA [place].      NECK CTA:   1.  Mild atherosclerotic calcification at the origin of the left and right internal carotid arteries. No stenosis within the neck. No evidence of vertebral or carotid dissection.      Results were discussed with Dr. Echeverria at 1:52 PM      CT Head w/o Contrast   Final Result   IMPRESSION:   1.  Normal head CT.      Results were discussed with Dr. FARRUKH Yoo at 9/2/2023 1:52 PM CDT         Report per radiology    Laboratory:  Labs Ordered and Resulted from Time of ED Arrival to Time of ED Departure   BASIC METABOLIC PANEL - Abnormal       Result Value    Sodium 138      Potassium 4.4      Chloride 101      Carbon Dioxide (CO2) 25      Anion Gap 12      Urea Nitrogen 16.3      Creatinine 0.96      Calcium 9.6      Glucose 108 (*)     GFR Estimate 89     TROPONIN T, HIGH SENSITIVITY - Abnormal    Troponin T, High Sensitivity 39 (*)    CBC WITH PLATELETS AND DIFFERENTIAL - Abnormal    WBC Count 8.6      RBC Count 4.65       Hemoglobin 13.4      Hematocrit 41.0      MCV 88      MCH 28.8      MCHC 32.7      RDW 15.7 (*)     Platelet Count 198      % Neutrophils 74      % Lymphocytes 19      % Monocytes 6      % Eosinophils 0      % Basophils 0      % Immature Granulocytes 1      NRBCs per 100 WBC 0      Absolute Neutrophils 6.4      Absolute Lymphocytes 1.6      Absolute Monocytes 0.5      Absolute Eosinophils 0.0      Absolute Basophils 0.0      Absolute Immature Granulocytes 0.0      Absolute NRBCs 0.0     TROPONIN T, HIGH SENSITIVITY - Abnormal    Troponin T, High Sensitivity 35 (*)    INR - Normal    INR 0.98     PARTIAL THROMBOPLASTIN TIME - Normal    aPTT 32     PLATELET FUNCTION P2Y12    Platelet Function P2Y12 181     GLUCOSE MONITOR NURSING POCT        Emergency Department Course & Assessments:  Interventions:  Medications   sodium chloride (PF) 0.9% PF flush 100 mL (has no administration in time range)   dexAMETHasone (DECADRON) injection 10 mg (has no administration in time range)   oxyCODONE (ROXICODONE) tablet 5 mg (has no administration in time range)   iopamidol (ISOVUE-370) solution 125 mL (125 mLs Intravenous $Given 9/2/23 1323)   Saline (100 mLs As instructed $Given 9/2/23 1323)   gadobutrol (GADAVIST) injection 10 mL (10 mLs Intravenous $Given 9/2/23 1838)          Independent Interpretation (X-rays, CTs, rhythm strip):  I reviewed the patient's head CT, no evidence of intracranial bleeding.  I reviewed the patient's left shoulder x-ray, no evidence of fracture or dislocation    Assessments/Consultations/Discussion of Management or Tests:  ED Course as of 09/02/23 2309   Sat Sep 02, 2023   1310 I obtained history and examined the patient as noted above.    1311 I consulted and spoke with stroke-neuro, Veda NP/PA.    2028 I spoke and consulted with on-call neurosurgery, Mitchel Araiza PA-C   2102 I spoke and consulted with hospitalist, Dr. Serna, who accepts the patient for admission.        Social  Determinants of Health affecting care:   None    Disposition:  The patient was admitted to the hospital under the care of Dr. Serna.     Impression & Plan    CMS Diagnoses: None    Medical Decision Making:  Patient is a 63-year-old male presents with atraumatic sudden onset neck pain and left arm pain/weakness that began last evening.  Patient's medical history and records reviewed.  Initial evaluation tier 2 code stroke was initiated due to concern for potential stroke or arterial dissection.  Labs, EKG, and imaging was obtained.  EKG without evidence of acute ischemia; troponin while mildly elevated was not significantly changed on repeat testing and patient denies chest pain or shortness of breath; low suspicion for ACS.  CT/CTA imaging of the head and neck without significant acute pathology as noted above; no evidence of dissection.  Brain MRI/MRA without significant findings as above.  Shoulder x-ray demonstrates moderate left glenohumeral degenerative arthrosis.  MRI cervical spine concerning for moderate central canal stenosis and severe left foraminal stenosis as noted above.  These imaging findings were discussed with on-call neurosurgery who recommended Decadron and will consult the patient tomorrow morning.  Given patient's significant left upper extremity weakness, will admit to observation with the hospitalist service for neurosurgery consultation.  Patient provided oxycodone for pain.  Stable at time of admission.      Diagnosis:    ICD-10-CM    1. Cervical stenosis of spinal canal  M48.02       2. Left arm weakness  R29.898            Discharge Medications:  New Prescriptions    No medications on file        Scribe Disclosure:  Caridad ROD, am serving as a scribe at 2:12 PM on 9/2/2023 to document services personally performed by Steven Echeverria DO  based on my observations and the provider's statements to me.   9/2/2023   Steven Echeverria DO O'Neill, Christopher Warren,  DO  09/02/23 1185

## 2023-09-02 NOTE — CONSULTS
"[facility]    Stroke Consult Note    Reason for Consult: Stroke Code     Chief Complaint: One-sided Weakness      HPI  Adam Sahni is a 63 year old male with pertinent past medical history of preDM, CAD with history of STEMI, tobacco use disorder, PAD, epilepsy on keppra, HLD, history of prostate cancer, history of cervical and lumbar radiculopathy, history lumbar fusion with residual radiculopathy, reportedly saw provider at [facility] a couple days ago due to those symptoms.    He presented to the [facility] ED today with L arm/hand weakness and some incoordination since last night at 2000 that began after he had stretched his back/neck. He suddenly had L neck pain and dizziness and some shoulder pain when he raises his LUE. Symptoms persistent overnight. On arrival to the ED he has significant decreased  strength to the L hand. /73. .    Imaging Findings  CTH: normal  CTA head: normal  CTA neck: mild atherosclerotic calcification origin of L and R ICAs  CTP: normal    Intravenous Thrombolysis  Not given due to:   - unclear or unfavorable risk-benefit profile for extended window thrombolysis beyond the conventional 4.5 hour time window    Endovascular Treatment  Not initiated due to absence of proximal vessel occlusion    Impression   Left neck/shoulder pain with left arm/hand weakness, rule out stroke versus spinal pathology    Recommendations  -MRI brain w/wo contrast  -MRA neck w/wo contrast (with T1 fat sat dissection protocol)  -MR C-spine  -on PTA ASA and plavix, check P2Y12    -Please page stroke team once completed so we can review images make further recommendations    Patient Follow-up     - final recommendation pending work-up    Thank you for this consult.      Narda Liu PA-C  Vascular Neurology    To page me or covering stroke neurology team member, click here: [facility]  Choose \"On Call\" tab at top, then select \"NEUROLOGY/ALL SITES\" from middle drop-down " "box, press Enter, then look for \"stroke\" or \"telestroke\" for your site.  ______________________________________________________    Clinically Significant Risk Factors Present on Admission                # Drug Induced Platelet Defect: home medication list includes an antiplatelet medication        # Overweight: Estimated body mass index is 28.7 kg/m  as calculated from the following:    Height as of this encounter: 1.778 m (5' 10\").    Weight as of this encounter: 90.7 kg (200 lb).              Past Medical History   Past Medical History:   Diagnosis Date    CAD (coronary artery disease)     with UGO to RCA on 6/9/23 for inferior STEMI    Chronic anemia     Depression     Hyperlipidemia     Lumbar radiculopathy     PAD (peripheral artery disease) (H)     S/p bilateral kissing iliac stents after thrombolytics 7/16/21-7/17/21 for severe claudication and complete occlusion of the left common iliac artery with tandem occlusions and/or high grade stenoses of the distal SFA and popliteal artery.    Peripheral neuropathy     Prediabetes     Prostate cancer (H)     s/p ext beam radiation therapy, follows at [place]     Past Surgical History   Past Surgical History:   Procedure Laterality Date    CV CORONARY ANGIOGRAM N/A 6/9/2023    Procedure: Coronary Angiogram;  Surgeon: Rosenda Pacheco MD;  Location:  [facility]    IR LOWER EXTREMITY ANGIOGRAM RIGHT  6/18/2023     Medications   Home Meds  Prior to Admission medications    Medication Sig Start Date End Date Taking? Authorizing Provider   alendronate (FOSAMAX) 70 MG tablet Take 70 mg by mouth every 7 days    Unknown, Entered By History   aspirin (ASA) 81 MG chewable tablet Take 1 tablet (81 mg) by mouth daily 6/11/23   Glen Padilla MD   atorvastatin (LIPITOR) 80 MG tablet Take 80 mg by mouth At Bedtime 3/6/20   Reported, Patient   Barberry-Oreg Grape-Goldenseal (BERBERINE COMPLEX) 200-200-50 MG CAPS Take 2 capsules by mouth daily    Unknown, " Entered By History   buPROPion (WELLBUTRIN SR) 150 MG 12 hr tablet Take 1 tablet (150 mg) by mouth daily for 3 days, THEN 1 tablet (150 mg) 2 times daily for 27 days. 6/12/23 7/20/23  Glen Padilla MD   cholecalciferol (VITAMIN D3) 125 mcg (5000 units) capsule Take 125 mcg by mouth daily    Unknown, Entered By History   cilostazol (PLETAL) 100 MG tablet Take 100 mg by mouth 2 times daily 3/6/20   Reported, Patient   clopidogrel (PLAVIX) 75 MG tablet Take 1 tablet (75 mg) by mouth daily 7/20/23   Dorian Gamble MD   clopidogrel (PLAVIX) 75 MG tablet Take 1 tablet (75 mg) by mouth daily 6/12/23   Glen Padilla MD   ferrous sulfate (FEROSUL) 325 (65 Fe) MG tablet Take 325 mg by mouth every other day    Unknown, Entered By History   fish oil-omega-3 fatty acids 1000 MG capsule Take 2 g by mouth daily    Unknown, Entered By History   gabapentin (NEURONTIN) 600 MG tablet Take 600 mg by mouth 3 times daily as needed for neuropathic pain    Unknown, Entered By History   Menaquinone-7 (VITAMIN K2) 100 MCG CAPS Take 1 capsule by mouth daily    Unknown, Entered By History   metoprolol tartrate (LOPRESSOR) 25 MG tablet Take 0.25 tablets (6.25 mg) by mouth 2 times daily 6/20/23   Vivien Gama,    nitroGLYcerin (NITROSTAT) 0.4 MG sublingual tablet For chest pain place 1 tablet under the tongue every 5 minutes for 3 doses. If symptoms persist 5 minutes after 1st dose call 911. 6/11/23   Glen Padilla MD   pregabalin (LYRICA) 75 MG capsule Take 75 mg by mouth 2 times daily    Unknown, Entered By History   sertraline (ZOLOFT) 100 MG tablet Take 150 mg by mouth daily 3/6/20   Reported, Patient       Scheduled Meds      Infusion Meds      PRN Meds      Allergies   Allergies   Allergen Reactions    Penicillins Hives, Shortness Of Breath and Swelling     Reports also having throat swelling- took medication as a child.       Family History   No family history on file.  Social  History   Social History     Tobacco Use    Smoking status: Never    Smokeless tobacco: Never   Substance Use Topics    Alcohol use: Yes    Drug use: Never       Review of Systems   The 10 point Review of Systems is negative other than noted in the HPI or here.        PHYSICAL EXAMINATION  Temp:  [97.6  F (36.4  C)] 97.6  F (36.4  C)  Pulse:  [71] 71  Resp:  [16] 16  BP: (120)/(75) 120/75  SpO2:  [99 %] 99 %     General Exam  General:  patient lying in bed without any acute distress    HEENT:  normocephalic/atraumatic  Pulmonary:  no respiratory distress    Neuro Exam  Mental Status:  alert, oriented x 3, follows commands, speech clear and fluent, naming and repetition normal, appears to have some difficulty with providing history of day that went to [facility] provider and those symptoms  Cranial Nerves:  visual fields intact, PERRL, EOMI with normal smooth pursuit, facial sensation intact and symmetric, facial movements symmetric, hearing not formally tested but intact to conversation, no dysarthria, tongue protrusion midline  Motor:  LUE 3/5 weakness but no drift, not able to lift as high as the R, RUE no drift 5/5 strength, no drift to b/l LE and strength intact  Reflexes:  toes down-going  Sensory:  light touch sensation intact and symmetric throughout upper and lower extremities, no extinction on double simultaneous stimulation   Coordination:  dysmetria possibly out of proportion to LUE weakness, no RUE dysmetria with finger to nose testing, heel-to-shin bilaterally without dysmetria  Station/Gait:  deferred    Dysphagia Screen  Per Nursing    Stroke Scales    NIHSS  1a. Level of Consciousness 0-->Alert, keenly responsive   1b. LOC Questions 0-->Answers both questions correctly   1c. LOC Commands 0-->Performs both tasks correctly   2.   Best Gaze 0-->Normal   3.   Visual 0-->No visual loss   4.   Facial Palsy 0-->Normal symmetrical movements   5a. Motor Arm, Left (S) 0-->No drift, limb holds 90 (or 45) degrees for  full 10 secs (But not able to lift as high as the right)   5b. Motor Arm, Right 0-->No drift, limb holds 90 (or 45) degrees for full 10 secs   6a. Motor Leg, Left 0-->No drift, leg holds 30 degree position for full 5 secs   6b. Motor Leg, right 0-->No drift, leg holds 30 degree position for full 5 secs   7.   Limb Ataxia (S) 1-->Present in one limb (Left upper extremity dysmetria but has significant weakness to left upper extremity as well, possibly out of proportion)   8.   Sensory 0-->Normal, no sensory loss   9.   Best Language 0-->No aphasia, normal   10. Dysarthria 0-->Normal   11. Extinction and Inattention  0-->No abnormality   Total 1 (09/02/23 1539)         Imaging  I personally reviewed all imaging; relevant findings per HPI.     Lab Results Data   CBC  No results for input(s): WBC, RBC, HGB, HCT, PLT in the last 168 hours.  Basic Metabolic Panel    No results for input(s): NA, POTASSIUM, CHLORIDE, CO2, BUN, CR, GLC, OJ in the last 168 hours.  Liver Panel  No results for input(s): PROTTOTAL, ALBUMIN, BILITOTAL, ALKPHOS, AST, ALT, BILIDIRECT in the last 168 hours.  INR    Recent Labs   Lab Test 08/08/23  1407 06/16/23  1802   INR 0.89 0.98      Lipid Profile    Recent Labs   Lab Test 06/09/23  0623 06/09/23  0309   CHOL 194 247*   HDL 35* 39*   * 160*   TRIG 76 242*     A1C    Recent Labs   Lab Test 06/09/23  0309   A1C 5.7*     Troponin  No results for input(s): CTROPT, TROPONINIS, TROPONINI, GHTROP in the last 168 hours.       Stroke Code Data Data   Stroke Code Data  (for stroke code without tele)  Stroke code activated 09/02/23   1310   First stroke provider response 09/02/23   1315   Last known normal 09/01/23   1959   Time of discovery   (or onset of symptoms) 09/01/23 2000   Head CT read by Stroke Neuro Dr/Provider 09/02/23   1332   Was stroke code de-escalated? Yes 09/02/23 1351            I personally examined and evaluated the patient today. At the time of my evaluation and management  the patient was in critical condition today due to stroke code. I personally managed review of chart, medical record, meds, imaging, history, exam, and discussion with attending regarding plan and documentation. I spent a total of 60 minutes providing critical care services, evaluating the patient, directing care and reviewing laboratory values and radiologic reports.

## 2023-09-03 LAB
ANION GAP SERPL CALCULATED.3IONS-SCNC: 12 MMOL/L (ref 7–15)
ATRIAL RATE - MUSE: 70 BPM
BASOPHILS # BLD AUTO: 0 10E3/UL (ref 0–0.2)
BASOPHILS NFR BLD AUTO: 0 %
BUN SERPL-MCNC: 18.4 MG/DL (ref 8–23)
CALCIUM SERPL-MCNC: 9.6 MG/DL (ref 8.8–10.2)
CHLORIDE SERPL-SCNC: 102 MMOL/L (ref 98–107)
CREAT SERPL-MCNC: 0.94 MG/DL (ref 0.67–1.17)
DEPRECATED HCO3 PLAS-SCNC: 23 MMOL/L (ref 22–29)
DIASTOLIC BLOOD PRESSURE - MUSE: NORMAL MMHG
EOSINOPHIL # BLD AUTO: 0 10E3/UL (ref 0–0.7)
EOSINOPHIL NFR BLD AUTO: 0 %
ERYTHROCYTE [DISTWIDTH] IN BLOOD BY AUTOMATED COUNT: 15.3 % (ref 10–15)
GFR SERPL CREATININE-BSD FRML MDRD: >90 ML/MIN/1.73M2
GLUCOSE BLDC GLUCOMTR-MCNC: 178 MG/DL (ref 70–99)
GLUCOSE SERPL-MCNC: 153 MG/DL (ref 70–99)
HCT VFR BLD AUTO: 38.1 % (ref 40–53)
HGB BLD-MCNC: 12.1 G/DL (ref 13.3–17.7)
IMM GRANULOCYTES # BLD: 0 10E3/UL
IMM GRANULOCYTES NFR BLD: 1 %
INTERPRETATION ECG - MUSE: NORMAL
LYMPHOCYTES # BLD AUTO: 0.8 10E3/UL (ref 0.8–5.3)
LYMPHOCYTES NFR BLD AUTO: 17 %
MCH RBC QN AUTO: 27.7 PG (ref 26.5–33)
MCHC RBC AUTO-ENTMCNC: 31.8 G/DL (ref 31.5–36.5)
MCV RBC AUTO: 87 FL (ref 78–100)
MONOCYTES # BLD AUTO: 0.2 10E3/UL (ref 0–1.3)
MONOCYTES NFR BLD AUTO: 3 %
NEUTROPHILS # BLD AUTO: 3.9 10E3/UL (ref 1.6–8.3)
NEUTROPHILS NFR BLD AUTO: 79 %
NRBC # BLD AUTO: 0 10E3/UL
NRBC BLD AUTO-RTO: 0 /100
P AXIS - MUSE: 23 DEGREES
PLATELET # BLD AUTO: 204 10E3/UL (ref 150–450)
POTASSIUM SERPL-SCNC: 4.6 MMOL/L (ref 3.4–5.3)
PR INTERVAL - MUSE: 180 MS
QRS DURATION - MUSE: 86 MS
QT - MUSE: 384 MS
QTC - MUSE: 414 MS
R AXIS - MUSE: 4 DEGREES
RBC # BLD AUTO: 4.37 10E6/UL (ref 4.4–5.9)
SODIUM SERPL-SCNC: 137 MMOL/L (ref 136–145)
SYSTOLIC BLOOD PRESSURE - MUSE: NORMAL MMHG
T AXIS - MUSE: -27 DEGREES
VENTRICULAR RATE- MUSE: 70 BPM
WBC # BLD AUTO: 4.9 10E3/UL (ref 4–11)

## 2023-09-03 PROCEDURE — 80048 BASIC METABOLIC PNL TOTAL CA: CPT | Performed by: STUDENT IN AN ORGANIZED HEALTH CARE EDUCATION/TRAINING PROGRAM

## 2023-09-03 PROCEDURE — 36415 COLL VENOUS BLD VENIPUNCTURE: CPT | Performed by: STUDENT IN AN ORGANIZED HEALTH CARE EDUCATION/TRAINING PROGRAM

## 2023-09-03 PROCEDURE — 250N000012 HC RX MED GY IP 250 OP 636 PS 637: Performed by: STUDENT IN AN ORGANIZED HEALTH CARE EDUCATION/TRAINING PROGRAM

## 2023-09-03 PROCEDURE — 99205 OFFICE O/P NEW HI 60 MIN: CPT | Mod: FS | Performed by: NEUROLOGICAL SURGERY

## 2023-09-03 PROCEDURE — 99232 SBSQ HOSP IP/OBS MODERATE 35: CPT | Performed by: HOSPITALIST

## 2023-09-03 PROCEDURE — 82962 GLUCOSE BLOOD TEST: CPT

## 2023-09-03 PROCEDURE — 85025 COMPLETE CBC W/AUTO DIFF WBC: CPT | Performed by: STUDENT IN AN ORGANIZED HEALTH CARE EDUCATION/TRAINING PROGRAM

## 2023-09-03 PROCEDURE — G0378 HOSPITAL OBSERVATION PER HR: HCPCS

## 2023-09-03 PROCEDURE — 250N000013 HC RX MED GY IP 250 OP 250 PS 637: Performed by: STUDENT IN AN ORGANIZED HEALTH CARE EDUCATION/TRAINING PROGRAM

## 2023-09-03 RX ORDER — ONDANSETRON 2 MG/ML
4 INJECTION INTRAMUSCULAR; INTRAVENOUS EVERY 6 HOURS PRN
Status: DISCONTINUED | OUTPATIENT
Start: 2023-09-03 | End: 2023-09-04 | Stop reason: HOSPADM

## 2023-09-03 RX ORDER — NALOXONE HYDROCHLORIDE 0.4 MG/ML
0.4 INJECTION, SOLUTION INTRAMUSCULAR; INTRAVENOUS; SUBCUTANEOUS
Status: DISCONTINUED | OUTPATIENT
Start: 2023-09-03 | End: 2023-09-04 | Stop reason: HOSPADM

## 2023-09-03 RX ORDER — GABAPENTIN 600 MG/1
600 TABLET ORAL 3 TIMES DAILY PRN
Status: DISCONTINUED | OUTPATIENT
Start: 2023-09-03 | End: 2023-09-03

## 2023-09-03 RX ORDER — NALOXONE HYDROCHLORIDE 0.4 MG/ML
0.2 INJECTION, SOLUTION INTRAMUSCULAR; INTRAVENOUS; SUBCUTANEOUS
Status: DISCONTINUED | OUTPATIENT
Start: 2023-09-03 | End: 2023-09-04 | Stop reason: HOSPADM

## 2023-09-03 RX ORDER — LEVETIRACETAM 500 MG/1
500 TABLET ORAL 2 TIMES DAILY
Status: DISCONTINUED | OUTPATIENT
Start: 2023-09-03 | End: 2023-09-04 | Stop reason: HOSPADM

## 2023-09-03 RX ORDER — PREGABALIN 75 MG/1
150 CAPSULE ORAL 2 TIMES DAILY
Status: DISCONTINUED | OUTPATIENT
Start: 2023-09-03 | End: 2023-09-04 | Stop reason: HOSPADM

## 2023-09-03 RX ORDER — ONDANSETRON 4 MG/1
4 TABLET, ORALLY DISINTEGRATING ORAL EVERY 6 HOURS PRN
Status: DISCONTINUED | OUTPATIENT
Start: 2023-09-03 | End: 2023-09-04 | Stop reason: HOSPADM

## 2023-09-03 RX ORDER — CLOPIDOGREL BISULFATE 75 MG/1
75 TABLET ORAL DAILY
Status: DISCONTINUED | OUTPATIENT
Start: 2023-09-03 | End: 2023-09-04 | Stop reason: HOSPADM

## 2023-09-03 RX ORDER — ATORVASTATIN CALCIUM 40 MG/1
80 TABLET, FILM COATED ORAL AT BEDTIME
Status: DISCONTINUED | OUTPATIENT
Start: 2023-09-03 | End: 2023-09-04 | Stop reason: HOSPADM

## 2023-09-03 RX ORDER — SERTRALINE HYDROCHLORIDE 100 MG/1
200 TABLET, FILM COATED ORAL DAILY
Status: DISCONTINUED | OUTPATIENT
Start: 2023-09-03 | End: 2023-09-04 | Stop reason: HOSPADM

## 2023-09-03 RX ORDER — ASPIRIN 81 MG/1
81 TABLET, CHEWABLE ORAL DAILY
Status: DISCONTINUED | OUTPATIENT
Start: 2023-09-03 | End: 2023-09-04 | Stop reason: HOSPADM

## 2023-09-03 RX ADMIN — OXYCODONE HYDROCHLORIDE 5 MG: 5 TABLET ORAL at 00:25

## 2023-09-03 RX ADMIN — LEVETIRACETAM 500 MG: 500 TABLET, FILM COATED ORAL at 09:04

## 2023-09-03 RX ADMIN — METOPROLOL TARTRATE 6.25 MG: 25 TABLET, FILM COATED ORAL at 09:04

## 2023-09-03 RX ADMIN — CLOPIDOGREL BISULFATE 75 MG: 75 TABLET ORAL at 09:04

## 2023-09-03 RX ADMIN — ASPIRIN 81 MG 81 MG: 81 TABLET ORAL at 09:04

## 2023-09-03 RX ADMIN — DEXAMETHASONE 4 MG: 4 TABLET ORAL at 09:03

## 2023-09-03 RX ADMIN — METOPROLOL TARTRATE 6.25 MG: 25 TABLET, FILM COATED ORAL at 21:59

## 2023-09-03 RX ADMIN — PREGABALIN 150 MG: 75 CAPSULE ORAL at 09:10

## 2023-09-03 RX ADMIN — PREGABALIN 150 MG: 75 CAPSULE ORAL at 21:59

## 2023-09-03 RX ADMIN — DEXAMETHASONE 4 MG: 4 TABLET ORAL at 21:59

## 2023-09-03 RX ADMIN — SERTRALINE HYDROCHLORIDE 200 MG: 100 TABLET ORAL at 09:04

## 2023-09-03 RX ADMIN — LEVETIRACETAM 500 MG: 500 TABLET, FILM COATED ORAL at 21:59

## 2023-09-03 RX ADMIN — ATORVASTATIN CALCIUM 80 MG: 40 TABLET, FILM COATED ORAL at 21:59

## 2023-09-03 ASSESSMENT — ACTIVITIES OF DAILY LIVING (ADL)
ADLS_ACUITY_SCORE: 33

## 2023-09-03 NOTE — PLAN OF CARE
09/02 1900 - 0700  Pt here with left sided weakness. Stroke code activated on arrival, MRI negative for CVA. A&Ox4. VSS. Regular diet, thin liquids. Takes pills whole. Up with SBA. PRN oxy given x1 for neck pain. Word finding difficulty this shift as well as expressions of anxiety and depression. Positive pronator drift noted in triage, none observed since admission. Pt scoring green on the Aggression Stop Light Tool. Neurology consult in the am. Discharge pending.

## 2023-09-03 NOTE — PROGRESS NOTES
Orientation/Cognitive: A&Ox4  Observation Goals (Met/ Not Met): Not Met  Mobility Level/Assist Equipment: SBA- GB/WK  Fall Risk (Y/N): Y  Behavior Concerns: None  Pain Management: Denies pain at this time  Tele/VS/O2: VSS/RA  ABNL Lab/BG: AM   Diet: Regular  Bowel/Bladder: Continent B&B   Skin Concerns: Bruising  Drains/Devices: PIV  Tests/Procedures for next shift: Neurosurgery today  Anticipated DC date & active delays: TBD pending Neurosurg consult  Patient Stated Goal for Today: Discharge home

## 2023-09-03 NOTE — PROGRESS NOTES
Observation goals  PRIOR TO DISCHARGE        -diagnostic tests and consults completed and resulted-Not met  -consultants weighed in-Not met Pending neurosurg  -vital signs normal or at patient baseline-Met  -safe disposition plan has been identified-Met  Nurse to notify provider when observation goals have been met and patient is ready for discharge.

## 2023-09-03 NOTE — PROGRESS NOTES
Observation goals  PRIOR TO DISCHARGE        -diagnostic tests and consults completed and resulted-Not met  - consultants weighed in-Not met  -vital signs normal or at patient baseline-Met  -safe disposition plan has been identified-Met  Nurse to notify provider when observation goals have been met and patient is ready for discharge.

## 2023-09-03 NOTE — H&P
[facility]    History and Physical - Hospitalist Service       Date of Admission:  9/2/2023    Assessment & Plan      Adam Sahni is a 63 year old male admitted on 9/2/2023 for LUE weakness and cervical stenosis.     LUE weakness   C5-C7 moderate central spinal stenosis  Severe L foraminal stenosis C4-C7  Severe R foraminal stenosis C3-C7  Patient was stretching last night when he felt a pop in his neck and then developed sudden onset L arm weakness. Within the ED, patient initially activated as a stroke for obvious LUE weakness. MR imaging returned negative for CVA but did demonstrate moderate central spinal stenosis with severe L and R foraminal stenosis. See full report. Neurosurgery contacted by the ED and recommended decadron 10mg and they will see tomorrow.    - observation admission  - neurosurgery consult  - per ED continue decadron at 4mg BID tomorrow  - PRN pain control  - monitor glucose on steroids    CAD s/p PCI 06/2023  NSTEMI  Troponin noted to be elevated but flat on admission. EKG without ischemic changes  - suspect type 2  - monitor for chest pain  - continue PTA DAPT  - continue PTA statin    Chronic anemia  Hb improved from baseline  - hold iron supplement on admission and resume on discharge  - monitor    Epilepsy  -resume home keppra     Peripheral neuropathy  Lumbar radiculopathy, with hx of lumbar fusion in 2022  Follows with a pain clinic through [facility]. Had a caudal epidural steroid injection on 6/1/23.  - resume gabapentin and lyrica     Depression  -resume zoloft and wellbutrin    Prostate cancer s/p external beam radiation therapy  *Follows with rad-onc [facility]. S/p ext beam radiotherapy and 28 months of androgen deprivation.     Tobacco Abuse  -Encouraged cessation.           Diet: NPO for Medical/Clinical Reasons Except for: No Exceptions    DVT Prophylaxis: Low Risk/Ambulatory with no VTE prophylaxis indicated  Nova Catheter: Not present  Lines: None    "  Cardiac Monitoring: None  Code Status:  FULL    Clinically Significant Risk Factors Present on Admission                # Drug Induced Platelet Defect: home medication list includes an antiplatelet medication        # Overweight: Estimated body mass index is 28.7 kg/m  as calculated from the following:    Height as of this encounter: 1.778 m (5' 10\").    Weight as of this encounter: 90.7 kg (200 lb).              Disposition Plan             Latonia Serna DO  Hospitalist Service  [facility]  Securely message with [employer] (more info)  Text page via [employer] Paging/Directory     ______________________________________________________________________    Chief Complaint   LUE weakness    History of Present Illness   Patient presents to the ED for ongoing weakness that developed last night while he was stretching. Weakness noted to his L arm and he is unable to raise it above his head. He had reduced  strength. He also reports some R sided weakness in the past that has resolved but is similar. NO recent illness. No chest pain.No chest pressure no SOB. Patient is anxious and having a hard time coping with the new findings      Past Medical History    Past Medical History:   Diagnosis Date    CAD (coronary artery disease)     with UGO to RCA on 6/9/23 for inferior STEMI    Chronic anemia     Depression     Hyperlipidemia     Lumbar radiculopathy     PAD (peripheral artery disease) (H)     S/p bilateral kissing iliac stents after thrombolytics 7/16/21-7/17/21 for severe claudication and complete occlusion of the left common iliac artery with tandem occlusions and/or high grade stenoses of the distal SFA and popliteal artery.    Peripheral neuropathy     Prediabetes     Prostate cancer (H)     s/p ext beam radiation therapy, follows at [place]       Past Surgical History   Past Surgical History:   Procedure Laterality Date    CV CORONARY ANGIOGRAM N/A 6/9/2023    Procedure: Coronary Angiogram;  " Surgeon: Rosenda Pacheco MD;  Location:  [facility]    IR LOWER EXTREMITY ANGIOGRAM RIGHT  6/18/2023       Prior to Admission Medications   Prior to Admission Medications   Prescriptions Last Dose Informant Patient Reported? Taking?   Barberry-Oreg Grape-Goldenseal (BERBERINE COMPLEX) 200-200-50 MG CAPS  Self Yes No   Sig: Take 2 capsules by mouth daily   Menaquinone-7 (VITAMIN K2) 100 MCG CAPS  Self Yes No   Sig: Take 1 capsule by mouth daily   alendronate (FOSAMAX) 70 MG tablet  Self Yes No   Sig: Take 70 mg by mouth every 7 days   aspirin (ASA) 81 MG chewable tablet   No No   Sig: Take 1 tablet (81 mg) by mouth daily   atorvastatin (LIPITOR) 80 MG tablet  Self Yes No   Sig: Take 80 mg by mouth At Bedtime   buPROPion (WELLBUTRIN SR) 150 MG 12 hr tablet   No No   Sig: Take 1 tablet (150 mg) by mouth daily for 3 days, THEN 1 tablet (150 mg) 2 times daily for 27 days.   cholecalciferol (VITAMIN D3) 125 mcg (5000 units) capsule  Self Yes No   Sig: Take 125 mcg by mouth daily   cilostazol (PLETAL) 100 MG tablet  Self Yes No   Sig: Take 100 mg by mouth 2 times daily   clopidogrel (PLAVIX) 75 MG tablet   No No   Sig: Take 1 tablet (75 mg) by mouth daily   clopidogrel (PLAVIX) 75 MG tablet   No No   Sig: Take 1 tablet (75 mg) by mouth daily   ferrous sulfate (FEROSUL) 325 (65 Fe) MG tablet  Self Yes No   Sig: Take 325 mg by mouth every other day   fish oil-omega-3 fatty acids 1000 MG capsule  Self Yes No   Sig: Take 2 g by mouth daily   gabapentin (NEURONTIN) 600 MG tablet  Self Yes No   Sig: Take 600 mg by mouth 3 times daily as needed for neuropathic pain   metoprolol tartrate (LOPRESSOR) 25 MG tablet   No No   Sig: Take 0.25 tablets (6.25 mg) by mouth 2 times daily   nitroGLYcerin (NITROSTAT) 0.4 MG sublingual tablet   No No   Sig: For chest pain place 1 tablet under the tongue every 5 minutes for 3 doses. If symptoms persist 5 minutes after 1st dose call 911.   pregabalin (LYRICA) 75 MG capsule  Self  Yes No   Sig: Take 75 mg by mouth 2 times daily   sertraline (ZOLOFT) 100 MG tablet  Self Yes No   Sig: Take 150 mg by mouth daily      Facility-Administered Medications: None        Review of Systems    The 10 point Review of Systems is negative other than noted in the HPI or here.     Physical Exam   Vital Signs: Temp: 97.6  F (36.4  C) Temp src: Temporal BP: 113/78 Pulse: 61   Resp: 16 SpO2: 95 % O2 Device: None (Room air)    Weight: 200 lbs 0 oz    Constitutional: Awake, alert, cooperative, no apparent distress.  Eyes: Conjunctiva and pupils examined and normal.  HEENT: Moist mucous membranes, normal dentition.  Respiratory: Clear to auscultation bilaterally, no crackles or wheezing.  Cardiovascular: Regular rate and rhythm, normal S1 and S2, and no murmur noted.  GI: Soft, non-distended, non-tender, normal bowel sounds.  Lymph/Hematologic: No anterior cervical or supraclavicular adenopathy.  Skin: No rashes, no cyanosis, no edema.  Neurologic: LUE with weakness noted to C6 distribution, unable use shoulder, thenar atrophy and general weakness on   Psychiatric: Alert, oriented to person, place and time, + anxiety    Medical Decision Making       65 MINUTES SPENT BY ME on the date of service doing chart review, history, exam, documentation & further activities per the note.      Data     I have personally reviewed the following data over the past 24 hrs:    8.6  \   13.4   / 198     138 101 16.3 /  108 (H)   4.4 25 0.96 \     Trop: 35 (H) BNP: N/A     INR:  0.98 PTT:  32   D-dimer:  N/A Fibrinogen:  N/A       Imaging results reviewed over the past 24 hrs:   Recent Results (from the past 24 hour(s))   CT Head w/o Contrast    Narrative    EXAM: CT HEAD W/O CONTRAST  LOCATION: [facility]  DATE: 9/2/2023    INDICATION: left-sided weakness and neck pain.  COMPARISON: None.  TECHNIQUE: Routine CT Head without IV contrast. Multiplanar reformats. Dose reduction techniques were  used.    FINDINGS:  INTRACRANIAL CONTENTS: No intracranial hemorrhage, extraaxial collection, or mass effect.  No CT evidence of acute infarct. Normal parenchymal attenuation. Normal ventricles and sulci. Posterior fossa structures including cerebellar hemispheres, fourth   ventricle and CP angle cisterns are clear. Nasopharynx is clear. Region of the sella and suprasellar cistern are clear.    VISUALIZED ORBITS/SINUSES/MASTOIDS: No intraorbital abnormality. Complete opacification throughout the right maxillary sinus with inspissated mucoid material. No middle ear or mastoid effusion.    BONES/SOFT TISSUES: No acute abnormality.      Impression    IMPRESSION:  1.  Normal head CT.    Results were discussed with Dr. FARRUKH Yoo at 9/2/2023 1:52 PM CDT   CTA Head Neck with Contrast    Narrative    EXAM: CTA HEAD NECK W CONTRAST  LOCATION: [facility]  DATE: 9/2/2023    INDICATION: Left-sided neck pain and left hand weakness with ataxia.  COMPARISON: Routine CT of brain.  CONTRAST: 75mL Isovue 370  TECHNIQUE: Head and neck CT angiogram with IV contrast. CTA images of the head and neck vessels obtained during the arterial phase of intravenous contrast administration. Axial 2D reconstructed images and multiplanar 3D MIP reconstructed images of the   head and neck vessels were performed by the technologist. Dose reduction techniques were used. All stenosis measurements made according to NASCET criteria unless otherwise specified.    FINDINGS:     HEAD CTA:  ANTERIOR CIRCULATION: No stenosis/occlusion, aneurysm, or high flow vascular malformation. Standard [place] of Lin anatomy.    POSTERIOR CIRCULATION: No stenosis/occlusion, aneurysm, or high flow vascular malformation. Balanced vertebral arteries supply a normal basilar artery.     DURAL VENOUS SINUSES: Expected enhancement of the major dural venous sinuses.    NECK CTA:  RIGHT CAROTID: Mild atherosclerotic calcification at the origin of the  right ICA with no stenosis.    LEFT CAROTID: Mild atherosclerotic calcification at the origin of the left ICA with no stenosis.    VERTEBRAL ARTERIES: No focal stenosis or dissection. Balanced vertebral arteries.    AORTIC ARCH: Classic aortic arch anatomy with no significant stenosis at the origin of the great vessels.    NONVASCULAR STRUCTURES: Unremarkable.      Impression    IMPRESSION:     HEAD CTA:   1.  Normal CTA [place] of Lin.    NECK CTA:  1.  Mild atherosclerotic calcification at the origin of the left and right internal carotid arteries. No stenosis within the neck. No evidence of vertebral or carotid dissection.    Results were discussed with Dr. Echeverria at 1:52 PM   CT Head Perfusion w Contrast - For Tier 2 Stroke    Narrative    EXAM: CT HEAD PERFUSION W CONTRAST  LOCATION: [facility]  DATE: 9/2/2023    INDICATION: Code Stroke to evaluate for potential thrombolysis and thrombectomy. Evaluate mismatch between penumbra and core infarct.     COMPARISON: CT/CTA same day  TECHNIQUE: Head and neck CT angiogram with IV contrast.  CT cerebral perfusion was performed utilizing a second contrast bolus. Perfusion data were post processed with generation of standard perfusion maps and estimation of ischemic/infarcted volumes   utilizing standard threshold values. Dose reduction techniques were used. All stenosis measurements made according to NASCET criteria unless otherwise specified.  CONTRAST: 50mL Isovue 370    FINDINGS:     CT PERFUSION:  PERFUSION MAPS: Symmetrical cerebral perfusion. No focal deficits in cerebral blood flow or volume to suggest ischemia/oligemia.    RAPID ANALYSIS:  CBF<30%: 0  Tmax>6sec: 4 mL (artifact)  Mismatch volume: 4 mL (artifact)  Mismatch ratio: Infant      Impression    IMPRESSION:     CT PERFUSION:  1.  Normal cerebral perfusion.   XR Shoulder Left 2 Views    Narrative    EXAM: XR SHOULDER LEFT 2 VIEWS  LOCATION: [facility]  DATE: 9/2/2023    INDICATION: hx of shoulder injury, now new stroke  COMPARISON: None.      Impression    IMPRESSION: No acute fracture or dislocation. There is moderate left glenohumeral degenerative arthrosis.   MRA Neck (Carotids) wo & w Contrast    Narrative    For Patients: As a result of the 21st Century Cures Act, medical imaging exams and procedure reports are released immediately into your electronic medical record. You may view this report before your referring provider. If you have questions, please   contact your health care provider.    EXAM: MR BRAIN W/O and W CONTRAST, MRA NECK (CAROTIDS) W/O and W CONTRAST  LOCATION: [facility]  DATE/TIME: 9/2/2023 6:38 PM CDT    INDICATION: LUE weakness  COMPARISON: CT 09/02/2023.  CONTRAST: 10mL Gadavist  TECHNIQUE:   1) Routine multiplanar multisequence head MRI without and with intravenous contrast.  2) Neck MRA without and with IV contrast. Stenosis measurements made according to NASCET criteria unless otherwise specified.    FINDINGS:  HEAD MRI:  INTRACRANIAL CONTENTS: No acute or subacute infarct. No mass, acute hemorrhage, or extra-axial fluid collections. Normal brain parenchymal signal. Normal ventricles and sulci. Normal position of the cerebellar tonsils. No pathologic contrast enhancement.    SELLA: No abnormality accounting for technique.    OSSEOUS STRUCTURES/SOFT TISSUES: Normal marrow signal. The major intracranial vascular flow voids are maintained.     ORBITS: No abnormality accounting for technique.     SINUSES/MASTOIDS: Complete opacification right maxillary sinus and slight opacification right anterior ethmoidal air cells. No middle ear or mastoid effusion.     NECK MRA:   RIGHT CAROTID: No measurable stenosis or dissection.    LEFT CAROTID: No measurable stenosis or dissection.    VERTEBRAL ARTERIES: No focal stenosis or dissection. Dominant left and smaller right vertebral arteries.    AORTIC ARCH: Classic  aortic arch anatomy with no significant stenosis at the origin of the great vessels.      Impression    IMPRESSION:  HEAD MRI:   1.  Normal head MRI.    NECK MRA:  1.  Normal neck MRA.     MR Brain w/o & w Contrast    Narrative    For Patients: As a result of the 21st Century Cures Act, medical imaging exams and procedure reports are released immediately into your electronic medical record. You may view this report before your referring provider. If you have questions, please   contact your health care provider.    EXAM: MR BRAIN W/O and W CONTRAST, MRA NECK (CAROTIDS) W/O and W CONTRAST  LOCATION: [facility]  DATE/TIME: 9/2/2023 6:38 PM CDT    INDICATION: LUE weakness  COMPARISON: CT 09/02/2023.  CONTRAST: 10mL Gadavist  TECHNIQUE:   1) Routine multiplanar multisequence head MRI without and with intravenous contrast.  2) Neck MRA without and with IV contrast. Stenosis measurements made according to NASCET criteria unless otherwise specified.    FINDINGS:  HEAD MRI:  INTRACRANIAL CONTENTS: No acute or subacute infarct. No mass, acute hemorrhage, or extra-axial fluid collections. Normal brain parenchymal signal. Normal ventricles and sulci. Normal position of the cerebellar tonsils. No pathologic contrast enhancement.    SELLA: No abnormality accounting for technique.    OSSEOUS STRUCTURES/SOFT TISSUES: Normal marrow signal. The major intracranial vascular flow voids are maintained.     ORBITS: No abnormality accounting for technique.     SINUSES/MASTOIDS: Complete opacification right maxillary sinus and slight opacification right anterior ethmoidal air cells. No middle ear or mastoid effusion.     NECK MRA:   RIGHT CAROTID: No measurable stenosis or dissection.    LEFT CAROTID: No measurable stenosis or dissection.    VERTEBRAL ARTERIES: No focal stenosis or dissection. Dominant left and smaller right vertebral arteries.    AORTIC ARCH: Classic aortic arch anatomy with no significant  stenosis at the origin of the great vessels.      Impression    IMPRESSION:  HEAD MRI:   1.  Normal head MRI.    NECK MRA:  1.  Normal neck MRA.     MR Cervical Spine w/o & w Contrast    Narrative    For Patients: As a result of the 21st Century Cures Act, medical imaging exams and procedure reports are released immediately into your electronic medical record. You may view this report before your referring provider. If you have questions, please   contact your health care provider.    EXAM: MR CERVICAL SPINE W/O and W CONTRAST  LOCATION: [facility]  DATE/TIME: 9/2/2023 6:38 PM CDT    INDICATION: LUE weakness and neck pain  COMPARISON: None.  TECHNIQUE: Routine cervical spine MRI without and with IV contrast.    FINDINGS:   Alignment: Minor anterolisthesis C3-C4, C4-C5. Shallow reversal of lordosis, apex at C5-C6.   Vertebral height: Cervical vertebral body heights are maintained.  Marrow signal: Prominent Modic type I (edema) reactive changes at C5-C6. Moderate bone marrow edema in the left C3-C4 laminectomy adjacent to the facet joints  Spinal cord: No abnormal signal.  Extraspinal: No extraspinal abnormality.     Craniovertebral junction: Normal.    C1-2: Normal.    C2-3: Normal height of disc. Dessicated disc. No disc herniation. No uncovertebral hypertrophy. Moderate left, mild right facet hypertrophy. No central spinal stenosis, no right foramen stenosis, mild left foramen stenosis.    C3-4: Mild loss of disc height. Dessicated disc. Broad based mild disc osteophyte complex. Bilateral uncovertebral hypertrophy. Moderate to severe bilateral facet hypertrophy. Mild central spinal stenosis, severe right foramen stenosis, mild left foramen   stenosis.    C4-5: Mild loss of disc height. Dessicated disc. Broad based mild disc osteophyte complex. No uncovertebral hypertrophy. Moderate to severe left, mild right facet hypertrophy. Mild central spinal stenosis, no right foramen stenosis,  moderate to severe   left foramen stenosis.    C5-6: Moderate loss of disc height. Degenerated disc. Disc osteophyte eccentric to the right. No uncovertebral hypertrophy. Moderate right,mild left facet hypertrophy. Moderate central spinal stenosis, severe right foramen stenosis, mild to moderate left   foramen stenosis.    C6-7: Marked loss of height of disc. Degenerated disc. Moderate broad based disc osteophyte complex. No uncovertebral hypertrophy. Mild bilateral facet hypertrophy. Moderate central spinal stenosis, severe right foramen stenosis, severe left foramen   stenosis.    C7-T1: Normal height of disc. Mild disc desiccation. No disc herniation. No uncovertebral hypertrophy. Mild bilateral facet hypertrophy. No central spinal stenosis, no right foramen stenosis, no left foramen stenosis.      Impression    IMPRESSION:  1.  At C5-C6 and C6-C7, moderate central spinal stenosis.  2.  Severe left foraminal stenosis at C4-C5 and C6-C7, more pronounced at C6-C7.  3.  Severe right foraminal stenosis at C3-C4, C5-C6 and C6-C7.

## 2023-09-03 NOTE — PROGRESS NOTES
[facility]    Medicine Progress Note - Hospitalist Service    Date of Admission:  9/2/2023    Assessment & Plan      Adam Sahni is a 63 year old male admitted on 9/2/2023 for LUE weakness and cervical stenosis.     LUE weakness   C5-C7 moderate central spinal stenosis  Severe L foraminal stenosis C4-C7  Severe R foraminal stenosis C3-C7  Patient was stretching last night when he felt a pop in his neck and then developed sudden onset L arm weakness. Within the ED, patient initially activated as a stroke for obvious LUE weakness. MR imaging returned negative for CVA but did demonstrate moderate central spinal stenosis with severe L and R foraminal stenosis. See full report. Neurosurgery contacted by the ED and recommended decadron 10 mg. And will formally see the patient    - neurosurgery consult service is following  - continue decadron at 4mg BID tomorrow per n/surgery  - PRN pain control  - monitor glucose on steroids    CAD s/p PCI 06/2023  NSTEMI  Troponin noted to be elevated but flat on admission. EKG without ischemic changes  - suspect type 2  - monitor for chest pain  - continue PTA DAPT  - continue PTA statin    Chronic anemia  Hb improved from baseline  - hold iron supplement on admission and resume on discharge  - monitor    Epilepsy  -resume home keppra     Peripheral neuropathy  Lumbar radiculopathy, with hx of lumbar fusion in 2022  Follows with a pain clinic through [facility]. Had a caudal epidural steroid injection on 6/1/23.  - resume gabapentin and lyrica     Depression  -resume zoloft and wellbutrin    Prostate cancer s/p external beam radiation therapy  *Follows with rad-onc [facility]. S/p ext beam radiotherapy and 28 months of androgen deprivation.     Tobacco Abuse  -Encouraged cessation.             Diet: Regular Diet Adult    DVT Prophylaxis: Low Risk/Ambulatory with no VTE prophylaxis indicated  Nova Catheter: Not present  Lines: None     Cardiac Monitoring:  "None  Code Status: Full Code      Clinically Significant Risk Factors Present on Admission                # Drug Induced Platelet Defect: home medication list includes an antiplatelet medication        # Overweight: Estimated body mass index is 28.7 kg/m  as calculated from the following:    Height as of this encounter: 1.778 m (5' 10\").    Weight as of this encounter: 90.7 kg (200 lb).              Disposition Plan      Expected Discharge Date: 09/04/2023        Discharge Comments: Neurosurg consult          Jin Crisostomo MD  Hospitalist Service  [facility]  Securely message with [employer] (more info)  Text page via [facility] Paging/Directory   ______________________________________________________________________    Interval History   Patient slept well, weakness improved, although not baseline yet.  No new numbness's, no new weaknesses.  Denies chest pain, palpitations, chest pain, cough or shortness of breath.    Physical Exam   Vital Signs: Temp: 98.4  F (36.9  C) Temp src: Oral BP: 108/70 Pulse: 62   Resp: 16 SpO2: 96 % O2 Device: None (Room air)    Weight: 200 lbs 0 oz    General Appearance: Alert in no distress comfortably sitting up in bed.  Respiratory: CTA, no wheezing or crackles  Cardiovascular: Heart is regular rate and rhythm no murmurs or gallops  GI: Abdomen is soft, plus bowel sounds, no organomegaly.  Skin: Warm dry, no acute rash      Medical Decision Making           Data     "

## 2023-09-03 NOTE — PROGRESS NOTES
RECEIVING UNIT ED HANDOFF REVIEW    ED Nurse Handoff Report was reviewed by: Pravin Cantu RN on September 2, 2023 at 11:12 PM

## 2023-09-03 NOTE — ED NOTES
[facility]  ED Nurse Handoff Report    ED Chief complaint: One-sided Weakness      ED Diagnosis:   Final diagnoses:   Cervical stenosis of spinal canal   Left arm weakness       Code Status: Full Code    Allergies:   Allergies   Allergen Reactions    Penicillins Hives, Shortness Of Breath and Swelling     Reports also having throat swelling- took medication as a child.         Patient Story: Pt comes in with LUE weakness and cervical stenosis.   Focused Assessment:  L arm weakness and decreased dexterity    Treatments and/or interventions provided: Steroids and opioids  Patient's response to treatments and/or interventions: Tolerated    To be done/followed up on inpatient unit:  Neurosurg consult, pain control    Does this patient have any cognitive concerns?:  None    Activity level - Baseline/Home:  Independent  Activity Level - Current:   Independent    Patient's Preferred language: English   Needed?: No    Isolation: None  Infection: Not Applicable  Patient tested for COVID 19 prior to admission: NO  Bariatric?: No    Vital Signs:   Vitals:    09/02/23 1500 09/02/23 1545 09/02/23 1846 09/02/23 1916   BP: 118/75  113/78    Pulse: 66 79 64 61   Resp: 22 17 11 16   Temp:       TempSrc:       SpO2: 97% 95%     Weight:       Height:           Cardiac Rhythm:     Was the PSS-3 completed:   Yes  What interventions are required if any?               Family Comments: None present  OBS brochure/video discussed/provided to patient/family: N/A              Name of person given brochure if not patient:               Relationship to patient:     For the majority of the shift this patient's behavior was Green.   Behavioral interventions performed were None needed.    ED NURSE PHONE NUMBER: *[phone]

## 2023-09-03 NOTE — CONSULTS
NEUROSURGERY CONSULT    Neurosurgery was asked by Dr. Serna to consult for left upper extremity weakness.      PLAN:    Mr. Sahni's most recent imaging exhibits multilevel moderate cervical spine central canal stenosis with multilevel severe neuroforaminal stenosis. Left C8 seems to be the most symptomatic. We have discussed both operative and non-operative treatment options. He would certainly benefit from surgical intervention, unfortunately he is currently anticoagulate on Plavix.     For now we feel that it would be in his best interest to try a conservative approach by participating in a physical therapy program. At some point during this hospitalization Dr. Lizarraga will stop by to further discuss possible surgical interventions.    We did review the images together and we explained his condition and the recommended treatment. We also discussed signs of a worsening problem that he should seek being evaluated.     It has been a pleasure meeting Adam Sahni. Thank you for having us be involved in his care.    ______________________________________________________________________    HPI:    Adam Sahni is a pleasant 63 year old male who presented to the [facility] Emergency Department yesterday for consultation on left sided upper extremity weakness. He describes the symptoms as a sharp, aching pain that initiates in the left posterior cervical spine pain and radiates distally in primarily the left C8 distribution. This pain is accompanied with paresthesia and weakness in the same distribution. The symptoms have been present for two days approximately after stretching. There are no bowel or bladder changes. No other concerns are voiced.    Mr. Sahni is anticoagulated on Plavix    Past Medical History:   Diagnosis Date    CAD (coronary artery disease)     with UGO to RCA on 6/9/23 for inferior STEMI    Chronic anemia     Depression     Hyperlipidemia     Lumbar radiculopathy     PAD (peripheral  "artery disease) (H)     S/p bilateral kissing iliac stents after thrombolytics 7/16/21-7/17/21 for severe claudication and complete occlusion of the left common iliac artery with tandem occlusions and/or high grade stenoses of the distal SFA and popliteal artery.    Peripheral neuropathy     Prediabetes     Prostate cancer (H)     s/p ext beam radiation therapy, follows at [facility]       Past Surgical History:   Procedure Laterality Date    CV CORONARY ANGIOGRAM N/A 6/9/2023    Procedure: Coronary Angiogram;  Surgeon: Rosenda Pacheco MD;  Location:  HEART CARDIAC CATH LAB    IR LOWER EXTREMITY ANGIOGRAM RIGHT  6/18/2023       Allergies   Allergen Reactions    Penicillins Hives, Shortness Of Breath and Swelling     Reports also having throat swelling- took medication as a child.         Social History     Tobacco Use    Smoking status: Never    Smokeless tobacco: Never   Substance Use Topics    Alcohol use: Yes       No family history on file.    Scheduled Medications     aspirin  81 mg Oral Daily    atorvastatin  80 mg Oral At Bedtime    clopidogrel  75 mg Oral Daily    dexAMETHasone  4 mg Oral Q12H [facility] (08/20)    levETIRAcetam  500 mg Oral BID    metoprolol tartrate  6.25 mg Oral BID    pregabalin  150 mg Oral BID    sertraline  200 mg Oral Daily       Home Medications    Alendronate   Aspirin 81 MG  Atorvastatin   Barberry-Oreg Grape-Goldenseal  Bupropion   Cholecalciferol   Cilostazol   Clopidogrel   Ferrous sulfate  Fish oil-omega-3 fatty acids  Gabapentin   Menaquinone-7  Metoprolol tartrate  Nitroglycerin   Pregabalin   Sertraline    PRN Medications    melatonin, naloxone **OR** naloxone **OR** naloxone **OR** naloxone, ondansetron **OR** ondansetron, oxyCODONE    ROS: 10 point ROS neg other than the symptoms noted above in the HPI.    Vitals:    BP 98/49 (BP Location: Right arm)   Pulse 69   Temp 98.5  F (36.9  C) (Oral)   Resp 16   Ht 1.778 m (5' 10\")   Wt 90.7 kg (200 lb)   SpO2 98%   BMI 28.70 kg/m  "   Body mass index is 28.7 kg/m .  I/O last 3 completed shifts:  In: 240 [P.O.:240]  Out: -     Exam:    Patient appears comfortable, conversational, and in no apparent distress.   Head: Normocephalic, without obvious abnormality, atraumatic, no facial asymmetry.   Eyes: conjunctivae/corneas clear. PERRL, EOM's intact.   Throat: lips, mucosa, and tongue normal; teeth and gums normal.   Neck: supple, symmetrical, trachea midline, no adenopathy and thyroid: not enlarged, symmetric, no tenderness/mass/nodules.   Lungs: clear to auscultation bilaterally.   Heart: regular rate and rhythm.   Abdomen: soft, non-tender; bowel sounds normal; no masses, no organomegaly.   Pulses: 2+ and symmetric.   Skin: Skin color, texture, turgor normal. No rashes or lesions.     CN II-XII grossly intact, alert and appropriate with conversation and following commands.   Gait is non-antalgic. Able to tandem walk - unsteady.   Cervical spine is non tender to palpation. Diminished range of motion of neck, but not concerning for lhermitte's phenomenon.   Bilateral bicep 2/4 and tricep reflexes 1/4. Sensation diminished throughout left upper extremities.     UE muscle strength  Right  Left    Deltoid  5/5  5-/5 RC path   Biceps  5/5  5/5    Triceps  5/5  5/5    Hand intrinsics  5/5  4/5    Hand grasp  5/5  4/5    Menendez signs  neg  neg      He has known left rotator cuff pathology and a recent left wrist fracture.     Lumbar spine is non tender to palpation   Intact sensation throughout lower extremities.   Bilateral patellar 2/4 and achilles reflex 1/4.     LE muscle strength  Right  Left    Iliopsoas (hip flexion)  5/5  5/5    Quad (knee extension)  5/5  5/5    Hamstring (knee flexion)  5/5  5/5    Gastrocnemius (PF)  5/5  5/5    Tibialis Ant. (DF)  5/5  5/5    EHL  5/5  5/5      Negative Babinski bilaterally. Negative for clonus.   Calves are soft and non-tender bilaterally.     ECG/Telemetry:    ECG taken at 1354, ECG read at 1354  Normal  sinus rhythm  Inferior infarct, age undetermined  Cannot rule out Anterior infarct, age undetermined  Abnormal ECG   Rate 70 bpm. TN interval 180 ms. QRS duration 86 ms. QT/QTc 384/414 ms. P-R-T axes 23 4 -27.     Imaging: MR CERVICAL SPINE W/O and W CONTRAST - 9/2/2023 6:38 PM CDT  Impression per radiology read - I have personally reviewed the images with the patient.    1.  At C5-C6 and C6-C7, moderate central spinal stenosis.  2.  Severe left foraminal stenosis at C4-C5 and C6-C7, more pronounced at C6-C7.  3.  Severe right foraminal stenosis at C3-C4, C5-C6 and C6-C7.    Available labs at time of consult:       Recent Labs   Lab 09/03/23  0643 09/02/23  1326   WBC 4.9 8.6   HGB 12.1* 13.4   HCT 38.1* 41.0   MCV 87 88    198     Recent Labs   Lab 09/03/23  0643 09/02/23  1326   WBC 4.9 8.6   HGB 12.1* 13.4   HCT 38.1* 41.0   MCV 87 88    198     No results for input(s): SED, CRP in the last 168 hours.  Recent Labs   Lab 09/03/23  0643 09/02/23  1326   HGB 12.1* 13.4     Recent Labs   Lab 09/02/23  1326   INR 0.98     Recent Labs   Lab 09/03/23  0643 09/02/23  1326    198     Recent Labs   Lab 09/03/23  0643 09/02/23  1326   WBC 4.9 8.6         Respectfully,    ROBERT Arroyo, PA-C  [facility]  [facility]     Tel: [phone]      All imaging, physical findings, and the above plan have been reviewed with Dr. Lizarraga.

## 2023-09-04 VITALS
SYSTOLIC BLOOD PRESSURE: 97 MMHG | BODY MASS INDEX: 28.63 KG/M2 | RESPIRATION RATE: 14 BRPM | WEIGHT: 200 LBS | HEIGHT: 70 IN | HEART RATE: 45 BPM | TEMPERATURE: 97.6 F | OXYGEN SATURATION: 97 % | DIASTOLIC BLOOD PRESSURE: 60 MMHG

## 2023-09-04 PROCEDURE — 250N000013 HC RX MED GY IP 250 OP 250 PS 637: Performed by: STUDENT IN AN ORGANIZED HEALTH CARE EDUCATION/TRAINING PROGRAM

## 2023-09-04 PROCEDURE — 99239 HOSP IP/OBS DSCHRG MGMT >30: CPT | Performed by: HOSPITALIST

## 2023-09-04 PROCEDURE — 250N000012 HC RX MED GY IP 250 OP 636 PS 637: Performed by: STUDENT IN AN ORGANIZED HEALTH CARE EDUCATION/TRAINING PROGRAM

## 2023-09-04 PROCEDURE — G0378 HOSPITAL OBSERVATION PER HR: HCPCS

## 2023-09-04 RX ORDER — PREGABALIN 150 MG/1
150 CAPSULE ORAL 2 TIMES DAILY
Qty: 30 CAPSULE | Refills: 1 | Status: SHIPPED | OUTPATIENT
Start: 2023-09-04 | End: 2024-08-22

## 2023-09-04 RX ADMIN — LEVETIRACETAM 500 MG: 500 TABLET, FILM COATED ORAL at 09:01

## 2023-09-04 RX ADMIN — SERTRALINE HYDROCHLORIDE 200 MG: 100 TABLET ORAL at 09:01

## 2023-09-04 RX ADMIN — DEXAMETHASONE 4 MG: 4 TABLET ORAL at 09:01

## 2023-09-04 RX ADMIN — ASPIRIN 81 MG 81 MG: 81 TABLET ORAL at 09:01

## 2023-09-04 RX ADMIN — PREGABALIN 150 MG: 75 CAPSULE ORAL at 09:01

## 2023-09-04 RX ADMIN — CLOPIDOGREL BISULFATE 75 MG: 75 TABLET ORAL at 09:01

## 2023-09-04 ASSESSMENT — ACTIVITIES OF DAILY LIVING (ADL)
ADLS_ACUITY_SCORE: 33
ADLS_ACUITY_SCORE: 33
ADLS_ACUITY_SCORE: 32
ADLS_ACUITY_SCORE: 33
ADLS_ACUITY_SCORE: 32

## 2023-09-04 NOTE — PLAN OF CARE
Observation goals PRIOR TO DISCHARGE  Comments: -diagnostic tests and consults completed and resulted - Met  - consultants weighed in - Met  - vital signs normal or at patient baseline- Met  -safe disposition plan has been identified- Met  Nurse to notify provider when observation goals have been met and patient is ready for discharge.     Mental Status: A&O x4.  Activity/dangle: SB Ast but refused help and bed alarm therefore independent in room  Diet: Tolerating regular diet  Pain: Denies pain  Nova/Voiding: Voiding adequately in the bathroom  Tele/Restraints/Iso: N/A  02/LDA: Room air. PIV line removed.  Other Info: Went over AVS and discharge medication, all questions answered. Discharged home with friend.

## 2023-09-04 NOTE — PLAN OF CARE
Orientation/Cognitive: A&Ox4  Observation Goals (Met/ Not Met): Not Met  Mobility Level/Assist Equipment: SBA  Fall Risk (Y/N): Y  Behavior Concerns: None  Pain Management: Denies pain at this time  Tele/VS/O2: VSS/RA  ABNL Lab/BG: See chart  Diet: Regular  Bowel/Bladder: Continent B&B   Skin Concerns: Bruising  Drains/Devices: PIV  Tests/Procedures for next shift: None  Anticipated DC date & active delays: Today  Patient Stated Goal for Today: Discharge home       Patient is on Anti coagulant, Unable to do the surgical intervention, medically cleared for discharge today, neuro cleared for discharge and follow up with outpatient clinic,       Observation goals  PRIOR TO DISCHARGE       -diagnostic tests and consults completed and resulted-met  -consultants weighed in-met   -vital signs normal or at patient baseline-Met  -safe disposition plan has been identified-Met  Nurse to notify provider when observation goals have been met and patient is ready for discharge.

## 2023-09-04 NOTE — PROVIDER NOTIFICATION
MD Notification    Notified Person: MD    Notified Person Name: Jassi    Notification Date/Time: 9/3/2023  07:54 PM     Notification Interaction: Phone    Purpose of Notification: Patient wants to discharge tonight and okay per neuroGurg-    Orders Received:    Comments: Neuro surge-Okay to discharge and go have intervention as outpatient  Provider call return: To contact House MODE to do discharge at 2000. Charge updated

## 2023-09-04 NOTE — DISCHARGE SUMMARY
"[facility]  Hospitalist Discharge Summary      Date of Admission:  9/2/2023  Date of Discharge:  9/4/2023  Discharging Provider: Jin Crisostomo MD  Discharge Service: Hospitalist Service    Discharge Diagnoses   Patient Active Problem List   Diagnosis    ST elevation myocardial infarction (STEMI), unspecified artery (H)    Hyperlipidemia with target low density lipoprotein (LDL) cholesterol less than 100 mg/dL    Idiopathic peripheral neuropathy    Major depression, single episode    Elevated PSA, greater than or equal to 20 ng/ml    Peripheral artery disease (H)    Severe episode of recurrent major depressive disorder, without psychotic features (H)    Tobacco dependence    Arterial occlusion    Cervical stenosis of spinal canal    Left arm weakness         Clinically Significant Risk Factors     # Overweight: Estimated body mass index is 28.7 kg/m  as calculated from the following:    Height as of this encounter: 1.778 m (5' 10\").    Weight as of this encounter: 90.7 kg (200 lb).       Follow-ups Needed After Discharge   Follow-up Appointments     Follow-up and recommended labs and tests       Follow up with primary care provider, Physician No Ref-Primary, within 7   days for hospital follow- up.  No follow up labs or test are needed.        {Additional follow-up instructions/to-do's for PCP    : For routine cares.    Unresulted Labs Ordered in the Past 30 Days of this Admission       No orders found from 8/3/2023 to 9/3/2023.        These results will be followed up by PCP    Discharge Disposition   Discharged to home  Condition at discharge: Stable    Hospital Course   Please refer to the H&P, neurosurgery notes for details of this presentation, in brief, Mr. Adam Sahni is a 63 year old male admitted on 9/2/2023 for LUE weakness and cervical stenosis.   Following is a list of major problems during this hospital stay:  LUE weakness   C5-C7 moderate central spinal " stenosis  Severe L foraminal stenosis C4-C7  Severe R foraminal stenosis C3-C7  Patient was stretching last night when he felt a pop in his neck and then developed sudden onset L arm weakness. Within the ED, patient initially activated as a stroke for obvious LUE weakness. MR imaging returned negative for CVA but did demonstrate moderate central spinal stenosis with severe L and R foraminal stenosis. See full report. Neurosurgery contacted by the ED and recommended decadron 10 mg. And will formally see the patient    - neurosurgery consult evaluated, and deemed that surgery would be appropriate, however the patient was on Plavix.  Hence the plan is for the patient to meet neurosurgery team as an outpatient and plan for surgeries.  - continue decadron at 4mg BID tomorrow per n/surgery  - PRN pain control  - monitor glucose on steroids    CAD s/p PCI 06/2023  NSTEMI  Troponin noted to be elevated but flat on admission. EKG without ischemic changes  - suspect type 2  -No chest pain throughout the stay  - continue PTA DAPT' Plavix should be on hold by neurosurgery once surgery is being contemplated.  Patient is aware of that and if any surgery is planned for his back he will discuss with his neurosurgery team when and how long to hold the Plavix.  - continue PTA statin    Chronic anemia  Hb improved from baseline  - hold iron supplement on admission and resume on discharge  - monitor    Epilepsy  -resume home keppra     Peripheral neuropathy  Lumbar radiculopathy, with hx of lumbar fusion in 2022  Follows with a pain clinic through [facility]. Had a caudal epidural steroid injection on 6/1/23.  - resume gabapentin and lyrica     Depression  -resume zoloft and wellbutrin    Prostate cancer s/p external beam radiation therapy  *Follows with rad-onc [facility]. S/p ext beam radiotherapy and 28 months of androgen deprivation.     Tobacco Abuse  -Encouraged cessation.          Consultations This Hospital Stay   NEUROSURGERY IP  CONSULT  NEUROSURGERY IP CONSULT    Code Status   Full Code    Time Spent on this Encounter   I, Jin Crisostomo MD, personally saw the patient today and spent greater than 30 minutes discharging this patient.       Jin Crisostomo MD  [facility] EXTENDED RECOVERY AND SHORT STAY  [address] [facility] [address]  Phone: [phone]  ______________________________________________________________________    Physical Exam   Vital Signs: Temp: 97.6  F (36.4  C) Temp src: Oral BP: 97/60 Pulse: (!) 45   Resp: 14 SpO2: 97 % O2 Device: None (Room air)    Weight: 200 lbs 0 oz  General Appearance: Alert in no distress, comfortable sitting up in the chair.  Respiratory: CTA, no wheezing or crackles.  Cardiovascular: Heart is regular rate rhythm no murmurs or gallops  GI: Abdomen is soft, positive bowel sounds, no tenderness no distention.  Skin: Warm, dry, no acute rash.  Healed scar from prior back surgeries are evident.  Other:       Primary Care Physician   Physician No Ref-Primary    Discharge Orders      Reason for your hospital stay    Cervical stenosis     Follow-up and recommended labs and tests     Follow up with primary care provider, Physician No Ref-Primary, within 7 days for hospital follow- up.  No follow up labs or test are needed.     Activity    Your activity upon discharge: activity as tolerated     Diet    Follow this diet upon discharge: Orders Placed This Encounter      Regular Diet Adult       Significant Results and Procedures   Most Recent 3 CBC's:  Recent Labs   Lab Test 09/03/23  0643 09/02/23  1326 08/08/23  1407   WBC 4.9 8.6 7.1   HGB 12.1* 13.4 11.4*   MCV 87 88 89    198 246       Discharge Medications   Current Discharge Medication List        CONTINUE these medications which have CHANGED    Details   !! pregabalin (LYRICA) 150 MG capsule Take 1 capsule (150 mg) by mouth 2 times daily  Qty: 30 capsule, Refills: 1    Associated Diagnoses:  Cervical stenosis of spinal canal; Hyperlipidemia with target low density lipoprotein (LDL) cholesterol less than 100 mg/dL; Idiopathic peripheral neuropathy       !! - Potential duplicate medications found. Please discuss with provider.        CONTINUE these medications which have NOT CHANGED    Details   alendronate (FOSAMAX) 70 MG tablet Take 70 mg by mouth every 7 days      aspirin (ASA) 81 MG chewable tablet Take 1 tablet (81 mg) by mouth daily  Qty: 30 tablet, Refills: 3    Associated Diagnoses: ST elevation myocardial infarction (STEMI), unspecified artery (H)      atorvastatin (LIPITOR) 80 MG tablet Take 80 mg by mouth daily      cilostazol (PLETAL) 100 MG tablet Take 100 mg by mouth 2 times daily      clopidogrel (PLAVIX) 75 MG tablet Take 1 tablet (75 mg) by mouth daily  Qty: 90 tablet, Refills: 1    Associated Diagnoses: PAD (peripheral artery disease) (H)      ferrous sulfate (FEROSUL) 325 (65 Fe) MG tablet Take 325 mg by mouth every other day      levETIRAcetam (KEPPRA) 500 MG tablet Take 500 mg by mouth 2 times daily      metoprolol tartrate (LOPRESSOR) 25 MG tablet Take 0.25 tablets (6.25 mg) by mouth 2 times daily  Qty: 15 tablet, Refills: 0    Comments: Future refills by PCP  Physician No Ref-Primary with phone number None.  Associated Diagnoses: ST elevation myocardial infarction (STEMI), unspecified artery (H)      !! pregabalin (LYRICA) 150 MG capsule Take 150 mg by mouth 2 times daily      sertraline (ZOLOFT) 100 MG tablet Take 200 mg by mouth daily      nitroGLYcerin (NITROSTAT) 0.4 MG sublingual tablet For chest pain place 1 tablet under the tongue every 5 minutes for 3 doses. If symptoms persist 5 minutes after 1st dose call 911.  Qty: 25 tablet, Refills: 0    Associated Diagnoses: ST elevation myocardial infarction (STEMI), unspecified artery (H)       !! - Potential duplicate medications found. Please discuss with provider.        Allergies   Allergies   Allergen Reactions    Penicillins  Hives, Shortness Of Breath and Swelling     Reports also having throat swelling- took medication as a child.

## 2023-09-11 ENCOUNTER — OFFICE VISIT (OUTPATIENT)
Dept: NEUROSURGERY | Facility: CLINIC | Age: 63
End: 2023-09-11
Payer: COMMERCIAL

## 2023-09-11 VITALS — HEART RATE: 68 BPM | OXYGEN SATURATION: 97 %

## 2023-09-11 DIAGNOSIS — M54.12 CERVICAL RADICULOPATHY: Primary | ICD-10-CM

## 2023-09-11 PROCEDURE — 99213 OFFICE O/P EST LOW 20 MIN: CPT | Performed by: NEUROLOGICAL SURGERY

## 2023-09-11 ASSESSMENT — PAIN SCALES - GENERAL: PAINLEVEL: SEVERE PAIN (6)

## 2023-09-11 NOTE — LETTER
9/11/2023         RE: Adam Sahni  [address]   [place] [address]        Dear Colleague,    Thank you for referring your patient, Adam Sahni, to the [facility] NEUROLOG[facility]. Please see a copy of my visit note below.    63 year old male with cervical radiculopathy.  Was admitted to the hospital last week with acute onset left arm pain and weakness.  Underwent work-up, including for possible stroke, which revealed cervical spondylosis and stenosis contributing to radiculopathy.  Was started on steroids and improved.  Symptoms substantially better currently.  Patient underwent Cardiac stent placement in June.  MR Cervical, personally reviewed, with severe left C6-7 foraminal stenosis, moderate C5-6 central/right foraminal stenosis and left C4-5 foraminal stenosis.       Past Medical History:   Diagnosis Date     CAD (coronary artery disease)     with UGO to RCA on 6/9/23 for inferior STEMI     Chronic anemia      Depression      Hyperlipidemia      Lumbar radiculopathy      PAD (peripheral artery disease) (H)     S/p bilateral kissing iliac stents after thrombolytics 7/16/21-7/17/21 for severe claudication and complete occlusion of the left common iliac artery with tandem occlusions and/or high grade stenoses of the distal SFA and popliteal artery.     Peripheral neuropathy      Prediabetes      Prostate cancer (H)     s/p ext beam radiation therapy, follows at [facility]     Past Surgical History:   Procedure Laterality Date     CV CORONARY ANGIOGRAM N/A 6/9/2023    Procedure: Coronary Angiogram;  Surgeon: Rosenda Pacheco MD;  Location:  HEART CARDIAC CATH LAB     IR LOWER EXTREMITY ANGIOGRAM RIGHT  6/18/2023     Social History     Socioeconomic History     Marital status: Single     Spouse name: Not on file     Number of children: Not on file     Years of education: Not on file     Highest education level: Not on file   Occupational History     Not on file   Tobacco  Use     Smoking status: Never     Smokeless tobacco: Never   Substance and Sexual Activity     Alcohol use: Yes     Drug use: Never     Sexual activity: Not on file   Other Topics Concern     Not on file   Social History Narrative     Not on file     Social Determinants of Health     Financial Resource Strain: Not on file   Food Insecurity: Not on file   Transportation Needs: Not on file   Physical Activity: Not on file   Stress: Not on file   Social Connections: Not on file   Intimate Partner Violence: Not on file   Housing Stability: Not on file     No family history on file.     ROS: 10 point ROS neg other than the symptoms noted above in the HPI.    Physical Exam  Pulse 68   SpO2 97%   HEENT:  Normocephalic, atraumatic.  PERRLA.  EOM s intact.  Visual fields full to gross exam  Neck:  Supple, non-tender, without lymphadenopathy.  Heart:  No peripheral edema  Lungs:  No SOB  Abdomen:  Non-distended.   Skin:  Warm and dry.  Extremities:  No edema, cyanosis or clubbing.  Psychiatric:  No apparent distress  Musculoskeletal:  Normal bulk and tone    NEUROLOGICAL EXAMINATION:     Mental status:  Alert and Oriented x 3, speech is fluent.  Cranial nerves:  II-XII intact.   Motor:    Shoulder Abduction:  Right:  5/5   Left:  5/5  Biceps:                      Right:  5/5   Left:  5/5  Triceps:                     Right:  5/5   Left:  5/5  Wrist Extensors:       Right:  5/5   Left:  5/5  Wrist Flexors:           Right:  5/5   Left:  5/5  interosseus :            Right:  5/5   Left:  5/5  Hip Flexion:                Right: 5/5  Left:  5/5  Quadriceps:             Right:  5/5  Left:  5/5  Hamstrings:             Right:  5/5  Left:  5/5  Gastroc Soleus:        Right:  5/5  Left:  5/5  Tib/Ant:                      Right:  5/5  Left:  5/5  EHL:                     Right:  5/5  Left:  5/5  Sensation:  Intact  Reflexes:  Negative Babinski.  Negative Clonus.  Negative Menendez's.  Coordination:  Smooth finger to nose testing.    Negative pronator drift.  Smooth tandem walking.    A/P:  63 year old male with cervical radiculopathy    I had a discussion with the patient, reviewing the history, symptoms, and imaging  Improving with steroids and conservative management  Discussed that at this time, he needs to continue to focus on continuing plavix and recovering from stent placement  If symptoms recur, could consider cervical surgery once cardiac issues resolved          Again, thank you for allowing me to participate in the care of your patient.        Sincerely,        Humberto Lizarraga MD

## 2023-09-11 NOTE — PROGRESS NOTES
63 year old male with cervical radiculopathy.  Was admitted to the hospital last week with acute onset left arm pain and weakness.  Underwent work-up, including for possible stroke, which revealed cervical spondylosis and stenosis contributing to radiculopathy.  Was started on steroids and improved.  Symptoms substantially better currently.  Patient underwent Cardiac stent placement in June.  MR Lam, personally reviewed, with severe left C6-7 foraminal stenosis, moderate C5-6 central/right foraminal stenosis and left C4-5 foraminal stenosis.       Past Medical History:   Diagnosis Date    CAD (coronary artery disease)     with UGO to RCA on 6/9/23 for inferior STEMI    Chronic anemia     Depression     Hyperlipidemia     Lumbar radiculopathy     PAD (peripheral artery disease) (H)     S/p bilateral kissing iliac stents after thrombolytics 7/16/21-7/17/21 for severe claudication and complete occlusion of the left common iliac artery with tandem occlusions and/or high grade stenoses of the distal SFA and popliteal artery.    Peripheral neuropathy     Prediabetes     Prostate cancer (H)     s/p ext beam radiation therapy, follows at [place]     Past Surgical History:   Procedure Laterality Date    CV CORONARY ANGIOGRAM N/A 6/9/2023    Procedure: Coronary Angiogram;  Surgeon: Rosenda Pacheco MD;  Location:  HE[facility] CATH LAB    IR LOWER EXTREMITY ANGIOGRAM RIGHT  6/18/2023     Social History     Socioeconomic History    Marital status: Single     Spouse name: Not on file    Number of children: Not on file    Years of education: Not on file    Highest education level: Not on file   Occupational History    Not on file   Tobacco Use    Smoking status: Never    Smokeless tobacco: Never   Substance and Sexual Activity    Alcohol use: Yes    Drug use: Never    Sexual activity: Not on file   Other Topics Concern    Not on file   Social History Narrative    Not on file     Social Determinants of Health     Financial  Resource Strain: Not on file   Food Insecurity: Not on file   Transportation Needs: Not on file   Physical Activity: Not on file   Stress: Not on file   Social Connections: Not on file   Intimate Partner Violence: Not on file   Housing Stability: Not on file     No family history on file.     ROS: 10 point ROS neg other than the symptoms noted above in the HPI.    Physical Exam  Pulse 68   SpO2 97%   HEENT:  Normocephalic, atraumatic.  PERRLA.  EOM s intact.  Visual fields full to gross exam  Neck:  Supple, non-tender, without lymphadenopathy.  Heart:  No peripheral edema  Lungs:  No SOB  Abdomen:  Non-distended.   Skin:  Warm and dry.  Extremities:  No edema, cyanosis or clubbing.  Psychiatric:  No apparent distress  Musculoskeletal:  Normal bulk and tone    NEUROLOGICAL EXAMINATION:     Mental status:  Alert and Oriented x 3, speech is fluent.  Cranial nerves:  II-XII intact.   Motor:    Shoulder Abduction:  Right:  5/5   Left:  5/5  Biceps:                      Right:  5/5   Left:  5/5  Triceps:                     Right:  5/5   Left:  5/5  Wrist Extensors:       Right:  5/5   Left:  5/5  Wrist Flexors:           Right:  5/5   Left:  5/5  interosseus :            Right:  5/5   Left:  5/5  Hip Flexion:                Right: 5/5  Left:  5/5  Quadriceps:             Right:  5/5  Left:  5/5  Hamstrings:             Right:  5/5  Left:  5/5  Gastroc Soleus:        Right:  5/5  Left:  5/5  Tib/Ant:                      Right:  5/5  Left:  5/5  EHL:                     Right:  5/5  Left:  5/5  Sensation:  Intact  Reflexes:  Negative Babinski.  Negative Clonus.  Negative Menendez's.  Coordination:  Smooth finger to nose testing.   Negative pronator drift.  Smooth tandem walking.    A/P:  63 year old male with cervical radiculopathy    I had a discussion with the patient, reviewing the history, symptoms, and imaging  Improving with steroids and conservative management  Discussed that at this time, he needs to continue to  focus on continuing plavix and recovering from stent placement  If symptoms recur, could consider cervical surgery once cardiac issues resolved

## 2023-09-13 ENCOUNTER — OFFICE VISIT (OUTPATIENT)
Dept: CARDIOLOGY | Facility: CLINIC | Age: 63
End: 2023-09-13
Payer: COMMERCIAL

## 2023-09-13 VITALS
DIASTOLIC BLOOD PRESSURE: 77 MMHG | OXYGEN SATURATION: 96 % | BODY MASS INDEX: 28.92 KG/M2 | WEIGHT: 202 LBS | HEART RATE: 86 BPM | HEIGHT: 70 IN | SYSTOLIC BLOOD PRESSURE: 114 MMHG

## 2023-09-13 DIAGNOSIS — F17.200 TOBACCO DEPENDENCE: ICD-10-CM

## 2023-09-13 DIAGNOSIS — I73.9 PERIPHERAL ARTERY DISEASE (H): ICD-10-CM

## 2023-09-13 DIAGNOSIS — E78.5 HYPERLIPIDEMIA WITH TARGET LOW DENSITY LIPOPROTEIN (LDL) CHOLESTEROL LESS THAN 100 MG/DL: ICD-10-CM

## 2023-09-13 DIAGNOSIS — I21.3 ST ELEVATION MYOCARDIAL INFARCTION (STEMI), UNSPECIFIED ARTERY (H): ICD-10-CM

## 2023-09-13 PROCEDURE — 99214 OFFICE O/P EST MOD 30 MIN: CPT | Performed by: INTERNAL MEDICINE

## 2023-09-13 RX ORDER — METOPROLOL SUCCINATE 25 MG/1
12.5 TABLET, EXTENDED RELEASE ORAL DAILY
Qty: 90 TABLET | Refills: 3 | Status: SHIPPED | OUTPATIENT
Start: 2023-09-13 | End: 2023-09-13

## 2023-09-13 RX ORDER — METOPROLOL SUCCINATE 25 MG/1
12.5 TABLET, EXTENDED RELEASE ORAL DAILY
Qty: 90 TABLET | Refills: 3 | Status: SHIPPED | OUTPATIENT
Start: 2023-09-13 | End: 2023-12-15

## 2023-09-13 NOTE — PROGRESS NOTES
CARDIOLOGY CLINIC CONSULTATION    PRIMARY CARE PHYSICIAN:  Physician No Ref-Primary    PRIMARY CARDIOLOGIST: Dr Pacheco    HISTORY OF PRESENT ILLNESS:  This is a 63-year-old gentleman who is seeing me as a urgent add-on visit for preoperative evaluation.  I have not seen this patient previously.  The patient was seen by our cardiology team in June of this year when he presented with inferior ST elevation myocardial infarction.  He has a history of peripheral vascular disease including iliac stents previously.  The patient presented with inferior STEMI somewhat delayed presentation.  EF was 45 to 50%.  Coronary angiography revealed RCA occlusion that was stented.  Other 70% lesions in the left-sided system predominantly circumflex and ramus were noted.  These were left to medical management.  Patient had some shortness of breath on Brilinta.  He has been put on dual antiplatelet therapy with aspirin and Plavix.  He takes cilostazol for his PAD.  He is on low-dose of metoprolol to tartrate.    Now the patient was admitted to the hospital in early September for left upper extremity weakness and was noted to have cervical stenosis.  He is scheduled to see neurosurgery at [place] tomorrow.  Apparently he has severe cervical foraminal stenosis on his MRI.  They are potentially planning surgery.  He is on dual antiplatelet therapy.    From a cardiac standpoint, patient denies any typical symptoms of angina however he is functionally not very active due to his neuropathy.  No typical symptoms of angina heart failure syncope or presyncope.    PAST MEDICAL HISTORY:  Past Medical History:   Diagnosis Date    CAD (coronary artery disease)     with UGO to RCA on 6/9/23 for inferior STEMI    Chronic anemia     Depression     Hyperlipidemia     Lumbar radiculopathy     PAD (peripheral artery disease) (H)     S/p bilateral kissing iliac stents after thrombolytics 7/16/21-7/17/21 for severe claudication and complete occlusion of  the left common iliac artery with tandem occlusions and/or high grade stenoses of the distal SFA and popliteal artery.    Peripheral neuropathy     Prediabetes     Prostate cancer (H)     s/p ext beam radiation therapy, follows at [place]       MEDICATIONS:  Current Outpatient Medications   Medication    alendronate (FOSAMAX) 70 MG tablet    aspirin (ASA) 81 MG chewable tablet    atorvastatin (LIPITOR) 80 MG tablet    cilostazol (PLETAL) 100 MG tablet    clopidogrel (PLAVIX) 75 MG tablet    ferrous sulfate (FEROSUL) 325 (65 Fe) MG tablet    levETIRAcetam (KEPPRA) 500 MG tablet    metoprolol succinate ER (TOPROL XL) 25 MG 24 hr tablet    pregabalin (LYRICA) 150 MG capsule    sertraline (ZOLOFT) 100 MG tablet    nitroGLYcerin (NITROSTAT) 0.4 MG sublingual tablet     No current facility-administered medications for this visit.       SOCIAL HISTORY:  I have reviewed this patient's social history and updated it with pertinent information if needed. Adam Sahni  reports that he has been smoking cigarettes. He has never used smokeless tobacco. He reports current alcohol use. He reports that he does not use drugs.    PHYSICAL EXAM:  Pulse:  [86] 86  BP: (114)/(77) 114/77  SpO2:  [96 %] 96 %  202 lbs 0 oz    Constitutional: alert, no distress  Respiratory: Good bilateral air entry  Cardiovascular: Normal regular heart sounds no edema  GI: nondistended  Neuropsychiatric: appropriate affact    ASSESSMENT: Pertinent issues addressed/ reviewed during this cardiology visit  Severe multivessel coronary artery disease status post RCA PCI in the setting of inferior STEMI  Severe peripheral vascular disease  Preoperative evaluation  Hyperlipidemia    RECOMMENDATIONS:  At this time the patient is asymptomatic from a cardiac standpoint.  He has moderate to severe obstructive disease in the left-sided system which has been left to medical management.  Particularly he has a 70% proximal circumflex and ramus lesions.  The patient is only  3 months out after his stenting.  At this time stopping dual antiplatelet therapy after an inferior STEMI would not be ideally recommended unless surgery is urgent and bleeding risk is prohibitive.  The patient is going to meet with neurosurgery teams tomorrow.  I will try to connect with the neurosurgery teams after that consultation to decide on optimal timing for surgery.  If it is urgent, we would have to take cardiac risks if antiplatelet therapy were to be discontinued.  On the other hand it would be ideal to wait about 6 months that would be end of the year before one of the antiplatelet agents is discontinued.  Secondly given significant amount of unrevascularized disease in the left system, I would recommend getting a Lexiscan nuclear stress test to ensure no high risk areas of ischemia.  Having said that if antiplatelet therapy cannot be discontinued going into surgery, revascularization preoperatively may not be an ideal option.    On another note I will add hepatic function panel get an echocardiogram CK levels and lipid panel for evaluation after him being on high intensity statin therapy.  His last evaluation was in June of this year in that regard    We will make final recommendations based on the results of these tests, and my discussion with neurosurgery team.      In the long-term I would have the patient follow-up with an MODE in 3 months and with the patient's primary cardiologist Dr Pacheco.    It was a pleasure seeing this patient in clinic today. Please do not hesitate to contact me with any future questions.     RUDDY Paez, [facility]  Cardiology - [facility] Heart  September 13, 2023    Review of the result(s) of each unique test - Last echo cath ECG CBC lipids personally reviewed     The level of medical decision making during this visit was of moderate complexity.    This note was completed in part using dictation via the Dragon voice recognition software. Some word and grammatical errors may  occur and must be interpreted in the appropriate clinical context.  If there are any questions pertaining to this issue, please contact me for further clarification.

## 2023-09-13 NOTE — LETTER
9/13/2023    Physician No Ref-Primary  No address on file    RE: Adam Sahni       Dear Colleague,     I had the pleasure of seeing Adam Sahni in the [facility].  CARDIOLOGY CLINIC CONSULTATION    PRIMARY CARE PHYSICIAN:  Physician No Ref-Primary    PRIMARY CARDIOLOGIST: Dr Pacheco    HISTORY OF PRESENT ILLNESS:  This is a 63-year-old gentleman who is seeing me as a urgent add-on visit for preoperative evaluation.  I have not seen this patient previously.  The patient was seen by our cardiology team in June of this year when he presented with inferior ST elevation myocardial infarction.  He has a history of peripheral vascular disease including iliac stents previously.  The patient presented with inferior STEMI somewhat delayed presentation.  EF was 45 to 50%.  Coronary angiography revealed RCA occlusion that was stented.  Other 70% lesions in the left-sided system predominantly circumflex and ramus were noted.  These were left to medical management.  Patient had some shortness of breath on Brilinta.  He has been put on dual antiplatelet therapy with aspirin and Plavix.  He takes cilostazol for his PAD.  He is on low-dose of metoprolol to tartrate.    Now the patient was admitted to the hospital in early September for left upper extremity weakness and was noted to have cervical stenosis.  He is scheduled to see neurosurgery at [place] tomorrow.  Apparently he has severe cervical foraminal stenosis on his MRI.  They are potentially planning surgery.  He is on dual antiplatelet therapy.    From a cardiac standpoint, patient denies any typical symptoms of angina however he is functionally not very active due to his neuropathy.  No typical symptoms of angina heart failure syncope or presyncope.    PAST MEDICAL HISTORY:  Past Medical History:   Diagnosis Date    CAD (coronary artery disease)     with UGO to RCA on 6/9/23 for inferior STEMI    Chronic anemia     Depression     Hyperlipidemia      Lumbar radiculopathy     PAD (peripheral artery disease) (H)     S/p bilateral kissing iliac stents after thrombolytics 7/16/21-7/17/21 for severe claudication and complete occlusion of the left common iliac artery with tandem occlusions and/or high grade stenoses of the distal SFA and popliteal artery.    Peripheral neuropathy     Prediabetes     Prostate cancer (H)     s/p ext beam radiation therapy, follows at [place]       MEDICATIONS:  Current Outpatient Medications   Medication    alendronate (FOSAMAX) 70 MG tablet    aspirin (ASA) 81 MG chewable tablet    atorvastatin (LIPITOR) 80 MG tablet    cilostazol (PLETAL) 100 MG tablet    clopidogrel (PLAVIX) 75 MG tablet    ferrous sulfate (FEROSUL) 325 (65 Fe) MG tablet    levETIRAcetam (KEPPRA) 500 MG tablet    metoprolol succinate ER (TOPROL XL) 25 MG 24 hr tablet    pregabalin (LYRICA) 150 MG capsule    sertraline (ZOLOFT) 100 MG tablet    nitroGLYcerin (NITROSTAT) 0.4 MG sublingual tablet     No current facility-administered medications for this visit.       SOCIAL HISTORY:  I have reviewed this patient's social history and updated it with pertinent information if needed. Adam Sahni  reports that he has been smoking cigarettes. He has never used smokeless tobacco. He reports current alcohol use. He reports that he does not use drugs.    PHYSICAL EXAM:  Pulse:  [86] 86  BP: (114)/(77) 114/77  SpO2:  [96 %] 96 %  202 lbs 0 oz    Constitutional: alert, no distress  Respiratory: Good bilateral air entry  Cardiovascular: Normal regular heart sounds no edema  GI: nondistended  Neuropsychiatric: appropriate affact    ASSESSMENT: Pertinent issues addressed/ reviewed during this cardiology visit  Severe multivessel coronary artery disease status post RCA PCI in the setting of inferior STEMI  Severe peripheral vascular disease  Preoperative evaluation  Hyperlipidemia    RECOMMENDATIONS:  At this time the patient is asymptomatic from a cardiac standpoint.  He has  moderate to severe obstructive disease in the left-sided system which has been left to medical management.  Particularly he has a 70% proximal circumflex and ramus lesions.  The patient is only 3 months out after his stenting.  At this time stopping dual antiplatelet therapy after an inferior STEMI would not be ideally recommended unless surgery is urgent and bleeding risk is prohibitive.  The patient is going to meet with neurosurgery teams tomorrow.  I will try to connect with the neurosurgery teams after that consultation to decide on optimal timing for surgery.  If it is urgent, we would have to take cardiac risks if antiplatelet therapy were to be discontinued.  On the other hand it would be ideal to wait about 6 months that would be end of the year before one of the antiplatelet agents is discontinued.  Secondly given significant amount of unrevascularized disease in the left system, I would recommend getting a Lexiscan nuclear stress test to ensure no high risk areas of ischemia.  Having said that if antiplatelet therapy cannot be discontinued going into surgery, revascularization preoperatively may not be an ideal option.    On another note I will add hepatic function panel get an echocardiogram CK levels and lipid panel for evaluation after him being on high intensity statin therapy.  His last evaluation was in June of this year in that regard    We will make final recommendations based on the results of these tests, and my discussion with neurosurgery team.      In the long-term I would have the patient follow-up with an MODE in 3 months and with the patient's primary cardiologist Dr Pacheco.    It was a pleasure seeing this patient in clinic today. Please do not hesitate to contact me with any future questions.     RUDDY Paez, [facility]  Cardiology - [facility] Heart  September 13, 2023    Review of the result(s) of each unique test - Last echo cath ECG CBC lipids personally reviewed     The level of medical  decision making during this visit was of moderate complexity.    This note was completed in part using dictation via the Dragon voice recognition software. Some word and grammatical errors may occur and must be interpreted in the appropriate clinical context.  If there are any questions pertaining to this issue, please contact me for further clarification.      Thank you for allowing me to participate in the care of your patient.      Sincerely,     Seamus Newton MD     [facility]  cc:   Referred Self,

## 2023-09-15 ENCOUNTER — CARE COORDINATION (OUTPATIENT)
Dept: CARDIOLOGY | Facility: CLINIC | Age: 63
End: 2023-09-15
Payer: COMMERCIAL

## 2023-09-15 NOTE — PROGRESS NOTES
Call out to [facility] for update on recommendations from visit on 9/14/23. Pt saw Neurology on 9/14/23 he has not seen Neurosurgery yet. Further imaging was ordered. The nurse I spoke w/put in a note for someone from the care team to call back with more information. She will have them clarify whether pt saw Neurosurgery yet as it is not clear that he did. He may have only seen Neurology yesterday. She did make note that pt had an MI and recent stent placed and is on DAPT. Explained question from Cardiologist is whether surgery is imminent or whether it's something that could wait until patient completes at least 6 months on DAPT. My call back number was provided. They will call back with more information. Vivien Alfred RN on 9/15/2023 at 2:27 PM        Department Care Team Description    09/14/2023 2:30 PM CDT [facility]   [address] [address]   [phone]  Arnoldo Mcfadden, APRN, CNP   [address] [address]   [phone] (Work)        Message  Received: 2 days ago  Seamus Newton MD  P [facility] Team 7  See my note.  I would like to connect with the neurosurgery teams after his appointment tomorrow.    ----- Message -----   From: Seamus Newton MD   Sent: 9/13/2023  12:37 PM CDT   To: Vivien Alfred RN; [facility] Team 7   Subject: RE: question                                     That depends on what neurosurgery wants.   ----- Message -----   From: Vivien Alfred RN   Sent: 9/13/2023  11:42 AM CDT   To: Seamus Newton MD; [facility] Team 7   Subject: question                                         Hi,     Your note also states lexiscan but no order placed nor was it mentioned below. Is plan still to get  lexiscan as well? If so please place order, then will help get arranged.     Thanks!   Vivien   ----- Message -----   From: Seamus Newton MD   Sent: 9/13/2023  10:44 AM CDT   To: [facility]  Heart Team 7     Also JIMMIE this was a tough clinic visit.  I prefer if he set up long-term follow-up with his primary cardiologist.  Please ensure he gets his echocardiogram and labs.

## 2023-09-19 NOTE — PROGRESS NOTES
Received VM from Aleah at [facility] stating the pt has seen Neurology only so far. They have not recommended any procedural interventions at this time. Pt does still need to see Dr. Andrews w/Neurosurgery however. At this time Neurology has no new recommendations affecting Cardiac meds or workup.     Called pt confirmed above. He states he has an appt w/Dr. Andrews in Neurosurgery coming up. He agreed to call us after that visit w/an update on their recommendations. Late entry d/t Epic system down. Vivien Alfred RN on 9/19/2023 at 11:35 AM

## 2023-10-02 ENCOUNTER — TELEPHONE (OUTPATIENT)
Dept: OTHER | Facility: CLINIC | Age: 63
End: 2023-10-02
Payer: COMMERCIAL

## 2023-10-02 NOTE — TELEPHONE ENCOUNTER
"Chart review:  LOV 7/20/23:  \"Has occlusion of both superficial femoral and above-knee popliteal arteries noted on the angiogram. We discussed the possibility of a right femoral to below-knee popliteal bypass graft.\"    Routing to Dr. Gamble/YOHANNES Valentine for discussion and follow up.     MEREDITH Blair, RN  [facility]  Office:  [phone] Fax: [phone]        "

## 2023-10-02 NOTE — TELEPHONE ENCOUNTER
[facility]    Who is the name of the provider?:  Dr Gamble    What is the location you see this provider at/preferred location?: [place]  Person calling / Facility: Adam Sahni  Phone number:  [phone]  Nurse call back needed:  YES     Reason for call:  Patient calling to advise Dr Gamble that he is making efforts to improve his cardiovascular health, (uses eliptical regularly) and is losing weight. With this in mind, patient wondering if previously discussed procedure(s) are possible.     Pharmacy location:  n/a  Outside Imaging: n/a   Can we leave a detailed message on this number?  YES

## 2023-10-03 ENCOUNTER — TELEPHONE (OUTPATIENT)
Dept: CARDIOLOGY | Facility: CLINIC | Age: 63
End: 2023-10-03
Payer: COMMERCIAL

## 2023-10-03 ENCOUNTER — TELEPHONE (OUTPATIENT)
Dept: OTHER | Facility: CLINIC | Age: 63
End: 2023-10-03
Payer: COMMERCIAL

## 2023-10-03 DIAGNOSIS — D64.9 ANEMIA: Primary | ICD-10-CM

## 2023-10-03 NOTE — TELEPHONE ENCOUNTER
Returned call to Juana, GI Coordinator and GI  ph.[phone]. Left detailed VM w/pt cardiac situation and informed her we would like to get an update on whether this is an urgent procedure that is requested; whether pt can continue on DAPT uninterrupted or not. If it must be held, and procedure is urgent, informed her pt would be at high risk for MACE if DAPT is held so  will need to review/advise. Asked she call back and update us. Also asked she update us whether the pt has had more recent labs showing concern for low hgb/rbc as our last Hgb on file shows 12.1 on 9/3/23. Vivien Alfred RN on 10/3/2023 at 1:23 PM

## 2023-10-03 NOTE — TELEPHONE ENCOUNTER
Call out to pt. He reports he is to have an endoscopy tomorrow with an outside organization. The staff from his GI clinic just called him this morning to discuss that he's on DAPT. Pt states he spoke with Vijaya they weren't aware of his recent stent and that he's on DAPT until today. Pt states they told him his stomach may be bleeding and he may have an ulcer d/t really low Hgb so they need to do an upper endoscopy. They advised he call Dr. Newton to discuss whether he can hold Plavix but told him he would need to be rescheduled since he has not held Plavix.     I explained to pt he is to continue DAPT without interruption at this time until Dr. Newton reviews. Informed pt if DAPT is held he would be high risk for in-stent thrombosis and MACE being he is only a few months out from his PCI/stent. Pt confirmed he has not stopped either aspirin or Plavix at this time. Explained to pt we need someone to determine if the GI procedure is needed urgently or not. Based on labs I have available pt's hemoglobin has improved a lot since June. Patient doesn't think anyone has rechecked labs recently. Most recent we have on file 9/3/23 hgb was 12.1. He is unsure what labs the GI clinic is reviewing that has caused them to believe he needed an urgent endoscopy. Hgb has dropped some from prior lab in August. However pt tells me he ran out of his Fe supplement and was off of that for awhile. Pt denies signs/sx of GI bleed. He thinks they may be looking at his old labs from June. Pt reports he has no GI or cardiac concerns at this time.    Told pt I would call his GI clinic to discuss his cardiac status and inform them that he is on DAPT and is s/p PCI/stent < 4 months ago. Pt verbalized he is to continued on his DAPT uninterrupted and the procedure tomorrow will be canceled until one of his providers rechecks labs and determines need for procedure.     I then called out to the GI clinic below. Spoke w/Ana who verbalized  understanding of the situation and passed a messaged on to the nurse Georgi. They are going to cancel the endoscopy tomorrow and call back with more information as to why this was scheduled urgently and if there are updated labs on file.      Update to Dr. Newton for recommendations. Vivien Alfred RN on 10/3/2023 at 11:41 AM        Visit Details    Encounter Start Encounter End   10/4/2023  7:30 AM 10/4/2023  8:00 AM     Providers    Linda Keith MD  Gastroenterology  [address] [place] [address]&&  [facility] [address]     Phone: [phone] / [phone]  Fax: [phone]

## 2023-10-03 NOTE — TELEPHONE ENCOUNTER
Juana called back from [facility]. She states that the GI endoscopy order was ordered in May 2023.     She states the last labs they have on file are from 2/24/23 hgb at that time was 12.6, which was down from 13.0 on previous lab on 11/25/22.     For now, Juana states they have canceled the GI endoscopy per their updated review of records as it doesn't appear the pt needs this done urgently at this time.     Routing back to Dr. Newton for recommendations as to whether pt will need repeat CBC done at some point to assure it's stable. Vivien Alfred RN on 10/3/2023 at 2:53 PM

## 2023-10-03 NOTE — TELEPHONE ENCOUNTER
Pt had cardiac cath with UGO placement on 6/9/23.    Patient is on Plavix and ASA for the above.  Prescription was only refilled by Dr. Gamble, as patient stated he was out and not taking it when he was seen in clinic on 7/20/23.      Spoke with patient and advised him of the above.  Provided the phone number for [facility] and requested he reach out to them to discuss further.  Patient verbalized understanding.    Meme Michael, JOANNN, RN-[facility]

## 2023-10-03 NOTE — TELEPHONE ENCOUNTER
Yes that is fine and can be checked at next MODE visit or primary cardiologist visit in 2-3 months, thanks. Please schedule with primary team for follow up. Thank you.

## 2023-10-03 NOTE — TELEPHONE ENCOUNTER
[facility] Call Center    Phone Message    May a detailed message be left on voicemail: yes     Reason for Call: Medication Question or concern regarding medication   Prescription Clarification  Name of Medication: PLavix  Prescribing Provider: Tye   Pharmacy:    [id number] - [place], [place] - [address] AT [facility] [address]  [id number] - [place], [place] - [address] AT [address] [place] [address]     What on the order needs clarification? Can patient have endoscopy while being on Plavix. Patient is scheduled for endoscopy 10/4/2023. Tye advised patient to call cardiology       Action Taken: Other: cardio    Travel Screening: Not Applicable

## 2023-10-03 NOTE — TELEPHONE ENCOUNTER
[facility]    Who is the name of the provider?:  Dr Gamble    What is the location you see this provider at/preferred location?: [place]  Person calling / Facility: Adam Sahni  Phone number:  [phone]  Nurse call back needed:  YES     Reason for call:  Patient is scheduled for procedure on 10/04/23.   Patient would like to speak to Dr Gamble or RN in regards to the PLAVIX he is prescribed patient was informed that he needs to discuss this medication prior to procedure.    Please contact patient to advise.    Pharmacy location:  n/a  Outside Imaging: n/a   Can we leave a detailed message on this number?  Yes

## 2023-10-05 NOTE — TELEPHONE ENCOUNTER
Reviewed with Dr. Gamble.  He is requesting to see the patient in clinic.    Please schedule patient for an in person return visit with Dr. Gamble at next available.     Appt note:  Next available follow up to 10/3/23 visit with Dr. Gamble; discuss possible lower extremity intervention.     JOANN MartinezN, RN-[facility] [place]

## 2023-10-06 NOTE — TELEPHONE ENCOUNTER
Call out to pt, no answer. Left detailed VM stating Dr. Newton would like him to have an MODE visit in 2-3 months with a CBC lab prior. Pt should also be monitored by PCP. Asked pt call back to confirm and schedule appts. Vivien Alfred RN on 10/6/2023 at 11:46 AM      Seamus Newton MD  You3 days ago     KV  Yes that is fine and can be checked at next MODE visit or primary cardiologist visit in 2-3 months, thanks. Please schedule with primary team for follow up. Thank you.

## 2023-10-19 ENCOUNTER — OFFICE VISIT (OUTPATIENT)
Dept: OTHER | Facility: CLINIC | Age: 63
End: 2023-10-19
Attending: SURGERY
Payer: COMMERCIAL

## 2023-10-19 VITALS — HEART RATE: 67 BPM | DIASTOLIC BLOOD PRESSURE: 72 MMHG | SYSTOLIC BLOOD PRESSURE: 108 MMHG | OXYGEN SATURATION: 97 %

## 2023-10-19 DIAGNOSIS — I73.9 PAD (PERIPHERAL ARTERY DISEASE) (H): Primary | ICD-10-CM

## 2023-10-19 PROCEDURE — 93923 UPR/LXTR ART STDY 3+ LVLS: CPT

## 2023-10-19 PROCEDURE — G0463 HOSPITAL OUTPT CLINIC VISIT: HCPCS | Mod: 25 | Performed by: SURGERY

## 2023-10-19 PROCEDURE — 99214 OFFICE O/P EST MOD 30 MIN: CPT | Performed by: SURGERY

## 2023-10-19 NOTE — PROGRESS NOTES
Patient is here to discuss follow up    /72 (BP Location: Right arm, Patient Position: Chair, Cuff Size: Adult Regular)   Pulse 67   SpO2 97%     Questions patient would like addressed today are: N/A.    Refills are needed: No    Has homecare services and agency name:  Nataliia BARLOW

## 2023-10-19 NOTE — PROGRESS NOTES
[facility]      Adam Sahni returns for vascular follow-up..  History of PAD with recanalization of the occluded right EIA with stenting x3.  Known chronic bilateral SFA above-knee popliteal artery occlusion.    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  Widely patent right profundus with extensive collaterals.  Reconstitution below-knee popliteal artery with three-vessel runoff.  Discussed possibility of right femoral to below-knee popliteal in situ type bypass graft.    With his recent admission he had such a marked left upper extremity arm weakness with stroke but was due to the cervical foraminal stenosis.  Fortunately this improved with IV steroids.  He has a appointment with the neurosurgeons to discuss this further.  Was also found to be anemic with a hemoglobin 9.9 of uncertain etiology.  Scheduled for colonoscopy/EGD but has to cancel this upcoming appointment since this is the same day of the neurosurgery appointment.  Does have chronic numbness to his feet from the lumbar issues.      Exam: Alert and appropriate.  Very talkative.   Walks with a noticeable limp from the neuropathy   Left arm appears to be normal strength presently   Blood pressure 108/72.  Pulse 67.   Right femoral pulses not palpable but strong biphasic bedside Doppler.--Implying patent iliac stent   Monophasic right AT which is unchanged by Doppler consistent with SFA/popliteal disease              Impression: Stable  PAD.  Patient still concerned about surgery but this should wait until his neck issues are dealt with since he does not have limb threatening ischemia.  Also with his coronary stents stopping Plavix leaving for short period of time and his stents were placed last summer.    Thus, I feel we should hold off considering a right femoral to the popliteal bypass.  Closer to the 1 year coronary stent anniversary will have him back where we will repeat the duplex of the iliac arteries to make sure the stent is patent and check his right greater saphenous vein to see if this an appropriate conduit for the bypass procedure.      25 minutes with patient today including review of more recent admission.    Dorian Gamble MD   This note was created using Dragon voice recognition software which may result in transcription errors.

## 2023-10-26 ENCOUNTER — HOSPITAL ENCOUNTER (OUTPATIENT)
Dept: CARDIOLOGY | Facility: CLINIC | Age: 63
Discharge: HOME | End: 2023-10-26
Attending: INTERNAL MEDICINE | Admitting: INTERNAL MEDICINE
Payer: COMMERCIAL

## 2023-10-26 ENCOUNTER — LAB (OUTPATIENT)
Dept: LAB | Facility: CLINIC | Age: 63
End: 2023-10-26
Payer: COMMERCIAL

## 2023-10-26 DIAGNOSIS — E78.5 HYPERLIPIDEMIA WITH TARGET LOW DENSITY LIPOPROTEIN (LDL) CHOLESTEROL LESS THAN 100 MG/DL: ICD-10-CM

## 2023-10-26 DIAGNOSIS — I21.3 ST ELEVATION MYOCARDIAL INFARCTION (STEMI), UNSPECIFIED ARTERY (H): ICD-10-CM

## 2023-10-26 DIAGNOSIS — I73.9 PERIPHERAL ARTERY DISEASE (H): ICD-10-CM

## 2023-10-26 DIAGNOSIS — F17.200 TOBACCO DEPENDENCE: ICD-10-CM

## 2023-10-26 DIAGNOSIS — D64.9 ANEMIA: ICD-10-CM

## 2023-10-26 LAB
ALBUMIN SERPL BCG-MCNC: 4.2 G/DL (ref 3.5–5.2)
ALP SERPL-CCNC: 132 U/L (ref 40–129)
ALT SERPL W P-5'-P-CCNC: 15 U/L (ref 0–70)
AST SERPL W P-5'-P-CCNC: 17 U/L (ref 0–45)
BI-PLANE LVEF ECHO: NORMAL
BILIRUB DIRECT SERPL-MCNC: <0.2 MG/DL (ref 0–0.3)
BILIRUB SERPL-MCNC: 0.3 MG/DL
CHOLEST SERPL-MCNC: 272 MG/DL
CK SERPL-CCNC: 70 U/L (ref 39–308)
ERYTHROCYTE [DISTWIDTH] IN BLOOD BY AUTOMATED COUNT: 15.9 % (ref 10–15)
HCT VFR BLD AUTO: 39.7 % (ref 40–53)
HDLC SERPL-MCNC: 36 MG/DL
HGB BLD-MCNC: 12.8 G/DL (ref 13.3–17.7)
LDLC SERPL CALC-MCNC: 200 MG/DL
MCH RBC QN AUTO: 27.2 PG (ref 26.5–33)
MCHC RBC AUTO-ENTMCNC: 32.2 G/DL (ref 31.5–36.5)
MCV RBC AUTO: 84 FL (ref 78–100)
NONHDLC SERPL-MCNC: 236 MG/DL
PLATELET # BLD AUTO: 213 10E3/UL (ref 150–450)
PROT SERPL-MCNC: 7.5 G/DL (ref 6.4–8.3)
RBC # BLD AUTO: 4.71 10E6/UL (ref 4.4–5.9)
TRIGL SERPL-MCNC: 182 MG/DL
WBC # BLD AUTO: 6.8 10E3/UL (ref 4–11)

## 2023-10-26 PROCEDURE — 93306 TTE W/DOPPLER COMPLETE: CPT | Mod: 26 | Performed by: INTERNAL MEDICINE

## 2023-10-26 PROCEDURE — 36415 COLL VENOUS BLD VENIPUNCTURE: CPT | Performed by: INTERNAL MEDICINE

## 2023-10-26 PROCEDURE — 80076 HEPATIC FUNCTION PANEL: CPT | Performed by: INTERNAL MEDICINE

## 2023-10-26 PROCEDURE — 82550 ASSAY OF CK (CPK): CPT | Performed by: INTERNAL MEDICINE

## 2023-10-26 PROCEDURE — 999N000208 ECHOCARDIOGRAM COMPLETE

## 2023-10-26 PROCEDURE — 80061 LIPID PANEL: CPT | Performed by: INTERNAL MEDICINE

## 2023-10-26 PROCEDURE — 255N000002 HC RX 255 OP 636: Performed by: INTERNAL MEDICINE

## 2023-10-26 PROCEDURE — 85027 COMPLETE CBC AUTOMATED: CPT | Performed by: INTERNAL MEDICINE

## 2023-10-26 RX ADMIN — HUMAN ALBUMIN MICROSPHERES AND PERFLUTREN 9 ML: 10; .22 INJECTION, SOLUTION INTRAVENOUS at 13:48

## 2023-10-27 ENCOUNTER — PRE VISIT (OUTPATIENT)
Dept: NEUROSURGERY | Facility: CLINIC | Age: 63
End: 2023-10-27
Payer: COMMERCIAL

## 2023-10-27 ENCOUNTER — CARE COORDINATION (OUTPATIENT)
Dept: CARDIOLOGY | Facility: CLINIC | Age: 63
End: 2023-10-27
Payer: COMMERCIAL

## 2023-10-27 DIAGNOSIS — E78.5 HYPERLIPIDEMIA WITH TARGET LOW DENSITY LIPOPROTEIN (LDL) CHOLESTEROL LESS THAN 100 MG/DL: ICD-10-CM

## 2023-10-27 DIAGNOSIS — I73.9 PERIPHERAL ARTERY DISEASE (H): ICD-10-CM

## 2023-10-27 DIAGNOSIS — I25.10 CAD (CORONARY ARTERY DISEASE): ICD-10-CM

## 2023-10-27 DIAGNOSIS — I21.3 ST ELEVATION MYOCARDIAL INFARCTION (STEMI), UNSPECIFIED ARTERY (H): Primary | ICD-10-CM

## 2023-10-27 RX ORDER — ATORVASTATIN CALCIUM 80 MG/1
80 TABLET, FILM COATED ORAL DAILY
Qty: 90 TABLET | Refills: 1 | Status: SHIPPED | OUTPATIENT
Start: 2023-10-27 | End: 2023-12-15

## 2023-10-27 NOTE — PROGRESS NOTES
Pt had Echo and labs completed on 10/26/23 following recent OV w/Dr. Newton on 9/13/23.     Pt had Neurosurgery consult on 10/23/23, note in Care Everywhere. It sounds like medical management is recommended. Pt also saw Vascular w/Dr. Gamble on 10/19/23. Recommendation to wait on potential  fem-pop bypass until 1 year anniversary out from coronary stenting. Please review those notes.     Cardiology follow up with MODE scheduled on 12/15/23. Message to pt for update whether he has been compliant with atorvastatin 80 mg daily. Update to Dr. Newton in the meantime. Vivien Alfred RN on 10/27/2023 at 2:48 PM    3:36 PM    Update from pt. It sounds like he ran out of the atorvastatin 80 mg. Unclear how long he has been without. Sent Rx to his pharmacy, pt will resume. Vivien Alfred RN on 10/27/2023 at 3:37 PM

## 2023-10-27 NOTE — CONFIDENTIAL NOTE
NEUROSURGERY- NEW PREVISIT PLANNING       Record Status/Location     Referring Provider     Diagnosis OV Note 10/23/23 PMH of L4-5 HLMD and foraminotomy, left L2-3 HLMD, and L5-S1 TLIF with Dr. Andrews    MRI (HEAD, NECK, SPINE)     CT     X-ray     INJECTION Care Everywhere 6/1/23- caudal epidural steroid injection   4/7/23- Lumbar Epidural Steroid Injection    PHYSICAL THERAPY Na    SURGERY Encounters 4/27/22 - lumbar 5 to Sacral 1 posterior fusion and interbody with bilateral foraminotomies, possible additional levels, L2-3 Microdisckectomy SPINAL FUSION POST LUMBAR W/STEALTH-O-ARM L5-S1 pedicle screw fusion and posterior lumbar interbody fusion with associated bilateral foraminotomies. He is status post a prior foraminotomy on the contralateral side L2-3 hemilaminectomy and microdiskectomy   1/26/21 - LAMINEC/FACETECT/FORAMIN,LUMBAR   LAMINEC/FACETECT/FORAMIN,EACH ADDNL   LUMBAR FORAMINOTOMY

## 2023-10-27 NOTE — CONFIDENTIAL NOTE
Action fax: [phone] By: 10/27   Action Taken MRI and CT requested from [facility] via fax confirmed 3:09

## 2023-10-27 NOTE — CONFIDENTIAL NOTE
Action fax: [phone] By: 10/27 TH   Action Taken MRI and CT requested from [facility] via fax confirmed 3:09    Images not in pacs 10/20 AG    Action Phone [phone]  [facility] 10/30 AG   Action Taken Call for XR done 10/23/23   - Advised need to fax request to [phone] ** SENT     Action [facility] - Re requested at fax number above- 10/30 AG   Action Taken MRI and CT   - CT report sent to  for print and fax to HIM    Action [facility] 11/6   Action Taken Re requested records as patient reports he submitted WILLIAM

## 2023-10-30 ENCOUNTER — DOCUMENTATION ONLY (OUTPATIENT)
Dept: NEUROSURGERY | Facility: CLINIC | Age: 63
End: 2023-10-30
Payer: COMMERCIAL

## 2023-10-30 NOTE — PROGRESS NOTES
Faxed CT Result October 30, 2023 to fax number HIM    Right Fax confirmed at 11:20 AM    Sunni PARRA      Physical Exam

## 2023-10-30 NOTE — TELEPHONE ENCOUNTER
I am surprised that his LDL is actually increased up to 200.  I am not sure if he is taking his atorvastatin.  Or I am not sure if the LDL level was nonfasting or not.  If he is not taking his atorvastatin, please reinitiate therapy as soon as possible.  If he is taking his atorvastatin, then he needs to be evaluated for PCSK9 inhibitors.    Please have him follow-up with primary cardiology team.

## 2023-11-02 ENCOUNTER — MYC MEDICAL ADVICE (OUTPATIENT)
Dept: NEUROSURGERY | Facility: CLINIC | Age: 63
End: 2023-11-02
Payer: COMMERCIAL

## 2023-11-03 ENCOUNTER — TELEPHONE (OUTPATIENT)
Dept: NEUROSURGERY | Facility: CLINIC | Age: 63
End: 2023-11-03
Payer: COMMERCIAL

## 2023-11-03 NOTE — TELEPHONE ENCOUNTER
LVM for patient to call clinic back as soon as possible because the disc he brought in was blank and clinic is unable to retrieve images from [facility] w/o WILLIAM. Patient declined filling out WILLIAM when he dropped off disc, stated he completed one with [facility]. Writer stated on voicemail message that his appointment on Monday will need to be rescheduled.

## 2023-11-10 ENCOUNTER — DOCUMENTATION ONLY (OUTPATIENT)
Dept: NEUROSURGERY | Facility: CLINIC | Age: 63
End: 2023-11-10
Payer: COMMERCIAL

## 2023-11-10 NOTE — PROGRESS NOTES
Faxed Medical Record Release Form November 10, 2023 to fax number HIM    Right Fax confirmed at 9:04 AM    Sunni Luciano

## 2023-11-13 ENCOUNTER — OFFICE VISIT (OUTPATIENT)
Dept: NEUROSURGERY | Facility: CLINIC | Age: 63
End: 2023-11-13
Payer: COMMERCIAL

## 2023-11-13 VITALS — HEART RATE: 82 BPM | SYSTOLIC BLOOD PRESSURE: 101 MMHG | OXYGEN SATURATION: 100 % | DIASTOLIC BLOOD PRESSURE: 71 MMHG

## 2023-11-13 DIAGNOSIS — M54.12 CERVICAL RADICULOPATHY: Primary | ICD-10-CM

## 2023-11-13 PROCEDURE — 99213 OFFICE O/P EST LOW 20 MIN: CPT | Performed by: NEUROLOGICAL SURGERY

## 2023-11-13 ASSESSMENT — PAIN SCALES - GENERAL: PAINLEVEL: MODERATE PAIN (4)

## 2023-11-13 NOTE — LETTER
11/13/2023         RE: Adam Sahni  [address]   [facility] [address]        Dear Colleague,    Thank you for referring your patient, Adam Sahni, to the [facility]. Please see a copy of my visit note below.    63 year old male with cervical radiculopathy.  Was admitted to the hospital last week with acute onset left arm pain and weakness.  Underwent work-up, including for possible stroke, which revealed cervical spondylosis and stenosis contributing to radiculopathy.  Was started on steroids and improved.  Symptoms substantially better currently.  Patient underwent Cardiac stent placement in June.  MR Cervical, personally reviewed, with severe left C6-7 foraminal stenosis, moderate C5-6 central/right foraminal stenosis and left C4-5 foraminal stenosis.    Returns for follow up.  Continued improvement in his radicular arm symptoms.  He does note popping sensation in his neck and with rotation and axial neck pain.  He also describes persistent residual low back pain and leg paresthesias after his previous lumbar surgery.       Past Medical History:   Diagnosis Date     CAD (coronary artery disease)     with UGO to RCA on 6/9/23 for inferior STEMI     Chronic anemia      Depression      Hyperlipidemia      Lumbar radiculopathy      PAD (peripheral artery disease) (H24)     S/p bilateral kissing iliac stents after thrombolytics 7/16/21-7/17/21 for severe claudication and complete occlusion of the left common iliac artery with tandem occlusions and/or high grade stenoses of the distal SFA and popliteal artery.     Peripheral neuropathy      Prediabetes      Prostate cancer (H)     s/p ext beam radiation therapy, follows at [place]     Past Surgical History:   Procedure Laterality Date     CV CORONARY ANGIOGRAM N/A 6/9/2023    Procedure: Coronary Angiogram;  Surgeon: Rosenda Pacheco MD;  Location:  HEART CARDIAC CATH LAB     IR LOWER EXTREMITY ANGIOGRAM RIGHT   6/18/2023     Social History     Socioeconomic History     Marital status: Single     Spouse name: Not on file     Number of children: Not on file     Years of education: Not on file     Highest education level: Not on file   Occupational History     Not on file   Tobacco Use     Smoking status: Some Days     Packs/day: .2     Types: Cigarettes     Smokeless tobacco: Never     Tobacco comments:     2 cigs a day   Substance and Sexual Activity     Alcohol use: Yes     Comment: nightly     Drug use: Never     Sexual activity: Not on file   Other Topics Concern     Not on file   Social History Narrative     Not on file     Social Determinants of Health     Financial Resource Strain: Not on file   Food Insecurity: Not on file   Transportation Needs: Not on file   Physical Activity: Not on file   Stress: Not on file   Social Connections: Not on file   Interpersonal Safety: Not on file   Housing Stability: Not on file     No family history on file.     ROS: 10 point ROS neg other than the symptoms noted above in the HPI.    Physical Exam  /71   Pulse 82   SpO2 100%   HEENT:  Normocephalic, atraumatic.  PERRLA.  EOM s intact.  Visual fields full to gross exam  Neck:  Supple, non-tender, without lymphadenopathy.  Heart:  No peripheral edema  Lungs:  No SOB  Abdomen:  Non-distended.   Skin:  Warm and dry.  Extremities:  No edema, cyanosis or clubbing.  Psychiatric:  No apparent distress  Musculoskeletal:  Normal bulk and tone    NEUROLOGICAL EXAMINATION:     Mental status:  Alert and Oriented x 3, speech is fluent.  Cranial nerves:  II-XII intact.   Motor:    Shoulder Abduction:  Right:  5/5   Left:  5/5  Biceps:                      Right:  5/5   Left:  5/5  Triceps:                     Right:  5/5   Left:  5/5  Wrist Extensors:       Right:  5/5   Left:  5/5  Wrist Flexors:           Right:  5/5   Left:  5/5  interosseus :            Right:  5/5   Left:  5/5  Hip Flexion:                Right: 5/5  Left:   5/5  Quadriceps:             Right:  5/5  Left:  5/5  Hamstrings:             Right:  5/5  Left:  5/5  Gastroc Soleus:        Right:  5/5  Left:  5/5  Tib/Ant:                      Right:  4/5  Left:  5/5  EHL:                     Right:  4/5  Left:  5/5  Sensation:  Intact  Reflexes:  Negative Babinski.  Negative Clonus.  Negative Menendez's.  Coordination:  Smooth finger to nose testing.   Negative pronator drift.  Smooth tandem walking.    A/P:  Agree with plan for upcoming SCS trial for lumbar symptoms  He noted that if this does not work, he will likely plan to travel to [facility] to pursue stem cell injections          Again, thank you for allowing me to participate in the care of your patient.        Sincerely,        Humberto Lizarraga MD

## 2023-11-13 NOTE — NURSING NOTE
"dAam Sahni is a 63 year old male who presents for:  Chief Complaint   Patient presents with    RECHECK     Paralysis in cervical region -         Initial Vitals:  /71   Pulse 82   SpO2 100%  Estimated body mass index is 28.98 kg/m  as calculated from the following:    Height as of 9/13/23: 5' 10\" (1.778 m).    Weight as of 9/13/23: 202 lb (91.6 kg).. There is no height or weight on file to calculate BSA. BP completed using cuff size: regular  Moderate Pain (4)    Nursing Comments:       Apoorva Bundy    "

## 2023-11-14 NOTE — PROGRESS NOTES
63 year old male with cervical radiculopathy.  Was admitted to the hospital last week with acute onset left arm pain and weakness.  Underwent work-up, including for possible stroke, which revealed cervical spondylosis and stenosis contributing to radiculopathy.  Was started on steroids and improved.  Symptoms substantially better currently.  Patient underwent Cardiac stent placement in June.  MR Carole, personally reviewed, with severe left C6-7 foraminal stenosis, moderate C5-6 central/right foraminal stenosis and left C4-5 foraminal stenosis.    Returns for follow up.  Continued improvement in his radicular arm symptoms.  He does note popping sensation in his neck and with rotation and axial neck pain.  He also describes persistent residual low back pain and leg paresthesias after his previous lumbar surgery.       Past Medical History:   Diagnosis Date    CAD (coronary artery disease)     with UGO to RCA on 6/9/23 for inferior STEMI    Chronic anemia     Depression     Hyperlipidemia     Lumbar radiculopathy     PAD (peripheral artery disease) (H24)     S/p bilateral kissing iliac stents after thrombolytics 7/16/21-7/17/21 for severe claudication and complete occlusion of the left common iliac artery with tandem occlusions and/or high grade stenoses of the distal SFA and popliteal artery.    Peripheral neuropathy     Prediabetes     Prostate cancer (H)     s/p ext beam radiation therapy, follows at [place]     Past Surgical History:   Procedure Laterality Date    CV CORONARY ANGIOGRAM N/A 6/9/2023    Procedure: Coronary Angiogram;  Surgeon: Rosenda Pacheco MD;  Location:  HEART CARDIAC CATH LAB    IR LOWER EXTREMITY ANGIOGRAM RIGHT  6/18/2023     Social History     Socioeconomic History    Marital status: Single     Spouse name: Not on file    Number of children: Not on file    Years of education: Not on file    Highest education level: Not on file   Occupational History    Not on file   Tobacco Use    Smoking  status: Some Days     Packs/day: .2     Types: Cigarettes    Smokeless tobacco: Never    Tobacco comments:     2 cigs a day   Substance and Sexual Activity    Alcohol use: Yes     Comment: nightly    Drug use: Never    Sexual activity: Not on file   Other Topics Concern    Not on file   Social History Narrative    Not on file     Social Determinants of Health     Financial Resource Strain: Not on file   Food Insecurity: Not on file   Transportation Needs: Not on file   Physical Activity: Not on file   Stress: Not on file   Social Connections: Not on file   Interpersonal Safety: Not on file   Housing Stability: Not on file     No family history on file.     ROS: 10 point ROS neg other than the symptoms noted above in the HPI.    Physical Exam  /71   Pulse 82   SpO2 100%   HEENT:  Normocephalic, atraumatic.  PERRLA.  EOM s intact.  Visual fields full to gross exam  Neck:  Supple, non-tender, without lymphadenopathy.  Heart:  No peripheral edema  Lungs:  No SOB  Abdomen:  Non-distended.   Skin:  Warm and dry.  Extremities:  No edema, cyanosis or clubbing.  Psychiatric:  No apparent distress  Musculoskeletal:  Normal bulk and tone    NEUROLOGICAL EXAMINATION:     Mental status:  Alert and Oriented x 3, speech is fluent.  Cranial nerves:  II-XII intact.   Motor:    Shoulder Abduction:  Right:  5/5   Left:  5/5  Biceps:                      Right:  5/5   Left:  5/5  Triceps:                     Right:  5/5   Left:  5/5  Wrist Extensors:       Right:  5/5   Left:  5/5  Wrist Flexors:           Right:  5/5   Left:  5/5  interosseus :            Right:  5/5   Left:  5/5  Hip Flexion:                Right: 5/5  Left:  5/5  Quadriceps:             Right:  5/5  Left:  5/5  Hamstrings:             Right:  5/5  Left:  5/5  Gastroc Soleus:        Right:  5/5  Left:  5/5  Tib/Ant:                      Right:  4/5  Left:  5/5  EHL:                     Right:  4/5  Left:  5/5  Sensation:  Intact  Reflexes:  Negative Babinski.   Negative Clonus.  Negative Menendez's.  Coordination:  Smooth finger to nose testing.   Negative pronator drift.  Smooth tandem walking.    A/P:  Agree with plan for upcoming SCS trial for lumbar symptoms  He noted that if this does not work, he will likely plan to travel to [facility] to pursue stem cell injections

## 2023-11-15 ENCOUNTER — MYC MEDICAL ADVICE (OUTPATIENT)
Dept: NEUROSURGERY | Facility: CLINIC | Age: 63
End: 2023-11-15
Payer: COMMERCIAL

## 2023-11-21 NOTE — TELEPHONE ENCOUNTER
Message sent to request Lumbar MRI report/images    Addendum 11/27/23: Lumbar MRI images/report from [facility] received and in system.    Reviewed by Dr Lizarraga. No other recommendations than the already planned SCS with [facility] in December. No need for appt to discuss.     Attempted to reach out to patient, no answer. Left voice message for them to call clinic back to further discuss.

## 2023-12-14 NOTE — PROGRESS NOTES
~Cardiology Clinic Visit~    Primary Cardiologist: Dr. Pacheco  Reason for visit: testing review    History of Present Illness    Adam Sahni is a very pleasant 63 year old male with a past medical history notable for coronary artery disease with STEMI in 06/2023, peripheral vascular disease including previous iliac stenting.    In summary, patient was seen in September 2023 for preoperative cardiac evaluation.  Prior to that, he was evaluated by our cardiology team in June 2023 when he presented with anterior ST elevation MI.  He had a delayed presentation STEMI and EF at the time was about 45-50%.    Coronary angiogram demonstrated occluded RCA which was stented.  There were other 70% lesions noted on the left-sided system, predominantly of the circumflex and ramus.  These were left to medical management.    Following stenting, patient had shortness of breath on Brilinta and was transitioned to Plavix in addition to aspirin.  He takes cilostazol for his PAD.  He is also on low-dose metoprolol tartrate.    He was admitted to the hospital in early September for L UE weakness and was noted to have cervical stenosis.  There is severe cervical foraminal stenosis based on MRI imaging.  Goal is for surgical intervention.  Again note, patient is on DAPT following coronary stenting in June of this year.  From a cardiac standpoint, patient has no typical symptoms of angina.  However, he is not very functionally active due to his neuropathy.  He has no typical symptoms of angina, heart failure, syncope or presyncope.    Diagnostics:  10/26/2023 echocardiogram: Biplane LVEF 51% with inferior and inferolateral hypokinesis.  The EF is slightly improved on direct comparison to prior study.  Wall motion abnormalities are in the same coronary artery distribution areas.  10/26/2023 labs:  FLP markedly uncontrolled with , HDL 36, , .  AST 17, CK 70  CBC with stable hemoglobin at 12.8    Since his  last visit, patient met with neurosurgery on 10/23/2023.  This is through care everywhere.  At this time, medical management is recommended.  He also saw Dr. Mitchell with vascular on 10/19/2023.  He was recommended waiting on a potential femoropopliteal bypass until we are at 1 year post coronary stenting.      Interval 12/15/23    Sounds like he was not taking his Atorvastatin for some time, and this was recently restarted.  He is not compliant with his current medication regimen.  He has no new cardiovascular symptoms or complaints described today.Patient denies roxy CP, SOB, FISHER, orthopnea, PND, LH or dizziness, pre-syncope, palpitations, edema or swelling.  __________________________________________________________________         Assessment and Impression:     Severe multivessel coronary artery disease   S/p RCA PCI in the setting of inferior STEMI  Severe peripheral vascular disease  Preoperative evaluation  Hyperlipidemia         Recommendations and Plan:     For now, continue atorvastatin 80 mg daily.  Recheck FLP in approximately 2 to 3 months.  If at that point, his LDL remains suboptimized, can consider adding additional agent such as Zetia or PCSK9 inhibitors pending lab results.  At this time the patient is asymptomatic from a cardiac standpoint.  Continue DAPT uninterrupted until at least June 2024.  Complete Lexiscan stress test in June, and follow-up with Dr. Rey at that time for testing review, routine reassessment, and cardiology clearance prior to possible neurosurgery and vascular surgery.  __________________________________________________________________    Thank you for the opportunity to participate in this pleasant patient's care.    We would be happy to see this patient sooner for any concerns in the meantime.    Fartun Klein APRN, CNP   Nurse Practitioner  [facility]    Today's clinic visit entailed:  Notes and testing reviewed from care everywhere  Review of  the result(s) of each unique test - cardiac testing and imaging, labs  The following tests were independently interpreted by me as noted in my documentation: Labs, echocardiogram  Ordering of each unique test  Prescription drug management  The level of medical decision making during this visit was of moderate complexity.    Orders this Visit:  Orders Placed This Encounter   Procedures    Lipid panel reflex to direct LDL Fasting    Follow-Up with Cardiology     Orders Placed This Encounter   Medications    atorvastatin (LIPITOR) 80 MG tablet     Sig: Take 1 tablet (80 mg) by mouth daily     Dispense:  90 tablet     Refill:  3    clopidogrel (PLAVIX) 75 MG tablet     Sig: Take 1 tablet (75 mg) by mouth daily     Dispense:  90 tablet     Refill:  2    metoprolol succinate ER (TOPROL XL) 25 MG 24 hr tablet     Sig: Take 1 tablet (25 mg) by mouth daily     Dispense:  90 tablet     Refill:  3     Medications Discontinued During This Encounter   Medication Reason    clopidogrel (PLAVIX) 75 MG tablet Reorder (No AVS)    metoprolol succinate ER (TOPROL XL) 25 MG 24 hr tablet Reorder (No AVS)    atorvastatin (LIPITOR) 80 MG tablet Reorder (No AVS)     Encounter Diagnoses   Name Primary?    ST elevation myocardial infarction (STEMI), unspecified artery (H)     Tobacco dependence     Hyperlipidemia with target low density lipoprotein (LDL) cholesterol less than 100 mg/dL     Coronary artery disease involving native coronary artery of native heart without angina pectoris Yes    Peripheral artery disease (H24)     PAD (peripheral artery disease) (H24)      CURRENT MEDICATIONS:  Current Outpatient Medications   Medication Sig Dispense Refill    alendronate (FOSAMAX) 70 MG tablet Take 70 mg by mouth every 7 days      aspirin (ASA) 81 MG chewable tablet Take 1 tablet (81 mg) by mouth daily 30 tablet 3    atorvastatin (LIPITOR) 80 MG tablet Take 1 tablet (80 mg) by mouth daily 90 tablet 3    cilostazol (PLETAL) 100 MG tablet Take 100  "mg by mouth 2 times daily      clopidogrel (PLAVIX) 75 MG tablet Take 1 tablet (75 mg) by mouth daily 90 tablet 2    ferrous sulfate (FEROSUL) 325 (65 Fe) MG tablet Take 325 mg by mouth every other day      levETIRAcetam (KEPPRA) 500 MG tablet Take 500 mg by mouth as needed      metoprolol succinate ER (TOPROL XL) 25 MG 24 hr tablet Take 1 tablet (25 mg) by mouth daily 90 tablet 3    pregabalin (LYRICA) 150 MG capsule Take 1 capsule (150 mg) by mouth 2 times daily 30 capsule 1    sertraline (ZOLOFT) 100 MG tablet Take 200 mg by mouth daily      nitroGLYcerin (NITROSTAT) 0.4 MG sublingual tablet For chest pain place 1 tablet under the tongue every 5 minutes for 3 doses. If symptoms persist 5 minutes after 1st dose call 911. (Patient not taking: Reported on 12/15/2023) 25 tablet 0     ALLERGIES     Allergies   Allergen Reactions    Penicillins Hives, Shortness Of Breath and Swelling     Reports also having throat swelling- took medication as a child.       PAST MEDICAL, SURGICAL, FAMILY, SOCIAL HISTORY:  History was reviewed and updated as needed, see medical record.    Review of Systems:  A 10-point Review Of Systems is otherwise normal except for that which is noted in the HPI and interval summary.    Physical Exam:    Vitals: /80   Pulse 92   Ht 1.753 m (5' 9\")   Wt 90.7 kg (200 lb)   SpO2 97%   BMI 29.53 kg/m    Constitutional: Appears stated age, well nourished, NAD.  Neck: Supple. Carotid pulses full and equal.   Respiratory: Non-labored. Lungs CTAB.  Cardiovascular: RRR, normal S1 and S2. No M/G/R.  Trace edema.  GI: Soft, non-distended, non-tender.  Skin: Warm and dry.   Musculoskeletal/Extremities: Symmetrical movement to all extremities.  Neurologic: No gross focal deficits. Alert, awake, and oriented to all spheres.  Psychiatric: Affect appropriate. Mentation normal.    Recent Lab Results:  LIPID RESULTS:  Lab Results   Component Value Date    CHOL 272 (H) 10/26/2023    HDL 36 (L) 10/26/2023    "  (H) 10/26/2023    TRIG 182 (H) 10/26/2023     LIVER ENZYME RESULTS:  Lab Results   Component Value Date    AST 17 10/26/2023    ALT 15 10/26/2023     CBC RESULTS:  Lab Results   Component Value Date    WBC 6.8 10/26/2023    WBC 8.2 06/19/2021    RBC 4.71 10/26/2023    RBC 4.21 (L) 06/19/2021    HGB 12.8 (L) 10/26/2023    HGB 12.7 (L) 06/19/2021    HCT 39.7 (L) 10/26/2023    HCT 38.3 (L) 06/19/2021    MCV 84 10/26/2023    MCV 91 06/19/2021    MCH 27.2 10/26/2023    MCH 30.2 06/19/2021    MCHC 32.2 10/26/2023    MCHC 33.2 06/19/2021    RDW 15.9 (H) 10/26/2023    RDW 14.3 06/19/2021     10/26/2023     06/19/2021     BMP RESULTS:  Lab Results   Component Value Date     09/03/2023     06/19/2021    POTASSIUM 4.6 09/03/2023    POTASSIUM 3.9 07/21/2021    POTASSIUM 4.1 06/19/2021    CHLORIDE 102 09/03/2023    CHLORIDE 105 07/21/2021    CHLORIDE 104 06/19/2021    CO2 23 09/03/2023    CO2 29 07/21/2021    CO2 25 06/19/2021    ANIONGAP 12 09/03/2023    ANIONGAP 5 07/21/2021    ANIONGAP 9 06/19/2021     (H) 09/03/2023     (H) 07/21/2021    GLC 94 06/19/2021    BUN 18.4 09/03/2023    BUN 15 07/21/2021    BUN 18 06/19/2021    CR 0.94 09/03/2023    CR 0.82 06/19/2021    GFRESTIMATED >90 09/03/2023    GFRESTIMATED >90 06/19/2021    GFRESTBLACK >90 06/19/2021    OJ 9.6 09/03/2023    OJ 9.0 06/19/2021      A1C RESULTS:  Lab Results   Component Value Date    A1C 5.7 (H) 06/09/2023     INR RESULTS:  Lab Results   Component Value Date    INR 0.98 09/02/2023    INR 0.89 08/08/2023

## 2023-12-15 ENCOUNTER — OFFICE VISIT (OUTPATIENT)
Dept: CARDIOLOGY | Facility: CLINIC | Age: 63
End: 2023-12-15
Attending: INTERNAL MEDICINE
Payer: COMMERCIAL

## 2023-12-15 VITALS
BODY MASS INDEX: 29.62 KG/M2 | DIASTOLIC BLOOD PRESSURE: 80 MMHG | HEIGHT: 69 IN | HEART RATE: 92 BPM | SYSTOLIC BLOOD PRESSURE: 120 MMHG | WEIGHT: 200 LBS | OXYGEN SATURATION: 97 %

## 2023-12-15 DIAGNOSIS — E78.5 HYPERLIPIDEMIA WITH TARGET LOW DENSITY LIPOPROTEIN (LDL) CHOLESTEROL LESS THAN 100 MG/DL: ICD-10-CM

## 2023-12-15 DIAGNOSIS — I73.9 PAD (PERIPHERAL ARTERY DISEASE) (H): ICD-10-CM

## 2023-12-15 DIAGNOSIS — I73.9 PERIPHERAL ARTERY DISEASE (H): ICD-10-CM

## 2023-12-15 DIAGNOSIS — I21.3 ST ELEVATION MYOCARDIAL INFARCTION (STEMI), UNSPECIFIED ARTERY (H): ICD-10-CM

## 2023-12-15 DIAGNOSIS — F17.200 TOBACCO DEPENDENCE: ICD-10-CM

## 2023-12-15 DIAGNOSIS — I25.10 CORONARY ARTERY DISEASE INVOLVING NATIVE CORONARY ARTERY OF NATIVE HEART WITHOUT ANGINA PECTORIS: Primary | ICD-10-CM

## 2023-12-15 PROCEDURE — 99214 OFFICE O/P EST MOD 30 MIN: CPT | Performed by: NURSE PRACTITIONER

## 2023-12-15 RX ORDER — CLOPIDOGREL BISULFATE 75 MG/1
75 TABLET ORAL DAILY
Qty: 90 TABLET | Refills: 2 | Status: SHIPPED | OUTPATIENT
Start: 2023-12-15 | End: 2024-09-16

## 2023-12-15 RX ORDER — METOPROLOL SUCCINATE 25 MG/1
25 TABLET, EXTENDED RELEASE ORAL DAILY
Qty: 90 TABLET | Refills: 3 | Status: SHIPPED | OUTPATIENT
Start: 2023-12-15

## 2023-12-15 RX ORDER — ATORVASTATIN CALCIUM 80 MG/1
80 TABLET, FILM COATED ORAL DAILY
Qty: 90 TABLET | Refills: 3 | Status: SHIPPED | OUTPATIENT
Start: 2023-12-15

## 2023-12-15 NOTE — LETTER
12/15/2023    Physician No Ref-Primary  No address on file    RE: Adam Sahni       Dear Colleague,     I had the pleasure of seeing Adam Sahni in the [facility].              ~Cardiology Clinic Visit~    Primary Cardiologist: Dr. Pacheco  Reason for visit: testing review    History of Present Illness    Adam Sahni is a very pleasant 63 year old male with a past medical history notable for coronary artery disease with STEMI in 06/2023, peripheral vascular disease including previous iliac stenting.    In summary, patient was seen in September 2023 for preoperative cardiac evaluation.  Prior to that, he was evaluated by our cardiology team in June 2023 when he presented with anterior ST elevation MI.  He had a delayed presentation STEMI and EF at the time was about 45-50%.    Coronary angiogram demonstrated occluded RCA which was stented.  There were other 70% lesions noted on the left-sided system, predominantly of the circumflex and ramus.  These were left to medical management.    Following stenting, patient had shortness of breath on Brilinta and was transitioned to Plavix in addition to aspirin.  He takes cilostazol for his PAD.  He is also on low-dose metoprolol tartrate.    He was admitted to the hospital in early September for L UE weakness and was noted to have cervical stenosis.  There is severe cervical foraminal stenosis based on MRI imaging.  Goal is for surgical intervention.  Again note, patient is on DAPT following coronary stenting in June of this year.  From a cardiac standpoint, patient has no typical symptoms of angina.  However, he is not very functionally active due to his neuropathy.  He has no typical symptoms of angina, heart failure, syncope or presyncope.    Diagnostics:  10/26/2023 echocardiogram: Biplane LVEF 51% with inferior and inferolateral hypokinesis.  The EF is slightly improved on direct comparison to prior study.  Wall motion abnormalities are in  the same coronary artery distribution areas.  10/26/2023 labs:  FLP markedly uncontrolled with , HDL 36, , .  AST 17, CK 70  CBC with stable hemoglobin at 12.8    Since his last visit, patient met with neurosurgery on 10/23/2023.  This is through care everywhere.  At this time, medical management is recommended.  He also saw Dr. Mitchell with vascular on 10/19/2023.  He was recommended waiting on a potential femoropopliteal bypass until we are at 1 year post coronary stenting.      Interval 12/15/23    Sounds like he was not taking his Atorvastatin for some time, and this was recently restarted.  He is not compliant with his current medication regimen.  He has no new cardiovascular symptoms or complaints described today.Patient denies roxy CP, SOB, FISHER, orthopnea, PND, LH or dizziness, pre-syncope, palpitations, edema or swelling.  __________________________________________________________________         Assessment and Impression:     Severe multivessel coronary artery disease   S/p RCA PCI in the setting of inferior STEMI  Severe peripheral vascular disease  Preoperative evaluation  Hyperlipidemia         Recommendations and Plan:     For now, continue atorvastatin 80 mg daily.  Recheck FLP in approximately 2 to 3 months.  If at that point, his LDL remains suboptimized, can consider adding additional agent such as Zetia or PCSK9 inhibitors pending lab results.  At this time the patient is asymptomatic from a cardiac standpoint.  Continue DAPT uninterrupted until at least June 2024.  Complete Lexiscan stress test in June, and follow-up with Dr. Rey at that time for testing review, routine reassessment, and cardiology clearance prior to possible neurosurgery and vascular surgery.  __________________________________________________________________    Thank you for the opportunity to participate in this pleasant patient's care.    We would be happy to see this patient sooner for any concerns in the  meantime.    FIDEL Timmons, CNP   Nurse Practitioner  [facility]    Today's clinic visit entailed:  Notes and testing reviewed from care everywhere  Review of the result(s) of each unique test - cardiac testing and imaging, labs  The following tests were independently interpreted by me as noted in my documentation: Labs, echocardiogram  Ordering of each unique test  Prescription drug management  The level of medical decision making during this visit was of moderate complexity.    Orders this Visit:  Orders Placed This Encounter   Procedures    Lipid panel reflex to direct LDL Fasting    Follow-Up with Cardiology     Orders Placed This Encounter   Medications    atorvastatin (LIPITOR) 80 MG tablet     Sig: Take 1 tablet (80 mg) by mouth daily     Dispense:  90 tablet     Refill:  3    clopidogrel (PLAVIX) 75 MG tablet     Sig: Take 1 tablet (75 mg) by mouth daily     Dispense:  90 tablet     Refill:  2    metoprolol succinate ER (TOPROL XL) 25 MG 24 hr tablet     Sig: Take 1 tablet (25 mg) by mouth daily     Dispense:  90 tablet     Refill:  3     Medications Discontinued During This Encounter   Medication Reason    clopidogrel (PLAVIX) 75 MG tablet Reorder (No AVS)    metoprolol succinate ER (TOPROL XL) 25 MG 24 hr tablet Reorder (No AVS)    atorvastatin (LIPITOR) 80 MG tablet Reorder (No AVS)     Encounter Diagnoses   Name Primary?    ST elevation myocardial infarction (STEMI), unspecified artery (H)     Tobacco dependence     Hyperlipidemia with target low density lipoprotein (LDL) cholesterol less than 100 mg/dL     Coronary artery disease involving native coronary artery of native heart without angina pectoris Yes    Peripheral artery disease (H24)     PAD (peripheral artery disease) (H24)      CURRENT MEDICATIONS:  Current Outpatient Medications   Medication Sig Dispense Refill    alendronate (FOSAMAX) 70 MG tablet Take 70 mg by mouth every 7 days      aspirin (ASA) 81 MG chewable  "tablet Take 1 tablet (81 mg) by mouth daily 30 tablet 3    atorvastatin (LIPITOR) 80 MG tablet Take 1 tablet (80 mg) by mouth daily 90 tablet 3    cilostazol (PLETAL) 100 MG tablet Take 100 mg by mouth 2 times daily      clopidogrel (PLAVIX) 75 MG tablet Take 1 tablet (75 mg) by mouth daily 90 tablet 2    ferrous sulfate (FEROSUL) 325 (65 Fe) MG tablet Take 325 mg by mouth every other day      levETIRAcetam (KEPPRA) 500 MG tablet Take 500 mg by mouth as needed      metoprolol succinate ER (TOPROL XL) 25 MG 24 hr tablet Take 1 tablet (25 mg) by mouth daily 90 tablet 3    pregabalin (LYRICA) 150 MG capsule Take 1 capsule (150 mg) by mouth 2 times daily 30 capsule 1    sertraline (ZOLOFT) 100 MG tablet Take 200 mg by mouth daily      nitroGLYcerin (NITROSTAT) 0.4 MG sublingual tablet For chest pain place 1 tablet under the tongue every 5 minutes for 3 doses. If symptoms persist 5 minutes after 1st dose call 911. (Patient not taking: Reported on 12/15/2023) 25 tablet 0     ALLERGIES     Allergies   Allergen Reactions    Penicillins Hives, Shortness Of Breath and Swelling     Reports also having throat swelling- took medication as a child.       PAST MEDICAL, SURGICAL, FAMILY, SOCIAL HISTORY:  History was reviewed and updated as needed, see medical record.    Review of Systems:  A 10-point Review Of Systems is otherwise normal except for that which is noted in the HPI and interval summary.    Physical Exam:    Vitals: /80   Pulse 92   Ht 1.753 m (5' 9\")   Wt 90.7 kg (200 lb)   SpO2 97%   BMI 29.53 kg/m    Constitutional: Appears stated age, well nourished, NAD.  Neck: Supple. Carotid pulses full and equal.   Respiratory: Non-labored. Lungs CTAB.  Cardiovascular: RRR, normal S1 and S2. No M/G/R.  Trace edema.  GI: Soft, non-distended, non-tender.  Skin: Warm and dry.   Musculoskeletal/Extremities: Symmetrical movement to all extremities.  Neurologic: No gross focal deficits. Alert, awake, and oriented to all " spheres.  Psychiatric: Affect appropriate. Mentation normal.    Recent Lab Results:  LIPID RESULTS:  Lab Results   Component Value Date    CHOL 272 (H) 10/26/2023    HDL 36 (L) 10/26/2023     (H) 10/26/2023    TRIG 182 (H) 10/26/2023     LIVER ENZYME RESULTS:  Lab Results   Component Value Date    AST 17 10/26/2023    ALT 15 10/26/2023     CBC RESULTS:  Lab Results   Component Value Date    WBC 6.8 10/26/2023    WBC 8.2 06/19/2021    RBC 4.71 10/26/2023    RBC 4.21 (L) 06/19/2021    HGB 12.8 (L) 10/26/2023    HGB 12.7 (L) 06/19/2021    HCT 39.7 (L) 10/26/2023    HCT 38.3 (L) 06/19/2021    MCV 84 10/26/2023    MCV 91 06/19/2021    MCH 27.2 10/26/2023    MCH 30.2 06/19/2021    MCHC 32.2 10/26/2023    MCHC 33.2 06/19/2021    RDW 15.9 (H) 10/26/2023    RDW 14.3 06/19/2021     10/26/2023     06/19/2021     BMP RESULTS:  Lab Results   Component Value Date     09/03/2023     06/19/2021    POTASSIUM 4.6 09/03/2023    POTASSIUM 3.9 07/21/2021    POTASSIUM 4.1 06/19/2021    CHLORIDE 102 09/03/2023    CHLORIDE 105 07/21/2021    CHLORIDE 104 06/19/2021    CO2 23 09/03/2023    CO2 29 07/21/2021    CO2 25 06/19/2021    ANIONGAP 12 09/03/2023    ANIONGAP 5 07/21/2021    ANIONGAP 9 06/19/2021     (H) 09/03/2023     (H) 07/21/2021    GLC 94 06/19/2021    BUN 18.4 09/03/2023    BUN 15 07/21/2021    BUN 18 06/19/2021    CR 0.94 09/03/2023    CR 0.82 06/19/2021    GFRESTIMATED >90 09/03/2023    GFRESTIMATED >90 06/19/2021    GFRESTBLACK >90 06/19/2021    OJ 9.6 09/03/2023    OJ 9.0 06/19/2021      A1C RESULTS:  Lab Results   Component Value Date    A1C 5.7 (H) 06/09/2023     INR RESULTS:  Lab Results   Component Value Date    INR 0.98 09/02/2023    INR 0.89 08/08/2023         Thank you for allowing me to participate in the care of your patient.      Sincerely,     FIDEL Timmons [facility]  cc:   Seamus Newton,  MD  [address]  [place] [id number]

## 2023-12-15 NOTE — PATIENT INSTRUCTIONS
Thank you for your visit with the [facility] today.    Today's Summary     Increase Toprol XL to 25 mg (1 tablet) daily.  Continue all your other cardiac medications.  You need to take Aspirin and Plavix until at least June 2024.  Do not stop either of these medications without talking to cardiology first.  Recheck cholesterol levels in February.  Complete a stress test in June 2024.  Follow up in June 2024 with Dr. Newton or Dr. Pacheco  Please call 6-months in advance to schedule your appointment.    If you have questions or concerns, please do not hesitate to call my nursing support team at [phone].    Scheduling phone number: [phone]  [facility] Clinic Number: [phone]    It was a pleasure seeing you today.     FIDEL Timmons, CNP  Nurse Practitioner  [facility]  December 15, 2023  ________________________________________________________

## 2024-02-13 ENCOUNTER — MYC MEDICAL ADVICE (OUTPATIENT)
Dept: OTHER | Facility: CLINIC | Age: 64
End: 2024-02-13
Payer: COMMERCIAL

## 2024-02-13 DIAGNOSIS — Z01.818 PREOP TESTING: ICD-10-CM

## 2024-02-13 DIAGNOSIS — I73.9 PAD (PERIPHERAL ARTERY DISEASE) (H): Primary | ICD-10-CM

## 2024-02-29 ENCOUNTER — HOSPITAL ENCOUNTER (EMERGENCY)
Facility: CLINIC | Age: 64
Discharge: HOME | End: 2024-03-01
Attending: EMERGENCY MEDICINE | Admitting: EMERGENCY MEDICINE
Payer: COMMERCIAL

## 2024-02-29 DIAGNOSIS — M79.662 PAIN OF LEFT LOWER LEG: ICD-10-CM

## 2024-02-29 DIAGNOSIS — I70.209 OCCLUSION OF FEMORAL ARTERY (H): ICD-10-CM

## 2024-02-29 LAB
ANION GAP SERPL CALCULATED.3IONS-SCNC: 12 MMOL/L (ref 7–15)
BASOPHILS # BLD AUTO: 0 10E3/UL (ref 0–0.2)
BASOPHILS NFR BLD AUTO: 1 %
BUN SERPL-MCNC: 13.4 MG/DL (ref 8–23)
CALCIUM SERPL-MCNC: 8.9 MG/DL (ref 8.8–10.2)
CHLORIDE SERPL-SCNC: 103 MMOL/L (ref 98–107)
CREAT SERPL-MCNC: 0.92 MG/DL (ref 0.67–1.17)
D DIMER PPP FEU-MCNC: 1.83 UG/ML FEU (ref 0–0.5)
DEPRECATED HCO3 PLAS-SCNC: 23 MMOL/L (ref 22–29)
EGFRCR SERPLBLD CKD-EPI 2021: >90 ML/MIN/1.73M2
EOSINOPHIL # BLD AUTO: 0.2 10E3/UL (ref 0–0.7)
EOSINOPHIL NFR BLD AUTO: 2 %
ERYTHROCYTE [DISTWIDTH] IN BLOOD BY AUTOMATED COUNT: 15 % (ref 10–15)
GLUCOSE SERPL-MCNC: 92 MG/DL (ref 70–99)
HCT VFR BLD AUTO: 38.6 % (ref 40–53)
HGB BLD-MCNC: 13 G/DL (ref 13.3–17.7)
IMM GRANULOCYTES # BLD: 0 10E3/UL
IMM GRANULOCYTES NFR BLD: 1 %
LYMPHOCYTES # BLD AUTO: 2.6 10E3/UL (ref 0.8–5.3)
LYMPHOCYTES NFR BLD AUTO: 32 %
MCH RBC QN AUTO: 29.5 PG (ref 26.5–33)
MCHC RBC AUTO-ENTMCNC: 33.7 G/DL (ref 31.5–36.5)
MCV RBC AUTO: 88 FL (ref 78–100)
MONOCYTES # BLD AUTO: 0.7 10E3/UL (ref 0–1.3)
MONOCYTES NFR BLD AUTO: 8 %
NEUTROPHILS # BLD AUTO: 4.7 10E3/UL (ref 1.6–8.3)
NEUTROPHILS NFR BLD AUTO: 56 %
NRBC # BLD AUTO: 0 10E3/UL
NRBC BLD AUTO-RTO: 0 /100
PLATELET # BLD AUTO: 227 10E3/UL (ref 150–450)
POTASSIUM SERPL-SCNC: 4.3 MMOL/L (ref 3.4–5.3)
RBC # BLD AUTO: 4.41 10E6/UL (ref 4.4–5.9)
SODIUM SERPL-SCNC: 138 MMOL/L (ref 135–145)
WBC # BLD AUTO: 8.2 10E3/UL (ref 4–11)

## 2024-02-29 PROCEDURE — 85025 COMPLETE CBC W/AUTO DIFF WBC: CPT | Performed by: EMERGENCY MEDICINE

## 2024-02-29 PROCEDURE — 36415 COLL VENOUS BLD VENIPUNCTURE: CPT | Performed by: EMERGENCY MEDICINE

## 2024-02-29 PROCEDURE — 99285 EMERGENCY DEPT VISIT HI MDM: CPT | Mod: 25

## 2024-02-29 PROCEDURE — 80048 BASIC METABOLIC PNL TOTAL CA: CPT | Performed by: EMERGENCY MEDICINE

## 2024-02-29 PROCEDURE — 96375 TX/PRO/DX INJ NEW DRUG ADDON: CPT

## 2024-02-29 PROCEDURE — 85379 FIBRIN DEGRADATION QUANT: CPT | Performed by: EMERGENCY MEDICINE

## 2024-02-29 PROCEDURE — 250N000011 HC RX IP 250 OP 636: Performed by: EMERGENCY MEDICINE

## 2024-02-29 PROCEDURE — 96374 THER/PROPH/DIAG INJ IV PUSH: CPT | Mod: 59

## 2024-02-29 RX ORDER — KETOROLAC TROMETHAMINE 15 MG/ML
15 INJECTION, SOLUTION INTRAMUSCULAR; INTRAVENOUS ONCE
Status: COMPLETED | OUTPATIENT
Start: 2024-02-29 | End: 2024-02-29

## 2024-02-29 RX ADMIN — KETOROLAC TROMETHAMINE 15 MG: 15 INJECTION, SOLUTION INTRAMUSCULAR; INTRAVENOUS at 23:02

## 2024-02-29 ASSESSMENT — COLUMBIA-SUICIDE SEVERITY RATING SCALE - C-SSRS
2. HAVE YOU ACTUALLY HAD ANY THOUGHTS OF KILLING YOURSELF IN THE PAST MONTH?: NO
1. IN THE PAST MONTH, HAVE YOU WISHED YOU WERE DEAD OR WISHED YOU COULD GO TO SLEEP AND NOT WAKE UP?: NO
6. HAVE YOU EVER DONE ANYTHING, STARTED TO DO ANYTHING, OR PREPARED TO DO ANYTHING TO END YOUR LIFE?: NO

## 2024-02-29 ASSESSMENT — ACTIVITIES OF DAILY LIVING (ADL)
ADLS_ACUITY_SCORE: 38

## 2024-03-01 ENCOUNTER — APPOINTMENT (OUTPATIENT)
Dept: CT IMAGING | Facility: CLINIC | Age: 64
End: 2024-03-01
Attending: EMERGENCY MEDICINE
Payer: COMMERCIAL

## 2024-03-01 ENCOUNTER — APPOINTMENT (OUTPATIENT)
Dept: ULTRASOUND IMAGING | Facility: CLINIC | Age: 64
End: 2024-03-01
Attending: EMERGENCY MEDICINE
Payer: COMMERCIAL

## 2024-03-01 VITALS
SYSTOLIC BLOOD PRESSURE: 115 MMHG | BODY MASS INDEX: 29.53 KG/M2 | RESPIRATION RATE: 12 BRPM | HEART RATE: 55 BPM | TEMPERATURE: 98.7 F | WEIGHT: 200 LBS | DIASTOLIC BLOOD PRESSURE: 95 MMHG | OXYGEN SATURATION: 97 %

## 2024-03-01 LAB
ALBUMIN UR-MCNC: NEGATIVE MG/DL
APPEARANCE UR: CLEAR
BILIRUB UR QL STRIP: NEGATIVE
COLOR UR AUTO: ABNORMAL
GLUCOSE UR STRIP-MCNC: NEGATIVE MG/DL
HGB UR QL STRIP: NEGATIVE
KETONES UR STRIP-MCNC: NEGATIVE MG/DL
LEUKOCYTE ESTERASE UR QL STRIP: NEGATIVE
MUCOUS THREADS #/AREA URNS LPF: PRESENT /LPF
NITRATE UR QL: NEGATIVE
PH UR STRIP: 5 [PH] (ref 5–7)
RBC URINE: <1 /HPF
SP GR UR STRIP: 1.02 (ref 1–1.03)
SQUAMOUS EPITHELIAL: <1 /HPF
UROBILINOGEN UR STRIP-MCNC: NORMAL MG/DL
WBC URINE: 1 /HPF

## 2024-03-01 PROCEDURE — 250N000011 HC RX IP 250 OP 636: Performed by: EMERGENCY MEDICINE

## 2024-03-01 PROCEDURE — 93971 EXTREMITY STUDY: CPT | Mod: LT

## 2024-03-01 PROCEDURE — 250N000009 HC RX 250: Performed by: EMERGENCY MEDICINE

## 2024-03-01 PROCEDURE — 93926 LOWER EXTREMITY STUDY: CPT | Mod: LT

## 2024-03-01 PROCEDURE — 74174 CTA ABD&PLVS W/CONTRAST: CPT

## 2024-03-01 PROCEDURE — 81001 URINALYSIS AUTO W/SCOPE: CPT | Performed by: EMERGENCY MEDICINE

## 2024-03-01 RX ORDER — HYDROMORPHONE HYDROCHLORIDE 1 MG/ML
0.5 INJECTION, SOLUTION INTRAMUSCULAR; INTRAVENOUS; SUBCUTANEOUS EVERY 30 MIN PRN
Status: DISCONTINUED | OUTPATIENT
Start: 2024-03-01 | End: 2024-03-01 | Stop reason: HOSPADM

## 2024-03-01 RX ORDER — GABAPENTIN 100 MG/1
100 CAPSULE ORAL 3 TIMES DAILY
Qty: 90 CAPSULE | Refills: 0 | Status: SHIPPED | OUTPATIENT
Start: 2024-03-01 | End: 2024-08-22

## 2024-03-01 RX ORDER — IOPAMIDOL 755 MG/ML
72 INJECTION, SOLUTION INTRAVASCULAR ONCE
Status: COMPLETED | OUTPATIENT
Start: 2024-03-01 | End: 2024-03-01

## 2024-03-01 RX ADMIN — SODIUM CHLORIDE 80 ML: 9 INJECTION, SOLUTION INTRAVENOUS at 01:06

## 2024-03-01 RX ADMIN — HYDROMORPHONE HYDROCHLORIDE 0.5 MG: 1 INJECTION, SOLUTION INTRAMUSCULAR; INTRAVENOUS; SUBCUTANEOUS at 00:05

## 2024-03-01 RX ADMIN — IOPAMIDOL 72 ML: 755 INJECTION, SOLUTION INTRAVENOUS at 01:06

## 2024-03-01 ASSESSMENT — ACTIVITIES OF DAILY LIVING (ADL)
ADLS_ACUITY_SCORE: 38

## 2024-03-01 NOTE — DISCHARGE INSTRUCTIONS
Start with 100 mg three times per day. After one week increase to 300 mg at night but keep 100 mg for other two doses  Follow up with your doctor for further adjustments in your dose  Follow up with Dr. Gamble   The occlusions on left leg appear chronic  Watch for redness of leg, new weakness/numbness of leg, color change of leg

## 2024-03-01 NOTE — ED TRIAGE NOTES
Pt c/o L leg pain with lower abd pain. Pt states this pain started 2 days ago and feels like a sharp 9/10 pain in his L leg. Pt endorses numbness in bilateral legs. Pt has significant cardiac hx. Pt also has had a stent place femoral artery from previous clot. Pt currently takes Plavix.       Triage Assessment (Adult)       Row Name 02/29/24 2031          Triage Assessment    Airway WDL WDL        Respiratory WDL    Respiratory WDL WDL        Skin Circulation/Temperature WDL    Skin Circulation/Temperature WDL WDL        Cardiac WDL    Cardiac WDL WDL        Peripheral/Neurovascular WDL    Peripheral Neurovascular WDL WDL        Cognitive/Neuro/Behavioral WDL    Cognitive/Neuro/Behavioral WDL WDL

## 2024-03-01 NOTE — ED PROVIDER NOTES
History     Chief Complaint:  Leg Pain       HPI   Adam Sahni is a 63 year old male who presented to the emergency department with leg pain.  He reports that he is having pain on the left mid anterior thigh that comes and goes.  Worsens with movement.  Started a few days ago.  No falls.  No trauma.  He has numbness in both lower extremities which is unchanged from baseline.  No weakness.  No color change.  Does have a history of back pain and problems but reports that this does not seem to be pain coming from his back and feels different than prior back pain    Independent Historian:   Independent historian    Review of External Notes:   Reviewed prior ultrasound of lower extremities    Medications:    Fosamax  Aspirin 81 mg  Lipitor  Pletal  Ferosul  Keppra  Metoprolol succinate  Lyrica  Zoloft   Zonegran    Past Medical History:    CAD (coronary artery disease)  Chronic anemia  Depression  Hyperlipidemia  Lumbar radiculopathy  PAD (peripheral artery disease)   Peripheral neuropathy  Prediabetes  Prostate cancer  STEMI  Tobacco dependence   Arterial occlusion    Past Surgical History:    Coronary Angiogram  Lower extremity angiogram right  Appendectomy   Lumbar foraminotomy  GI EGD with Biopsy  GI Capsule Endoscopy     Physical Exam   Patient Vitals for the past 24 hrs:   BP Temp Temp src Pulse Resp SpO2 Weight   03/01/24 0217 -- -- -- 53 17 96 % --   02/29/24 2358 106/65 -- -- 56 20 97 % --   02/29/24 2258 105/67 -- -- 59 20 97 % --   02/29/24 2152 -- -- -- 56 19 98 % --   02/29/24 2029 (!) 147/80 98.7  F (37.1  C) Oral 80 16 96 % 90.7 kg (200 lb)      Physical Exam  General: Sitting up in bed  Eyes:  The pupils are equal and round    Conjunctivae and sclerae are normal  ENT:    Atraumatic face  Neck:  Normal range of motion  CV:  Regular rate, regular rhythm     Skin warm and well perfused     DP pulses faint bilaterally    Good color and warmth to bilateral LE  Resp:  Non labored breathing on room  air    No tachypnea    No cough heard  GI:  Abdomen is soft, there is no rigidity    No distension    No rebound tenderness     Mild LLQ tenderness  MS:  Points to left anterior mid thigh as to the location of his pain but there is no overlying swelling or erythema  Skin:  No rash or acute skin lesions noted  Neuro:   Awake, alert.      Speech is normal and fluent.    Face is symmetric.     Moves all extremities equally, equal dorsiflexion, plantarflexion, straight leg raise bilaterally    Reports numbness bilaterally on light touch that is unchanged from baseline  Psych: Normal affect.  Appropriate interactions.    Emergency Department Course     Imaging:  CTA Abdomen Pelvis with Contrast   Final Result   IMPRESSION:   1.  Patent bilateral common iliac and right external iliac artery stents.   2.  Occlusion of the right internal iliac artery is new from 06/16/2023 and presumably due to exclusion by the right external iliac artery stent.   3.  Chronic occlusion and distal reconstitution of the left profunda femoris artery.   4.  Colonic diverticulosis.   5.  Post prostatectomy.         US Lower Extremity Venous Duplex Left   Final Result   IMPRESSION:   1.  No deep venous thrombosis in the left lower extremity.      US Lower Extremity Arterial Duplex Left   Final Result   IMPRESSION:      1.  Complete occlusion of a segment in the mid SFA with reconstitution of the distal vessels by collateral flow.         Report per radiology     Laboratory:  Labs Ordered and Resulted from Time of ED Arrival to Time of ED Departure   D DIMER QUANTITATIVE - Abnormal       Result Value    D-Dimer Quantitative 1.83 (*)    CBC WITH PLATELETS AND DIFFERENTIAL - Abnormal    WBC Count 8.2      RBC Count 4.41      Hemoglobin 13.0 (*)     Hematocrit 38.6 (*)     MCV 88      MCH 29.5      MCHC 33.7      RDW 15.0      Platelet Count 227      % Neutrophils 56      % Lymphocytes 32      % Monocytes 8      % Eosinophils 2      % Basophils 1       % Immature Granulocytes 1      NRBCs per 100 WBC 0      Absolute Neutrophils 4.7      Absolute Lymphocytes 2.6      Absolute Monocytes 0.7      Absolute Eosinophils 0.2      Absolute Basophils 0.0      Absolute Immature Granulocytes 0.0      Absolute NRBCs 0.0     ROUTINE UA WITH MICROSCOPIC REFLEX TO CULTURE - Abnormal    Color Urine Light Yellow      Appearance Urine Clear      Glucose Urine Negative      Bilirubin Urine Negative      Ketones Urine Negative      Specific Gravity Urine 1.024      Blood Urine Negative      pH Urine 5.0      Protein Albumin Urine Negative      Urobilinogen Urine Normal      Nitrite Urine Negative      Leukocyte Esterase Urine Negative      Mucus Urine Present (*)     RBC Urine <1      WBC Urine 1      Squamous Epithelials Urine <1     BASIC METABOLIC PANEL - Normal    Sodium 138      Potassium 4.3      Chloride 103      Carbon Dioxide (CO2) 23      Anion Gap 12      Urea Nitrogen 13.4      Creatinine 0.92      GFR Estimate >90      Calcium 8.9      Glucose 92        Procedures   None    Emergency Department Course & Assessments:    Interventions:  Medications   HYDROmorphone (PF) (DILAUDID) injection 0.5 mg (0.5 mg Intravenous $Given 3/1/24 0005)   ketorolac (TORADOL) injection 15 mg (15 mg Intravenous $Given 2/29/24 2302)   Saline (80 mLs As instructed $Given 3/1/24 0106)   iopamidol (ISOVUE-370) solution 72 mL (72 mLs Intravenous $Given 3/1/24 0106)      Independent Interpretation (X-rays, CTs, rhythm strip):  None    Assessments/Consultations/Discussion of Management or Tests:   Past medical records, nursing notes, and vitals reviewed.  0309: I prepared the patient to be discharged home.     Disposition:  The patient was discharged.     Impression & Plan      Medical Decision Making:  Adam Sahni is a 63-year-old male who presented to the emergency department with leg pain.  Pain located in left mid thigh area.  No overlying signs of infection.  Neurovascularly intact.   Reports baseline numbness that is unchanged in the lower extremities.  Did have some mild lower abdominal tenderness on the left side on exam so I did obtain CT abdomen pelvis as well as left lower extremity ultrasound.  No DVT.  He has no symptoms to suggest PE.  Does have chronic occlusion on left leg as well as bilateral stents.  There is occlusion of the right internal iliac artery which is new from last summer but he has no symptoms on the right lower extremity so likely incidental finding.  He reports that he has follow-up with Dr. Gamble with vascular surgery.  At this time, I am not finding a reason to admit him to the hospital.  Was able to walk.  No trauma to suggest fracture.  Recommend follow-up with vascular surgery.  He would like to try gabapentin to see if this helps the pain.  It sounds like he is not on Lyrica anymore which he previously was on.  He understands what to monitor for at home in terms of acute arterial occlusion on the lower extremities which may include color change, new numbness or weakness    Diagnosis:    ICD-10-CM    1. Pain of left lower leg  M79.662       2. Occlusion of femoral artery (H24)  I70.209            Discharge Medications:  New Prescriptions    GABAPENTIN (NEURONTIN) 100 MG CAPSULE    Take 1 capsule (100 mg) by mouth 3 times daily for 30 days      2/29/2024   Marilyn Leblanc MD Goertz, Maria Kristine, MD  03/01/24 0942

## 2024-03-16 ENCOUNTER — HEALTH MAINTENANCE LETTER (OUTPATIENT)
Age: 64
End: 2024-03-16

## 2024-04-11 ENCOUNTER — TELEPHONE (OUTPATIENT)
Dept: OTHER | Facility: CLINIC | Age: 64
End: 2024-04-11
Payer: COMMERCIAL

## 2024-04-11 NOTE — TELEPHONE ENCOUNTER
[facility]    Who is the name of the provider?:  AYAN MOLINA   What is the location you see this provider at/preferred location?: [place]  Person calling / Facility: Adam Sahni  Phone number:  [phone] (home)  Nurse call back needed:  yes     Reason for call:    Patient states he had a vascular leg ultrasound at [facility] on 4/10/24.    He is to return to see Dr. Molina in May/Rita and he has ordered imaging.  Please review what imaging Adam needs for his upcoming Dr. Molina appointment.    Please review and advise.    Pharmacy location:     [facility] #[id number] - [place] - [address] AT [facility] [address]  [facility] #[id number] - [place] - [address] AT [address] [place] [address]  Outside Imaging: n/a   Can we leave a detailed message on this number?  YES     4/11/2024, 1:03 PM

## 2024-04-12 NOTE — TELEPHONE ENCOUNTER
Brief chart review:  LOV with Dr. Gamble on 10/19/23, with recommendations to follow up in June.  ALEN with exercise and aorta/IVC/iliac duplex ordered.    4/10/24 BLE arterial US completed at [facility] with the following:  Impression:   Findings are overall unchanged compared to prior.   1. Patent bilateral common iliac stents.   2. Chronic occlusion of the right distal SFA with distal reconstitution.   3. Chronic occlusion of the left proximal profunda femoral artery.   4. Chronic occlusion of the mid SFA with distal reconstitution.     Appears patient saw IR in consultation at [facility] on 4/10/24.  Pt does  not need to see two separate vascular clinics.  Would recommend patient decide on what location he would prefer and for continuity of care    LVM with request to call back at his convenience.    Meme Michael, JOANNN, RN, [facility]

## 2024-04-12 NOTE — TELEPHONE ENCOUNTER
Patient returned call.  We discussed the reasons why an open revascularization on his lower extremities have not been done.  Pt was placed on Plavix, s/p UGO and needs to remain on this un-interrupted.  Pt aware his findings are chronic.  Pt also has neuropathy.    Pt will call if anything changes prior to follow up in June.  Will cancel the aorta/IVC/iliac US, as this was completed at [facility] as noted.  No further questions.    Meme Michael, JOANNN, RN, [facility] [place]

## 2024-04-15 ENCOUNTER — MYC MEDICAL ADVICE (OUTPATIENT)
Dept: OTHER | Facility: CLINIC | Age: 64
End: 2024-04-15
Payer: COMMERCIAL

## 2024-07-05 ENCOUNTER — TELEPHONE (OUTPATIENT)
Dept: OTHER | Facility: CLINIC | Age: 64
End: 2024-07-05
Payer: COMMERCIAL

## 2024-07-05 NOTE — TELEPHONE ENCOUNTER
[facility]    Who is the name of the provider? Dr Gamble    What is the location you see this provider at/preferred location? [place]    Person calling / Facility: Adam     Phone number: [phone]    Nurse call back needed:     Reason for call:  Pt is currently scheduled for an LAEN with Exercise on 07/18/24 - he is concerned with his heart condition that he wont be able to do the treadmill. Pt is wondering if there is another test that he could do to get his heart rate up - Pt stated that per Dr Gamble they want to make sure his heart is in good shape prior to surgery.       Pharmacy location: N/A    Outside Imaging: N/A    Can we leave a detailed message on this number? Y    Additional Info:

## 2024-07-05 NOTE — TELEPHONE ENCOUNTER
Cardiology followed up with patient discussed that Dr. Gamble needs to advise on the US ALEN, patient concerned he can not complete due to physical restrictions.  Patient was updated that he is due for a NM Lexiscan stress test, orders are in the system.  Patient is aware that he does not need to exercise for this test and that he can call to schedule this at anytime.  RN did also advise that if he is expecting to have surgery that requires cardiac clearance that stress test should be done before that time.    Karina Mg RN on 7/5/2024 at 1:25 PM

## 2024-07-05 NOTE — TELEPHONE ENCOUNTER
[facility] Call Center    Phone Message    May a detailed message be left on voicemail: yes     Reason for Call: Other: Adam has some walking limitations and would like a call back to discuss alternatives     Action Taken: Other: Cardiology    Travel Screening: Not Applicable     Thank you!  [facility]

## 2024-07-08 NOTE — TELEPHONE ENCOUNTER
If patient cannot tolerate the exercise portion or declines to do it that is fine- Cuffed and Wanted will change order to no exercise and he does not need to do with treadmill portion.     Juli HARPER, YOHANNES    [facility]  Vascular [facility]  Office: [phone]  Fax: [phone]

## 2024-07-09 NOTE — TELEPHONE ENCOUNTER
LM for patient to call us back to relay the message below:    If patient cannot tolerate the exercise portion or declines to do it that is fine- US tech will change order to no exercise and he does not need to do with treadmill portion.     This is our 2nd and final attempt to relay this message.

## 2024-07-15 NOTE — PROGRESS NOTES
[facility] [place] [facility]    Adam Sahni Returns for vascular follow-up.  64-year-old history of PAD.  Recanalization of occluded right  external iliac artery with stenting x 3.  Known chronic bilateral SFA and above-knee popliteal artery occlusion.    10/2023 CTA with successful recannulized right external iliac artery.  Widely patent profundofemoral with extensive collaterals.  Below-knee popliteal arteries reconstitute via these collaterals with three-vessel runoff bilaterally.    Stable PAD with minimal symptoms on 10/19/2023 follow-up.  Discussed possibility of femoral-popliteal bypass graft he has coronary stents and necessity of holding Plavix for short period of time--thus wanted to wait for some period of time due to the UGO.    3/1/2024 CTA revealing widely patent stents.  However internal iliac artery that was disease in the right is now occluded likely from the iliac stents.  No change in the right SFA occlusion.  Mild right common femoral artery disease    PMH: Medications: Toprol-XL, Lyrica, Keppra, Neurontin, Lipitor, Fosamax, Zoloft, Pletal, aspirin, Plavix     Medical: Hypertension.     CAD with history of STEMI and stent-6/9/2023    Hyperlipidemia on statin-LDL= 200 on 10/26/2023    History of prostate cancer     Severe C4-C7 bilateral foraminal stenosis    Chronic low back pain with laminectomy 1/2021.    Laminectomy with rods 4/2022    Mild carotid bifurcation disease 6/17/2023 duplex    Chronic pedal peripheral neuropathy    History depression    TODAY'S TESTING:   ALEN:   Right= 0.70/0.71 with biphasic waveforms.  Left= 0.49/0.4 with monophasic waveforms.  No exercise testing performed due to patient's instability           We did map the right GSV in case of bypass would be necessary and this was an adequate conduit.  Left GSV is also very adequate conduit for in situ bypass graft.                    TELEPHONE VISIT:   Patient had a scheduled appointment this morning and underwent  the testing as described above.  Recently, he was infected with bed bites with multiple lesions.  He did not tell us till after the testing and does have several visible areas which he says is resolving.  For safety sake of the clinic he was sent home where he is getting appropriate treatment and fumigation and set up a phone visit from clinic to his home.    Patient has had no cardiac symptoms since his stenting.  Still has issues with walking more on the right than left leg but no rest pain.  Does develop buttock claudication and hip discomfort with walking more on the right.    We discussed about a claudication this is most likely from the diseased internal iliac arteries with the right being occluded.  Left is diseased but patent.  There is very little we can do about this and he is aware.    He also discussed his hyperlipidemia on maximum dose of Lipitor.  His sister talked about using Repatha but this is something he will need to discuss further with his cardiologist and other physicians to see if this is appropriate.  We do feel that with his past systemic vascular issues the LDL less than 70 would be ideal.    IMPRESSION:   #1.  Widely patent iliac stents.  Some improvement in the ABIs on the right with the stenting of the iliac arteries.  However, very unlikely symptoms will improve any further without surgical intervention.  His right surprisingly it is worse in the left leg despite ABIs and findings.    He would be a candidate for right common femoral to below-knee popliteal in situ bypass graft.  We discussed this in the past and he is very interested in proceeding.    #2.  We would request that he see his cardiologist to get approval for surgery.  We would hold the Plavix for 5 days if feasible preoperatively and start this immediately postoperatively for the bypass procedure and thus I wanted cardiology clearance.    #3.  Likely buttock claudication symptoms from iliac disease that cannot be improved  further.     #4.  Minimal carotid bifurcation disease on previous testing.    Over 25 minutes with patient today with chart review and phone visit for reasons described above    Dorian Gamble MD   This note was created using Dragon voice recognition software which may result in transcription errors.

## 2024-07-18 ENCOUNTER — HOSPITAL ENCOUNTER (OUTPATIENT)
Dept: ULTRASOUND IMAGING | Facility: CLINIC | Age: 64
Discharge: HOME | End: 2024-07-18
Attending: SURGERY
Payer: COMMERCIAL

## 2024-07-18 ENCOUNTER — TELEPHONE (OUTPATIENT)
Dept: CARDIOLOGY | Facility: CLINIC | Age: 64
End: 2024-07-18
Payer: COMMERCIAL

## 2024-07-18 ENCOUNTER — VIRTUAL VISIT (OUTPATIENT)
Dept: OTHER | Facility: CLINIC | Age: 64
End: 2024-07-18
Attending: SURGERY
Payer: COMMERCIAL

## 2024-07-18 ENCOUNTER — TELEPHONE (OUTPATIENT)
Dept: OTHER | Facility: CLINIC | Age: 64
End: 2024-07-18

## 2024-07-18 DIAGNOSIS — I73.9 PAD (PERIPHERAL ARTERY DISEASE) (H): ICD-10-CM

## 2024-07-18 DIAGNOSIS — Z01.818 PREOP TESTING: ICD-10-CM

## 2024-07-18 DIAGNOSIS — I73.9 PAD (PERIPHERAL ARTERY DISEASE) (H): Primary | ICD-10-CM

## 2024-07-18 DIAGNOSIS — E78.5 HYPERLIPIDEMIA LDL GOAL <70: ICD-10-CM

## 2024-07-18 PROCEDURE — 93922 UPR/L XTREMITY ART 2 LEVELS: CPT

## 2024-07-18 PROCEDURE — 93970 EXTREMITY STUDY: CPT

## 2024-07-18 PROCEDURE — 99443 PR PHYSICIAN TELEPHONE EVALUATION 21-30 MIN: CPT | Mod: 93 | Performed by: SURGERY

## 2024-07-18 NOTE — TELEPHONE ENCOUNTER
Health Call Center    Phone Message    May a detailed message be left on voicemail: yes     Reason for Call: Medication Question or concern regarding medication   Prescription Clarification  Name of Medication: Repatha      What on the order needs clarification? Patient was recommended by their sister in regards to taking Repatha and wanting to know if this is something that care team will recommend them to take. Please call patient back to further discuss.      Action Taken: Other: Cardiology    Travel Screening: Not Applicable     Thank you!  [facility]

## 2024-07-18 NOTE — PATIENT INSTRUCTIONS
Thank you for choosing [facility] for your vascular care.  Please call us at [phone] if you have any questions or concerns.    CLINIC DISCHARGE INSTRUCTIONS    Patient:  Adam LAL Sahni  Provider: Dr. Gamble    REASON FOR VISIT:    PAD follow up    RECOMMENDATIONS:  You will need to obtain surgery clearance from your cardiologist for the procedure.  Please confirm with them that they are ok with you to hold your Plavix for 5 days prior to the procedure with Dr. Tye Gamble's nurse will reach out to you to further discuss preoperative instructions in 1 to 2 business days.    *If you had a positive experience, please indicate on your patient satisfaction survey form that [facility] will be sending you.  This may also be completed as a phone call.

## 2024-07-18 NOTE — TELEPHONE ENCOUNTER
Returned call to patient and advised that this will need to be discussed at his upcoming office visit.  Patient in agreement.  Karina Mg RN on 7/18/2024 at 11:17 AM

## 2024-07-18 NOTE — PROGRESS NOTES
Adam is a 64 year old who is being evaluated via a billable telephone visit.    What phone number would you like to be contacted at? Telephone:[phone]  RLE vein map, ALEN (VHC8:00, WRO9:30) History of PAD with right iliac artery stenting 6/18/23; 7 month follow up to 10/19/23 appointment with Dr. Gamble.   How would you like to obtain your AVS? Cedric Valente MA

## 2024-07-18 NOTE — TELEPHONE ENCOUNTER
[facility]    Who is the name of the provider?:  AYAN MOLINA   What is the location you see this provider at/preferred location?: [place]  Person calling / Facility: Adam Sahni  Phone number:  [phone] (home)   Nurse call back needed:  NO,    Reason for call:  Patient called to let Dr Molina and staff know that he was not able to get an appt with cardiology for surgery clearance until 10/17/24.    Pharmacy location: n/a  Outside Imaging: n/a   Can we leave a detailed message on this number?  YES     7/18/2024, 10:50 AM

## 2024-07-22 ENCOUNTER — CARE COORDINATION (OUTPATIENT)
Dept: CARDIOLOGY | Facility: CLINIC | Age: 64
End: 2024-07-22
Payer: COMMERCIAL

## 2024-07-22 NOTE — TELEPHONE ENCOUNTER
Returned call to patient.  LVM that writer was updated regarding cardiology follow up.  Will check once appointment is completed and then proceed with scheduling procedure.  Writer also stated preoperative information will be sent to his [employer] message and to call or respond to message with any other questions.    Written surgery order is in Dr. Gamble's flag folder, pending review after cardiology appointment on 7/26/24.  Will enter surgery orders if patient is cleared by cardiology.    Meme Michael, JOANNN, RN, -[facility] [place]

## 2024-07-26 ENCOUNTER — OFFICE VISIT (OUTPATIENT)
Dept: CARDIOLOGY | Facility: CLINIC | Age: 64
End: 2024-07-26
Payer: COMMERCIAL

## 2024-07-26 ENCOUNTER — LAB (OUTPATIENT)
Dept: LAB | Facility: CLINIC | Age: 64
End: 2024-07-26
Payer: COMMERCIAL

## 2024-07-26 VITALS
HEIGHT: 69 IN | BODY MASS INDEX: 30.38 KG/M2 | HEART RATE: 71 BPM | SYSTOLIC BLOOD PRESSURE: 123 MMHG | DIASTOLIC BLOOD PRESSURE: 78 MMHG | WEIGHT: 205.1 LBS

## 2024-07-26 DIAGNOSIS — E78.5 HYPERLIPIDEMIA WITH TARGET LOW DENSITY LIPOPROTEIN (LDL) CHOLESTEROL LESS THAN 100 MG/DL: ICD-10-CM

## 2024-07-26 DIAGNOSIS — I25.10 CORONARY ARTERY DISEASE INVOLVING NATIVE CORONARY ARTERY OF NATIVE HEART WITHOUT ANGINA PECTORIS: ICD-10-CM

## 2024-07-26 LAB
CHOLEST SERPL-MCNC: 185 MG/DL
FASTING STATUS PATIENT QL REPORTED: YES
HDLC SERPL-MCNC: 38 MG/DL
LDLC SERPL CALC-MCNC: 115 MG/DL
NONHDLC SERPL-MCNC: 147 MG/DL
TRIGL SERPL-MCNC: 158 MG/DL

## 2024-07-26 PROCEDURE — 93005 ELECTROCARDIOGRAM TRACING: CPT | Performed by: INTERNAL MEDICINE

## 2024-07-26 PROCEDURE — 36415 COLL VENOUS BLD VENIPUNCTURE: CPT | Performed by: NURSE PRACTITIONER

## 2024-07-26 PROCEDURE — 99214 OFFICE O/P EST MOD 30 MIN: CPT | Performed by: INTERNAL MEDICINE

## 2024-07-26 PROCEDURE — 80061 LIPID PANEL: CPT | Performed by: NURSE PRACTITIONER

## 2024-07-26 NOTE — PROGRESS NOTES
CARDIOLOGY CLINIC CONSULTATION    PRIMARY CARE PHYSICIAN:  Physician No Ref-Primary    HISTORY OF PRESENT ILLNESS:  This is a pleasant 64-year-old gentleman who is a patient of Dr. Pacheco but was seen by me last year due to clinic availability and now an urgent add-on visit for clearance preoperatively.  He seen me in clinic today.  He has the following pertinent medical issues    History of severe hyperlipidemia likely familial  Coronary artery disease with inferior ST elevation myocardial infarction in June 2023, delayed presentation status post RCA stenting.  EF around 45 to 50%.  Moderate LAD and circumflex disease left to medical management  History of smoking trying to quit  Severe peripheral vascular disease contemplating femoropopliteal bypass    The patient is here for urgent add-on for perioperative clearance for femoropopliteal bypass.  The patient tells me that he swims regularly multiple laps without any cardiac symptoms.  He spends about 30 minutes on the elliptical.  Functional capacity is definitely and easily more than 4 METS.  He has no angina syncope presyncope.  He is compliant with dual antiplatelet therapy.      He is compliant with high intensity statin.  His sister also has high LDL levels and very likely this is familial hyperlipidemia.  He has not had his LDL checked since October of last year.  At that time his LDL was 200.    PAST MEDICAL HISTORY:  Past Medical History:   Diagnosis Date    CAD (coronary artery disease)     with UGO to RCA on 6/9/23 for inferior STEMI    Chronic anemia     Depression     Hyperlipidemia     Lumbar radiculopathy     PAD (peripheral artery disease) (H24)     S/p bilateral kissing iliac stents after thrombolytics 7/16/21-7/17/21 for severe claudication and complete occlusion of the left common iliac artery with tandem occlusions and/or high grade stenoses of the distal SFA and popliteal artery.    Peripheral neuropathy     Prediabetes     Prostate cancer  (H)     s/p ext beam radiation therapy, follows at [place]       MEDICATIONS:  Current Outpatient Medications   Medication Sig Dispense Refill    alendronate (FOSAMAX) 70 MG tablet Take 70 mg by mouth every 7 days      aspirin (ASA) 81 MG chewable tablet Take 1 tablet (81 mg) by mouth daily 30 tablet 3    atorvastatin (LIPITOR) 80 MG tablet Take 1 tablet (80 mg) by mouth daily 90 tablet 3    clopidogrel (PLAVIX) 75 MG tablet Take 1 tablet (75 mg) by mouth daily 90 tablet 2    ferrous sulfate (FEROSUL) 325 (65 Fe) MG tablet Take 325 mg by mouth every other day      metoprolol succinate ER (TOPROL XL) 25 MG 24 hr tablet Take 1 tablet (25 mg) by mouth daily 90 tablet 3    nitroGLYcerin (NITROSTAT) 0.4 MG sublingual tablet For chest pain place 1 tablet under the tongue every 5 minutes for 3 doses. If symptoms persist 5 minutes after 1st dose call 911. 25 tablet 0    pregabalin (LYRICA) 150 MG capsule Take 1 capsule (150 mg) by mouth 2 times daily 30 capsule 1    sertraline (ZOLOFT) 100 MG tablet Take 200 mg by mouth daily      cilostazol (PLETAL) 100 MG tablet Take 100 mg by mouth 2 times daily (Patient not taking: Reported on 7/26/2024)      gabapentin (NEURONTIN) 100 MG capsule Take 1 capsule (100 mg) by mouth 3 times daily for 30 days (Patient not taking: Reported on 7/26/2024) 90 capsule 0    levETIRAcetam (KEPPRA) 500 MG tablet Take 500 mg by mouth as needed (Patient not taking: Reported on 7/26/2024)       No current facility-administered medications for this visit.       SOCIAL HISTORY:  I have reviewed this patient's social history and updated it with pertinent information if needed. Adam Sahni  reports that he has been smoking cigarettes. He has never used smokeless tobacco. He reports current alcohol use. He reports that he does not use drugs.    PHYSICAL EXAM:  Pulse:  [71] 71  BP: (123)/(78) 123/78  205 lbs 1.6 oz    Constitutional: alert, no distress  Respiratory: Good bilateral air entry  Cardiovascular:  Normal regular heart sounds  GI: nondistended  Neuropsychiatric: appropriate affact    ASSESSMENT: Pertinent issues addressed/ reviewed during this cardiology visit  Coronary artery disease with history of STEMI June 2023  Likely familial hyperlipidemia  Severe peripheral vascular disease  Preoperative consultation    RECOMMENDATIONS:  Patient is asymptomatic from a cardiac standpoint.  Functional capacity is easily greater than 4 METS.  Given asymptomatic status and good functional capacity, preoperative ischemic evaluation has not shown to improve perioperative outcomes with vascular surgery.  FABIOLA BLACKMAN 2005.  Recommend continuing aggressive medical therapy.  Given that it has been a year since his MI, Plavix can be discontinued from a cardiac standpoint perioperatively.  However given severe vascular disease and other unrevascularized coronary disease, we have decided to continue DAPT for 2 years.  He has no bleeding problems at this time however should this change, Plavix can be discontinued long-term.  I am going to add on a lipid panel today.  If LDL levels are still uncontrolled on high intensity statin, I recommend initiation of Repatha.  He does not have kids and has a genetic testing for him for familial hyperlipidemia would not  options at this time.  His first degree relatives however all should be screened.    Follow-up MODE in 6 months.    It was a pleasure seeing this patient in clinic today. Please do not hesitate to contact me with any future questions.     RUDDY Paez, [facility]  Cardiology - [facility]  July 26, 2024    Review of the result(s) of each unique test - Last CBC BMP lipids echo     The level of medical decision making during this visit was of moderate complexity.    This note was completed in part using dictation via the Dragon voice recognition software. Some word and grammatical errors may occur and must be interpreted in the appropriate clinical context.  If there  are any questions pertaining to this issue, please contact me for further clarification.

## 2024-07-26 NOTE — LETTER
7/26/2024    Physician No Ref-Primary  No address on file    RE: Adam LAL Bora       Dear Colleague,     I had the pleasure of seeing Adam LUKASZ Bora in the [facility].  CARDIOLOGY CLINIC CONSULTATION    PRIMARY CARE PHYSICIAN:  Physician No Ref-Primary    HISTORY OF PRESENT ILLNESS:  This is a pleasant 64-year-old gentleman who is a patient of Dr. Pacheco but was seen by me last year due to clinic availability and now an urgent add-on visit for clearance preoperatively.  He seen me in clinic today.  He has the following pertinent medical issues    History of severe hyperlipidemia likely familial  Coronary artery disease with inferior ST elevation myocardial infarction in June 2023, delayed presentation status post RCA stenting.  EF around 45 to 50%.  Moderate LAD and circumflex disease left to medical management  History of smoking trying to quit  Severe peripheral vascular disease contemplating femoropopliteal bypass    The patient is here for urgent add-on for perioperative clearance for femoropopliteal bypass.  The patient tells me that he swims regularly multiple laps without any cardiac symptoms.  He spends about 30 minutes on the elliptical.  Functional capacity is definitely and easily more than 4 METS.  He has no angina syncope presyncope.  He is compliant with dual antiplatelet therapy.      He is compliant with high intensity statin.  His sister also has high LDL levels and very likely this is familial hyperlipidemia.  He has not had his LDL checked since October of last year.  At that time his LDL was 200.    PAST MEDICAL HISTORY:  Past Medical History:   Diagnosis Date    CAD (coronary artery disease)     with UGO to RCA on 6/9/23 for inferior STEMI    Chronic anemia     Depression     Hyperlipidemia     Lumbar radiculopathy     PAD (peripheral artery disease) (H24)     S/p bilateral kissing iliac stents after thrombolytics 7/16/21-7/17/21 for severe claudication and complete  occlusion of the left common iliac artery with tandem occlusions and/or high grade stenoses of the distal SFA and popliteal artery.    Peripheral neuropathy     Prediabetes     Prostate cancer (H)     s/p ext beam radiation therapy, follows at [place]       MEDICATIONS:  Current Outpatient Medications   Medication Sig Dispense Refill    alendronate (FOSAMAX) 70 MG tablet Take 70 mg by mouth every 7 days      aspirin (ASA) 81 MG chewable tablet Take 1 tablet (81 mg) by mouth daily 30 tablet 3    atorvastatin (LIPITOR) 80 MG tablet Take 1 tablet (80 mg) by mouth daily 90 tablet 3    clopidogrel (PLAVIX) 75 MG tablet Take 1 tablet (75 mg) by mouth daily 90 tablet 2    ferrous sulfate (FEROSUL) 325 (65 Fe) MG tablet Take 325 mg by mouth every other day      metoprolol succinate ER (TOPROL XL) 25 MG 24 hr tablet Take 1 tablet (25 mg) by mouth daily 90 tablet 3    nitroGLYcerin (NITROSTAT) 0.4 MG sublingual tablet For chest pain place 1 tablet under the tongue every 5 minutes for 3 doses. If symptoms persist 5 minutes after 1st dose call 911. 25 tablet 0    pregabalin (LYRICA) 150 MG capsule Take 1 capsule (150 mg) by mouth 2 times daily 30 capsule 1    sertraline (ZOLOFT) 100 MG tablet Take 200 mg by mouth daily      cilostazol (PLETAL) 100 MG tablet Take 100 mg by mouth 2 times daily (Patient not taking: Reported on 7/26/2024)      gabapentin (NEURONTIN) 100 MG capsule Take 1 capsule (100 mg) by mouth 3 times daily for 30 days (Patient not taking: Reported on 7/26/2024) 90 capsule 0    levETIRAcetam (KEPPRA) 500 MG tablet Take 500 mg by mouth as needed (Patient not taking: Reported on 7/26/2024)       No current facility-administered medications for this visit.       SOCIAL HISTORY:  I have reviewed this patient's social history and updated it with pertinent information if needed. Adam Sahni  reports that he has been smoking cigarettes. He has never used smokeless tobacco. He reports current alcohol use. He reports  that he does not use drugs.    PHYSICAL EXAM:  Pulse:  [71] 71  BP: (123)/(78) 123/78  205 lbs 1.6 oz    Constitutional: alert, no distress  Respiratory: Good bilateral air entry  Cardiovascular: Normal regular heart sounds  GI: nondistended  Neuropsychiatric: appropriate affact    ASSESSMENT: Pertinent issues addressed/ reviewed during this cardiology visit  Coronary artery disease with history of STEMI June 2023  Likely familial hyperlipidemia  Severe peripheral vascular disease  Preoperative consultation    RECOMMENDATIONS:  Patient is asymptomatic from a cardiac standpoint.  Functional capacity is easily greater than 4 METS.  Given asymptomatic status and good functional capacity, preoperative ischemic evaluation has not shown to improve perioperative outcomes with vascular surgery.  CARP NEJM 2005.  Recommend continuing aggressive medical therapy.  Given that it has been a year since his MI, Plavix can be discontinued from a cardiac standpoint perioperatively.  However given severe vascular disease and other unrevascularized coronary disease, we have decided to continue DAPT for 2 years.  He has no bleeding problems at this time however should this change, Plavix can be discontinued long-term.  I am going to add on a lipid panel today.  If LDL levels are still uncontrolled on high intensity statin, I recommend initiation of Repatha.  He does not have kids and has a genetic testing for him for familial hyperlipidemia would not  options at this time.  His first degree relatives however all should be screened.    Follow-up MODE in 6 months.    It was a pleasure seeing this patient in clinic today. Please do not hesitate to contact me with any future questions.     RUDDY Paez, [facility]  Cardiology - [facility]  July 26, 2024    Review of the result(s) of each unique test - Last CBC BMP lipids echo     The level of medical decision making during this visit was of moderate complexity.    This note  was completed in part using dictation via the Dragon voice recognition software. Some word and grammatical errors may occur and must be interpreted in the appropriate clinical context.  If there are any questions pertaining to this issue, please contact me for further clarification.      Thank you for allowing me to participate in the care of your patient.      Sincerely,     Seamus Newton MD     [facility]  cc:   Dorian Gambel MD  [address] [place] [id number]

## 2024-07-29 ENCOUNTER — CARE COORDINATION (OUTPATIENT)
Dept: CARDIOLOGY | Facility: CLINIC | Age: 64
End: 2024-07-29
Payer: COMMERCIAL

## 2024-07-29 DIAGNOSIS — E78.5 HYPERLIPIDEMIA LDL GOAL <70: Primary | ICD-10-CM

## 2024-07-29 NOTE — PROGRESS NOTES
"Per 7/26/24 OV w/Dr. Newton: \"I am going to add on a lipid panel today. If LDL levels are still uncontrolled on high intensity statin, I recommend initiation of Repatha.\"     Message sent to pt and Rx Coverage team. Will see which PCKS9-inhibitor pt's insurance covers. Vivien Alfred RN on 7/29/2024 at 4:40 PM      Component      Latest Ref Rng 10/26/2023  12:27 PM 7/26/2024  8:43 AM   Cholesterol      <200 mg/dL 272 (H)  185    Triglycerides      <150 mg/dL 182 (H)  158 (H)    HDL Cholesterol      >=40 mg/dL 36 (L)  38 (L)    LDL Cholesterol Calculated      <=100 mg/dL 200 (H)  115 (H)    Non HDL Cholesterol      <130 mg/dL 236 (H)  147 (H)    Patient Fasting?  Yes       Legend:  (H) High  (L) Low  " normal...

## 2024-07-30 NOTE — TELEPHONE ENCOUNTER
Patient has [employer] through the marketplace.    Praluent is not covered.    Repatha (after prior auth): It appears patient pays a 25% coinsurance, so I would estimate his cost to be approximately $138/mo.  He is eligible to download a copay card from [employer] to reduce this to as little as $5/mo.    Joann Berman  Pharmacy Technician/Liaison, Discharge Pharmacy   [phone] (voice or text)  [email or web address]  Available on Vocera and Teams

## 2024-08-09 ENCOUNTER — TELEPHONE (OUTPATIENT)
Dept: OTHER | Facility: CLINIC | Age: 64
End: 2024-08-09
Payer: COMMERCIAL

## 2024-08-09 DIAGNOSIS — I70.211 ATHEROSCLEROSIS OF NATIVE ARTERY OF RIGHT LOWER EXTREMITY WITH INTERMITTENT CLAUDICATION (H): Primary | ICD-10-CM

## 2024-08-09 RX ORDER — EVOLOCUMAB 140 MG/ML
140 INJECTION, SOLUTION SUBCUTANEOUS
Qty: 6 ML | Refills: 3 | Status: SHIPPED | OUTPATIENT
Start: 2024-08-09

## 2024-08-09 NOTE — TELEPHONE ENCOUNTER
Reviewed cardiology's note from 7/26/24.  Patient is cleared from cardiology for procedure.  Surgery order entered.  Patient was sent preoperative instructions on 7/22/24.      Surgery form given to surgery scheduler.    JOANN MartinezN, RN, [facility]

## 2024-08-09 NOTE — TELEPHONE ENCOUNTER
If he is asymptomatic, I would recommend ongoing aggressive medical management.  If he has shortness of breath or anginal symptoms then getting a stress test is reasonable for further evaluation of ischemia in his ramus and LAD territories.    Question about cilostazol should be with vascular team.    If LDL is not at goal Repatha would be indicated.    If he has further questions please have him follow-up with an MODE

## 2024-08-09 NOTE — TELEPHONE ENCOUNTER
CASE RECEIVED ON 08/09/24 FOR:RIGHT COMMON FEMORAL ARTERY TO BELOW KNEE POPLITEAL IN SITU BYPASS GRAFT     CASE ID:[id number]    Spoke to patient there is nothing to avoid. Patient does have a reunion on 09/07/24 to avoid.

## 2024-08-12 NOTE — TELEPHONE ENCOUNTER
Reviewed surgery date/time with patient provided [facility] number at [phone] for patient to coordinate pre-op exam. Instructed patient to continue ASA and hold PLAVIX 5 days prioor. Post-op with Dr Gamble on 09/05/24. Mailing surgery packet to patient on 08/12/24.

## 2024-08-13 NOTE — TELEPHONE ENCOUNTER
FUTURE VISIT INFORMATION      SURGERY INFORMATION:  Date: 24  Location:  or  Surgeon:  Dorian Gamble MD   Anesthesia Type:  general  Procedure: RIGHT COMMON FEMORAL ARTERY TO BELOW KNEE POPLITEAL IN SITU BYPASS GRAFT   Consult: virtual visit 24    RECORDS REQUESTED FROM:       Primary Care Provider: Jozef Meyer MB [facility]    Pertinent Medical History: STEMI, peripheral artery disease, arterial occlusion    Most recent EKG+ Tracin24    Most recent ECHO: 10/26/23    Most recent Cardiac Stress Test: 12/15/23

## 2024-08-19 ENCOUNTER — TELEPHONE (OUTPATIENT)
Dept: CARDIOLOGY | Facility: CLINIC | Age: 64
End: 2024-08-19
Payer: COMMERCIAL

## 2024-08-19 DIAGNOSIS — E78.5 HYPERLIPIDEMIA LDL GOAL <70: Primary | ICD-10-CM

## 2024-08-19 NOTE — TELEPHONE ENCOUNTER
Central Prior Authorization Team   Phone: [phone]    PA Initiation    Medication: Repatha 140mg/ml  Insurance Company: [employer] - Phone [phone] Fax [phone]  Pharmacy Filling the Rx: [id number] - [place], [place] - [address] & [facility]  Filling Pharmacy Phone: [phone]  Filling Pharmacy Fax:    Start Date: 8/19/2024

## 2024-08-19 NOTE — TELEPHONE ENCOUNTER
Per call to [facility] pharmacy-    The Rx is currently at their resolution center- the pharmacist messaged the center to release the Rx so they can process the medication.  They will contact the patient when this is ready for .

## 2024-08-19 NOTE — TELEPHONE ENCOUNTER
Prior auth for Repatha was sent to PA team on 8/9. Will message PA team to see if they are still waiting on an update. Adalberto SHEFFIELD August 19, 2024, 10:13 AM

## 2024-08-19 NOTE — TELEPHONE ENCOUNTER
Prior Authorization Approval    Authorization Effective Date: 8/19/2024  Authorization Expiration Date: 8/19/2025  Medication: Repatha 140mg/ml  Approved Dose/Quantity:   Reference #:     Insurance Company: [employer] - Phone [phone] Fax [phone]  Expected CoPay:       CoPay Card Available:      Foundation Assistance Needed:    Which Pharmacy is filling the prescription (Not needed for infusion/clinic administered): [id number] - [place], [place] - [address]  Pharmacy Notified:  yes  Patient Notified:  yes- Pharmacy will contact patient when ready to /ship

## 2024-08-19 NOTE — TELEPHONE ENCOUNTER
[facility] Call Center    Phone Message    May a detailed message be left on voicemail: yes     Reason for Call: Other: Please call [facility] back to confirm prior authorization for Katarina Collazo.     Action Taken: Other: Cardiology    Travel Screening: Not Applicable    Thank you!  [facility]

## 2024-08-19 NOTE — PROGRESS NOTES
PTA medications updated by Medication Scribe prior to surgery via phone call with patient (last doses completed by Nurse)     Medication history sources: Patient, Surescripts, and (Cardiology progress note)  In the past week, patient estimated taking medication this percent of the time: Unsure. The patient would not complete the interview.      Significant changes made to the medication list:  None and unable to obtain PTA medication doses and last dose.      Additional medication history information:   Patient was advised to bring: List of medications and obtain if/ when last doses to be taken PTA.    Medication reconciliation completed by provider prior to medication history? No    Time spent in this activity: 35 minutes    The information provided in this note is only as accurate as the sources available at the time of update(s)      Prior to Admission medications    Medication Sig Last Dose Taking? Auth Provider Long Term End Date   alendronate (FOSAMAX) 70 MG tablet Take 70 mg by mouth every 7 days 8/15/2024 at am Yes Unknown, Entered By History Yes    aspirin (ASA) 81 MG chewable tablet Take 1 tablet (81 mg) by mouth daily  at am Yes Glen Padilla MD     atorvastatin (LIPITOR) 80 MG tablet Take 1 tablet (80 mg) by mouth daily  at pm Yes Fartun Klein APRN CNP Yes    cholecalciferol (VITAMIN D3) 25 mcg (1000 units) capsule Take 1 capsule by mouth daily  at am Yes Reported, Patient     cilostazol (PLETAL) 100 MG tablet Take 100 mg by mouth 2 times daily Unknown Yes Reported, Patient Yes    ferrous sulfate (FEROSUL) 325 (65 Fe) MG tablet Take 325 mg by mouth every other day  at am Yes Unknown, Entered By History     sertraline (ZOLOFT) 100 MG tablet Take 200 mg by mouth daily (Some times takes 1 tablet BID)  at am Yes Unknown, Entered By History No    clopidogrel (PLAVIX) 75 MG tablet Take 1 tablet (75 mg) by mouth daily   Fartun Klein APRN CNP Yes    evolocumab (REPATHA SURECLICK) 140  MG/ML prefilled autoinjector Inject 1 mL (140 mg) subcutaneously every 14 days   Seamus Newton MD     gabapentin (NEURONTIN) 100 MG capsule Take 1 capsule (100 mg) by mouth 3 times daily for 30 days  Patient not taking: Reported on 7/26/2024   Marilyn Leblanc MD Yes 3/31/24   levETIRAcetam (KEPPRA) 500 MG tablet Take 500 mg by mouth as needed   Unknown, Entered By History Yes    metoprolol succinate ER (TOPROL XL) 25 MG 24 hr tablet Take 1 tablet (25 mg) by mouth daily   Fartun Klein, FIDEL CNP Yes    nitroGLYcerin (NITROSTAT) 0.4 MG sublingual tablet For chest pain place 1 tablet under the tongue every 5 minutes for 3 doses. If symptoms persist 5 minutes after 1st dose call 911.   Glen Padilla MD Yes    pregabalin (LYRICA) 150 MG capsule Take 1 capsule (150 mg) by mouth 2 times daily   Jin Crisostomo MD Yes        Medication history completed by: Annamaria Hagen LPN

## 2024-08-22 ENCOUNTER — TELEPHONE (OUTPATIENT)
Dept: OTHER | Facility: CLINIC | Age: 64
End: 2024-08-22

## 2024-08-22 ENCOUNTER — ANESTHESIA EVENT (OUTPATIENT)
Dept: SURGERY | Facility: CLINIC | Age: 64
DRG: 254 | End: 2024-08-22
Payer: COMMERCIAL

## 2024-08-22 ENCOUNTER — PRE VISIT (OUTPATIENT)
Dept: SURGERY | Facility: CLINIC | Age: 64
End: 2024-08-22

## 2024-08-22 ENCOUNTER — OFFICE VISIT (OUTPATIENT)
Dept: SURGERY | Facility: CLINIC | Age: 64
End: 2024-08-22
Payer: COMMERCIAL

## 2024-08-22 ENCOUNTER — LAB (OUTPATIENT)
Dept: LAB | Facility: CLINIC | Age: 64
End: 2024-08-22
Payer: COMMERCIAL

## 2024-08-22 VITALS
SYSTOLIC BLOOD PRESSURE: 130 MMHG | HEART RATE: 79 BPM | DIASTOLIC BLOOD PRESSURE: 86 MMHG | TEMPERATURE: 98.9 F | HEIGHT: 69 IN | WEIGHT: 204 LBS | BODY MASS INDEX: 30.21 KG/M2

## 2024-08-22 DIAGNOSIS — Z01.818 PRE-OP EVALUATION: ICD-10-CM

## 2024-08-22 DIAGNOSIS — Z01.818 PRE-OP EVALUATION: Primary | ICD-10-CM

## 2024-08-22 LAB
ERYTHROCYTE [DISTWIDTH] IN BLOOD BY AUTOMATED COUNT: 15.2 % (ref 10–15)
HCT VFR BLD AUTO: 39.8 % (ref 40–53)
HGB BLD-MCNC: 13.4 G/DL (ref 13.3–17.7)
MCH RBC QN AUTO: 29.5 PG (ref 26.5–33)
MCHC RBC AUTO-ENTMCNC: 33.7 G/DL (ref 31.5–36.5)
MCV RBC AUTO: 88 FL (ref 78–100)
PLATELET # BLD AUTO: 219 10E3/UL (ref 150–450)
RBC # BLD AUTO: 4.55 10E6/UL (ref 4.4–5.9)
WBC # BLD AUTO: 6.9 10E3/UL (ref 4–11)

## 2024-08-22 PROCEDURE — 36415 COLL VENOUS BLD VENIPUNCTURE: CPT | Performed by: PATHOLOGY

## 2024-08-22 PROCEDURE — 99203 OFFICE O/P NEW LOW 30 MIN: CPT | Performed by: PHYSICIAN ASSISTANT

## 2024-08-22 PROCEDURE — 85027 COMPLETE CBC AUTOMATED: CPT | Performed by: PATHOLOGY

## 2024-08-22 RX ORDER — VARENICLINE TARTRATE 1 MG/1
1 TABLET, FILM COATED ORAL DAILY
Status: ON HOLD | COMMUNITY
Start: 2022-06-10 | End: 2024-08-23

## 2024-08-22 ASSESSMENT — LIFESTYLE VARIABLES: TOBACCO_USE: 1

## 2024-08-22 ASSESSMENT — PAIN SCALES - GENERAL: PAINLEVEL: EXTREME PAIN (9)

## 2024-08-22 NOTE — TELEPHONE ENCOUNTER
[facility]    I called Adam Sahni about his right femoral to popliteal in situ bypass graft tomorrow morning.  I left a message on the cell phone.    Patient was seen by cardiology and cleared him for surgery on 7/26/2024 with a note in epic.    LDL= 115 on recheck which will need to be addressed with his PAD systemic vascular issues.      Fine to hold Plavix for surgery but with his cardiac issues recommend  DAPT for at least one more year.    Dorian Gamble MD

## 2024-08-22 NOTE — PATIENT INSTRUCTIONS
Name:  Adam Sahni   MRN:  [id number]   :  1960   Today's Date:  2024         You were seen today for a pre-operative assessment in the:    Pre-operative Anesthesia Assessment Center(PAC)  [facility]  [address] [place] [address]  phone [phone]      You will be receiving a call with location, date, arrival time and diet instructions from Preadmission Nursing at your surgical site:    -[facility]: [phone]      Anesthesia recommendations for medications:    Hold Ibuprofen for 1 day before procedure.   Hold Naproxen for 4 days before procedure.     No alcohol or cannabis products for 24 hours before your procedure           Please DO NOT take the following medications the day of procedure:    Asprin   Vitamin D  Ferrous sulfate  Chantix      Please take these medications the day of procedure:    Sertraline (Zoloft)  Gabapentin (Neurontin)      How do I prepare myself?  - Please take 2 showers (one the night prior to surgery and one the morning of surgery) using Scrubcare or Hibiclens soap.    Use this soap only from the neck to your toes.     Leave the soap on your skin for one minute--then rinse thoroughly.      You may use your own shampoo and conditioner. No other hair products.   - Please remove all jewelry and body piercings.  - No lotions, deodorants or fragrance.  - Bring your ID and insurance card.    -If you have a Deep Brain Stimulator, a Spinal Cord Stimulator, or any implanted Neuro Device, you must bring the remote to your appointment                 For further questions regarding your surgery please call your surgeon's office.

## 2024-08-22 NOTE — H&P
Pre-Operative H & P     CC:  Preoperative exam to assess for increased cardiopulmonary risk while undergoing surgery and anesthesia.    Date of Encounter: 8/22/2024  Primary Care Physician:  No Ref-Primary, Physician     Reason for visit:   Encounter Diagnosis   Name Primary?    Pre-op evaluation Yes       HPI  Adam Sahni is a 64 year old male who presents for pre-operative H & P in preparation for  Procedure Information       Case: [id number] Date/Time: 08/23/24 1010    Procedure: RIGHT COMMON FEMORAL ARTERY TO BELOW KNEE POPLITEAL IN SITU BYPASS GRAFT (Right: Groin)    Anesthesia type: General    Diagnosis: Atherosclerosis of native artery of right lower extremity with intermittent claudication (H24) [I70.211]    Pre-op diagnosis: Atherosclerosis of native artery of right lower extremity with intermittent claudication (H24) [I70.211]    Location:  OR [address] OR    Providers: Dorian Gamble MD            Patient is being evaluated for comorbid conditions of CAD status post UGO to RCA 2023, dyslipidemia, PAD, prediabetes, tobacco use, peripheral neuropathy, depression    Mr. Sahni has a history of PAD status post recannulization of occluded right external iliac artery with stenting x 3.  He has known above-knee popliteal artery occlusion.  He follows with Dr. Gamble and is now scheduled for the above procedure.    History is obtained from the patient and chart review    Hx of abnormal bleeding or anti-platelet use: plavix, ASA      Past Medical History  Past Medical History:   Diagnosis Date    CAD (coronary artery disease)     with UGO to RCA on 6/9/23 for inferior STEMI    Chronic anemia     Depression     Hyperlipidemia     Lumbar radiculopathy     PAD (peripheral artery disease) (H24)     S/p bilateral kissing iliac stents after thrombolytics 7/16/21-7/17/21 for severe claudication and complete occlusion of the left common iliac artery with tandem occlusions and/or high grade stenoses of the  distal SFA and popliteal artery.    Peripheral neuropathy     Prediabetes     Prostate cancer (H)     s/p ext beam radiation therapy, follows at [facility]       Past Surgical History  Past Surgical History:   Procedure Laterality Date    APPENDECTOMY      CV CORONARY ANGIOGRAM N/A 06/09/2023    Procedure: Coronary Angiogram;  Surgeon: Rosenda Pacheco MD;  Location:  [facility]    IR LOWER EXTREMITY ANGIOGRAM RIGHT  06/18/2023    ORTHOPEDIC SURGERY      previious lumbar surgery       Prior to Admission Medications  Current Outpatient Medications   Medication Sig Dispense Refill    alendronate (FOSAMAX) 70 MG tablet Take 70 mg by mouth every 7 days. Friday last dose 8/16/24      aspirin (ASA) 81 MG chewable tablet Take 1 tablet (81 mg) by mouth daily (Patient taking differently: Take 81 mg by mouth every morning.) 30 tablet 3    atorvastatin (LIPITOR) 80 MG tablet Take 1 tablet (80 mg) by mouth daily (Patient taking differently: Take 80 mg by mouth every evening.) 90 tablet 3    cholecalciferol (VITAMIN D3) 25 mcg (1000 units) capsule Take 1 capsule by mouth every morning.      ferrous sulfate (FEROSUL) 325 (65 Fe) MG tablet Take 325 mg by mouth every other day      nitroGLYcerin (NITROSTAT) 0.4 MG sublingual tablet For chest pain place 1 tablet under the tongue every 5 minutes for 3 doses. If symptoms persist 5 minutes after 1st dose call 911. 25 tablet 0    sertraline (ZOLOFT) 100 MG tablet Take 200 mg by mouth every morning. (Some times takes 1 tablet BID)      varenicline (CHANTIX) 1 MG tablet Take 1 mg by mouth daily.      clopidogrel (PLAVIX) 75 MG tablet Take 1 tablet (75 mg) by mouth daily (Patient not taking: Reported on 8/22/2024) 90 tablet 2    evolocumab (REPATHA SURECLICK) 140 MG/ML prefilled autoinjector Inject 1 mL (140 mg) subcutaneously every 14 days (Patient taking differently: Inject 140 mg subcutaneously every 14 days. Have not started yet) 6 mL 3    metoprolol succinate ER (TOPROL XL)  25 MG 24 hr tablet Take 1 tablet (25 mg) by mouth daily (Patient not taking: Reported on 8/22/2024) 90 tablet 3       Allergies  Allergies   Allergen Reactions    Penicillins Hives, Shortness Of Breath and Swelling     Reports also having throat swelling- took medication as a child.         Social History  Social History     Socioeconomic History    Marital status: Single     Spouse name: Not on file    Number of children: Not on file    Years of education: Not on file    Highest education level: Not on file   Occupational History    Not on file   Tobacco Use    Smoking status: Some Days     Current packs/day: 0.20     Types: Cigarettes     Passive exposure: Current    Smokeless tobacco: Never    Tobacco comments:     2-3 cigs a day   Substance and Sexual Activity    Alcohol use: Yes     Comment: 2 drinks twice a week    Drug use: Never    Sexual activity: Not on file   Other Topics Concern    Not on file   Social History Narrative    Not on file     Social Determinants of Health     Financial Resource Strain: Low Risk  (1/13/2023)    Received from [facility]    Overall Financial Resource Strain (CARDIA)     Difficulty of Paying Living Expenses: Not hard at all   Food Insecurity: No Food Insecurity (1/16/2024)    Received from [facility]    Hunger Vital Sign     Worried About Running Out of Food in the Last Year: Never true     Ran Out of Food in the Last Year: Never true   Transportation Needs: No Transportation Needs (1/16/2024)    Received from [facility]    PRAPARE - Transportation     Lack of Transportation (Medical): No     Lack of Transportation (Non-Medical): No   Physical Activity: Insufficiently Active (1/16/2024)    Received from [facility]    Exercise Vital Sign     Days of Exercise per Week: 1 day     Minutes of Exercise per Session: 20 min   Stress: Stress Concern Present (1/13/2023)    Received from [facility], [facility]    [employer]  [employer] Occup[employer] - Occupational Stress Questionnaire     Feeling of Stress : Rather much   Social Connections: Socially Isolated (1/13/2023)    [facility]    Social Connection and Isolation Panel [NHANES]     Frequency of Communication with Friends and Family: Once a week     Frequency of Social Gatherings with Friends and Family: Once a week     Attends Evangelical Services: Never     Active Member of Clubs or Organizations: No     Attends Club or Organization Meetings: 1 to 4 times per year     Marital Status: Never    Interpersonal Safety: Not At Risk (1/13/2023)    Received from [facility]    Humiliation, Afraid, Rape, and Kick questionnaire     Fear of Current or Ex-Partner: No     Emotionally Abused: No     Physically Abused: No     Sexually Abused: No   Housing Stability: Low Risk  (1/16/2024)    [facility]    Housing Stability     What is your living situation today?: I have a steady place to live       Family History  Family History   Problem Relation Age of Onset    Anesthesia Reaction No family hx of        Review of Systems  The complete review of systems is negative other than noted in the HPI or here.   Anesthesia Evaluation   Pt has had prior anesthetic.         ROS/MED HX  ENT/Pulmonary:     (+)                tobacco use, Current use,                       Neurologic:     (+)    peripheral neuropathy, - bilateral feet.                           Cardiovascular:     (+) Dyslipidemia - Peripheral Vascular Disease-  CAD -  - stent-2023. 1  Taking blood thinners                              Previous cardiac testing   Echo: Date: 2023 Results:  Interpretation Summary     Left ventricular systolic function is mildly reduced.  Biplane LVEF is 51%.  Inferior and inferolateral hypokinesis.  On direct comaprison to prior, EF is slightly improved. Wall motion  abnormalities are likely in the same coronary aretry distribution on the  "two  studies.     The study was technically difficult.    Stress Test:  Date: Results:    ECG Reviewed:  Date: 7/2024 Results:  SR  Cath:  Date: 6/2023 Results:  1. Late-presentation inferior MI due to thrombotic occlusion of the proximal right coronary artery.  2. Severe 70% stenosis of the proximal LCx which is a small to medium vessel.  3. Moderate to severe stenosis of two branches of the large ramus intermedius.   4. Moderate stenosis of the midLAD.  5. PCI of the proximal to midRCA with aspiration thrombectomy and Cutting Balloon angioplasty and placement of a 3.5 x 38 mm Synergy drug-eluting stent which was postdilated to 4.0 mm.      METS/Exercise Tolerance: 4 - Raking leaves, gardening Comment: Swimming without exertional symptoms    Hematologic:  - neg hematologic  ROS  (-) history of blood clots and history of blood transfusion   Musculoskeletal:  - neg musculoskeletal ROS     GI/Hepatic:  - neg GI/hepatic ROS  (-) GERD   Renal/Genitourinary:  - neg Renal ROS     Endo: Comment: Prediabetes    (-) chronic steroid usage   Psychiatric/Substance Use:     (+) psychiatric history depression       Infectious Disease:  - neg infectious disease ROS     Malignancy:  - neg malignancy ROS     Other:            /86 (BP Location: Right arm, Patient Position: Sitting, Cuff Size: Adult Large)   Pulse 79   Temp 98.9  F (37.2  C) (Oral)   Ht 1.753 m (5' 9\")   Wt 92.5 kg (204 lb)   BMI 30.13 kg/m      Physical Exam   Constitutional: Awake, alert, cooperative, no apparent distress, and appears stated age.  Eyes: Pupils equal, round and reactive to light, extra ocular muscles intact, sclera clear, conjunctiva normal.  HENT: Normocephalic, oral pharynx with moist mucus membranes  Respiratory: Clear to auscultation bilaterally, no crackles or wheezing.  Cardiovascular: Regular rate and rhythm, normal S1 and S2, and no murmur noted.  Carotids no bruits. No edema. Palpable pulses to radial arteries.   GI: Normal bowel " "sounds, soft, non-distended, non-tender  Genitourinary:  deferred  Skin: Warm and dry.   Musculoskeletal: Full ROM of neck. There is no redness, warmth, or swelling of the joints.   Neurologic: Awake, alert, oriented to name, place and time. Cranial nerves II-XII are grossly intact. Using walking stick for ambulation   Neuropsychiatric: Calm, cooperative. Normal affect.     Prior Labs/Diagnostic Studies   All labs and imaging personally reviewed     EKG/ stress test - if available please see in ROS above   Echo result w/o MOPS: Interpretation Summary Left ventricular systolic function is mildly reduced.Biplane LVEF is 51%.Inferior and inferolateral hypokinesis.On direct comaprison to prior, EF is slightly improved. Wall motionabnormalities are likely in the same coronary aretry distribution on the twostudies. The study was technically difficult.           No data to display                  The patient's records and results personally reviewed by this provider.     Outside records reviewed from: Care Everywhere    LAB/DIAGNOSTIC STUDIES TODAY:  CBC    Assessment    Adam Sahni is a 64 year old male seen as a PAC referral for risk assessment and optimization for anesthesia.    Plan/Recommendations  Pt will be optimized for the proposed procedure.  See below for details on the assessment, risk, and preoperative recommendations    NEUROLOGY  - No history of TIA, CVA or seizure  -neuropathy  -Post Op delirium risk factors:  No risk identified    ENT  - No current airway concerns.  Will need to be reassessed day of surgery.  Mallampati: Unable to assess  TM: Unable to assess    CARDIAC  - No history of Afib  -follows with cardiology for h/o CAD. Per note 7/26/24:  \"Patient is asymptomatic from a cardiac standpoint.  Functional capacity is easily greater than 4 METS.  Given asymptomatic status and good functional capacity, preoperative ischemic evaluation has not shown to improve perioperative outcomes with vascular " "surgery.  CARP [facility] 2005.  Recommend continuing aggressive medical therapy.  Given that it has been a year since his MI, Plavix can be discontinued from a cardiac standpoint perioperatively.  However given severe vascular disease and other unrevascularized coronary disease, we have decided to continue DAPT for 2 years.  He has no bleeding problems at this time however should this change, Plavix can be discontinued long-term.  I am going to add on a lipid panel today.  If LDL levels are still uncontrolled on high intensity statin, I recommend initiation of Repatha.  He does not have kids and has a genetic testing for him for familial hyperlipidemia would not  options at this time.  His first degree relatives however all should be screened.\"  -PAD now with the above procedure planned  -previous cardiac testing above  - METS (Metabolic Equivalents)  Patient performs 4 or more METS exercise without symptoms             Total Score: 0      RCRI-Low risk: Class 2 0.9% complication rate             Total Score: 1    RCRI: CAD        PULMONARY  MICHELLE Low Risk             Total Score: 2    MICHELLE: Over 50 ys old    MICHELLE: Male      - Denies asthma or inhaler use  - Tobacco History    History   Smoking Status    Some Days    Types: Cigarettes   Smokeless Tobacco    Never   -offered smoking cessation counseling, patient is interested. I will alert YOHANNES Fernandez    GI  PONV Low Risk  Total Score: 1           1 AN PONV: Intended Post Op Opioids        /RENAL  - Baseline Creatinine WNL    ENDOCRINE    - BMI: Estimated body mass index is 30.13 kg/m  as calculated from the following:    Height as of this encounter: 1.753 m (5' 9\").    Weight as of this encounter: 92.5 kg (204 lb).  Obesity (BMI >30)  - No history of Diabetes Mellitus    HEME  VTE Low Risk 0.5%             Total Score: 3    VTE: Greater than 59 yrs old    VTE: Male      - Platelet disfunction second to Aspirin (Leonardo, many others) and Clopidogrel (Plavix)- " per surgery team/ cardiology- holding plavix in preparation for surgery   -anemia using iron supplement.  -will update T&S today     Different anesthesia methods/types have been discussed with the patient, but they are aware that the final plan will be decided by the assigned anesthesia provider on the date of service.    The patient is optimized for their procedure. AVS with information on surgery time/arrival time, meds and NPO status given by nursing staff. No further diagnostic testing indicated.      On the day of service:     Prep time: 19 minutes  Visit time: 12 minutes  Documentation time: 9 minutes  ------------------------------------------  Total time: 40 minutes      Lisette Slater PA-C  Preoperative Assessment Center  [facility]  Phone: [phone]  Fax: [phone]

## 2024-08-23 ENCOUNTER — ANESTHESIA (OUTPATIENT)
Dept: SURGERY | Facility: CLINIC | Age: 64
DRG: 254 | End: 2024-08-23
Payer: COMMERCIAL

## 2024-08-23 ENCOUNTER — APPOINTMENT (OUTPATIENT)
Dept: SURGERY | Facility: PHYSICIAN GROUP | Age: 64
End: 2024-08-23
Payer: COMMERCIAL

## 2024-08-23 ENCOUNTER — HOSPITAL ENCOUNTER (INPATIENT)
Facility: CLINIC | Age: 64
LOS: 2 days | Discharge: HOME | DRG: 254 | End: 2024-08-25
Attending: SURGERY | Admitting: SURGERY
Payer: COMMERCIAL

## 2024-08-23 DIAGNOSIS — I73.9 PERIPHERAL ARTERY DISEASE (H): Primary | ICD-10-CM

## 2024-08-23 DIAGNOSIS — E78.5 HYPERLIPIDEMIA WITH TARGET LOW DENSITY LIPOPROTEIN (LDL) CHOLESTEROL LESS THAN 100 MG/DL: ICD-10-CM

## 2024-08-23 LAB
ABO/RH(D): NORMAL
ANION GAP SERPL CALCULATED.3IONS-SCNC: 10 MMOL/L (ref 7–15)
ANTIBODY SCREEN: NEGATIVE
BUN SERPL-MCNC: 16.1 MG/DL (ref 8–23)
CALCIUM SERPL-MCNC: 9.4 MG/DL (ref 8.8–10.4)
CHLORIDE SERPL-SCNC: 106 MMOL/L (ref 98–107)
CREAT SERPL-MCNC: 0.77 MG/DL (ref 0.67–1.17)
EGFRCR SERPLBLD CKD-EPI 2021: >90 ML/MIN/1.73M2
GLUCOSE SERPL-MCNC: 96 MG/DL (ref 70–99)
HCO3 SERPL-SCNC: 22 MMOL/L (ref 22–29)
PLATELET # BLD AUTO: 201 10E3/UL (ref 150–450)
POTASSIUM SERPL-SCNC: 4.1 MMOL/L (ref 3.4–5.3)
SODIUM SERPL-SCNC: 138 MMOL/L (ref 135–145)
SPECIMEN EXPIRATION DATE: NORMAL

## 2024-08-23 PROCEDURE — 272N000001 HC OR GENERAL SUPPLY STERILE: Performed by: SURGERY

## 2024-08-23 PROCEDURE — 370N000017 HC ANESTHESIA TECHNICAL FEE, PER MIN: Performed by: SURGERY

## 2024-08-23 PROCEDURE — 06BP0ZZ EXCISION OF RIGHT SAPHENOUS VEIN, OPEN APPROACH: ICD-10-PCS | Performed by: SURGERY

## 2024-08-23 PROCEDURE — 250N000009 HC RX 250: Performed by: NURSE ANESTHETIST, CERTIFIED REGISTERED

## 2024-08-23 PROCEDURE — 35583 VEIN BYP GRFT FEM-POPLITEAL: CPT | Mod: RT | Performed by: SURGERY

## 2024-08-23 PROCEDURE — 258N000003 HC RX IP 258 OP 636

## 2024-08-23 PROCEDURE — 999N000141 HC STATISTIC PRE-PROCEDURE NURSING ASSESSMENT: Performed by: SURGERY

## 2024-08-23 PROCEDURE — 250N000025 HC SEVOFLURANE, PER MIN: Performed by: SURGERY

## 2024-08-23 PROCEDURE — 76998 US GUIDE INTRAOP: CPT | Mod: 26 | Performed by: SURGERY

## 2024-08-23 PROCEDURE — 258N000003 HC RX IP 258 OP 636: Performed by: NURSE ANESTHETIST, CERTIFIED REGISTERED

## 2024-08-23 PROCEDURE — 250N000012 HC RX MED GY IP 250 OP 636 PS 637: Performed by: SURGERY

## 2024-08-23 PROCEDURE — 258N000003 HC RX IP 258 OP 636: Performed by: ANESTHESIOLOGY

## 2024-08-23 PROCEDURE — 120N000001 HC R&B MED SURG/OB

## 2024-08-23 PROCEDURE — 250N000011 HC RX IP 250 OP 636: Performed by: SURGERY

## 2024-08-23 PROCEDURE — 710N000009 HC RECOVERY PHASE 1, LEVEL 1, PER MIN: Performed by: SURGERY

## 2024-08-23 PROCEDURE — 35583 VEIN BYP GRFT FEM-POPLITEAL: CPT | Performed by: ANESTHESIOLOGY

## 2024-08-23 PROCEDURE — 041K09L BYPASS RIGHT FEMORAL ARTERY TO POPLITEAL ARTERY WITH AUTOLOGOUS VENOUS TISSUE, OPEN APPROACH: ICD-10-PCS | Performed by: SURGERY

## 2024-08-23 PROCEDURE — 85049 AUTOMATED PLATELET COUNT: CPT

## 2024-08-23 PROCEDURE — 86900 BLOOD TYPING SEROLOGIC ABO: CPT | Performed by: SURGERY

## 2024-08-23 PROCEDURE — 250N000011 HC RX IP 250 OP 636: Performed by: ANESTHESIOLOGY

## 2024-08-23 PROCEDURE — 250N000009 HC RX 250: Performed by: SURGERY

## 2024-08-23 PROCEDURE — 250N000011 HC RX IP 250 OP 636

## 2024-08-23 PROCEDURE — 80048 BASIC METABOLIC PNL TOTAL CA: CPT | Performed by: SURGERY

## 2024-08-23 PROCEDURE — 271N000002 HC RX 271: Performed by: SURGERY

## 2024-08-23 PROCEDURE — 360N000075 HC SURGERY LEVEL 2, PER MIN: Performed by: SURGERY

## 2024-08-23 PROCEDURE — 250N000013 HC RX MED GY IP 250 OP 250 PS 637

## 2024-08-23 PROCEDURE — 250N000011 HC RX IP 250 OP 636: Performed by: NURSE ANESTHETIST, CERTIFIED REGISTERED

## 2024-08-23 PROCEDURE — 258N000003 HC RX IP 258 OP 636: Performed by: SURGERY

## 2024-08-23 PROCEDURE — 35583 VEIN BYP GRFT FEM-POPLITEAL: CPT | Performed by: NURSE ANESTHETIST, CERTIFIED REGISTERED

## 2024-08-23 PROCEDURE — 36415 COLL VENOUS BLD VENIPUNCTURE: CPT | Performed by: SURGERY

## 2024-08-23 PROCEDURE — 36415 COLL VENOUS BLD VENIPUNCTURE: CPT

## 2024-08-23 RX ORDER — DEXAMETHASONE SODIUM PHOSPHATE 4 MG/ML
INJECTION, SOLUTION INTRA-ARTICULAR; INTRALESIONAL; INTRAMUSCULAR; INTRAVENOUS; SOFT TISSUE PRN
Status: DISCONTINUED | OUTPATIENT
Start: 2024-08-23 | End: 2024-08-23

## 2024-08-23 RX ORDER — ASPIRIN 81 MG/1
81 TABLET, CHEWABLE ORAL DAILY
Status: DISCONTINUED | OUTPATIENT
Start: 2024-08-24 | End: 2024-08-25 | Stop reason: HOSPADM

## 2024-08-23 RX ORDER — FENTANYL CITRATE 0.05 MG/ML
25 INJECTION, SOLUTION INTRAMUSCULAR; INTRAVENOUS EVERY 5 MIN PRN
Status: DISCONTINUED | OUTPATIENT
Start: 2024-08-23 | End: 2024-08-23 | Stop reason: HOSPADM

## 2024-08-23 RX ORDER — SODIUM CHLORIDE, SODIUM LACTATE, POTASSIUM CHLORIDE, CALCIUM CHLORIDE 600; 310; 30; 20 MG/100ML; MG/100ML; MG/100ML; MG/100ML
INJECTION, SOLUTION INTRAVENOUS CONTINUOUS
Status: DISCONTINUED | OUTPATIENT
Start: 2024-08-23 | End: 2024-08-23 | Stop reason: HOSPADM

## 2024-08-23 RX ORDER — VARENICLINE TARTRATE 1 MG/1
1 TABLET, FILM COATED ORAL DAILY
Status: DISCONTINUED | OUTPATIENT
Start: 2024-08-24 | End: 2024-08-23

## 2024-08-23 RX ORDER — CLINDAMYCIN PHOSPHATE 900 MG/50ML
900 INJECTION, SOLUTION INTRAVENOUS SEE ADMIN INSTRUCTIONS
Status: DISCONTINUED | OUTPATIENT
Start: 2024-08-23 | End: 2024-08-23 | Stop reason: HOSPADM

## 2024-08-23 RX ORDER — NALOXONE HYDROCHLORIDE 0.4 MG/ML
0.4 INJECTION, SOLUTION INTRAMUSCULAR; INTRAVENOUS; SUBCUTANEOUS
Status: DISCONTINUED | OUTPATIENT
Start: 2024-08-23 | End: 2024-08-25 | Stop reason: HOSPADM

## 2024-08-23 RX ORDER — CLOPIDOGREL BISULFATE 75 MG/1
75 TABLET ORAL DAILY
Status: DISCONTINUED | OUTPATIENT
Start: 2024-08-24 | End: 2024-08-23

## 2024-08-23 RX ORDER — SERTRALINE HYDROCHLORIDE 100 MG/1
200 TABLET, FILM COATED ORAL EVERY MORNING
Status: DISCONTINUED | OUTPATIENT
Start: 2024-08-24 | End: 2024-08-25 | Stop reason: HOSPADM

## 2024-08-23 RX ORDER — ONDANSETRON 4 MG/1
4 TABLET, ORALLY DISINTEGRATING ORAL EVERY 30 MIN PRN
Status: DISCONTINUED | OUTPATIENT
Start: 2024-08-23 | End: 2024-08-23 | Stop reason: HOSPADM

## 2024-08-23 RX ORDER — METOPROLOL SUCCINATE 25 MG/1
25 TABLET, EXTENDED RELEASE ORAL DAILY
Status: DISCONTINUED | OUTPATIENT
Start: 2024-08-24 | End: 2024-08-25 | Stop reason: HOSPADM

## 2024-08-23 RX ORDER — HEPARIN SODIUM 1000 [USP'U]/ML
INJECTION, SOLUTION INTRAVENOUS; SUBCUTANEOUS PRN
Status: DISCONTINUED | OUTPATIENT
Start: 2024-08-23 | End: 2024-08-23 | Stop reason: HOSPADM

## 2024-08-23 RX ORDER — ONDANSETRON 2 MG/ML
4 INJECTION INTRAMUSCULAR; INTRAVENOUS EVERY 6 HOURS PRN
Status: DISCONTINUED | OUTPATIENT
Start: 2024-08-23 | End: 2024-08-25 | Stop reason: HOSPADM

## 2024-08-23 RX ORDER — ACETAMINOPHEN 325 MG/1
975 TABLET ORAL EVERY 8 HOURS
Status: DISCONTINUED | OUTPATIENT
Start: 2024-08-23 | End: 2024-08-25 | Stop reason: HOSPADM

## 2024-08-23 RX ORDER — HYDROMORPHONE HCL IN WATER/PF 6 MG/30 ML
0.2 PATIENT CONTROLLED ANALGESIA SYRINGE INTRAVENOUS
Status: DISCONTINUED | OUTPATIENT
Start: 2024-08-23 | End: 2024-08-25 | Stop reason: HOSPADM

## 2024-08-23 RX ORDER — HYDROMORPHONE HCL IN WATER/PF 6 MG/30 ML
0.4 PATIENT CONTROLLED ANALGESIA SYRINGE INTRAVENOUS
Status: DISCONTINUED | OUTPATIENT
Start: 2024-08-23 | End: 2024-08-25 | Stop reason: HOSPADM

## 2024-08-23 RX ORDER — PROPOFOL 10 MG/ML
INJECTION, EMULSION INTRAVENOUS CONTINUOUS PRN
Status: DISCONTINUED | OUTPATIENT
Start: 2024-08-23 | End: 2024-08-23

## 2024-08-23 RX ORDER — NALOXONE HYDROCHLORIDE 0.4 MG/ML
0.2 INJECTION, SOLUTION INTRAMUSCULAR; INTRAVENOUS; SUBCUTANEOUS
Status: DISCONTINUED | OUTPATIENT
Start: 2024-08-23 | End: 2024-08-25 | Stop reason: HOSPADM

## 2024-08-23 RX ORDER — ONDANSETRON 2 MG/ML
4 INJECTION INTRAMUSCULAR; INTRAVENOUS EVERY 30 MIN PRN
Status: DISCONTINUED | OUTPATIENT
Start: 2024-08-23 | End: 2024-08-23 | Stop reason: HOSPADM

## 2024-08-23 RX ORDER — ATORVASTATIN CALCIUM 80 MG/1
80 TABLET, FILM COATED ORAL EVERY EVENING
Status: DISCONTINUED | OUTPATIENT
Start: 2024-08-23 | End: 2024-08-25 | Stop reason: HOSPADM

## 2024-08-23 RX ORDER — CLINDAMYCIN PHOSPHATE 900 MG/50ML
900 INJECTION, SOLUTION INTRAVENOUS
Status: DISCONTINUED | OUTPATIENT
Start: 2024-08-23 | End: 2024-08-23 | Stop reason: HOSPADM

## 2024-08-23 RX ORDER — FERROUS SULFATE 325(65) MG
325 TABLET ORAL EVERY OTHER DAY
Status: DISCONTINUED | OUTPATIENT
Start: 2024-08-24 | End: 2024-08-25 | Stop reason: HOSPADM

## 2024-08-23 RX ORDER — ONDANSETRON 4 MG/1
4 TABLET, ORALLY DISINTEGRATING ORAL EVERY 6 HOURS PRN
Status: DISCONTINUED | OUTPATIENT
Start: 2024-08-23 | End: 2024-08-25 | Stop reason: HOSPADM

## 2024-08-23 RX ORDER — OXYCODONE HYDROCHLORIDE 5 MG/1
10 TABLET ORAL EVERY 4 HOURS PRN
Status: DISCONTINUED | OUTPATIENT
Start: 2024-08-23 | End: 2024-08-25 | Stop reason: HOSPADM

## 2024-08-23 RX ORDER — DIPHENHYDRAMINE HCL 25 MG
25 CAPSULE ORAL EVERY 6 HOURS PRN
Status: DISCONTINUED | OUTPATIENT
Start: 2024-08-23 | End: 2024-08-25 | Stop reason: HOSPADM

## 2024-08-23 RX ORDER — NALOXONE HYDROCHLORIDE 0.4 MG/ML
0.1 INJECTION, SOLUTION INTRAMUSCULAR; INTRAVENOUS; SUBCUTANEOUS
Status: DISCONTINUED | OUTPATIENT
Start: 2024-08-23 | End: 2024-08-23 | Stop reason: HOSPADM

## 2024-08-23 RX ORDER — LIDOCAINE HYDROCHLORIDE 20 MG/ML
INJECTION, SOLUTION INFILTRATION; PERINEURAL PRN
Status: DISCONTINUED | OUTPATIENT
Start: 2024-08-23 | End: 2024-08-23

## 2024-08-23 RX ORDER — FENTANYL CITRATE 50 UG/ML
INJECTION, SOLUTION INTRAMUSCULAR; INTRAVENOUS PRN
Status: DISCONTINUED | OUTPATIENT
Start: 2024-08-23 | End: 2024-08-23

## 2024-08-23 RX ORDER — CLOPIDOGREL BISULFATE 75 MG/1
75 TABLET ORAL DAILY
Status: DISCONTINUED | OUTPATIENT
Start: 2024-08-23 | End: 2024-08-25 | Stop reason: HOSPADM

## 2024-08-23 RX ORDER — ALENDRONATE SODIUM 70 MG/1
70 TABLET ORAL
Status: DISCONTINUED | OUTPATIENT
Start: 2024-08-24 | End: 2024-08-23

## 2024-08-23 RX ORDER — PROPOFOL 10 MG/ML
INJECTION, EMULSION INTRAVENOUS PRN
Status: DISCONTINUED | OUTPATIENT
Start: 2024-08-23 | End: 2024-08-23

## 2024-08-23 RX ORDER — BISACODYL 10 MG
10 SUPPOSITORY, RECTAL RECTAL DAILY PRN
Status: DISCONTINUED | OUTPATIENT
Start: 2024-08-26 | End: 2024-08-25 | Stop reason: HOSPADM

## 2024-08-23 RX ORDER — HALOPERIDOL 5 MG/ML
1 INJECTION INTRAMUSCULAR
Status: DISCONTINUED | OUTPATIENT
Start: 2024-08-23 | End: 2024-08-23 | Stop reason: HOSPADM

## 2024-08-23 RX ORDER — DIPHENHYDRAMINE HYDROCHLORIDE 50 MG/ML
25 INJECTION INTRAMUSCULAR; INTRAVENOUS EVERY 6 HOURS PRN
Status: DISCONTINUED | OUTPATIENT
Start: 2024-08-23 | End: 2024-08-25 | Stop reason: HOSPADM

## 2024-08-23 RX ORDER — MAGNESIUM HYDROXIDE 1200 MG/15ML
LIQUID ORAL PRN
Status: DISCONTINUED | OUTPATIENT
Start: 2024-08-23 | End: 2024-08-23 | Stop reason: HOSPADM

## 2024-08-23 RX ORDER — NITROGLYCERIN 0.4 MG/1
0.4 TABLET SUBLINGUAL EVERY 5 MIN PRN
Status: DISCONTINUED | OUTPATIENT
Start: 2024-08-23 | End: 2024-08-25 | Stop reason: HOSPADM

## 2024-08-23 RX ORDER — OXYCODONE HYDROCHLORIDE 5 MG/1
5 TABLET ORAL EVERY 4 HOURS PRN
Status: DISCONTINUED | OUTPATIENT
Start: 2024-08-23 | End: 2024-08-25 | Stop reason: HOSPADM

## 2024-08-23 RX ORDER — HYDROMORPHONE HCL IN WATER/PF 6 MG/30 ML
0.4 PATIENT CONTROLLED ANALGESIA SYRINGE INTRAVENOUS EVERY 5 MIN PRN
Status: DISCONTINUED | OUTPATIENT
Start: 2024-08-23 | End: 2024-08-23 | Stop reason: HOSPADM

## 2024-08-23 RX ORDER — PROCHLORPERAZINE MALEATE 10 MG
10 TABLET ORAL EVERY 6 HOURS PRN
Status: DISCONTINUED | OUTPATIENT
Start: 2024-08-23 | End: 2024-08-24

## 2024-08-23 RX ORDER — BUPIVACAINE HYDROCHLORIDE 5 MG/ML
INJECTION, SOLUTION PERINEURAL PRN
Status: DISCONTINUED | OUTPATIENT
Start: 2024-08-23 | End: 2024-08-23 | Stop reason: HOSPADM

## 2024-08-23 RX ORDER — FENTANYL CITRATE 0.05 MG/ML
50 INJECTION, SOLUTION INTRAMUSCULAR; INTRAVENOUS EVERY 5 MIN PRN
Status: DISCONTINUED | OUTPATIENT
Start: 2024-08-23 | End: 2024-08-23 | Stop reason: HOSPADM

## 2024-08-23 RX ORDER — KETOROLAC TROMETHAMINE 30 MG/ML
INJECTION, SOLUTION INTRAMUSCULAR; INTRAVENOUS PRN
Status: DISCONTINUED | OUTPATIENT
Start: 2024-08-23 | End: 2024-08-23

## 2024-08-23 RX ORDER — POLYETHYLENE GLYCOL 3350 17 G/17G
17 POWDER, FOR SOLUTION ORAL DAILY
Status: DISCONTINUED | OUTPATIENT
Start: 2024-08-24 | End: 2024-08-25 | Stop reason: HOSPADM

## 2024-08-23 RX ORDER — CALCIUM CARBONATE 500 MG/1
500 TABLET, CHEWABLE ORAL 4 TIMES DAILY PRN
Status: DISCONTINUED | OUTPATIENT
Start: 2024-08-23 | End: 2024-08-25 | Stop reason: HOSPADM

## 2024-08-23 RX ORDER — AMOXICILLIN 250 MG
1 CAPSULE ORAL 2 TIMES DAILY
Status: DISCONTINUED | OUTPATIENT
Start: 2024-08-23 | End: 2024-08-25 | Stop reason: HOSPADM

## 2024-08-23 RX ORDER — ACETAMINOPHEN 325 MG/1
650 TABLET ORAL EVERY 4 HOURS PRN
Status: DISCONTINUED | OUTPATIENT
Start: 2024-08-26 | End: 2024-08-25 | Stop reason: HOSPADM

## 2024-08-23 RX ORDER — HEPARIN SODIUM 5000 [USP'U]/.5ML
5000 INJECTION, SOLUTION INTRAVENOUS; SUBCUTANEOUS EVERY 8 HOURS
Status: DISCONTINUED | OUTPATIENT
Start: 2024-08-24 | End: 2024-08-25 | Stop reason: HOSPADM

## 2024-08-23 RX ORDER — SODIUM CHLORIDE 9 MG/ML
INJECTION, SOLUTION INTRAVENOUS CONTINUOUS
Status: DISCONTINUED | OUTPATIENT
Start: 2024-08-23 | End: 2024-08-25 | Stop reason: HOSPADM

## 2024-08-23 RX ORDER — HEPARIN SODIUM 1000 [USP'U]/ML
INJECTION, SOLUTION INTRAVENOUS; SUBCUTANEOUS PRN
Status: DISCONTINUED | OUTPATIENT
Start: 2024-08-23 | End: 2024-08-23

## 2024-08-23 RX ORDER — HYDROMORPHONE HCL IN WATER/PF 6 MG/30 ML
0.2 PATIENT CONTROLLED ANALGESIA SYRINGE INTRAVENOUS EVERY 5 MIN PRN
Status: DISCONTINUED | OUTPATIENT
Start: 2024-08-23 | End: 2024-08-23 | Stop reason: HOSPADM

## 2024-08-23 RX ORDER — CLINDAMYCIN PHOSPHATE 900 MG/50ML
900 INJECTION, SOLUTION INTRAVENOUS EVERY 8 HOURS
Status: COMPLETED | OUTPATIENT
Start: 2024-08-23 | End: 2024-08-24

## 2024-08-23 RX ORDER — LIDOCAINE 40 MG/G
CREAM TOPICAL
Status: DISCONTINUED | OUTPATIENT
Start: 2024-08-23 | End: 2024-08-25 | Stop reason: HOSPADM

## 2024-08-23 RX ORDER — ONDANSETRON 2 MG/ML
INJECTION INTRAMUSCULAR; INTRAVENOUS PRN
Status: DISCONTINUED | OUTPATIENT
Start: 2024-08-23 | End: 2024-08-23

## 2024-08-23 RX ADMIN — SENNOSIDES AND DOCUSATE SODIUM 1 TABLET: 50; 8.6 TABLET ORAL at 20:44

## 2024-08-23 RX ADMIN — FENTANYL CITRATE 50 MCG: 50 INJECTION, SOLUTION INTRAMUSCULAR; INTRAVENOUS at 15:34

## 2024-08-23 RX ADMIN — SODIUM CHLORIDE: 9 INJECTION, SOLUTION INTRAVENOUS at 17:50

## 2024-08-23 RX ADMIN — DEXAMETHASONE SODIUM PHOSPHATE 4 MG: 4 INJECTION, SOLUTION INTRA-ARTICULAR; INTRALESIONAL; INTRAMUSCULAR; INTRAVENOUS; SOFT TISSUE at 11:09

## 2024-08-23 RX ADMIN — PROPOFOL 35 MCG/KG/MIN: 10 INJECTION, EMULSION INTRAVENOUS at 11:10

## 2024-08-23 RX ADMIN — ROCURONIUM BROMIDE 20 MG: 50 INJECTION, SOLUTION INTRAVENOUS at 11:25

## 2024-08-23 RX ADMIN — PROPOFOL 200 MG: 10 INJECTION, EMULSION INTRAVENOUS at 11:01

## 2024-08-23 RX ADMIN — CLOPIDOGREL BISULFATE 75 MG: 75 TABLET ORAL at 18:46

## 2024-08-23 RX ADMIN — ONDANSETRON 4 MG: 2 INJECTION INTRAMUSCULAR; INTRAVENOUS at 13:56

## 2024-08-23 RX ADMIN — HYDROMORPHONE HYDROCHLORIDE 0.5 MG: 1 INJECTION, SOLUTION INTRAMUSCULAR; INTRAVENOUS; SUBCUTANEOUS at 11:54

## 2024-08-23 RX ADMIN — LIDOCAINE HYDROCHLORIDE 60 MG: 20 INJECTION, SOLUTION INFILTRATION; PERINEURAL at 11:01

## 2024-08-23 RX ADMIN — SODIUM CHLORIDE, POTASSIUM CHLORIDE, SODIUM LACTATE AND CALCIUM CHLORIDE: 600; 310; 30; 20 INJECTION, SOLUTION INTRAVENOUS at 13:06

## 2024-08-23 RX ADMIN — HEPARIN SODIUM 5000 UNITS: 1000 INJECTION, SOLUTION INTRAVENOUS; SUBCUTANEOUS at 12:24

## 2024-08-23 RX ADMIN — CLINDAMYCIN PHOSPHATE 900 MG: 900 INJECTION, SOLUTION INTRAVENOUS at 20:41

## 2024-08-23 RX ADMIN — FENTANYL CITRATE 50 MCG: 50 INJECTION INTRAMUSCULAR; INTRAVENOUS at 14:12

## 2024-08-23 RX ADMIN — HEPARIN SODIUM 2000 UNITS: 1000 INJECTION, SOLUTION INTRAVENOUS; SUBCUTANEOUS at 13:24

## 2024-08-23 RX ADMIN — FENTANYL CITRATE 50 MCG: 50 INJECTION INTRAMUSCULAR; INTRAVENOUS at 11:01

## 2024-08-23 RX ADMIN — HYDROMORPHONE HYDROCHLORIDE 0.4 MG: 0.2 INJECTION, SOLUTION INTRAMUSCULAR; INTRAVENOUS; SUBCUTANEOUS at 16:14

## 2024-08-23 RX ADMIN — ROCURONIUM BROMIDE 50 MG: 50 INJECTION, SOLUTION INTRAVENOUS at 11:01

## 2024-08-23 RX ADMIN — ATORVASTATIN CALCIUM 80 MG: 80 TABLET, FILM COATED ORAL at 20:44

## 2024-08-23 RX ADMIN — PHENYLEPHRINE HYDROCHLORIDE 50 MCG: 10 INJECTION INTRAVENOUS at 12:25

## 2024-08-23 RX ADMIN — FENTANYL CITRATE 50 MCG: 50 INJECTION INTRAMUSCULAR; INTRAVENOUS at 11:12

## 2024-08-23 RX ADMIN — SODIUM CHLORIDE, POTASSIUM CHLORIDE, SODIUM LACTATE AND CALCIUM CHLORIDE: 600; 310; 30; 20 INJECTION, SOLUTION INTRAVENOUS at 09:26

## 2024-08-23 RX ADMIN — OXYCODONE HYDROCHLORIDE 10 MG: 5 TABLET ORAL at 20:05

## 2024-08-23 RX ADMIN — CLINDAMYCIN PHOSPHATE 900 MG: 900 INJECTION, SOLUTION INTRAVENOUS at 09:26

## 2024-08-23 RX ADMIN — SUGAMMADEX 200 MG: 100 INJECTION, SOLUTION INTRAVENOUS at 14:38

## 2024-08-23 RX ADMIN — FENTANYL CITRATE 50 MCG: 50 INJECTION, SOLUTION INTRAMUSCULAR; INTRAVENOUS at 16:05

## 2024-08-23 RX ADMIN — ACETAMINOPHEN 975 MG: 325 TABLET ORAL at 18:46

## 2024-08-23 RX ADMIN — MIDAZOLAM 2 MG: 1 INJECTION INTRAMUSCULAR; INTRAVENOUS at 10:55

## 2024-08-23 RX ADMIN — KETOROLAC TROMETHAMINE 15 MG: 30 INJECTION, SOLUTION INTRAMUSCULAR at 13:53

## 2024-08-23 RX ADMIN — FENTANYL CITRATE 50 MCG: 50 INJECTION INTRAMUSCULAR; INTRAVENOUS at 14:07

## 2024-08-23 ASSESSMENT — ACTIVITIES OF DAILY LIVING (ADL)
ADLS_ACUITY_SCORE: 26
ADLS_ACUITY_SCORE: 38
ADLS_ACUITY_SCORE: 26

## 2024-08-23 ASSESSMENT — ENCOUNTER SYMPTOMS
ORTHOPNEA: 0
SEIZURES: 0
DYSRHYTHMIAS: 0

## 2024-08-23 ASSESSMENT — LIFESTYLE VARIABLES: TOBACCO_USE: 1

## 2024-08-23 NOTE — ANESTHESIA POSTPROCEDURE EVALUATION
Patient: Adam Sahni    Procedure: Procedure(s):  RIGHT COMMON FEMORAL ARTERY TO BELOW KNEE POPLITEAL IN SITU BYPASS GRAFT       Anesthesia Type:  General    Note:  Disposition: Inpatient   Postop Pain Control: Uneventful            Sign Out: Well controlled pain   PONV: No   Neuro/Psych: Uneventful            Sign Out: Acceptable/Baseline neuro status   Airway/Respiratory: Uneventful            Sign Out: Acceptable/Baseline resp. status   CV/Hemodynamics: Uneventful            Sign Out: Acceptable CV status   Other NRE: NONE   DID A NON-ROUTINE EVENT OCCUR? No           Last vitals:  Vitals Value Taken Time   /69 08/23/24 1618   Temp 36.9  C (98.4  F) 08/23/24 1545   Pulse 64 08/23/24 1620   Resp 10 08/23/24 1620   SpO2 95 % 08/23/24 1620   Vitals shown include unfiled device data.    Electronically Signed By: Eriberto Sykes MD  August 23, 2024  4:21 PM

## 2024-08-23 NOTE — OP NOTE
OPERATIVE NOTE    PROCEDURE DATE: 8/23/2024      PRE-OP DIAGNOSIS: Peripheral artery disease with occluded right distal SFA and proximal popliteal artery with ongoing claudication symptomatic      POST-OP DIAGNOSES: Same.      PROCEDURE PERFORMED: #1.  Right mid common femoral artery to below-knee popliteal in situ greater saphenous vein bypass     #2.  Interoperative mapping right GSV      SURGEON:  Dorian Gamble M.D.      ASSISTANT: Brinda Malave MD (vascular resident )      ANESTHESIA:  General-endotracheal      PRE-OP MEDICATIONS: Clindamycin 900 mg IV      INDICATIONS FOR PROCEDURE: 64-year-old patient with peripheral artery disease has undergone a bilateral iliac stenting.  He has known occlusion of the right distal SFA and the above-knee popliteal artery.  Below-knee popliteal artery is disease-free with three-vessel runoff but the dominant anterior tibial/dorsalis pedis artery.  He only had significant improvement with his iliac stent and comes the operative today for femoral-popliteal bypass graft.  Previous mapping revealed adequate greater saphenous vein for an in situ bypass graft.      DESCRIPTION OF PROCEDURE: VEIN MAPPING: Using duplex ultrasound in preinduction I mapped the right greater saphenous vein from the saphenofemoral junction of the mid calf.  This was an adequate conduit with no disease.  All sidebranches are marked on the skin.      Patient is then brought the op room placed supine.  Induced under general anesthesia oral intubated without difficulty.  Nova catheter was placed with no difficulty.  Calf pneumatic compression boots were used in the left with appropriate padding.  Entire right leg and groin were prepped and draped.  Timeout was called and the sites were identified.    GREATER SAPHENOUS VEIN EXPOSURE: Leg incision was made in the right groin.  Dissection was carried down the saphenofemoral junction.  Multiple branches were divided between 3-0 silk suture.  With the aid  of lighted retractor we made to thigh incisions at the site of the previously marked branches which were ligated with 3-0 silk suture presenting 1 large sidebranch distally.  We then opened up the medial calf to dissected free and excellent greater saphenous vein preserving 1 distal sidebranch.  Vein was Moist with a 60 mg papaverine-Plasma-Lyte solution with minimal manipulation.    VASCULAR EXPOSURE: Via the groin incision we dissected free the common femoral artery.  At the very proximal portion of the previous stent and we avoided this.  There was a soft area in the midportion of the artery.  Distal common femoral head moderate amount of plaque.  We had a soft area for anastomosis.  We then enlarged our proximal medial calf incision.  Fascia was divided.  The semimembranosus and semitendinosus tendons were divided.  We dissected down to identify the popliteal artery which had only mild disease.  Proximal and distal Silastic vessels are placed.      CFA to BK POPLITEAL IN-SITU: 5000 units intravenous heparin given.  The vein was clamped at the saphenofemoral junction and transected and this was oversewn with running 5-0 Prolene suture.  Vein was controlled with a padded bulldog.  First valve was cut under direct visualization.  Vesseloops were applied proximally and distally using a Kitner on the distal vessel loop to avoid clamping the artery preserving the blood close the profundus femoral and proximal SFA.  A 1.2 cm longitudinal arteriotomy was made.  A mild endarterectomy was performed the anterior wall remove the calcium.  Saphenous vein was sewn in end-to-side fashion with running 6-0 Prolene suture and loupe medication.  Release of the vessel is dilated very nicely.  But a strong pulse.  Metal ligaclips were used to evy the anastomosis.    Distal end of the vein was transected with a after dividing with a 2-0 silk suture.  This is irrigated with papaverine solution.  The [place]isabel valvulotome was passed  through the distal vein up to anastomosis and withdrawn twice cutting all valve leaflets with excellent pulsatile pulsatile flow being noted.  Vein dilated up for least 4 mm in diameter.  This was controlled with padded bulldog clamp.  Vesseloops were then tightened around the popliteal artery and a 1.2 cm longitudinal arteriotomy was made a artery that had very mild disease.  The vein graft was spatulated via small sidebranch and end-to-side anastomosis was created with running 7-0 Prolene suture and loupe magnification.  Upon completion release of the bulldog and vessels and excellent pulse.  A very gentle curve was noted and the vein graft going down to the popliteal artery.  We ligated our preserve sidebranch and by Doppler we had no competing branches and the in situ graft.  Excellent distal Doppler flow was appreciated with a palpable DP pulse.    A 10 inch On-Q cath was brought through the groin incision and laid along the vein graft for postoperative analgesia.  The distal fascia in the calf was closed with interrupted 2-0 Vicryl mattress sutures.  The groin was closed in layers with interrupted 201 interrupted 3-0 Vicryl suture.  The thigh and calf incisions are closed with interrupted 3-0 Vicryl deep.  Skin was closed with 4-0 Monocryl in subcu fashion.    Wounds were infiltrated with 0.5% Marcaine which was also injected in the On-Q catheter for postoperative analgesia.  Toradol 15 mg IV was given.  Surgical adhesive to the groin incision followed by Tegaderm.  Steri-Strips gauze and Pratibha to the other incisions.    Patient was extubated and returned to recovery in satisfactory condition.  Needle sponge count correct x 2.      EBL: Less than 25 mL      COMPLICATIONS: None        OPERATIVE FINDINGS: CFA=A-   GSV=A  Popliteal=A-       +3 palpable graft and DP pulse upon completion.      Dorian Gamble MD

## 2024-08-23 NOTE — ANESTHESIA PROCEDURE NOTES
Airway       Patient location: [facility] - Operating Room or Procedural Area.       Procedure Start/Stop Times: 8/23/2024 11:05 AM  Staff -        Anesthesiologist:  Maylin Hill MD       CRNA: Virgen Guerrero APRN CRNA       Performed By: CRNAIndications and Patient Condition       Indications for airway management: sabine-procedural and airway protection       Induction type:intravenous       Mask difficulty assessment: 2 - vent by mask + OA or adjuvant +/- NMBA    Final Airway Details       Final airway type: endotracheal airway       Successful airway: ETT - single  Endotracheal Airway Details        ETT size (mm): 8.0       Cuffed: yes       Successful intubation technique: video laryngoscopy       VL Blade Size: Quan 4       Grade View of Cords: 1       Adjucts: stylet       Position: Right       Measured from: gums/teeth       Secured at (cm): 23       Bite block used: None    Post intubation assessment        Placement verified by: capnometry, equal breath sounds and chest rise        Number of attempts at approach: 1       Number of other approaches attempted: 0       Secured with: tape       Ease of procedure: easy       Dentition: Intact and Unchanged    Medication(s) Administered   Medication Administration Time: 8/23/2024 11:05 AM

## 2024-08-23 NOTE — ANESTHESIA CARE TRANSFER NOTE
Patient: Adam Sahni    Procedure: Procedure(s):  RIGHT COMMON FEMORAL ARTERY TO BELOW KNEE POPLITEAL IN SITU BYPASS GRAFT       Diagnosis: Atherosclerosis of native artery of right lower extremity with intermittent claudication (H24) [I70.211]  Diagnosis Additional Information: No value filed.    Anesthesia Type:   General     Note:    Oropharynx: oropharynx clear of all foreign objects and spontaneously breathing  Level of Consciousness: awake  Oxygen Supplementation: face mask  Level of Supplemental Oxygen (L/min / FiO2): 6  Independent Airway: airway patency satisfactory and stable  Dentition: dentition unchanged  Vital Signs Stable: post-procedure vital signs reviewed and stable  Report to RN Given: handoff report given  Patient transferred to: PACU    Handoff Report: Identifed the Patient, Identified the Reponsible Provider, Reviewed the pertinent medical history, Discussed the surgical course, Reviewed Intra-OP anesthesia mangement and issues during anesthesia, Set expectations for post-procedure period and Allowed opportunity for questions and acknowledgement of understanding    Vitals:  Vitals Value Taken Time   BP     Temp     Pulse 86 08/23/24 1452   Resp 16 08/23/24 1452   SpO2 97 % 08/23/24 1452   Vitals shown include unfiled device data.    Electronically Signed By: FIDEL Llanos CRNA  August 23, 2024  2:53 PM

## 2024-08-23 NOTE — ANESTHESIA PREPROCEDURE EVALUATION
Anesthesia Pre-Procedure Evaluation    Patient: Adam Sahni   MRN: [id number] : 1960        Procedure : Procedure(s):  RIGHT COMMON FEMORAL ARTERY TO BELOW KNEE POPLITEAL IN SITU BYPASS GRAFT          Past Medical History:   Diagnosis Date    CAD (coronary artery disease)     with UGO to RCA on 23 for inferior STEMI    Chronic anemia     Depression     Hyperlipidemia     Lumbar radiculopathy     PAD (peripheral artery disease) (H24)     S/p bilateral kissing iliac stents after thrombolytics 21-21 for severe claudication and complete occlusion of the left common iliac artery with tandem occlusions and/or high grade stenoses of the distal SFA and popliteal artery.    Peripheral neuropathy     Prediabetes     Prostate cancer (H)     s/p ext beam radiation therapy, follows at [place]      Past Surgical History:   Procedure Laterality Date    APPENDECTOMY      CV CORONARY ANGIOGRAM N/A 2023    Procedure: Coronary Angiogram;  Surgeon: Rosenda Pacheco MD;  Location:  [facility]    IR LOWER EXTREMITY ANGIOGRAM RIGHT  2023    ORTHOPEDIC SURGERY      previious lumbar surgery      Allergies   Allergen Reactions    Penicillins Hives, Shortness Of Breath and Swelling     Reports also having throat swelling- took medication as a child.        Social History     Tobacco Use    Smoking status: Some Days     Current packs/day: 0.20     Types: Cigarettes     Passive exposure: Current    Smokeless tobacco: Never    Tobacco comments:     2-3 cigs a day   Substance Use Topics    Alcohol use: Yes     Comment: 2 drinks twice a week      Wt Readings from Last 1 Encounters:   24 92.5 kg (204 lb)        Anesthesia Evaluation   Pt has had prior anesthetic.     No history of anesthetic complications       ROS/MED HX  ENT/Pulmonary:     (+)                tobacco use, Current use,                    (-) asthma and recent URI   Neurologic:     (+)    peripheral neuropathy, - bilateral  feet.                        (-) no seizures, no CVA and no TIA   Cardiovascular:     (+) Dyslipidemia - Peripheral Vascular Disease-  CAD -  - stent-2023. 1 Drug Eluting Stent. Taking blood thinners                              Previous cardiac testing   Echo: Date: 2023 Results:  Interpretation Summary     Left ventricular systolic function is mildly reduced.  Biplane LVEF is 51%.  Inferior and inferolateral hypokinesis.  On direct comaprison to prior, EF is slightly improved. Wall motion abnormalities are likely in the same coronary aretry distribution on the two studies.     The study was technically difficult.    Stress Test:  Date: Results:    ECG Reviewed:  Date: 7/2024 Results:  SR  Cath:  Date: 6/2023 Results:  1. Late-presentation inferior MI due to thrombotic occlusion of the proximal right coronary artery.  2. Severe 70% stenosis of the proximal LCx which is a small to medium vessel.  3. Moderate to severe stenosis of two branches of the large ramus intermedius.   4. Moderate stenosis of the midLAD.  5. PCI of the proximal to midRCA with aspiration thrombectomy and Cutting Balloon angioplasty and placement of a 3.5 x 38 mm Synergy drug-eluting stent which was postdilated to 4.0 mm.   (-) orthopnea/PND and arrhythmias   METS/Exercise Tolerance: 4 - Raking leaves, gardening Comment: Swimming without exertional symptoms    Hematologic:    (-) history of blood clots, anemia and history of blood transfusion   Musculoskeletal:       GI/Hepatic:     (+) GERD (Occasional, usually diet induced. Does not take meds),                (-) liver disease   Renal/Genitourinary:    (-) renal disease   Endo: Comment: Prediabetes     (+)               Obesity (BMI 30),    (-) chronic steroid usage   Psychiatric/Substance Use:     (+) psychiatric history depression       Infectious Disease:    (-) Recent Fever   Malignancy:       Other:            Physical Exam    Airway        Mallampati: II   TM distance: > 3 FB   Neck  "ROM: full   Mouth opening: > 3 cm    Respiratory Devices and Support         Dental       (+) Minor Abnormalities - some fillings, tiny chips      Cardiovascular          Rhythm and rate: regular and normal     Pulmonary           breath sounds clear to auscultation           OUTSIDE LABS:  CBC:   Lab Results   Component Value Date    WBC 6.9 08/22/2024    WBC 8.2 02/29/2024    HGB 13.4 08/22/2024    HGB 13.0 (L) 02/29/2024    HCT 39.8 (L) 08/22/2024    HCT 38.6 (L) 02/29/2024     08/22/2024     02/29/2024     BMP:   Lab Results   Component Value Date     02/29/2024     09/03/2023    POTASSIUM 4.3 02/29/2024    POTASSIUM 4.6 09/03/2023    CHLORIDE 103 02/29/2024    CHLORIDE 102 09/03/2023    CO2 23 02/29/2024    CO2 23 09/03/2023    BUN 13.4 02/29/2024    BUN 18.4 09/03/2023    CR 0.92 02/29/2024    CR 0.94 09/03/2023    GLC 92 02/29/2024     (H) 09/03/2023     COAGS:   Lab Results   Component Value Date    PTT 32 09/02/2023    INR 0.98 09/02/2023     POC: No results found for: \"BGM\", \"HCG\", \"HCGS\"  HEPATIC:   Lab Results   Component Value Date    ALBUMIN 4.2 10/26/2023    PROTTOTAL 7.5 10/26/2023    ALT 15 10/26/2023    AST 17 10/26/2023    ALKPHOS 132 (H) 10/26/2023    BILITOTAL 0.3 10/26/2023     OTHER:   Lab Results   Component Value Date    LACT 1.3 06/16/2023    A1C 5.7 (H) 06/09/2023    OJ 8.9 02/29/2024    PHOS 3.1 06/20/2023    MAG 2.0 08/08/2023    LIPASE 67 (L) 07/21/2021    TSH 2.52 08/08/2023       Anesthesia Plan    ASA Status:  3    NPO Status:  NPO Appropriate    Anesthesia Type: General.     - Airway: ETT   Induction: Intravenous, Propofol.           Consents    Anesthesia Plan(s) and associated risks, benefits, and realistic alternatives discussed. Questions answered and patient/representative(s) expressed understanding.     - Discussed: Risks, Benefits and Alternatives for the PROCEDURE were discussed     - Discussed with:  Patient            Postoperative " "Care    Pain management: IV analgesics, Oral pain medications, Multi-modal analgesia.   PONV prophylaxis: Ondansetron (or other 5HT-3), Dexamethasone or Solumedrol     Comments:               Maylin Hill MD    I have reviewed the pertinent notes and labs in the chart from the past 30 days and (re)examined the patient.  Any updates or changes from those notes are reflected in this note.             # Drug Induced Platelet Defect: home medication list includes an antiplatelet medication  # Obesity: Estimated body mass index is 30.13 kg/m  as calculated from the following:    Height as of 8/22/24: 1.753 m (5' 9\").    Weight as of 8/22/24: 92.5 kg (204 lb).      "

## 2024-08-23 NOTE — PROGRESS NOTES
Vascular Surgery Progress Note    Patient seen post procedure resting comfortably in bed in NAD. Reports some Right grain pain however overall is manageable. Has not yet had anything to eat, states he prefers to fast.     Exam:   Awake, alert, nad   Nlb on 1L NC  RR per dp pulse   Incisions clean, dry, in tact.   RLE with palpable DP pulse     A/P:   POD0 s/p RLE CFA to BK pop bypass with in situ GSV. Doing well postop     - pain control prn   - encourage aggressive IS  - OK for regular diet, continue IVF for now given has not yet eaten, may not tonight.   - can remove lewis this evening if pt feels okay using urinal/getting out of bed. Order in.  - continue asa/plavix -plavix dose for tn ordered.     Tanmay Malvae MD  Vascular Surgery PGY4  To reach vascular surgery [place] team on call, please go to [facility] and from the drop down find the following:   VASCULAR SURGERY/[facility]  Then page the person listed to the right of day/night call. Can click the pager to page.

## 2024-08-24 ENCOUNTER — APPOINTMENT (OUTPATIENT)
Dept: PHYSICAL THERAPY | Facility: CLINIC | Age: 64
DRG: 254 | End: 2024-08-24
Payer: COMMERCIAL

## 2024-08-24 ENCOUNTER — APPOINTMENT (OUTPATIENT)
Dept: OCCUPATIONAL THERAPY | Facility: CLINIC | Age: 64
DRG: 254 | End: 2024-08-24
Payer: COMMERCIAL

## 2024-08-24 LAB — GLUCOSE BLDC GLUCOMTR-MCNC: 118 MG/DL (ref 70–99)

## 2024-08-24 PROCEDURE — 97535 SELF CARE MNGMENT TRAINING: CPT | Mod: GO

## 2024-08-24 PROCEDURE — 97161 PT EVAL LOW COMPLEX 20 MIN: CPT | Mod: GP

## 2024-08-24 PROCEDURE — 250N000013 HC RX MED GY IP 250 OP 250 PS 637: Performed by: SURGERY

## 2024-08-24 PROCEDURE — 97116 GAIT TRAINING THERAPY: CPT | Mod: GP

## 2024-08-24 PROCEDURE — 97530 THERAPEUTIC ACTIVITIES: CPT | Mod: GP

## 2024-08-24 PROCEDURE — 250N000013 HC RX MED GY IP 250 OP 250 PS 637

## 2024-08-24 PROCEDURE — 120N000001 HC R&B MED SURG/OB

## 2024-08-24 PROCEDURE — 97165 OT EVAL LOW COMPLEX 30 MIN: CPT | Mod: GO

## 2024-08-24 PROCEDURE — 250N000011 HC RX IP 250 OP 636

## 2024-08-24 RX ORDER — EZETIMIBE 10 MG/1
10 TABLET ORAL AT BEDTIME
Status: DISCONTINUED | OUTPATIENT
Start: 2024-08-24 | End: 2024-08-25 | Stop reason: HOSPADM

## 2024-08-24 RX ORDER — OXYCODONE HYDROCHLORIDE 5 MG/1
5 TABLET ORAL EVERY 4 HOURS PRN
Qty: 15 TABLET | Refills: 0 | Status: SHIPPED | OUTPATIENT
Start: 2024-08-24 | End: 2024-09-05

## 2024-08-24 RX ORDER — EZETIMIBE 10 MG/1
10 TABLET ORAL AT BEDTIME
Qty: 30 TABLET | Refills: 3 | Status: SHIPPED | OUTPATIENT
Start: 2024-08-24

## 2024-08-24 RX ADMIN — ACETAMINOPHEN 975 MG: 325 TABLET ORAL at 08:55

## 2024-08-24 RX ADMIN — ATORVASTATIN CALCIUM 80 MG: 80 TABLET, FILM COATED ORAL at 19:57

## 2024-08-24 RX ADMIN — METOPROLOL SUCCINATE 25 MG: 25 TABLET, EXTENDED RELEASE ORAL at 08:55

## 2024-08-24 RX ADMIN — OXYCODONE HYDROCHLORIDE 10 MG: 5 TABLET ORAL at 00:17

## 2024-08-24 RX ADMIN — HEPARIN SODIUM 5000 UNITS: 5000 INJECTION, SOLUTION INTRAVENOUS; SUBCUTANEOUS at 21:41

## 2024-08-24 RX ADMIN — SENNOSIDES AND DOCUSATE SODIUM 1 TABLET: 50; 8.6 TABLET ORAL at 08:55

## 2024-08-24 RX ADMIN — SENNOSIDES AND DOCUSATE SODIUM 1 TABLET: 50; 8.6 TABLET ORAL at 21:41

## 2024-08-24 RX ADMIN — POLYETHYLENE GLYCOL 3350 17 G: 17 POWDER, FOR SOLUTION ORAL at 08:55

## 2024-08-24 RX ADMIN — OXYCODONE HYDROCHLORIDE 5 MG: 5 TABLET ORAL at 04:41

## 2024-08-24 RX ADMIN — ACETAMINOPHEN 975 MG: 325 TABLET ORAL at 16:32

## 2024-08-24 RX ADMIN — HEPARIN SODIUM 5000 UNITS: 5000 INJECTION, SOLUTION INTRAVENOUS; SUBCUTANEOUS at 06:27

## 2024-08-24 RX ADMIN — ASPIRIN 81 MG CHEWABLE TABLET 81 MG: 81 TABLET CHEWABLE at 08:55

## 2024-08-24 RX ADMIN — EZETIMIBE 10 MG: 10 TABLET ORAL at 21:41

## 2024-08-24 RX ADMIN — OXYCODONE HYDROCHLORIDE 5 MG: 5 TABLET ORAL at 19:57

## 2024-08-24 RX ADMIN — FERROUS SULFATE TAB 325 MG (65 MG ELEMENTAL FE) 325 MG: 325 (65 FE) TAB at 08:55

## 2024-08-24 RX ADMIN — CLINDAMYCIN PHOSPHATE 900 MG: 900 INJECTION, SOLUTION INTRAVENOUS at 01:59

## 2024-08-24 RX ADMIN — ACETAMINOPHEN 975 MG: 325 TABLET ORAL at 00:17

## 2024-08-24 RX ADMIN — HEPARIN SODIUM 5000 UNITS: 5000 INJECTION, SOLUTION INTRAVENOUS; SUBCUTANEOUS at 14:05

## 2024-08-24 RX ADMIN — SERTRALINE HYDROCHLORIDE 200 MG: 100 TABLET ORAL at 11:31

## 2024-08-24 RX ADMIN — CLOPIDOGREL BISULFATE 75 MG: 75 TABLET ORAL at 08:55

## 2024-08-24 ASSESSMENT — ACTIVITIES OF DAILY LIVING (ADL)
ADLS_ACUITY_SCORE: 26
ADLS_ACUITY_SCORE: 27
PREVIOUS_RESPONSIBILITIES: MEAL PREP;HOUSEKEEPING;SHOPPING;LAUNDRY;MEDICATION MANAGEMENT;FINANCES;DRIVING
ADLS_ACUITY_SCORE: 27
ADLS_ACUITY_SCORE: 26
ADLS_ACUITY_SCORE: 27
ADLS_ACUITY_SCORE: 26
ADLS_ACUITY_SCORE: 27
ADLS_ACUITY_SCORE: 27
ADLS_ACUITY_SCORE: 26
ADLS_ACUITY_SCORE: 27
ADLS_ACUITY_SCORE: 27
ADLS_ACUITY_SCORE: 26
ADLS_ACUITY_SCORE: 27
ADLS_ACUITY_SCORE: 26
ADLS_ACUITY_SCORE: 27

## 2024-08-24 NOTE — PROGRESS NOTES
08/24/24 1036   Appointment Info   Signing Clinician's Name / Credentials (OT) Gustavo Cagle OTR/L   Living Environment   People in Home alone   Current Living Arrangements apartment   Home Accessibility no concerns  (Has elevator)   Transportation Anticipated family or friend will provide  (Pt typically drives)   Living Environment Comments Friend in his building can assist upon discharge.   Self-Care   Usual Activity Tolerance good   Current Activity Tolerance moderate   Equipment Currently Used at Home cane, straight;walker, rolling   Fall history within last six months yes   Number of times patient has fallen within last six months 1   Activity/Exercise/Self-Care Comment Independent in all ADLs at baseline. Uses a cane occasionally.   Instrumental Activities of Daily Living (IADL)   Previous Responsibilities meal prep;housekeeping;shopping;laundry;medication management;finances;driving   General Information   Onset of Illness/Injury or Date of Surgery 08/23/24   Referring Physician Brinda Malave MD   Patient/Family Therapy Goal Statement (OT) Home   Additional Occupational Profile Info/Pertinent History of Current Problem POD#1 right femoral to popliteal in situ. See chart for details.   Cognitive Status Examination   Orientation Status orientation to person, place and time   Safety Deficit at risk behavior observed   Transfers   Transfers bed-chair transfer;sit-stand transfer;toilet transfer   Transfer Skill: Bed to Chair/Chair to Bed   Bed-Chair [place] (Transfers) set up;verbal cues  (SBA)   Sit-Stand Transfer   Sit-Stand [place] (Transfers) set up;verbal cues  (SBA)   Toilet Transfer   Type (Toilet Transfer) sit-stand;stand-sit   [place] Level (Toilet Transfer) set up;verbal cues  (SBA)   Activities of Daily Living   BADL Assessment/Intervention lower body dressing   Additional Documentation Comment, BADL Assessment/Training (Row)   Lower Body Dressing Assessment/Training    [place] Level (Lower Body Dressing) doff;don;socks;pants/bottoms;set up;verbal cues;contact guard assist   Clinical Impression   Criteria for Skilled Therapeutic Interventions Met (OT) Yes, treatment indicated   OT Diagnosis Decreased independence in I/ADLs.   Influenced by the following impairments Decreased independence in I/ADLs.   OT Problem List-Impairments impacting ADL problems related to;activity tolerance impaired;cognition;post-surgical precautions;pain   Assessment of Occupational Performance 1-3 Performance Deficits   Identified Performance Deficits Decreased independence in I/ADLs. (Dressing, bathing, toileting)   Planned Therapy Interventions (OT) ADL retraining;IADL retraining;cognition;transfer training   Clinical Decision Making Complexity (OT) problem focused assessment/low complexity   Risk & Benefits of therapy have been explained evaluation/treatment results reviewed;care plan/treatment goals reviewed;risks/benefits reviewed;patient   OT Total Evaluation Time   OT Eval, Low Complexity Minutes (74136) 7   OT Goals   Therapy Frequency (OT) Daily   OT Predicted Duration/Target Date for Goal Attainment 08/26/24   OT Goals Hygiene/Grooming;Lower Body Dressing;Toilet Transfer/Toileting;Cognition;OT Goal 1;OT Goal 2   OT: Hygiene/Grooming modified independent;while standing   OT: Lower Body Dressing Modified independent;including set-up/clothing retrieval;using adaptive equipment   OT: Toilet Transfer/Toileting Modified independent;toilet transfer;cleaning and garment management   OT: Cognitive Patient/caregiver will verbalize understanding of cognitive assessment results/recommendations as needed for safe discharge planning   OT: Goal 1 Pt will verbalize understanding of 2 shower transfer techniques, in order to increase IND in I/ADLs.   OT: Goal 2 Pt will verbalize understanding of 2 compensatory techniques for I/ADLs, in order to increase IND in I/ADls.   Interventions   Interventions Quick  Adds Self-Care/Home Management   Self-Care/Home Management   Self-Care/Home Mgmt/ADL, Compensatory, Meal Prep Minutes (11006) 24   Symptoms Noted During/After Treatment (Meal Preparation/Planning Training) fatigue;increased pain   Treatment Detail/Skilled Intervention Educated on LE dressing with compensatory techniques and AE. While seated/standing, pt completed LE dressing (don socks and don/doff underwear) with CGA initially and then progressing to SBA after education on compensatory techniques and AE. Pt ambulated to the bathroom with IV pole and SBA for toilet transfer. TRansfer to/from the toilet with SBA on 3 occasions, after education on safe hand placement. During session, pt impulsive with functional transfers and required verbal cues for pacing. Educated on compensatory techniques for I/ADLs.   OT Discharge Planning   OT Plan LE dressing and bring AE; shower transfer; cognitive screen? Anticipate 1-2 more sessions.   OT Discharge Recommendation (DC Rec) home with assist   OT Rationale for DC Rec At baseline, pt IND in all I/ADLs. Pt primairly limited by pain. Anticipate home with assist in I/ADLs as needed.   Total Session Time   Timed Code Treatment Minutes 24   Total Session Time (sum of timed and untimed services) 31

## 2024-08-24 NOTE — PROGRESS NOTES
Vascular Surgery Progress Note:  POD#1 right femoral to popliteal in situ    S: Had a very good night.  Voiding after catheter out.  Pain well-controlled.    O:   Vitals:  BP  Min: 96/64  Max: 127/75  Temp  Av.8  F (36.6  C)  Min: 96.9  F (36.1  C)  Max: 98.9  F (37.2  C)  Pulse  Av.9  Min: 54  Max: 89  I/O last 3 completed shifts:  In: 2100 [P.O.:500; I.V.:1600]  Out: 980 [Urine:980]    Physical Exam: Alert and appropriate.  Very comfortable.      Surgical wounds=A    +2 palpable right DP pulse.    Excellent Doppler graft-DP/PT    Normal CMS.        Assessment/Plan: 1.  Doing very well following right femoral to popliteal bypass graft with good runoff.  On chronic Plavix that had been held for surgery and this was restarted with baby aspirin.     #2.  LDL goal less than 70.  On maximum dose Lipitor 80 mg.  Will add Zetia 10 mg     #3.  Increase activity.  Plan Home tomorrow.  Instructed in postoperative cares.      Wm. Tye MD

## 2024-08-24 NOTE — PLAN OF CARE
Goal Outcome Evaluation:       Date/Time 8/23/24 [id number]  Diagnosis: PAD  POD#:0 of fem pop bypass  Mental Status:Aox4, drowsy  Activity/dangle: Ind w/ cane at baseline, not OOB yet  Diet:Clears, tolerating.   Pain: moderate pain to RLE, scheduled tylenol given, prns available.  Nova/Voiding:Nova in place, otherwise continent b/b.  Tele/Restraints/Iso:n/a  02/LDA:VSS on 1L NC  D/C Date:TBD  Other Info:On continuous Q pump - site at R groin. Baseline n/t in BLE otherwise CMS intact, pt states the n/t is improved to RLE. Dopplar popliteal pulse, +2 DP pulses. Incisions to R groin and leg CDI.

## 2024-08-24 NOTE — PLAN OF CARE
08/24/24 0954   Appointment Info   Signing Clinician's Name / Credentials (PT) Alva Stoner DPT   Living Environment   People in Home alone   Current Living Arrangements apartment   Living Environment Comments elevator   Self-Care   Usual Activity Tolerance fair   Current Activity Tolerance fair   Equipment Currently Used at Home cane, straight   Fall history within last six months yes   Number of times patient has fallen within last six months 1   Activity/Exercise/Self-Care Comment hx lumbar spine surgery, uses SEC at times, also has walking stick, 4ww he uses on occasion. Drives. Does own cleaning   General Information   Onset of Illness/Injury or Date of Surgery 08/23/24   Referring Physician Brinda Malave MD   Pertinent History of Current Problem (include personal factors and/or comorbidities that impact the POC) POD0 s/p RLE CFA to BK pop bypass with in situ GSV. Doing well postop   Existing Precautions/Restrictions fall   Weight-Bearing Status - LLE weight-bearing as tolerated   Weight-Bearing Status - RLE weight-bearing as tolerated   Cognition   Cognitive Status Comments Very talkative, rambles at times, tangential, redirection to task   Pain Assessment   Patient Currently in Pain Yes, see Vital Sign flowsheet   Integumentary/Edema   Integumentary/Edema Comments see nursing notes   Posture    Posture Forward head position   Range of Motion (ROM)   ROM Comment BLE WFL   Strength (Manual Muscle Testing)   Strength Comments Gross functional weakness w/ OOB mobility. BLE > 3/5 strength   Bed Mobility   Comment, (Bed Mobility) ind   Transfers   Comment, (Transfers) STS CGA BUE push off, IV pole for stability upon standing   Gait/Stairs (Locomotion)   Comment, (Gait/Stairs) 5' eval unilateral support on IV pole   Balance   Balance Comments good dynamic with use of UE support. No LOB. Some static swaying at times upon standing during session but no overt LOB. Pt reporting due to low back  pain/radiculpathy   Sensory Examination   Sensory Perception Comments intact to light touch   Clinical Impression   Criteria for Skilled Therapeutic Intervention Yes, treatment indicated   PT Diagnosis (PT) impaired IND with mobility from baseline   Influenced by the following impairments functional weakness, impaired high level balance, pain   Functional limitations due to impairments see above   Clinical Presentation (PT Evaluation Complexity) stable   Clinical Presentation Rationale clinical judgement   Clinical Decision Making (Complexity) low complexity   Planned Therapy Interventions (PT) balance training;bed mobility training;gait training;home exercise program;patient/family education;strengthening;ROM (range of motion);stretching;transfer training   PT Total Evaluation Time   PT Eval, Low Complexity Minutes (07539) 10   Physical Therapy Goals   PT Frequency Daily   PT Predicted Duration/Target Date for Goal Attainment 08/31/24   PT Goals Bed Mobility;Transfers;Gait   PT: Bed Mobility Independent   PT: Transfers Modified independent;Sit to/from stand;Bed to/from chair;Assistive device   PT: Gait Modified independent;Rolling walker;Straight cane   Interventions   Interventions Quick Adds Therapeutic Activity;Therapeutic Procedure;Gait Training   PT Discharge Planning   PT Plan Gait SEC, progress gait distance, RLE ROM w/i tolerance   PT Discharge Recommendation (DC Rec) home   PT Rationale for DC Rec Pt mobilizing SBA w/ use of IV pole for support this AM. Predict pt will meet PT goals after one more session. Recommend DC to home. No further PT needs at DC   PT Brief overview of current status Goals of therapy will be to address safe mobility and make recommendations for discharge to next level of care. Patient and RN will continue to follow all falls risk precautions as documented by RN staff while hospitalized; SBA   Total Session Time   Total Session Time (sum of timed and untimed services) 10

## 2024-08-24 NOTE — PLAN OF CARE
Goal Outcome Evaluation:      Plan of Care Reviewed With: patient    Overall Patient Progress: improvingOverall Patient Progress: improving     Date/Time 8/23/24 7989-5268  Diagnosis: PAD  POD#:1 of R common femoral artery to below knee popliteal in situ bypass graft  Behavior & Aggression: green  Fall Risk: yes  Orientation: Aox4, rambling at times  ABNL VS/O2: VSS on 1LPM NC  Activity/dangle: Up [id number]  Diet: Tolerating regular diet   Pain: Pain to RLE, scheduled tylenol given, prn oxy.  Bowel/Bladder: Passing flatus, no BM this shift, BS normoactive, Nova removed this AM after pt walked, DTV by 845am.  D/C Date:TBD  Other Info:On-Q pump - site at R groin. Baseline neuropathy in BLE otherwise CMS intact, pt states the neuropathy is improved to RLE. Pulses dopplerable. Incisions to R groin and leg CDI. Has word-finding difficulty, per pt is not new.

## 2024-08-25 ENCOUNTER — APPOINTMENT (OUTPATIENT)
Dept: PHYSICAL THERAPY | Facility: CLINIC | Age: 64
DRG: 254 | End: 2024-08-25
Attending: SURGERY
Payer: COMMERCIAL

## 2024-08-25 ENCOUNTER — APPOINTMENT (OUTPATIENT)
Dept: OCCUPATIONAL THERAPY | Facility: CLINIC | Age: 64
DRG: 254 | End: 2024-08-25
Attending: SURGERY
Payer: COMMERCIAL

## 2024-08-25 VITALS
SYSTOLIC BLOOD PRESSURE: 103 MMHG | HEART RATE: 60 BPM | DIASTOLIC BLOOD PRESSURE: 66 MMHG | OXYGEN SATURATION: 96 % | TEMPERATURE: 99.2 F | RESPIRATION RATE: 18 BRPM | HEIGHT: 69 IN | WEIGHT: 211.3 LBS | BODY MASS INDEX: 31.29 KG/M2

## 2024-08-25 LAB — GLUCOSE BLDC GLUCOMTR-MCNC: 99 MG/DL (ref 70–99)

## 2024-08-25 PROCEDURE — 97535 SELF CARE MNGMENT TRAINING: CPT | Mod: GO

## 2024-08-25 PROCEDURE — 250N000011 HC RX IP 250 OP 636

## 2024-08-25 PROCEDURE — 97116 GAIT TRAINING THERAPY: CPT | Mod: GP

## 2024-08-25 PROCEDURE — 250N000013 HC RX MED GY IP 250 OP 250 PS 637

## 2024-08-25 RX ADMIN — ACETAMINOPHEN 975 MG: 325 TABLET ORAL at 08:18

## 2024-08-25 RX ADMIN — OXYCODONE HYDROCHLORIDE 5 MG: 5 TABLET ORAL at 07:06

## 2024-08-25 RX ADMIN — ASPIRIN 81 MG CHEWABLE TABLET 81 MG: 81 TABLET CHEWABLE at 08:17

## 2024-08-25 RX ADMIN — ACETAMINOPHEN 975 MG: 325 TABLET ORAL at 02:03

## 2024-08-25 RX ADMIN — OXYCODONE HYDROCHLORIDE 5 MG: 5 TABLET ORAL at 02:03

## 2024-08-25 RX ADMIN — SERTRALINE HYDROCHLORIDE 200 MG: 100 TABLET ORAL at 08:22

## 2024-08-25 RX ADMIN — METOPROLOL SUCCINATE 25 MG: 25 TABLET, EXTENDED RELEASE ORAL at 08:18

## 2024-08-25 RX ADMIN — CLOPIDOGREL BISULFATE 75 MG: 75 TABLET ORAL at 08:18

## 2024-08-25 RX ADMIN — SENNOSIDES AND DOCUSATE SODIUM 1 TABLET: 50; 8.6 TABLET ORAL at 08:19

## 2024-08-25 RX ADMIN — POLYETHYLENE GLYCOL 3350 17 G: 17 POWDER, FOR SOLUTION ORAL at 08:22

## 2024-08-25 RX ADMIN — HEPARIN SODIUM 5000 UNITS: 5000 INJECTION, SOLUTION INTRAVENOUS; SUBCUTANEOUS at 07:01

## 2024-08-25 ASSESSMENT — ACTIVITIES OF DAILY LIVING (ADL)
ADLS_ACUITY_SCORE: 27
ADLS_ACUITY_SCORE: 27
ADLS_ACUITY_SCORE: 26
ADLS_ACUITY_SCORE: 26
ADLS_ACUITY_SCORE: 27
ADLS_ACUITY_SCORE: 27
ADLS_ACUITY_SCORE: 26
ADLS_ACUITY_SCORE: 26
ADLS_ACUITY_SCORE: 27
ADLS_ACUITY_SCORE: 26
ADLS_ACUITY_SCORE: 26

## 2024-08-25 NOTE — PLAN OF CARE
Goal Outcome Evaluation:      Plan of Care Reviewed With: patient    Overall Patient Progress: improvingOverall Patient Progress: improving    Date/Time 8/23/24 5549-2560  Diagnosis: PAD  POD#:1 of R common femoral artery to below knee popliteal in situ bypass graft  Behavior & Aggression: green  Fall Risk: yes  Orientation: A&Ox4  ABNL VS/O2: VSS on RA  Activity/dangle: SBA  Diet: Tolerating regular diet   Pain: Pain to RLE, scheduled tylenol given  Bowel/Bladder: Voiding without problem, BS active, no BM; Passing flatus.  D/C Date:TBD  Other Info:On-Q pump - site at R groin. Baseline neuropathy in BLE otherwise CMS intact, pt states the neuropathy is improved to RLE. Pulses dopplerable. Incisions to R groin and leg small dried drainage noted. Has word-finding difficulty, per pt is not new.

## 2024-08-25 NOTE — PLAN OF CARE
Occupational Therapy Discharge Summary    Reason for therapy discharge:    All goals and outcomes met, no further needs identified.    Progress towards therapy goal(s). See goals on Care Plan in [facility] electronic health record for goal details.  Goals met    Therapy recommendation(s):    home with assist in I/ADLs as needed.

## 2024-08-25 NOTE — PLAN OF CARE
Patient discharging home via family. AVS gone over with patient in room and prescriptions given to patient. PIV removed. All belongings left with patient. Home medications returned to patient. All questions answered.    Addendum: Transport aide called at 1345 stating patient admitted to lying about having a ride and was planning to drive. Charge nurse made aware and bedside RN.  Patient brought back to room 1047

## 2024-08-25 NOTE — PROGRESS NOTES
Care Management Follow Up    Length of Stay (days): 2    Expected Discharge Date: 08/25/2024     Concerns to be Addressed:       Patient plan of care discussed at interdisciplinary rounds: Yes    Anticipated Discharge Disposition:       Anticipated Discharge Services:    Anticipated Discharge DME:      Patient/family educated on Medicare website which has current facility and service quality ratings:    Education Provided on the Discharge Plan:    Patient/Family in Agreement with the Plan:      Referrals Placed by CM/SW:    Private pay costs discussed: Not applicable    Additional Information:  Writer used blue and white accunt code to provide patient a ride home as he did not have funds nor family to provide     KT Rodriguez

## 2024-08-25 NOTE — PLAN OF CARE
Goal Outcome Evaluation:INDICATIONS FOR PROCEDU DESCRIPTION OF PROCEDURE        Orientation: A/O x4 calm/pleasant  denies general discomfort chills, fever, and abdominal discomfort     Vitals/T: vss on R A    IV Access/drains: PIV SL     Die: Regular with good appetite     Mobility: Independent in room with cane used     GI/: continent   2 of R common femoral artery to below knee popliteal in situ bypass graft   Wound/Skin:     Consults: PT    Discharge Plan: Discharging home with uber due to pt can not drive a week       See Flow sheets for assessment

## 2024-08-25 NOTE — DISCHARGE SUMMARY
[facility]  Discharge Summary  Vascular Surgery     Adam Sahni MRN# [id number]   YOB: 1960 Age: 64 year old     Date of Admission:  8/23/2024  Date of Discharge::  8/25/2024  Admitting Physician:  Dorian Gamble MD  Discharge Physician:  Dorian Gamble MD  Primary Care Physician:        No Ref-Primary, Physician          Admission Diagnoses:   Atherosclerosis of native artery of right lower extremity with intermittent claudication (H24) [I70.211]            Discharge Diagnosis:   Atherosclerosis of native artery of right lower extremity with intermittent claudication (H24) [I70.211]           Procedures:   Procedure(s):  RIGHT COMMON FEMORAL ARTERY TO BELOW KNEE POPLITEAL IN SITU BYPASS GRAFT            Consultations:   OCCUPATIONAL THERAPY ADULT IP CONSULT  PHYSICAL THERAPY ADULT IP CONSULT          Brief History of Illness:   64-year-old patient with peripheral artery disease has undergone a bilateral iliac stenting. He has known occlusion of the right distal SFA and the above-knee popliteal artery. Below-knee popliteal artery is disease-free with three-vessel runoff but the dominant anterior tibial/dorsalis pedis artery. He only had significant improvement with his iliac stent and comes the operative today for femoral-popliteal bypass graft. Previous mapping revealed adequate greater saphenous vein for an in situ bypass graft.            Hospital Course:   The patient had an uneventful hospital course after the surgery.  The patient remained stable in terms of vitals, tolerating diet, able to ambulate.  Incision appears to be clean/dry/intact.  Pain was controlled on p.o. medications.    Other discharge periods were cleared.  The patient was given instructions for discharge and prescriptions, and the patient was discharged home in stable condition.           Imaging Studies:     Results for orders placed or performed during the hospital encounter of 07/18/24    US ALEN Doppler No Exercise    Narrative    US ALEN DOPPLER NO EXERCISE, 1-2 LEVELS, BILAT  PROCEDURE DATE: 7/18/2024 8:52 AM     HISTORY:  History of PAD with right iliac artery stenting 6/18/23; PAD  (peripheral artery disease) (H24)    COMPARISON: None.    FINDINGS: Pressure measurements in mmHg    RIGHT  Brachial: 116  Ankle (PT): 88, 0.70  Ankle (DP): 89, 0.71    LEFT  Brachial: 125  Ankle (PT): 61, 0.49  Ankle (DP): 50, 0.40    The patient was not exercised given unsteadiness on feet and recent  falls    WAVEFORMS: Monophasic on the left and multiphasic on the right.      Impression    IMPRESSION:  1.  Right leg: Right leg ALEN is 0.71.  This is consistent with  moderate resting arterial insufficiency.   2.  Left leg: Left leg ALEN is 0.49.  This is consistent with severe  resting arterial insufficiency.     CHENTE MCHUGH MD         SYSTEM ID:  [id number]              Medications Prior to Admission:     Medications Prior to Admission   Medication Sig Dispense Refill Last Dose    alendronate (FOSAMAX) 70 MG tablet Take 70 mg by mouth every 7 days. Friday last dose 8/16/24 8/22/2024 at am    aspirin (ASA) 81 MG chewable tablet Take 1 tablet (81 mg) by mouth daily (Patient taking differently: Take 81 mg by mouth every morning.) 30 tablet 3 8/16/2024 at am    atorvastatin (LIPITOR) 80 MG tablet Take 1 tablet (80 mg) by mouth daily (Patient taking differently: Take 80 mg by mouth every evening.) 90 tablet 3 8/22/2024 at pm    cholecalciferol (VITAMIN D3) 25 mcg (1000 units) capsule Take 1 capsule by mouth every morning.   8/20/2024 at am    ferrous sulfate (FEROSUL) 325 (65 Fe) MG tablet Take 325 mg by mouth every other day   8/21/2024 at am    nitroGLYcerin (NITROSTAT) 0.4 MG sublingual tablet For chest pain place 1 tablet under the tongue every 5 minutes for 3 doses. If symptoms persist 5 minutes after 1st dose call 911. 25 tablet 0 More than a month    sertraline (ZOLOFT) 100 MG tablet Take 200 mg by mouth every  morning. (Some times takes 1 tablet BID)   8/23/2024 at am    clopidogrel (PLAVIX) 75 MG tablet Take 1 tablet (75 mg) by mouth daily 90 tablet 2     evolocumab (REPATHA SURECLICK) 140 MG/ML prefilled autoinjector Inject 1 mL (140 mg) subcutaneously every 14 days 6 mL 3     metoprolol succinate ER (TOPROL XL) 25 MG 24 hr tablet Take 1 tablet (25 mg) by mouth daily 90 tablet 3               Discharge Medications:     Current Discharge Medication List        START taking these medications    Details   ezetimibe (ZETIA) 10 MG tablet Take 1 tablet (10 mg) by mouth at bedtime.  Qty: 30 tablet, Refills: 3    Associated Diagnoses: Hyperlipidemia with target low density lipoprotein (LDL) cholesterol less than 100 mg/dL      oxyCODONE (ROXICODONE) 5 MG tablet Take 1 tablet (5 mg) by mouth every 4 hours as needed for moderate pain.  Qty: 15 tablet, Refills: 0    Associated Diagnoses: Peripheral artery disease (H24)           CONTINUE these medications which have NOT CHANGED    Details   alendronate (FOSAMAX) 70 MG tablet Take 70 mg by mouth every 7 days. Friday last dose 8/16/24      aspirin (ASA) 81 MG chewable tablet Take 1 tablet (81 mg) by mouth daily  Qty: 30 tablet, Refills: 3    Associated Diagnoses: ST elevation myocardial infarction (STEMI), unspecified artery (H)      atorvastatin (LIPITOR) 80 MG tablet Take 1 tablet (80 mg) by mouth daily  Qty: 90 tablet, Refills: 3    Associated Diagnoses: ST elevation myocardial infarction (STEMI), unspecified artery (H); Peripheral artery disease (H24)      cholecalciferol (VITAMIN D3) 25 mcg (1000 units) capsule Take 1 capsule by mouth every morning.      ferrous sulfate (FEROSUL) 325 (65 Fe) MG tablet Take 325 mg by mouth every other day      nitroGLYcerin (NITROSTAT) 0.4 MG sublingual tablet For chest pain place 1 tablet under the tongue every 5 minutes for 3 doses. If symptoms persist 5 minutes after 1st dose call 911.  Qty: 25 tablet, Refills: 0    Associated Diagnoses: ST  elevation myocardial infarction (STEMI), unspecified artery (H)      sertraline (ZOLOFT) 100 MG tablet Take 200 mg by mouth every morning. (Some times takes 1 tablet BID)      clopidogrel (PLAVIX) 75 MG tablet Take 1 tablet (75 mg) by mouth daily  Qty: 90 tablet, Refills: 2    Associated Diagnoses: PAD (peripheral artery disease) (H24)      evolocumab (REPATHA SURECLICK) 140 MG/ML prefilled autoinjector Inject 1 mL (140 mg) subcutaneously every 14 days  Qty: 6 mL, Refills: 3    Associated Diagnoses: Hyperlipidemia LDL goal <70      metoprolol succinate ER (TOPROL XL) 25 MG 24 hr tablet Take 1 tablet (25 mg) by mouth daily  Qty: 90 tablet, Refills: 3    Associated Diagnoses: ST elevation myocardial infarction (STEMI), unspecified artery (H); Tobacco dependence; Hyperlipidemia with target low density lipoprotein (LDL) cholesterol less than 100 mg/dL; Peripheral artery disease (H24)                     Medications Discontinued or Adjusted During This Hospitalization:   New narcotics initiated: Oxycodone           Antibiotics Prescribed at Discharge:   No new antibiotics prescribed           Day of Discharge Physical Exam:   Temp:  [99.2  F (37.3  C)-100.2  F (37.9  C)] 99.2  F (37.3  C)  Pulse:  [60-67] 60  Resp:  [18] 18  BP: (103-117)/(59-74) 103/66  SpO2:  [95 %-96 %] 96 %    Physical Exam: Alert and appropriate.  Very comfortable.                                          Surgical wounds=A                          Excellent Doppler graft-DP/PT                          Normal CMS.         Final Pathology Result:   Not applicable           Discharge Instructions and Follow-Up:     Discharge Procedure Orders   Reason for your hospital stay   Order Comments: Lower extremity arterial bypass to improve perfusion to your lower extremity     Follow-up and recommended labs and tests    Order Comments: Follow up as scheduled in 2 weeks with vascular surgery clinic.     Activity   Order Comments: Your activity upon discharge:  Activity as tolerated.     Order Specific Question Answer Comments   Is discharge order? Yes      Discharge Instructions   Order Comments: Lower Extremity Bypass    Post-Operative Instructions    You will be in the hospital for 2-5 days.  Plan on having someone available to drive you home and stay with you for approximately 1 week.    Incision Care  You may have staples or sutures holding your incisions closed.  These will be removed at your 2 week post-operative appointment.  Once they are removed, you may have white strips of tape, called steri-strips, applied over the incision.  These will curl up on the ends after 5-7 days, at which time they can be removed. You may alternatively have a surgical glue on your incisions, this will flake off on its own over the next 1-2 weeks.  If you have an incision in your groin, keep gauze tucked in the groin skin fold to prevent the skin on skin contact.  This will prevent a moist environment which hinders healing.  Call our office to discuss this further if this area looks red or has an odor.  Leg swelling is common after surgery and must be controlled by elevating your legs above your heart.  Propping your legs up on a stool or sitting in a recliner chair will not relieve the swelling, but it will prevent it from getting worse.  Avoid hanging your leg in a dependent position.  For the first few weeks after surgery, you will need to really work at controlling this swelling and will likely need to spend a fair amount of time with your leg elevated.  Eventually, you should be able to manage the swelling by elevating 3-4 times a day for 20-30 minutes.  If the swelling does not decrease after elevating your legs above your heart for at least 2 hours, you should call our office.  You may shower 2-3 days after your surgery - pat the incision dry to prevent irritation from moisture.  You do not need to cover with a bandage unless you have drainage.  You may develop a bruising and  firmness near your incision.  This will soften and resolve over the next 1-3 weeks.  Call our office if it enlarges, becomes red, or painful.  On occasion a soft, fluid-filled bulge can develop near a surgical incision - this is called a seroma.  It will likely resolve on its own, but occasionally requires your doctor to drain it in clinic.  You should call our office if you have questions about this.  You may notice numbness around your incision.  This is due to nerve irritation from the surgery and will gradually improve over the next several weeks.  Occasionally, an incisional wound vac may be used.  This is disposable.  If this is used, you will be provided additional details before you leave the hospital.    Activity  Walking is important after surgery.  You will begin walking before you leave the hospital, and then you should gradually increase your distance as tolerated.  You should be taking at least 3-4 short walks a day if leg swelling is not a problem.  You can climb stairs when you feel strong enough.  You should not drive for 1 week.  In addition, you can not be taking prescription strength pain medication and you must feel strong enough to drive safely.  You may return to work once you feel ready - this can be decided at your 2 week post-operative visit.  You should avoid strenuous activity, including running, biking, or weight-lifting until discussed at your post-operative appointment.    Diet  You may resume a regular diet before you leave the hospital.  You may find that it is best to try smaller, more frequent meals until your appetite returns.    Medications  You may be started on a blood thinner after surgery.  If you are taking Coumadin, you need to have your blood monitored regularly.  You may be given a prescription for pain medication after surgery.  If you need to have this refilled, please call your pharmacy where you had it filled.  They will then call us for approval.  Please do not call  after hours for a refill on pain medication.    Post-Operative Appointments  You will need to see your doctor in clinic 10-14 days after surgery.    You will then be monitored regularly with an ultrasound test.  This will be discussed further at your post operative appointment and scheduled accordingly.    When to Call our Office  Temperature greater than 101.  When you are unable to feel a pulse in your foot or leg, when you have been monitoring regularly.  If you notice new onset of numbness, tingling, coolness or pain in your leg or foot.  Severe pain, especially if it is new and preventing sleep.  If you will be having an invasive procedure, such as a colonoscopy or dental work, within one year of your surgery, you may need to take an antibiotic prior to the procedure if you had an artificial graft placed with your surgery; please call us so we can assist you with this.    Call our main number, [phone], for assistance with any concerns or questions.  [facility] [address]  [place] [id number]     Diet   Order Comments: Follow this diet upon discharge: Regular     Order Specific Question Answer Comments   Is discharge order? Yes               Home Health Care:     Not needed           Discharge Disposition:     Discharged to home      Condition at discharge: Stable    Patient was discussed with staff, Dr. Gamble, on the day of discharge.    STAFF:  Doing very well.  Follow-up in 2 weeks in Clinic.       Dorian Gamble MD

## 2024-08-25 NOTE — PLAN OF CARE
Physical Therapy Discharge Summary    Reason for therapy discharge:    Discharged to home.    Progress towards therapy goal(s). See goals on Care Plan in [facility] electronic health record for goal details.  Goals met    Therapy recommendation(s):    No further therapy is recommended.

## 2024-08-25 NOTE — PLAN OF CARE
Goal Outcome Evaluation:      Plan of Care Reviewed With: patient    Overall Patient Progress: improvingOverall Patient Progress: improving     Date/Time 8/24/24 [id number]  Diagnosis: PAD  POD#:2 of R common femoral artery to below knee popliteal in situ bypass graft  Behavior & Aggression: green  Fall Risk: yes  Orientation: A&Ox4  ABNL VS/O2: VSS on RA  Activity/dangle: SBA  Diet: Tolerating regular diet   Pain: Pain to RLE, scheduled tylenol and PRN oxy given  Bowel/Bladder: Voiding without problem,BS active, reports BM x1 this evening; Passing flatus.  D/C Date: Pending  Other Info:On-Q pump - site at R groin. Baseline neuropathy in BLE otherwise CMS intact, pt states the neuropathy is improved to RLE. Pulses dopplerable. Incisions to R groin and leg small dried drainage noted. Has word-finding difficulty, per pt is not new. Pt expresses some anxiety about independence upon discharge.

## 2024-08-30 ENCOUNTER — MYC MEDICAL ADVICE (OUTPATIENT)
Dept: OTHER | Facility: CLINIC | Age: 64
End: 2024-08-30
Payer: COMMERCIAL

## 2024-08-30 ENCOUNTER — TELEPHONE (OUTPATIENT)
Dept: SURGERY | Facility: CLINIC | Age: 64
End: 2024-08-30
Payer: COMMERCIAL

## 2024-08-30 NOTE — PROGRESS NOTES
Called and left voicemail regarding pt's interest in Smoking Cessation at his PAC appointment  Information sent to [employer] along with my contact info.  Will try to contact again next week

## 2024-08-30 NOTE — TELEPHONE ENCOUNTER
Provider:  Dr. Gamble   Procedure: Right mid common femoral artery to below-knee popliteal in situ greater saphenous vein bypass   Surgery date:  8/23/2024     Pt called and is concerned about right leg swelling.  See MyChart message and pictures below.    Location? Right leg  Swelling? Yes    Recommendations:  Appears like normal post operative swelling; continue to elevate as much as possible, will take time to resolve; ok to remove remaining steri-strips if inclined.    Patient verbalized understanding and appreciated the return call.  No further questions.  Meme Michael, JOANNN, RN, -[facility] [place]

## 2024-08-30 NOTE — TELEPHONE ENCOUNTER
Patient called to make sure his message was seen. (See MyChart msg)  Please call him at [phone].    Pt had RIGHT COMMON FEMORAL ARTERY TO BELOW KNEE POPLITEAL IN SITU BYPASS GRAFT with Dr Gamble on 08/23/24.

## 2024-09-03 NOTE — PROGRESS NOTES
[place] [facility]    Adam Sahni returns for his first postoperative vascular follow-up.  Disabling claudication symptoms.  We have been following him since June 2023.    -- 7/17/2021: Bilateral common iliac artery stenting at [facility]    -- Known chronic occlusion right distal SFA and AK pop with fairly good runoff.  Also left distal SFA occlusion.    -- STEMI with stenting performed 6/9/2023.    -- Previous CTA with mild aortic disease.  Patent celiac and SMA arteries.  Common iliac artery stents are patent.  Right  EIA occluded with patent common femoral artery disease.  Right internal iliac patent.  Left external iliac with minimal narrowing.  Left mid and distal SFA occluded with reconstituted PT and three-vessel runoff.      ----8/23/2024: Right mid common femoral to below-knee popliteal in situ GSV bypass.  Doing very well postoperatively with +2 palpable right DP pulse good Doppler to graft-DP/PT.  Discharge POD#2      Medical: Medications: Toprol-XL, Repatha, Zoloft, aspirin, Plavix, Lipitor, Zetia   Medical: Hypertension    Hyperlipidemia poorly controlled with prior medications: LDL = 115.    Added Zetia to maximal dose Lipitor    Has noted some postoperative swelling that is gradually improving with elevation.  Still sore at surgical sites which is expected.  Back to his hobbies working on guitars but trouble sitting for long periods entire due to postoperative discomfort that will gradually improve.    Exam: Alert and appropriate.  Using cane with walking.   Blood pressure 125/80 left arm.  Pulse 86.   Surgical sites all healing well.    Mild thigh swelling with minimal calf and ankle swelling  Mild erythema along the midportion of thigh incisions--no obvious cellulitis  +3 right DP pulse    +3 triphasic signals within right graft and DP but somewhat weaker biphasic right PT        IMPRESSION:   #1.  Widely patent femoral to below-knee popliteal in situ bypass graft with good runoff.   Excellent DP pulses and good Doppler flow.  Continue on aspirin and Plavix.  Duplex in 6 weeks.    #2.  Improving postoperative swelling which is quite common.  Some mild thigh erythema but no obvious infection and will follow this.    I will refill his oxycodone No. 12 tablets via E prescription today.    Dorian Gamble MD    This note was created using Dragon voice recognition software which may result in transcription errors.

## 2024-09-04 ENCOUNTER — TELEPHONE (OUTPATIENT)
Dept: SURGERY | Facility: CLINIC | Age: 64
End: 2024-09-04
Payer: COMMERCIAL

## 2024-09-04 NOTE — PROGRESS NOTES
Left message regarding Smoking Cessation as he expressed an interest in it at his PAC appointment.  Information was sent via [employer] last week.   Encouraged him to call back when ready.

## 2024-09-05 ENCOUNTER — OFFICE VISIT (OUTPATIENT)
Dept: OTHER | Facility: CLINIC | Age: 64
End: 2024-09-05
Attending: SURGERY
Payer: COMMERCIAL

## 2024-09-05 VITALS — SYSTOLIC BLOOD PRESSURE: 125 MMHG | HEART RATE: 86 BPM | TEMPERATURE: 99.3 F | DIASTOLIC BLOOD PRESSURE: 80 MMHG

## 2024-09-05 DIAGNOSIS — I73.9 PERIPHERAL ARTERY DISEASE (H): ICD-10-CM

## 2024-09-05 DIAGNOSIS — I70.211 ATHEROSCLEROSIS OF NATIVE ARTERY OF RIGHT LOWER EXTREMITY WITH INTERMITTENT CLAUDICATION (H): Primary | ICD-10-CM

## 2024-09-05 PROCEDURE — G0463 HOSPITAL OUTPT CLINIC VISIT: HCPCS | Performed by: SURGERY

## 2024-09-05 PROCEDURE — 99024 POSTOP FOLLOW-UP VISIT: CPT | Performed by: SURGERY

## 2024-09-05 RX ORDER — OXYCODONE HYDROCHLORIDE 5 MG/1
5 TABLET ORAL EVERY 4 HOURS PRN
Qty: 10 TABLET | Refills: 0 | Status: SHIPPED | OUTPATIENT
Start: 2024-09-05 | End: 2024-09-05

## 2024-09-05 RX ORDER — OXYCODONE HYDROCHLORIDE 5 MG/1
5 TABLET ORAL EVERY 4 HOURS PRN
Qty: 10 TABLET | Refills: 0 | Status: SHIPPED | OUTPATIENT
Start: 2024-09-05

## 2024-09-05 NOTE — PROGRESS NOTES
[facility]        Patient is here for a post-op.    Pt is currently taking Aspirin, Statin, and Plavix.    /80 (BP Location: Left arm, Patient Position: Chair, Cuff Size: Adult Regular)   Pulse 86   Temp 99.3  F (37.4  C) (Oral)     The provider has been notified that the patient has no concerns.     Questions patient would like addressed today are: N/A.    Refills are needed: N/A    Has homecare services and agency name:  Nataliia Valetne MA

## 2024-09-12 ENCOUNTER — MYC MEDICAL ADVICE (OUTPATIENT)
Dept: OTHER | Facility: CLINIC | Age: 64
End: 2024-09-12
Payer: COMMERCIAL

## 2024-09-16 DIAGNOSIS — I73.9 PAD (PERIPHERAL ARTERY DISEASE) (H): ICD-10-CM

## 2024-09-16 RX ORDER — CLOPIDOGREL BISULFATE 75 MG/1
75 TABLET ORAL DAILY
Qty: 90 TABLET | Refills: 3 | Status: SHIPPED | OUTPATIENT
Start: 2024-09-16

## 2024-09-30 ENCOUNTER — MYC MEDICAL ADVICE (OUTPATIENT)
Dept: OTHER | Facility: CLINIC | Age: 64
End: 2024-09-30
Payer: COMMERCIAL

## 2024-10-16 NOTE — PROGRESS NOTES
[place] [facility]    Adam Sahni returns for vascular follow-up.  He was recently seen on 9/5/2024 as documented in epic.  History of bilateral iliac stenting with known right distal SFA and above-knee popliteal artery occlusion.  Normal below-knee popliteal artery with three-vessel runoff to the dominant anterior tibial.  Iliac stenting did not improve his symptoms.     -- 8/23/2024: Right mid common femoral --below-knee popliteal in situ GSV     PMH: Medications: Toprol-XL, Zoloft, Lipitor, Zetia, Repatha injections,    Plavix, aspirin, Fosamax    He has done very well following surgery.  He did Ace wrap his leg every day with only mild swelling being noted.  Marked improvement of his walking.    Exam: Alert and appropriate.  Normal affect.  Ambulatory.   Blood pressure 123/69.  Pulse 86 regular   Chest= clear  Cardiovascular= regular rate   Well-healing right thigh and calf incisions.  Mild calf edema.  Normal sensation  .  But some chronic foot hypersensitivity  +3 right graft and DP pulse     Bedside Doppler with +3 biphasic signal of right graft and DP.  Slightly weaker PT      Graft duplex reveals is widely patent with excellent flow.  Popliteal artery measures 6.5 mm.  Minimal mid graft diameter 4.8 mm.  No sidebranches.        IMPRESSION:   #1.  Widely patent right femoral-popliteal bypass graft with excellent runoff and dominant DP.  Now with palpable pulses.  Very mild postoperative swelling is gradually improving of no clinical concern.  Continue on aspirin/Plavix.  Follow-up duplex 3 months.    #2.  Hyperlipidemia.  Preoperative LDL = 115.  Now on Lipitor-Zetia and Repatha.  Lipid profile will be followed.    #3.  Chronic neuropathy.  History of back issues.  Will follow-up with neurology.    >20 minutes with patient today.  Follow-up in 3 months.    Dorian Gamble MD   This note was created using Dragon voice recognition software which may result in transcription errors.

## 2024-10-17 ENCOUNTER — OFFICE VISIT (OUTPATIENT)
Dept: OTHER | Facility: CLINIC | Age: 64
End: 2024-10-17
Attending: SURGERY
Payer: COMMERCIAL

## 2024-10-17 ENCOUNTER — HOSPITAL ENCOUNTER (OUTPATIENT)
Dept: ULTRASOUND IMAGING | Facility: CLINIC | Age: 64
Discharge: HOME | End: 2024-10-17
Attending: SURGERY
Payer: COMMERCIAL

## 2024-10-17 VITALS — OXYGEN SATURATION: 97 % | HEART RATE: 86 BPM | DIASTOLIC BLOOD PRESSURE: 69 MMHG | SYSTOLIC BLOOD PRESSURE: 123 MMHG

## 2024-10-17 DIAGNOSIS — I70.211 ATHEROSCLEROSIS OF NATIVE ARTERY OF RIGHT LOWER EXTREMITY WITH INTERMITTENT CLAUDICATION (H): ICD-10-CM

## 2024-10-17 DIAGNOSIS — I73.9 PAD (PERIPHERAL ARTERY DISEASE) (H): Primary | ICD-10-CM

## 2024-10-17 DIAGNOSIS — E78.5 HYPERLIPIDEMIA LDL GOAL <70: ICD-10-CM

## 2024-10-17 PROCEDURE — G0463 HOSPITAL OUTPT CLINIC VISIT: HCPCS | Performed by: SURGERY

## 2024-10-17 PROCEDURE — 93926 LOWER EXTREMITY STUDY: CPT | Mod: RT

## 2024-10-17 PROCEDURE — 99024 POSTOP FOLLOW-UP VISIT: CPT | Performed by: SURGERY

## 2024-10-17 NOTE — PROGRESS NOTES
Patient is here to discuss follow up.    There were no vitals taken for this visit.    Questions patient would like addressed today are: NA.    Refills are needed:  Oxycodone?    Has homecare services and agency name:  Nataliia Ayoub RN

## 2024-10-30 ENCOUNTER — MYC MEDICAL ADVICE (OUTPATIENT)
Dept: CARDIOLOGY | Facility: CLINIC | Age: 64
End: 2024-10-30
Payer: COMMERCIAL

## 2024-10-31 ENCOUNTER — TELEPHONE (OUTPATIENT)
Dept: CALL CENTER | Age: 64
End: 2024-10-31
Payer: COMMERCIAL

## 2024-10-31 ENCOUNTER — NURSE TRIAGE (OUTPATIENT)
Dept: NURSING | Facility: CLINIC | Age: 64
End: 2024-10-31
Payer: COMMERCIAL

## 2024-10-31 NOTE — TELEPHONE ENCOUNTER
"  Nurse Triage SBAR    Is this a 2nd Level Triage? YES, LICENSED PRACTITIONER REVIEW IS REQUIRED    Situation: Left eye red and irritated, vision opaque.  Small headache     Assessment:   Day before yesterday  left eye vision opaque, eye red with spider vessels  Denies confusion or arm or leg weakness or speech concerns.  He is having a mild headache,  1-2/10.  He denies N/T  He denies SOB or chest discomfort  He denies eyelid swelling  He denies fall or injury  Denies fever or chills  He does have quite a bit of clear drainage from the eye  White of eye is red with \"spider  vessels\" in it  Eye feeling irritated  Denies double vision  When he uses both eyes, he can see, but when he closes the right eye, his left eye, he can not make out letter in the [employer] book or numbers on the clock.  Blurry.  He is using eye washes and it is improving a bit, but it burns.  With left eye only, not able to see the clock or read, but with both eyes yes  He initially thought it was from his [facility], Cardiology med.  But he spoke with them and they ruled it out since he has been on it and no previous issues.      Protocol Recommended Disposition:   Routing to eye for a call back    Recommendation:   Stop eyewashes if they burn, until you speak with Eye clinic    Routed to provider          Reason for Disposition   Blurred vision or visual changes and present now and sudden onset or new (e.g., minutes, hours, days)  (Exception: Seeing floaters / black specks OR previously diagnosed migraine headaches with same symptoms.)    Additional Information   Negative: Complete loss of vision in one or both eyes   Negative: SEVERE eye pain    Answer Assessment - Initial Assessment Questions  1. DESCRIPTION: \"How has your vision changed?\" (e.g., complete vision loss, blurred vision, double vision, floaters, etc.)      Day before yesterday  left eye vision opaque  2. LOCATION: \"One or both eyes?\" If one, ask: \"Which eye?\"      Left eye  3. " "SEVERITY: \"Can you see anything?\" If Yes, ask: \"What can you see?\" (e.g., fine print)      With left eye only, not able to see the clock or read, but with both eyes yes  4. ONSET: \"When did this begin?\" \"Did it start suddenly or has this been gradual?\"      Sudden the day before  5. PATTERN: \"Does this come and go, or has it been constant since it started?\"      constant  6. PAIN: \"Is there any pain in your eye(s)?\"  (Scale 1-10; or mild, moderate, severe)    - NONE (0): No pain.    - MILD (1-3): Doesn't interfere with normal activities.    - MODERATE (4-7): Interferes with normal activities or awakens from sleep.     - SEVERE (8-10): Excruciating pain, unable to do any normal activities.      Headache  1-2/10  7. CONTACTS-GLASSES: \"Do you wear contacts or glasses?\"        8. CAUSE: \"What do you think is causing this visual problem?\"      He initially thought it was from his [facility], Cardiology med.  But he spoke with them and they ruled it out.  So he is not sure why this is occurring  9. OTHER SYMPTOMS: \"Do you have any other symptoms?\" (e.g., confusion, headache, arm or leg weakness, speech problems)      Denies confusion or arm or leg weakness or speech concerns.  He is having a mild headache,  1-2/10.  He denies N/T  He denies SOB or chest discomfort  He denies eyelid swelling  He denies fall or injury  Denies fever or chills  He does have quite a bit of clear drainage from the eye  White of eye is red with \"spider  vessels\" in it  Eye feeling irritated  Denies double vision  When he uses both eyes, he can see, but when he closes the right eye, his left eye, he can not make out letter in the [employer] book or numbers on the clock.  Blurry.  He is using eye washes and it is improving a bit, but it burns.  10. PREGNANCY: \"Is there any chance you are pregnant?\" \"When was your last menstrual period?\"        N/a    Protocols used: Vision Loss or Change-A-OH    "

## 2024-10-31 NOTE — TELEPHONE ENCOUNTER
Spoke to pt at 1315    Left eye vision changes 2 days ago.    Like cellmaríajuliette in vision times 2 days-- some improvement per pt    Pt with some mild pain  Some redness    Pt only able to read large print.    [facility] and difficult for pt to travel here and today snow/sleet happening.    Pt states will try to contact [facility] eye clinic.    I will try patient back in about an hour to confirm if able to see another eye provider and if not review likely scheduling for tomorrow unless pt able to come here today.    Willy Song RN 1:36 PM 10/31/24

## 2024-10-31 NOTE — TELEPHONE ENCOUNTER
Pt called main scheduling and has appt scheduled elsewhere tomorrow.    Willy Song RN 2:15 PM 10/31/24

## 2024-10-31 NOTE — TELEPHONE ENCOUNTER
Caller reporting the following red-flag symptom(s): Cloudy vision and sudden vision loss    Per the system red-flag symptom policy, patient was instructed to:  speak with a Registered Nurse    Action:  Patient warm transferred to a Registered NursePatricia

## 2024-11-25 ENCOUNTER — LAB (OUTPATIENT)
Dept: LAB | Facility: CLINIC | Age: 64
End: 2024-11-25
Payer: COMMERCIAL

## 2024-11-25 DIAGNOSIS — E78.5 HYPERLIPIDEMIA LDL GOAL <70: ICD-10-CM

## 2024-11-25 LAB
ALT SERPL W P-5'-P-CCNC: 16 U/L (ref 0–70)
CHOLEST SERPL-MCNC: 141 MG/DL
FASTING STATUS PATIENT QL REPORTED: YES
HDLC SERPL-MCNC: 46 MG/DL
LDLC SERPL CALC-MCNC: 65 MG/DL
NONHDLC SERPL-MCNC: 95 MG/DL
TRIGL SERPL-MCNC: 150 MG/DL

## 2024-12-12 DIAGNOSIS — I73.9 PERIPHERAL ARTERY DISEASE (H): ICD-10-CM

## 2024-12-12 DIAGNOSIS — E78.5 HYPERLIPIDEMIA WITH TARGET LOW DENSITY LIPOPROTEIN (LDL) CHOLESTEROL LESS THAN 100 MG/DL: ICD-10-CM

## 2024-12-12 DIAGNOSIS — F17.200 TOBACCO DEPENDENCE: ICD-10-CM

## 2024-12-12 DIAGNOSIS — I21.3 ST ELEVATION MYOCARDIAL INFARCTION (STEMI), UNSPECIFIED ARTERY (H): ICD-10-CM

## 2024-12-12 RX ORDER — METOPROLOL SUCCINATE 25 MG/1
25 TABLET, EXTENDED RELEASE ORAL DAILY
Qty: 90 TABLET | Refills: 3 | Status: SHIPPED | OUTPATIENT
Start: 2024-12-12

## 2024-12-12 RX ORDER — ATORVASTATIN CALCIUM 80 MG/1
80 TABLET, FILM COATED ORAL DAILY
Qty: 90 TABLET | Refills: 3 | Status: SHIPPED | OUTPATIENT
Start: 2024-12-12

## 2025-01-28 ENCOUNTER — PRE VISIT (OUTPATIENT)
Dept: UROLOGY | Facility: CLINIC | Age: 65
End: 2025-01-28
Payer: COMMERCIAL

## 2025-01-28 NOTE — TELEPHONE ENCOUNTER
Reason for visit: hx of elevated PSA-- previously seen by Dr. Dawn 3 years ago -- scheduled by Shari Milligan    Dx/Hx/Sx: elevated PSA--possible prostate cancer     Records/imaging/labs/orders: in epic     At Rooming: standard

## 2025-02-15 ENCOUNTER — APPOINTMENT (OUTPATIENT)
Dept: ULTRASOUND IMAGING | Facility: CLINIC | Age: 65
DRG: 253 | End: 2025-02-15
Attending: EMERGENCY MEDICINE
Payer: COMMERCIAL

## 2025-02-15 ENCOUNTER — APPOINTMENT (OUTPATIENT)
Dept: ULTRASOUND IMAGING | Facility: CLINIC | Age: 65
DRG: 253 | End: 2025-02-15
Attending: RADIOLOGY
Payer: COMMERCIAL

## 2025-02-15 ENCOUNTER — APPOINTMENT (OUTPATIENT)
Dept: INTERVENTIONAL RADIOLOGY/VASCULAR | Facility: CLINIC | Age: 65
DRG: 253 | End: 2025-02-15
Attending: RADIOLOGY
Payer: COMMERCIAL

## 2025-02-15 ENCOUNTER — HOSPITAL ENCOUNTER (INPATIENT)
Facility: CLINIC | Age: 65
LOS: 3 days | Discharge: HOME | DRG: 253 | End: 2025-02-18
Attending: EMERGENCY MEDICINE | Admitting: INTERNAL MEDICINE
Payer: COMMERCIAL

## 2025-02-15 ENCOUNTER — APPOINTMENT (OUTPATIENT)
Dept: CT IMAGING | Facility: CLINIC | Age: 65
DRG: 253 | End: 2025-02-15
Payer: COMMERCIAL

## 2025-02-15 DIAGNOSIS — F17.200 SMOKING: ICD-10-CM

## 2025-02-15 DIAGNOSIS — M79.604 PAIN OF RIGHT LOWER EXTREMITY DUE TO ISCHEMIA: ICD-10-CM

## 2025-02-15 DIAGNOSIS — I70.90 ARTERIAL OCCLUSION: Primary | ICD-10-CM

## 2025-02-15 DIAGNOSIS — I73.9 PERIPHERAL ARTERY DISEASE: ICD-10-CM

## 2025-02-15 DIAGNOSIS — I99.8 PAIN OF RIGHT LOWER EXTREMITY DUE TO ISCHEMIA: ICD-10-CM

## 2025-02-15 LAB
ABO + RH BLD: NORMAL
ALBUMIN SERPL BCG-MCNC: 4.2 G/DL (ref 3.5–5.2)
ALP SERPL-CCNC: 149 U/L (ref 40–150)
ALT SERPL W P-5'-P-CCNC: 15 U/L (ref 0–70)
ANION GAP SERPL CALCULATED.3IONS-SCNC: 12 MMOL/L (ref 7–15)
APTT PPP: 28 SECONDS (ref 22–38)
AST SERPL W P-5'-P-CCNC: 17 U/L (ref 0–45)
ATRIAL RATE - MUSE: 59 BPM
BASOPHILS # BLD AUTO: 0.1 10E3/UL (ref 0–0.2)
BASOPHILS NFR BLD AUTO: 1 %
BILIRUB DIRECT SERPL-MCNC: <0.2 MG/DL (ref 0–0.3)
BILIRUB SERPL-MCNC: 0.3 MG/DL
BLD GP AB SCN SERPL QL: NEGATIVE
BUN SERPL-MCNC: 27.1 MG/DL (ref 8–23)
CALCIUM SERPL-MCNC: 10 MG/DL (ref 8.8–10.4)
CHLORIDE SERPL-SCNC: 104 MMOL/L (ref 98–107)
CREAT SERPL-MCNC: 1.25 MG/DL (ref 0.67–1.17)
CRP SERPL-MCNC: 23.74 MG/L
DIASTOLIC BLOOD PRESSURE - MUSE: NORMAL MMHG
EGFRCR SERPLBLD CKD-EPI 2021: 64 ML/MIN/1.73M2
EOSINOPHIL # BLD AUTO: 0.1 10E3/UL (ref 0–0.7)
EOSINOPHIL NFR BLD AUTO: 1 %
ERYTHROCYTE [DISTWIDTH] IN BLOOD BY AUTOMATED COUNT: 14.8 % (ref 10–15)
ERYTHROCYTE [DISTWIDTH] IN BLOOD BY AUTOMATED COUNT: 14.9 % (ref 10–15)
ERYTHROCYTE [DISTWIDTH] IN BLOOD BY AUTOMATED COUNT: 14.9 % (ref 10–15)
EST. AVERAGE GLUCOSE BLD GHB EST-MCNC: 117 MG/DL
FIBRINOGEN PPP-MCNC: 355 MG/DL (ref 170–510)
FLUAV RNA SPEC QL NAA+PROBE: NEGATIVE
FLUBV RNA RESP QL NAA+PROBE: NEGATIVE
GLUCOSE BLDC GLUCOMTR-MCNC: 80 MG/DL (ref 70–99)
GLUCOSE SERPL-MCNC: 119 MG/DL (ref 70–99)
HBA1C MFR BLD: 5.7 %
HCO3 SERPL-SCNC: 24 MMOL/L (ref 22–29)
HCT VFR BLD AUTO: 34.5 % (ref 40–53)
HCT VFR BLD AUTO: 35.3 % (ref 40–53)
HCT VFR BLD AUTO: 39.8 % (ref 40–53)
HGB BLD-MCNC: 11.7 G/DL (ref 13.3–17.7)
HGB BLD-MCNC: 11.7 G/DL (ref 13.3–17.7)
HGB BLD-MCNC: 11.9 G/DL (ref 13.3–17.7)
HGB BLD-MCNC: 13.3 G/DL (ref 13.3–17.7)
IMM GRANULOCYTES # BLD: 0 10E3/UL
IMM GRANULOCYTES NFR BLD: 0 %
INR PPP: 0.95 (ref 0.85–1.15)
INTERPRETATION ECG - MUSE: NORMAL
LACTATE SERPL-SCNC: 0.8 MMOL/L (ref 0.7–2)
LYMPHOCYTES # BLD AUTO: 1.9 10E3/UL (ref 0.8–5.3)
LYMPHOCYTES NFR BLD AUTO: 19 %
MCH RBC QN AUTO: 29.1 PG (ref 26.5–33)
MCH RBC QN AUTO: 29.3 PG (ref 26.5–33)
MCH RBC QN AUTO: 29.8 PG (ref 26.5–33)
MCHC RBC AUTO-ENTMCNC: 33.4 G/DL (ref 31.5–36.5)
MCHC RBC AUTO-ENTMCNC: 33.7 G/DL (ref 31.5–36.5)
MCHC RBC AUTO-ENTMCNC: 34.3 G/DL (ref 31.5–36.5)
MCV RBC AUTO: 87 FL (ref 78–100)
MONOCYTES # BLD AUTO: 0.6 10E3/UL (ref 0–1.3)
MONOCYTES NFR BLD AUTO: 6 %
NEUTROPHILS # BLD AUTO: 7.5 10E3/UL (ref 1.6–8.3)
NEUTROPHILS NFR BLD AUTO: 74 %
NRBC # BLD AUTO: 0 10E3/UL
NRBC BLD AUTO-RTO: 0 /100
P AXIS - MUSE: 42 DEGREES
PLATELET # BLD AUTO: 152 10E3/UL (ref 150–450)
PLATELET # BLD AUTO: 191 10E3/UL (ref 150–450)
PLATELET # BLD AUTO: 198 10E3/UL (ref 150–450)
POTASSIUM SERPL-SCNC: 4.7 MMOL/L (ref 3.4–5.3)
PR INTERVAL - MUSE: 202 MS
PROT SERPL-MCNC: 7.7 G/DL (ref 6.4–8.3)
QRS DURATION - MUSE: 94 MS
QT - MUSE: 398 MS
QTC - MUSE: 394 MS
R AXIS - MUSE: 20 DEGREES
RBC # BLD AUTO: 3.96 10E6/UL (ref 4.4–5.9)
RBC # BLD AUTO: 4.06 10E6/UL (ref 4.4–5.9)
RBC # BLD AUTO: 4.57 10E6/UL (ref 4.4–5.9)
RSV RNA SPEC NAA+PROBE: NEGATIVE
SARS-COV-2 RNA RESP QL NAA+PROBE: NEGATIVE
SODIUM SERPL-SCNC: 140 MMOL/L (ref 135–145)
SPECIMEN EXP DATE BLD: NORMAL
SYSTOLIC BLOOD PRESSURE - MUSE: NORMAL MMHG
T AXIS - MUSE: -26 DEGREES
TROPONIN T SERPL HS-MCNC: 21 NG/L
TROPONIN T SERPL HS-MCNC: 25 NG/L
VENTRICULAR RATE- MUSE: 59 BPM
WBC # BLD AUTO: 10 10E3/UL (ref 4–11)
WBC # BLD AUTO: 10.1 10E3/UL (ref 4–11)
WBC # BLD AUTO: 8.3 10E3/UL (ref 4–11)

## 2025-02-15 PROCEDURE — 93005 ELECTROCARDIOGRAM TRACING: CPT

## 2025-02-15 PROCEDURE — 36415 COLL VENOUS BLD VENIPUNCTURE: CPT

## 2025-02-15 PROCEDURE — 250N000011 HC RX IP 250 OP 636: Performed by: RADIOLOGY

## 2025-02-15 PROCEDURE — 250N000013 HC RX MED GY IP 250 OP 250 PS 637

## 2025-02-15 PROCEDURE — 87040 BLOOD CULTURE FOR BACTERIA: CPT

## 2025-02-15 PROCEDURE — 96360 HYDRATION IV INFUSION INIT: CPT

## 2025-02-15 PROCEDURE — 272N000125 HC CATH CR12

## 2025-02-15 PROCEDURE — 272N000570 HC SHEATH CR7

## 2025-02-15 PROCEDURE — 80048 BASIC METABOLIC PNL TOTAL CA: CPT | Performed by: EMERGENCY MEDICINE

## 2025-02-15 PROCEDURE — 258N000003 HC RX IP 258 OP 636: Performed by: EMERGENCY MEDICINE

## 2025-02-15 PROCEDURE — 84484 ASSAY OF TROPONIN QUANT: CPT

## 2025-02-15 PROCEDURE — 86850 RBC ANTIBODY SCREEN: CPT | Performed by: EMERGENCY MEDICINE

## 2025-02-15 PROCEDURE — 85014 HEMATOCRIT: CPT | Performed by: EMERGENCY MEDICINE

## 2025-02-15 PROCEDURE — 96372 THER/PROPH/DIAG INJ SC/IM: CPT | Performed by: EMERGENCY MEDICINE

## 2025-02-15 PROCEDURE — 258N000003 HC RX IP 258 OP 636

## 2025-02-15 PROCEDURE — 87637 SARSCOV2&INF A&B&RSV AMP PRB: CPT

## 2025-02-15 PROCEDURE — 250N000011 HC RX IP 250 OP 636

## 2025-02-15 PROCEDURE — 85730 THROMBOPLASTIN TIME PARTIAL: CPT

## 2025-02-15 PROCEDURE — 86900 BLOOD TYPING SEROLOGIC ABO: CPT | Performed by: EMERGENCY MEDICINE

## 2025-02-15 PROCEDURE — 999N000128 HC STATISTIC PERIPHERAL IV START W/O US GUIDANCE

## 2025-02-15 PROCEDURE — 250N000013 HC RX MED GY IP 250 OP 250 PS 637: Performed by: RADIOLOGY

## 2025-02-15 PROCEDURE — 93979 VASCULAR STUDY: CPT

## 2025-02-15 PROCEDURE — 99285 EMERGENCY DEPT VISIT HI MDM: CPT | Mod: 25

## 2025-02-15 PROCEDURE — 272N000566 HC SHEATH CR3

## 2025-02-15 PROCEDURE — 272N000194 HC ACCESSORY CR3

## 2025-02-15 PROCEDURE — 36415 COLL VENOUS BLD VENIPUNCTURE: CPT | Performed by: EMERGENCY MEDICINE

## 2025-02-15 PROCEDURE — 258N000003 HC RX IP 258 OP 636: Performed by: RADIOLOGY

## 2025-02-15 PROCEDURE — 99223 1ST HOSP IP/OBS HIGH 75: CPT

## 2025-02-15 PROCEDURE — 3E05317 INTRODUCTION OF OTHER THROMBOLYTIC INTO PERIPHERAL ARTERY, PERCUTANEOUS APPROACH: ICD-10-PCS | Performed by: RADIOLOGY

## 2025-02-15 PROCEDURE — 80053 COMPREHEN METABOLIC PANEL: CPT | Performed by: EMERGENCY MEDICINE

## 2025-02-15 PROCEDURE — 250N000011 HC RX IP 250 OP 636: Performed by: EMERGENCY MEDICINE

## 2025-02-15 PROCEDURE — 85027 COMPLETE CBC AUTOMATED: CPT

## 2025-02-15 PROCEDURE — 93971 EXTREMITY STUDY: CPT | Mod: RT

## 2025-02-15 PROCEDURE — C1769 GUIDE WIRE: HCPCS

## 2025-02-15 PROCEDURE — 86140 C-REACTIVE PROTEIN: CPT

## 2025-02-15 PROCEDURE — 85384 FIBRINOGEN ACTIVITY: CPT

## 2025-02-15 PROCEDURE — 82040 ASSAY OF SERUM ALBUMIN: CPT

## 2025-02-15 PROCEDURE — 99222 1ST HOSP IP/OBS MODERATE 55: CPT | Mod: GC | Performed by: SURGERY

## 2025-02-15 PROCEDURE — 85025 COMPLETE CBC W/AUTO DIFF WBC: CPT | Performed by: EMERGENCY MEDICINE

## 2025-02-15 PROCEDURE — 272N000116 HC CATH CR1

## 2025-02-15 PROCEDURE — 255N000002 HC RX 255 OP 636: Performed by: RADIOLOGY

## 2025-02-15 PROCEDURE — 272N000196 HC ACCESSORY CR5

## 2025-02-15 PROCEDURE — 04HK33Z INSERTION OF INFUSION DEVICE INTO RIGHT FEMORAL ARTERY, PERCUTANEOUS APPROACH: ICD-10-PCS | Performed by: RADIOLOGY

## 2025-02-15 PROCEDURE — 74177 CT ABD & PELVIS W/CONTRAST: CPT

## 2025-02-15 PROCEDURE — 120N000013 HC R&B IMCU

## 2025-02-15 PROCEDURE — 93926 LOWER EXTREMITY STUDY: CPT | Mod: RT

## 2025-02-15 PROCEDURE — 83605 ASSAY OF LACTIC ACID: CPT

## 2025-02-15 PROCEDURE — 272N000124 HC CATH CR11

## 2025-02-15 PROCEDURE — 250N000009 HC RX 250: Performed by: RADIOLOGY

## 2025-02-15 PROCEDURE — 76937 US GUIDE VASCULAR ACCESS: CPT

## 2025-02-15 PROCEDURE — 83036 HEMOGLOBIN GLYCOSYLATED A1C: CPT

## 2025-02-15 PROCEDURE — 272N000123 HC CATH CR9

## 2025-02-15 PROCEDURE — 82248 BILIRUBIN DIRECT: CPT

## 2025-02-15 PROCEDURE — 250N000009 HC RX 250

## 2025-02-15 PROCEDURE — 85610 PROTHROMBIN TIME: CPT | Performed by: EMERGENCY MEDICINE

## 2025-02-15 RX ORDER — SODIUM CHLORIDE 9 MG/ML
INJECTION, SOLUTION INTRAVENOUS CONTINUOUS
Status: DISCONTINUED | OUTPATIENT
Start: 2025-02-15 | End: 2025-02-15

## 2025-02-15 RX ORDER — LABETALOL HYDROCHLORIDE 5 MG/ML
10-20 INJECTION, SOLUTION INTRAVENOUS EVERY 4 HOURS PRN
Status: DISCONTINUED | OUTPATIENT
Start: 2025-02-15 | End: 2025-02-16

## 2025-02-15 RX ORDER — HEPARIN SODIUM 10000 [USP'U]/100ML
0-5000 INJECTION, SOLUTION INTRAVENOUS CONTINUOUS
Status: DISCONTINUED | OUTPATIENT
Start: 2025-02-15 | End: 2025-02-15

## 2025-02-15 RX ORDER — FENTANYL CITRATE 50 UG/ML
25-50 INJECTION, SOLUTION INTRAMUSCULAR; INTRAVENOUS EVERY 5 MIN PRN
Status: DISCONTINUED | OUTPATIENT
Start: 2025-02-15 | End: 2025-02-15 | Stop reason: HOSPADM

## 2025-02-15 RX ORDER — NALOXONE HYDROCHLORIDE 0.4 MG/ML
0.4 INJECTION, SOLUTION INTRAMUSCULAR; INTRAVENOUS; SUBCUTANEOUS
Status: DISCONTINUED | OUTPATIENT
Start: 2025-02-15 | End: 2025-02-18 | Stop reason: HOSPADM

## 2025-02-15 RX ORDER — LIDOCAINE 40 MG/G
CREAM TOPICAL
Status: DISCONTINUED | OUTPATIENT
Start: 2025-02-15 | End: 2025-02-15

## 2025-02-15 RX ORDER — ONDANSETRON 4 MG/1
4 TABLET, ORALLY DISINTEGRATING ORAL EVERY 6 HOURS PRN
Status: DISCONTINUED | OUTPATIENT
Start: 2025-02-15 | End: 2025-02-18 | Stop reason: HOSPADM

## 2025-02-15 RX ORDER — HEPARIN SODIUM 200 [USP'U]/100ML
1 INJECTION, SOLUTION INTRAVENOUS EVERY 5 MIN PRN
Status: DISCONTINUED | OUTPATIENT
Start: 2025-02-15 | End: 2025-02-18

## 2025-02-15 RX ORDER — ACETAMINOPHEN 325 MG/1
650 TABLET ORAL EVERY 4 HOURS PRN
Status: DISCONTINUED | OUTPATIENT
Start: 2025-02-15 | End: 2025-02-18 | Stop reason: HOSPADM

## 2025-02-15 RX ORDER — METOCLOPRAMIDE 5 MG/1
10 TABLET ORAL EVERY 6 HOURS PRN
Status: DISCONTINUED | OUTPATIENT
Start: 2025-02-15 | End: 2025-02-18

## 2025-02-15 RX ORDER — HYDRALAZINE HYDROCHLORIDE 20 MG/ML
10-20 INJECTION INTRAMUSCULAR; INTRAVENOUS EVERY 30 MIN PRN
Status: DISCONTINUED | OUTPATIENT
Start: 2025-02-15 | End: 2025-02-18 | Stop reason: HOSPADM

## 2025-02-15 RX ORDER — ONDANSETRON 2 MG/ML
4 INJECTION INTRAMUSCULAR; INTRAVENOUS EVERY 6 HOURS PRN
Status: DISCONTINUED | OUTPATIENT
Start: 2025-02-15 | End: 2025-02-18 | Stop reason: HOSPADM

## 2025-02-15 RX ORDER — AMOXICILLIN 250 MG
1 CAPSULE ORAL 2 TIMES DAILY
Status: DISCONTINUED | OUTPATIENT
Start: 2025-02-15 | End: 2025-02-18 | Stop reason: HOSPADM

## 2025-02-15 RX ORDER — AMOXICILLIN 250 MG
1 CAPSULE ORAL 2 TIMES DAILY PRN
Status: DISCONTINUED | OUTPATIENT
Start: 2025-02-15 | End: 2025-02-18 | Stop reason: HOSPADM

## 2025-02-15 RX ORDER — FLUMAZENIL 0.1 MG/ML
0.2 INJECTION, SOLUTION INTRAVENOUS
Status: DISCONTINUED | OUTPATIENT
Start: 2025-02-15 | End: 2025-02-15 | Stop reason: HOSPADM

## 2025-02-15 RX ORDER — NALOXONE HYDROCHLORIDE 0.4 MG/ML
0.2 INJECTION, SOLUTION INTRAMUSCULAR; INTRAVENOUS; SUBCUTANEOUS
Status: DISCONTINUED | OUTPATIENT
Start: 2025-02-15 | End: 2025-02-15 | Stop reason: HOSPADM

## 2025-02-15 RX ORDER — ONDANSETRON 2 MG/ML
4 INJECTION INTRAMUSCULAR; INTRAVENOUS
Status: DISCONTINUED | OUTPATIENT
Start: 2025-02-15 | End: 2025-02-15 | Stop reason: HOSPADM

## 2025-02-15 RX ORDER — LIDOCAINE 40 MG/G
CREAM TOPICAL
Status: DISCONTINUED | OUTPATIENT
Start: 2025-02-15 | End: 2025-02-18 | Stop reason: HOSPADM

## 2025-02-15 RX ORDER — NALOXONE HYDROCHLORIDE 0.4 MG/ML
0.2 INJECTION, SOLUTION INTRAMUSCULAR; INTRAVENOUS; SUBCUTANEOUS
Status: DISCONTINUED | OUTPATIENT
Start: 2025-02-15 | End: 2025-02-18 | Stop reason: HOSPADM

## 2025-02-15 RX ORDER — ONDANSETRON 4 MG/1
4 TABLET, ORALLY DISINTEGRATING ORAL EVERY 6 HOURS PRN
Status: DISCONTINUED | OUTPATIENT
Start: 2025-02-15 | End: 2025-02-15

## 2025-02-15 RX ORDER — CALCIUM CARBONATE 500 MG/1
1000 TABLET, CHEWABLE ORAL 4 TIMES DAILY PRN
Status: DISCONTINUED | OUTPATIENT
Start: 2025-02-15 | End: 2025-02-18 | Stop reason: HOSPADM

## 2025-02-15 RX ORDER — POLYETHYLENE GLYCOL 3350 17 G/17G
17 POWDER, FOR SOLUTION ORAL DAILY
Status: DISCONTINUED | OUTPATIENT
Start: 2025-02-15 | End: 2025-02-18 | Stop reason: HOSPADM

## 2025-02-15 RX ORDER — IODIXANOL 320 MG/ML
100 INJECTION, SOLUTION INTRAVASCULAR ONCE
Status: COMPLETED | OUTPATIENT
Start: 2025-02-15 | End: 2025-02-15

## 2025-02-15 RX ORDER — NALOXONE HYDROCHLORIDE 0.4 MG/ML
0.4 INJECTION, SOLUTION INTRAMUSCULAR; INTRAVENOUS; SUBCUTANEOUS
Status: DISCONTINUED | OUTPATIENT
Start: 2025-02-15 | End: 2025-02-15 | Stop reason: HOSPADM

## 2025-02-15 RX ORDER — AMOXICILLIN 250 MG
2 CAPSULE ORAL 2 TIMES DAILY PRN
Status: DISCONTINUED | OUTPATIENT
Start: 2025-02-15 | End: 2025-02-18 | Stop reason: HOSPADM

## 2025-02-15 RX ORDER — PROCHLORPERAZINE MALEATE 10 MG
10 TABLET ORAL EVERY 6 HOURS PRN
Status: DISCONTINUED | OUTPATIENT
Start: 2025-02-15 | End: 2025-02-15

## 2025-02-15 RX ORDER — AMOXICILLIN 250 MG
2 CAPSULE ORAL 2 TIMES DAILY
Status: DISCONTINUED | OUTPATIENT
Start: 2025-02-15 | End: 2025-02-18 | Stop reason: HOSPADM

## 2025-02-15 RX ORDER — DOCUSATE SODIUM 100 MG/1
100 CAPSULE, LIQUID FILLED ORAL 2 TIMES DAILY
Status: DISCONTINUED | OUTPATIENT
Start: 2025-02-15 | End: 2025-02-18 | Stop reason: HOSPADM

## 2025-02-15 RX ORDER — PROCHLORPERAZINE 25 MG
25 SUPPOSITORY, RECTAL RECTAL EVERY 12 HOURS PRN
Status: DISCONTINUED | OUTPATIENT
Start: 2025-02-15 | End: 2025-02-17

## 2025-02-15 RX ORDER — MORPHINE SULFATE 2 MG/ML
2 INJECTION, SOLUTION INTRAMUSCULAR; INTRAVENOUS
Status: DISCONTINUED | OUTPATIENT
Start: 2025-02-15 | End: 2025-02-17

## 2025-02-15 RX ORDER — IOPAMIDOL 755 MG/ML
103 INJECTION, SOLUTION INTRAVASCULAR ONCE
Status: COMPLETED | OUTPATIENT
Start: 2025-02-15 | End: 2025-02-15

## 2025-02-15 RX ORDER — METOCLOPRAMIDE HYDROCHLORIDE 5 MG/ML
10 INJECTION INTRAMUSCULAR; INTRAVENOUS EVERY 6 HOURS PRN
Status: DISCONTINUED | OUTPATIENT
Start: 2025-02-15 | End: 2025-02-18

## 2025-02-15 RX ORDER — ATORVASTATIN CALCIUM 80 MG/1
80 TABLET, FILM COATED ORAL DAILY
Status: DISCONTINUED | OUTPATIENT
Start: 2025-02-16 | End: 2025-02-18 | Stop reason: HOSPADM

## 2025-02-15 RX ORDER — SODIUM CHLORIDE 9 MG/ML
INJECTION, SOLUTION INTRAVENOUS CONTINUOUS
Status: DISCONTINUED | OUTPATIENT
Start: 2025-02-15 | End: 2025-02-18

## 2025-02-15 RX ORDER — HEPARIN SODIUM 10000 [USP'U]/100ML
500 INJECTION, SOLUTION INTRAVENOUS CONTINUOUS
Status: DISCONTINUED | OUTPATIENT
Start: 2025-02-15 | End: 2025-02-17

## 2025-02-15 RX ORDER — KETOROLAC TROMETHAMINE 15 MG/ML
15 INJECTION, SOLUTION INTRAMUSCULAR; INTRAVENOUS ONCE
Status: COMPLETED | OUTPATIENT
Start: 2025-02-15 | End: 2025-02-15

## 2025-02-15 RX ORDER — ONDANSETRON 2 MG/ML
4 INJECTION INTRAMUSCULAR; INTRAVENOUS EVERY 6 HOURS PRN
Status: DISCONTINUED | OUTPATIENT
Start: 2025-02-15 | End: 2025-02-15

## 2025-02-15 RX ORDER — MORPHINE SULFATE 2 MG/ML
1 INJECTION, SOLUTION INTRAMUSCULAR; INTRAVENOUS
Status: DISCONTINUED | OUTPATIENT
Start: 2025-02-15 | End: 2025-02-17

## 2025-02-15 RX ORDER — NICOTINE 21 MG/24HR
1 PATCH, TRANSDERMAL 24 HOURS TRANSDERMAL DAILY PRN
Status: DISCONTINUED | OUTPATIENT
Start: 2025-02-15 | End: 2025-02-18 | Stop reason: HOSPADM

## 2025-02-15 RX ORDER — PROCHLORPERAZINE MALEATE 10 MG
10 TABLET ORAL EVERY 6 HOURS PRN
Status: DISCONTINUED | OUTPATIENT
Start: 2025-02-15 | End: 2025-02-17

## 2025-02-15 RX ORDER — HYDROMORPHONE HYDROCHLORIDE 2 MG/1
2 TABLET ORAL EVERY 4 HOURS PRN
Status: DISCONTINUED | OUTPATIENT
Start: 2025-02-15 | End: 2025-02-18 | Stop reason: HOSPADM

## 2025-02-15 RX ORDER — ACETAMINOPHEN 650 MG/1
650 SUPPOSITORY RECTAL EVERY 4 HOURS PRN
Status: DISCONTINUED | OUTPATIENT
Start: 2025-02-15 | End: 2025-02-18 | Stop reason: HOSPADM

## 2025-02-15 RX ADMIN — SODIUM CHLORIDE 1000 ML: 9 INJECTION, SOLUTION INTRAVENOUS at 08:59

## 2025-02-15 RX ADMIN — DOCUSATE SODIUM 100 MG: 100 CAPSULE, LIQUID FILLED ORAL at 19:48

## 2025-02-15 RX ADMIN — MIDAZOLAM 0.5 MG: 1 INJECTION INTRAMUSCULAR; INTRAVENOUS at 15:28

## 2025-02-15 RX ADMIN — FENTANYL CITRATE 50 MCG: 50 INJECTION INTRAMUSCULAR; INTRAVENOUS at 15:18

## 2025-02-15 RX ADMIN — Medication: at 17:09

## 2025-02-15 RX ADMIN — MIDAZOLAM 1 MG: 1 INJECTION INTRAMUSCULAR; INTRAVENOUS at 15:09

## 2025-02-15 RX ADMIN — MIDAZOLAM 0.5 MG: 1 INJECTION INTRAMUSCULAR; INTRAVENOUS at 15:24

## 2025-02-15 RX ADMIN — SODIUM CHLORIDE: 9 INJECTION, SOLUTION INTRAVENOUS at 17:17

## 2025-02-15 RX ADMIN — NICARDIPINE HYDROCHLORIDE 2.5 MG/HR: 0.2 INJECTION INTRAVENOUS at 17:51

## 2025-02-15 RX ADMIN — SODIUM CHLORIDE: 9 INJECTION, SOLUTION INTRAVENOUS at 11:46

## 2025-02-15 RX ADMIN — KETOROLAC TROMETHAMINE 15 MG: 15 INJECTION, SOLUTION INTRAMUSCULAR; INTRAVENOUS at 06:13

## 2025-02-15 RX ADMIN — SODIUM CHLORIDE: 9 INJECTION, SOLUTION INTRAVENOUS at 16:23

## 2025-02-15 RX ADMIN — ALTEPLASE 0.5 MG/HR: 2.2 INJECTION, POWDER, LYOPHILIZED, FOR SOLUTION INTRAVENOUS at 16:12

## 2025-02-15 RX ADMIN — FENTANYL CITRATE 25 MCG: 50 INJECTION INTRAMUSCULAR; INTRAVENOUS at 15:25

## 2025-02-15 RX ADMIN — IOPAMIDOL 103 ML: 755 INJECTION, SOLUTION INTRAVENOUS at 16:22

## 2025-02-15 RX ADMIN — FENTANYL CITRATE 25 MCG: 50 INJECTION INTRAMUSCULAR; INTRAVENOUS at 15:40

## 2025-02-15 RX ADMIN — SENNOSIDES AND DOCUSATE SODIUM 1 TABLET: 50; 8.6 TABLET ORAL at 20:15

## 2025-02-15 RX ADMIN — FENTANYL CITRATE 25 MCG: 50 INJECTION INTRAMUSCULAR; INTRAVENOUS at 15:36

## 2025-02-15 RX ADMIN — MIDAZOLAM 1 MG: 1 INJECTION INTRAMUSCULAR; INTRAVENOUS at 15:16

## 2025-02-15 RX ADMIN — MIDAZOLAM 0.5 MG: 1 INJECTION INTRAMUSCULAR; INTRAVENOUS at 15:35

## 2025-02-15 RX ADMIN — SODIUM CHLORIDE: 9 INJECTION, SOLUTION INTRAVENOUS at 23:45

## 2025-02-15 RX ADMIN — LIDOCAINE HYDROCHLORIDE 10 ML: 10 INJECTION, SOLUTION INFILTRATION; PERINEURAL at 15:17

## 2025-02-15 RX ADMIN — FENTANYL CITRATE 25 MCG: 50 INJECTION INTRAMUSCULAR; INTRAVENOUS at 15:45

## 2025-02-15 RX ADMIN — MIDAZOLAM 0.5 MG: 1 INJECTION INTRAMUSCULAR; INTRAVENOUS at 15:45

## 2025-02-15 RX ADMIN — FENTANYL CITRATE 50 MCG: 50 INJECTION INTRAMUSCULAR; INTRAVENOUS at 15:10

## 2025-02-15 RX ADMIN — HEPARIN SODIUM 500 UNITS/HR: 10000 INJECTION, SOLUTION INTRAVENOUS at 16:10

## 2025-02-15 RX ADMIN — HEPARIN SODIUM 1650 UNITS/HR: 10000 INJECTION, SOLUTION INTRAVENOUS at 11:47

## 2025-02-15 RX ADMIN — SODIUM CHLORIDE 69 ML: 9 INJECTION, SOLUTION INTRAVENOUS at 16:47

## 2025-02-15 RX ADMIN — IODIXANOL 50 ML: 320 INJECTION, SOLUTION INTRAVASCULAR at 15:52

## 2025-02-15 ASSESSMENT — ACTIVITIES OF DAILY LIVING (ADL)
ADLS_ACUITY_SCORE: 53
ADLS_ACUITY_SCORE: 58
ADLS_ACUITY_SCORE: 53
ADLS_ACUITY_SCORE: 53
ADLS_ACUITY_SCORE: 58
ADLS_ACUITY_SCORE: 53
ADLS_ACUITY_SCORE: 57
ADLS_ACUITY_SCORE: 58
ADLS_ACUITY_SCORE: 58
ADLS_ACUITY_SCORE: 53
ADLS_ACUITY_SCORE: 58
ADLS_ACUITY_SCORE: 58
ADLS_ACUITY_SCORE: 53

## 2025-02-15 ASSESSMENT — COLUMBIA-SUICIDE SEVERITY RATING SCALE - C-SSRS
2. HAVE YOU ACTUALLY HAD ANY THOUGHTS OF KILLING YOURSELF IN THE PAST MONTH?: NO
6. HAVE YOU EVER DONE ANYTHING, STARTED TO DO ANYTHING, OR PREPARED TO DO ANYTHING TO END YOUR LIFE?: NO
1. IN THE PAST MONTH, HAVE YOU WISHED YOU WERE DEAD OR WISHED YOU COULD GO TO SLEEP AND NOT WAKE UP?: NO

## 2025-02-15 NOTE — CONSULTS
"Vascular Surgery Consult  February 15, 2025    Adam Sahni  : 1960    Date of Service: 2/15/2025 10:25 AM    Reason for Consult: Benjamin 2a limb ischemia 2/2 occluded R CFA to AK pop bypass graft.     Assessment and Plan:  Adam Sahni is a 64 year old male with pMH notable for prostate cancer s/p radiation, lumbar stenosis s/p lumbar fusion, CAD s/p PCI, HLD, and , PAD w/ claudication  w/ b/l iliac artery stenting last endo intervention  who is s/p R CFA to AK pop bypass w/ in situ GSV 2024 for claudication who presents with 2 days of acute onset coolness and worsening pain in RLE. Pt with chronic years long pain in RLE which is primarily coming from his back and radiating down leg however 2 days ago had sudden onset pain in R foot and coolness \"like an ice cube.\" Last seen 2024 at which time velocities in graft were adequate and did not appear threatened. On exam RLE is cooler than L, pt has absent pedal doppler signals, no doppler signal appreciated in graft. His sensation  is in tact and he is at is baseline with extreme hypersensitivity and pain to touch of RLE, plantar/dorsiflexion in tact, diminished ability to wiggle toes compared to L however this appears to also be baseline. Labs notable for Cr of 1.25 which was 0.77 in , KISHAN, A1c of 5.7. Venous duplex was performed initially with evaluation of tibial vessels which demonstrated sluggish flow with no easily appreciated waveform in PT or AT which is consistent with occluded. Graft. Patient has acute limb ischemia 2/2 occluded R CFA to AK pop bypass graft, no clear motor/sensory deficits however significant pain and coolness - IR consulted for initiation of intraarterial lytics.     - IR consult for intra-arterial lytics, discussed with Dr. Tan plan to start when ICU bed is available. Pt does not require transfer for this as given motor/sensory in tact can wait some time however should be initiated today   - NPO " "  - heparin drip   - multimodal pain control   - IVF for KISHAN       Discussed with Dr. Gamble, who is in agreement with the above    Tanmay Malave MD  Surgery PGY4    History of Present Illness:    with pMH notable for prostate cancer s/p radiation, lumbar stenosis s/p lumbar fusion, CAD s/p PCI, HLD, and , PAD w/ claudication  w/ b/l iliac artery stenting last endo intervention 2023 who is s/p R CFA to AK pop bypass w/ in situ GSV August 2024 for claudication who presents with 2 days of acute onset coolness and worsening pain in RLE. Pt with chronic years long pain in RLE which is primarily coming from his back and radiating down leg however 2 days ago had sudden onset pain in R foot and coolness \"like an ice cube.\"  He has not had pain like this before. Reports prior to this his leg was \"like it was when he left the hospital\" which he means to say still with pain from his radiculopathy from L5/S1 stenosis however without the pain with walking in his calf with claudication like he had prior to surgery.     States he has been trying to remember to always take his plavix.     Past Medical History:  Past Medical History:   Diagnosis Date    CAD (coronary artery disease)     with UGO to RCA on 6/9/23 for inferior STEMI    Chronic anemia     Depression     Hyperlipidemia     Lumbar radiculopathy     PAD (peripheral artery disease)     S/p bilateral kissing iliac stents after thrombolytics 7/16/21-7/17/21 for severe claudication and complete occlusion of the left common iliac artery with tandem occlusions and/or high grade stenoses of the distal SFA and popliteal artery.    Peripheral neuropathy     Prediabetes     Prostate cancer (H)     s/p ext beam radiation therapy, follows at [facility]       Past Surgical History  As above    Family History:  Noncontributory       Medications:  Current Outpatient Medications   Medication Sig Dispense Refill    alendronate (FOSAMAX) 70 MG tablet Take 70 mg by mouth every 7 days. Friday last " dose 8/16/24      aspirin (ASA) 81 MG chewable tablet Take 1 tablet (81 mg) by mouth daily (Patient taking differently: Take 81 mg by mouth every morning.) 30 tablet 3    atorvastatin (LIPITOR) 80 MG tablet Take 1 tablet (80 mg) by mouth daily. 90 tablet 3    cholecalciferol (VITAMIN D3) 25 mcg (1000 units) capsule Take 1 capsule by mouth every morning.      clopidogrel (PLAVIX) 75 MG tablet Take 1 tablet (75 mg) by mouth daily. 90 tablet 3    evolocumab (REPATHA SURECLICK) 140 MG/ML prefilled autoinjector Inject 1 mL (140 mg) subcutaneously every 14 days 6 mL 3    ezetimibe (ZETIA) 10 MG tablet Take 1 tablet (10 mg) by mouth at bedtime. 30 tablet 3    ferrous sulfate (FEROSUL) 325 (65 Fe) MG tablet Take 325 mg by mouth every other day      metoprolol succinate ER (TOPROL XL) 25 MG 24 hr tablet Take 1 tablet (25 mg) by mouth daily. 90 tablet 3    nitroGLYcerin (NITROSTAT) 0.4 MG sublingual tablet For chest pain place 1 tablet under the tongue every 5 minutes for 3 doses. If symptoms persist 5 minutes after 1st dose call 911. 25 tablet 0    oxyCODONE (ROXICODONE) 5 MG tablet Take 1 tablet (5 mg) by mouth every 4 hours as needed for moderate pain. 10 tablet 0    sertraline (ZOLOFT) 100 MG tablet Take 200 mg by mouth every morning. (Some times takes 1 tablet BID)         Allergies:     Allergies   Allergen Reactions    Penicillins Hives, Shortness Of Breath and Swelling     Reports also having throat swelling- took medication as a child.         Review of Symptoms:  A 10 point review of symptoms has been conducted and is negative except for that mentioned in the above HPI.    Physical Exam:    Blood pressure 137/90, pulse 92, temperature 97.8  F (36.6  C), temperature source Oral, resp. rate 18, weight 92.4 kg (203 lb 12.8 oz), SpO2 98%.  Gen:    Lying in bed in NAD, A&OX3  HEENT: Normocephalic and atraumatic  CV:  Regular rate per doppler signals in LLE  Pulm:  Non-labored breathing on room air  Ext:  RLE cool to  touch, paler compared to L. Well healed incisions.   Vascular:  RLE absent doppler signals in graft, absent pedal doppler signals RLE.     LLE with strong biphasic dp doppler signals.   Neuro: Hypersensitive to touch in BLE with exquisite pain to touch. Diminished ability to wiggle toes in RLE compared to LLE. Plantar/dorsiflexion 5/5 LLE, able to bend and straighten knee w/o issue.     Labs:  CBC RESULTS:   Recent Labs   Lab Test 02/15/25  0859   WBC 10.1   HGB 13.3        Last Basic Metabolic Panel:  Lab Results   Component Value Date    POTASSIUM 4.7 02/15/2025    POTASSIUM 3.9 07/21/2021    POTASSIUM 4.1 06/19/2021     Lab Results   Component Value Date    CR 1.25 02/15/2025    CR 0.82 06/19/2021       Imaging:  Venous duplex reviewed w/ no visualized DVT however tibial vessels w/ no waveforms - do have some flow 6cm/sec in PT and AT however, consistent w/ occluded graft.     STAFF: Patient seen in the emergency department with Dr. Yung.  Developed acute onset of right leg pain approximately 48 hours ago.  No specific activity change or other issues and has been taking his aspirin and Plavix though the latter not necessarily every day.    We performed a right common femoral to below-knee popliteal in situ bypass graft after a failed distal SFA stent.  Was last seen in the clinic in October 2024 at which time the duplex revealed a widely patent graft with good Doppler signals.  Past history of bilateral common iliac artery stenting.  Patient was scheduled for follow-up actually several weeks ago and had rescheduled his appointment.    Clinically he has not normal motor and sensory function.  Very weak Doppler signals to the right side.  No palpable pulse within bypass graft implying likely occlusion.    Will start intravenous heparin.  Awaiting opening of an ICU bed and plan angiography with Dr. Tan to evaluate this further and likely lytic therapy.    Over 45 minutes with patient.      Dorian  Deo Gamble MD

## 2025-02-15 NOTE — PROGRESS NOTES
"Vascular Surgery Progress Note    Patient seen after initiation of lytic therapy.   Angiogram reviewed - demonstrates occlusion of R REAGAN at origin with flush occlusion. There is reconstitution of flow at common femoral, and SFA and profunda fill, with filling of bypass graft as well although sluggish.   Bypass is patent with stenosis at distal anastomosis    Patient reports he is doing okay - does have burning pain in R calf and foot \"like it is exploding.\"   No back pain, no chest pain, lightheadedness, dizziness.     Exam:   Afebrile, HR 50s, BP 130s-160s  Awake, alert, nad  NLB on RA   RR per doppler signals in LLE  Extremities;   L/E groin site soft, small amount blood on dressing however not soaked, stable over 20 minutes.   RLE pale, cool to touch compared to LLE.   RLE Calf compartments soft, compressible, pt with mild pain on compression of calf     Vascular:   RLE with monophasic R femoral doppler signal. DP/PT absent pedal signals.   LLE withbiphasic PT, monophasic DP doppler signal    Neuro:   LLE sensation in tact to light touch- remains w/ hypersensitivity, plantar/dorsiflexion 5/5.   RLE - plantarflexion 5/5, pt unable to generate dorsiflexion. Able to bend knee, flex at hip. Unable to wiggle toes.   Diminished sensation of dorsum foot intermittently - pt notably with hypersensitivity when touched one moment, then when touched a few minutes later states he feels pressure, then a few moments after that feels nothing, then back to hypersensitive.     Long discussion with patient about his motor/sensory changes - the inability to dorsiflex is a major change from this am's exam. In discussing this with pt he tells me that he has a history of drop foot however that comes and goes for him. States it was always there for many years then a few years ago went away and became \"intermittent,\" and that at least for the past several months he is unable to dorsiflex when he lays down for a prolonged period of time, " "but then if he were to stand up and walk around he'd be able to dorsiflex again after a bit. He is sure this has been going on for months. Remembers his sister asking him about it a few weeks ago.     His sensory exam has also changed - this seems to be a true new change for him, however pt is a difficult historian and states he sometimes has numbness in R foot as well, although he thinks that it may be worse today than other days.     He has a difficult time parsing out time frame of sx in his RLE in general noting that he had \"4 different things happening at once\" and was diagnosed with \"cancer, back issues, blood clot\" all at the same time so he isn't sure what sx have been present for years, months.     He is clear that what changed 2 days ago was \"Bam, my leg felt like an ice cube\" and that was new. He is less clear about motor/sensory changes as noted above.       A/P:   64yom who presented with [place] 2a ischemia which may have now progressed to 2B after initiation of lytics although unclear given patient notes he has h/o intermittent loss of ability to dorsiflex with prolonged times laying down - which he certainly has been doing now.Does have new loss of sensation although again pt unclear if this happens intermittently as well. Certainly very concerned for progression of ischemia - however this was exam shortly after initiation of lytics and pt has reconstitution of CFA via collaterals so presumably has some flow to LE. Will monitor pt exam closely, will return to check ~9pm. If worsening of exam or no improvement will consider taking pt to OR for thrombectomy, revascularization, possible fasciotomies. Discussed with patient who expressed understanding.     - NPO   - Heparin drip 500u through sheath   - alteplase 0.5mg/hr through catheter  - q6 fibrinogen/hgb   - PCA for pain control   - q1 doppler/motor/sensory and compartment checks - please page vascular surgery stat with any changes in exam, " concern for bleeding, progressive loss of motor function and will return sooner.     Discussed the above including motor/sensory changes with Dr. Gamble who is in agreement with the above.     Tanmay Malave MD  Vascular Surgery PGY4  To reach vascular surgery [place] team on call, please go to [facility] and from the drop down find the following:   VASCULAR SURGERY/[facility]  Then page the person listed to the right of day/night call. Can click the pager to page.

## 2025-02-15 NOTE — ED TRIAGE NOTES
"Pt with chronic R knee and leg pain arrives with worsening pain since 2/14. Pt reports \"it's locked up, it's like an ice cube\". Pt reports foot drop as well. Pain 9/10.      Pt reports he is concerned for DVT. Denies hx DVT. Requesting US for DVT.  "

## 2025-02-15 NOTE — IR NOTE
Interventional Radiology Pre-Procedure Sedation Assessment   Time of Assessment: 3:05 PM    Expected Level: Moderate Sedation    Indication: Sedation is required for the following type of Procedure: Arterial    Sedation and procedural consent: Risks, benefits and alternatives were discussed with Patient    PO Intake: Appropriately NPO for procedure    ASA Class: Class 3 - SEVERE SYSTEMIC DISEASE, DEFINITE FUNCTIONAL LIMITATIONS.    Mallampati: Grade 2:  Soft palate, base of uvula, tonsillar pillars, and portion of posterior pharyngeal wall visible    Lungs: Lungs Clear with good breath sounds bilaterally    Heart: Normal heart sounds and rate    History and physical reviewed and no updates needed. I have reviewed the lab findings, diagnostic data, medications, and the plan for sedation. I have determined this patient to be an appropriate candidate for the planned sedation and procedure and have reassessed the patient IMMEDIATELY PRIOR to sedation and procedure.    Sree Tan MD

## 2025-02-15 NOTE — H&P
"[facility]    History and Physical - Hospitalist Service       Date of Admission:  2/15/2025    Assessment & Plan      Adam Sahni is a 64 year old male with past medical history significant for prostate cancer s/p androgen suppression and radiation therapy (2020) with recent elevated PSA concerning for relapse, peripheral artery disease with peripheral neuropathy s/p bilateral iliac stenting (07/2021) with known right distal SFA and above-knee popliteal artery occlusion and more recently right femoral-popliteal bypass graft (08/2024), lumbar stenosis s/p lumbar fusion (04/2022) with residual right sided radiculopathy and paraesthesias, severe cervical spondylosis and stenosis with LUE radiculopathy and generalized weakness, coronary artery disease s/p PCI RCA with residual left-sided lesions (LCx and ramus 70% stenosed) treated with medical management (06/2023), severe hyperlipidemia (suspected familial), prediabetes, among others, who was admitted on 02/15/2025 for further evaluation and management of right lower extremity pain.     Right lower extremity limb ischemia   History of severe peripheral artery disease s/p bilateral iliac stenting (07/2021) with known right distal SFA and above-knee popliteal artery occlusion and right mid-common femoral to below knee popliteal in situ GSV (08/2024)  History of cervical stenosis with LUE radiculopathy   *Patient reports an approximate 3-4 year history of chronic radiculopathy of bilateral LE secondary to lumbar stenosis despite lumbar fusion in 2022. He states symptoms became acutely worse approximately two days prior to admission as he was sitting in his recliner at home when a shooting pain ran posteriorly down his right lower extremity while at the same time his RLE became abruptly cold below his knee. Since symptom onset he states he has had a consistent \"pressure\" and intermittent burning pain with associated foot drop that's worse " with ambulation. To alleviate symptoms he reports using crutches at home intermittently to assist in ambulation. Reports he has been generally taking DAPT (plavix and ASA) daily although does admit to intermittent missed doses. He continues to smoke cigarettes although down to approximately 10 cigarettes per day from previous 0.5 -1.0 PPD. Estimated 15 pack-year history previously although suspect closer to 25 pack-years.  *Follows with Vascular ([facility]). Last office visit in 10/2024. Patient with history of severe PAD s/p bilateral iliac stenting with known right distal SFA and above-knee popliteal artery occlusion. He has normal below-knee popliteal artery with three-vessel runoff to the dominant anterior tibial. Iliac stenting noted to not improve his symptoms. Now s/p right femoral-popliteal bypass (08/2024). Per chart review, 10/2024 follow-up clinic visit with vascular notable for bedside Doppler with +3 biphasic signal of right graft and DP.  Slightly weaker PT. Right femoral-popliteal bypass graft felt widely patent with excellent runoff and dominant DP.   *CTA abdomen and pelvis 03/2024 with mild to moderate atherosclerosis of the aorta. No aortic aneurysm or dissection. Celiac, superior mesenteric, renal, and inferior mesenteric arteries are patent. Patent stents of the bilateral common iliac arteries and right external iliac artery. Occlusion of the right internal iliac artery is new from 06/16/2020 and presumably due to exclusion by the right external iliac artery stent. Patent left external and internal iliac arteries. Patent bilateral common femoral and included superficial femoral arteries. Included right profundus femoris artery is patent. Chronic occlusion and distal reconstitution of the left profunda femoris artery.  *Patient presented to the ED hemodynamically stable. Afebrile.Labs in the ED notable for no leukocytosis, anemia or significant electrolyte abnormality. Renal function with mild  insufficiency with Cr 1.25 (baseline 0.75-0.95). US bilateral LE without DVT. Limited evaluation of the posterior tibial and anterior tibial artery suggests minimal to no flow, similar to previous ultrasound 06/2023. Vscular surgery ([place]) and IR consulted in ED. Started on high intensity heparin infusion and given 1 L NS, toradol in ED for pain.  Plan to transition to lytics when ICU bed available.   *On PTA plavix and ASA.   - Admit to IMC. Transition to ICU when available.  - Bedrest.    - Vitals and CMS checks q1 hours.   - Neuro checks q 2 hours.   - Telemetry.   - Continuous pulse oximetry. Oxygen PRN.   - Vascular ([place]) consulted in ED. Discussed with ED physician plan to hold off on heparin infusion pending further vascular evaluation. IR consultation per request of vascular for consideration of IR LE angiogram, appreciate the cares. Continue high intensity heparin transfusion and transition to thrombolytics when ICU bed available. NPO.   - Pain management with Dilaudid PCA. Additional PRN medications as ordered.   - Nicardipine drip for SBP < 160 mmHg.   - IVF as ordered.   - CRP 23.74.   - Lactate negative.   - CT chest abdomen and pelvis pending.   - Continue to monitor post-operatively.       Episodes of epigastric pain and nausea post-prandially   *Symptoms intermittent and not currently present. Episode of non-bloody emesis following intake of large dinner day prior to arrival. Patient denying epigastric abdomina, pain or N/V. No recent changes to stool (dark black although on iron supplement). Small evidence of red blood in setting of hemorrhoids.   - NPO.   - Antiemetics as ordered.   - CT chest abdomen and pelvis pending.   - Continue to monitor for new or worsening symptoms.     T4 prostate adenocarcinoma s/p androgen suppression and radiation therapy (2020) with recent concern for relapse   *Patient's primary radiation oncologist from [place] (Dr. So).Patient diagnosed with prostate cancer in  2020. Prostate MRI at time demonstrated almost entire prostate gland replaced by prostate cancer, posterior peripheral zone bilateral extraprostatic extension with neurovascular bundle invasion, bilateral seminal vesicle invasion, and tumor extension into posterior bladder base; there was no evidence of hayes or osseous metastases. CT scan at that time did show multiple sub-5 mm retroperitoneal lymph nodes with 2 left-sided pelvic lymph nodes, of undetermined significance though not excluding early metastatic involvement. Bone scan demonstrated no evidence of osseous metastases. Management options were discussed, including prostatectomy versus definitive radiotherapy plus androgen suppression therapy. Patient ultimately opted for radiotherapy plus androgen suppression therapy; radiotherapy was completed in August, 2020. Plan to follow closely with urology with PSA q 6 months.   *PSA 14.3 01/2025. Concern for relapse of prostate cancer.   - CT abdomen and pelvis pending with request for visualization of prostate or additional evidence of mass.   - Follow-up with [place] for PET had been scheduled 02/18/2025. Continue to follow closely upon discharge for further work-up given increased concern for malignancy.     Chronic normochromic normocytic anemia  Iron deficiency anemia due to chronic blood loss  *Baseline hemoglobin around 13.5 following iron supplementation in 2024. Former work-up for anemia notable for findings consistent with some degree of iron deficiency, however, MCV normal with elevated RDW indicating some degree of macrocytosis. B12 and folate normal. Colonoscopy, EGD and capsule endoscopy and found to have colonic telangiectasias. Per chart review, suspicion degree of anemia secondary to chronic blood loss as well. The patient is not having tarry stools. He is mostly asymptomatic. On peripheral smear in 2022 there was no apparent bone marrow disorder. Plan to continue to treat with iron supplementation.    *Patient endorses dark stools although on iron supplementation for iron deficiency no RBPR other than small amount on toilet paper patient attributes to hemorrhoid.   - Trend Fibrinogen/ Hgb q 6 hours. Conditional orders placed for Hgb <8 in setting of known chronic bleed and telangiectasias or for symptomatic anemia. Patient consented in ED.     Coronary artery disease s/p PCI RCA with residual left-sided lesions (LCx and ramus 70% stenosed) treated with medical management (06/2023)  Elevated troponins suspected secondary to demand ischemia   Hypertension   History of trace mitral regurgitation  History of mild trileaflet aortic sclerosis without stenosis  *Patient with history of CAD s/p PCI RCA with residual left-sided lesions (LCx and ramus 70% stenosed) treated with medical management. Troponins chronically elevated between 35-45 in past year following STEMI in 2023. Echocardiogram from time of STEMI in 10/2023 with LVEF 51%. Inferior and inferolateral hypokinesis. Grade I or early diastolic dysfunction. Prior to this EF mildly decreased at 45-50%. Significant family history noted.   *Patient without recent history of chest pain, shortness of breath, palpitations.   - Telemetry.   - EKG without ischemic changes.   - Trend troponins. Initial troponin 25. Repeat flat. Discontinue trending.   - Hold PTA metoprolol. Start nicardipine drip.   - Continue to follow-up with Cardiology outpatient as previously scheduled.     Severe hyperlipidemia suspected familial   *Patient reportedly compliant with high intensity statin. Most recent lipid panel 11/2024 with total cholesterol 141, triglycerides 150, LDL 65, HDL 46 (first time controlled following addition of repatha).   - Continue PTA high intensity statin, zetia and repatha.     Carotid artery stenosis   *Mild carotid bifurcation disease 6/17/2023 on US.   - Noted. Continue to manage outpatient.     KISHAN   *Cr 1.25 upon admission. Baseline 0.75-0.95.   - IVF.   -  Avoid nephrotoxins as able.   - CT abdomen and pelvis as ordered. No concerns with retention.   - Continue to monitor with morning labs.     Prediabetes   *A1c 5.7 upon admission. Diet controlled.   - Continue to monitor outpatient.      Osteopenia   *On PTA alendronate.  - Hold PTA alendronate.     Depression  *On PTA sertraline.   - Continue PTA sertraline.     Tobacco use disorder   *Patient current every day smoker. Currently smoking 10 cigarrettes daily, which is decreased from 0.5 - 1.0 packs per day for past 30 years. Estimated 15 pack-year history previously although suspect closer to 25 pack-years. THC gummies intermittently to assist with sleep at night. No alcohol use  - Cessation encouraged.   - Nicotine replacement ordered PRN.     Code Status  - Discussed directly with patient who elects FULL CODE.         Diet: NPO per Anesthesia Guidelines for Procedure/Surgery Except for: Meds  NPO for Medical/Clinical Reasons Except for: Meds, Ice Chips  DVT Prophylaxis: Heparin  Nova Catheter: Not present  Lines: PRESENT           Cardiac Monitoring: ACTIVE order. Indication: Bradycardias (48 hours)  Code Status: Full Code    Disposition Plan     Medically Ready for Discharge: Anticipated in 2-4 Days     The patient's care was discussed with the Attending Physician, Dr. Shelby .    Keshia Jain PA-C  Hospitalist Service  [facility]  Securely message with [employer] (more info)  Text page via [employer] Paging/Directory     ______________________________________________________________________    Chief Complaint   RLE extremity pain     History is obtained from the patient, electronic health record, and emergency department physician    History of Present Illness   Adam Sahni is a 64 year old male with past medical history significant for prostate cancer s/p androgen suppression and radiation therapy (2020) with recent elevated PSA concerning for relapse, peripheral artery disease  "with peripheral neuropathy s/p bilateral iliac stenting (07/2021) with known right distal SFA and above-knee popliteal artery occlusion and more recently right femoral-popliteal bypass graft (08/2024), lumbar stenosis s/p lumbar fusion (04/2022) with residual right sided radiculopathy and paraesthesias, severe cervical spondylosis and stenosis with LUE radiculopathy and generalized weakness, coronary artery disease s/p PCI RCA with residual left-sided lesions (LCx and ramus 70% stenosed) treated with medical management (06/2023), severe hyperlipidemia (suspected familial), prediabetes, among others, who was admitted on 02/15/2025 for further evaluation and management of right lower extremity pain.     Patient presented to the ED hemodynamically stable. Afebrile. He reports an approximate 3-4 year history of chronic radiculopathy of bilateral LE secondary to lumbar stenosis despite lumbar fusion in 2022. He states symptoms became acutely worse approximately two days prior to admission as he was sitting in his recliner at home when a shooting pain ran posteriorly down his right lower extremity while at the same time his RLE became abruptly cold below his knee. Since symptom onset he states he has had a consistent \"pressure\" and intermittent burning pain with associated foot drop that's worse with ambulation. To alleviate symptoms he reports using crutches at home intermittently to assist in ambulation. Reports he has been generally taking DAPT (plavix and ASA) daily although does admit to intermittent missed doses. Patient otherwise endorses history of severe epigastric pain and nausea post-prandially. One episode emesis secondary to symptoms without evidence of hematemesis. Patient otherwise without history of chest pain, shortness of breath, palpitations, pleurisy, syncope/pre-syncope, fever, chills, urinary or bowel changes. Continues to endorse nocturia and chronic diarrhea following completion or radiation of " prostate. He continues to smoke cigarettes although down to approximately 10 cigarettes per day from previous 0.5 -1.0 PPD. Estimated 15 pack-year history previously although suspect closer to 25 pack-years. THC gummies intermittently to assist with sleep at night. No alcohol use. Labs in the ED notable for no leukocytosis, anemia or significant electrolyte abnormality. Renal function with mild insufficiency with Cr 1.25 (baseline 0.75-0.95). US bilateral LE without DVT. Limited evaluation of the posterior tibial and anterior tibial artery suggests minimal to no flow, similar to previous ultrasound 06/2023. Vscular surgery ([facility]) and IR consulted in ED. Started on high intensity heparin infusion and given 1 L NS, toradol in ED for pain.  Plan to transition to lytics when ICU bed available.     Past Medical History    Past Medical History:   Diagnosis Date    CAD (coronary artery disease)     with UGO to RCA on 6/9/23 for inferior STEMI    Chronic anemia     Depression     Hyperlipidemia     Lumbar radiculopathy     PAD (peripheral artery disease)     S/p bilateral kissing iliac stents after thrombolytics 7/16/21-7/17/21 for severe claudication and complete occlusion of the left common iliac artery with tandem occlusions and/or high grade stenoses of the distal SFA and popliteal artery.    Peripheral neuropathy     Prediabetes     Prostate cancer ([facility])     s/p ext beam radiation therapy, follows at [facility]       Past Surgical History   Past Surgical History:   Procedure Laterality Date    APPENDECTOMY      BYPASS GRAFT INSITU FEMOROPOPLITEAL Right 8/23/2024    Procedure: RIGHT COMMON FEMORAL ARTERY TO BELOW KNEE POPLITEAL IN SITU BYPASS GRAFT;  Surgeon: Dorian Gamble MD;  Location:  OR    CV CORONARY ANGIOGRAM N/A 06/09/2023    Procedure: Coronary Angiogram;  Surgeon: Rosenda Pacheco MD;  Location:  HEART CARDIAC CATH LAB    IR LOWER EXTREMITY ANGIOGRAM RIGHT  06/18/2023    IR LOWER EXTREMITY ANGIOGRAM  RIGHT  2/15/2025    ORTHOPEDIC SURGERY      previious lumbar surgery       Prior to Admission Medications   Prior to Admission Medications   Prescriptions Last Dose Informant Patient Reported? Taking?   Aspirin 500 MG TABS Past Week  Yes Yes   Sig: Take 500 tablets by mouth daily as needed (For Pain).   atorvastatin (LIPITOR) 80 MG tablet Past Week  No Yes   Sig: Take 1 tablet (80 mg) by mouth daily.   cholecalciferol (VITAMIN D3) 25 mcg (1000 units) capsule Past Week Self Yes Yes   Sig: Take 1 capsule by mouth daily as needed.   clopidogrel (PLAVIX) 75 MG tablet 2/15/2025 Morning  No Yes   Sig: Take 1 tablet (75 mg) by mouth daily.   evolocumab (REPATHA SURECLICK) 140 MG/ML prefilled autoinjector Past Week Self No Yes   Sig: Inject 1 mL (140 mg) subcutaneously every 14 days   ferrous sulfate (FEROSUL) 325 (65 Fe) MG tablet Past Week Morning Self Yes Yes   Sig: Take 325 mg by mouth every other day   metoprolol succinate ER (TOPROL XL) 25 MG 24 hr tablet Unknown  No Yes   Sig: Take 1 tablet (25 mg) by mouth daily.   nitroGLYcerin (NITROSTAT) 0.4 MG sublingual tablet Unknown Self No Yes   Sig: For chest pain place 1 tablet under the tongue every 5 minutes for 3 doses. If symptoms persist 5 minutes after 1st dose call 911.   sertraline (ZOLOFT) 100 MG tablet Past Week Morning Self Yes Yes   Sig: Take 200 mg by mouth every morning. (Some times takes 1 tablet BID)      Facility-Administered Medications: None        Allergies   Allergies   Allergen Reactions    Penicillins Hives, Shortness Of Breath and Swelling     Reports also having throat swelling- took medication as a child.          Physical Exam   Vital Signs: Temp: 98.2  F (36.8  C) Temp src: Oral BP: 131/71 Pulse: 64   Resp: 12 SpO2: 96 % O2 Device: Nasal cannula Oxygen Delivery: 2 LPM  Weight: 204 lbs 9.39 oz  GENERAL:  Pleasant, cooperative, alert. Sitting in bed comfortably in no acute distress. Nontoxic appearing.   HEENT: Normocephalic, atraumatic.  Extra  "occular mm intact.  Sclera clear. PERRL.  Mucous membranes moist.  No erythema; uvula midline.  Neck supple with no adenopathy.   PULMONOLOGY: Clear to auscultation bilaterally.  CARDIAC: Regular rate and rhythm.    ABDOMEN: Soft, non distended. Minimal tenderness or \"discomfort\" to palpation of epigastric region.   MUSCULOSKELETAL:  Moving x 4 spontaneously.  Normal bulk and tone. No LE edema. DP pulse RLE absent and right foot clooer to touch than LLE. Sensation and strength intact bilaterally although even light sensation of lower extremities results in worsening of baseline radiculopathy (R>L). Patient unable to flex/extend digits of RLE although dorsiflexion and plantarflexion intact. DP pulses LLE intact. No evidence of ulcerations or significant necrosis noted at this time.   NEURO: Alert and oriented x4. CN II-XII grossly intact and symmetric. No ataxia or tremor. Nonfocal exam.    Medical Decision Making       75 MINUTES SPENT BY ME on the date of service doing chart review, history, exam, documentation & further activities per the note.      Data   ------------------------- PAST 24 HR DATA REVIEWED -----------------------------------------------    I have personally reviewed the following data over the past 24 hrs:    8.3  \   11.9 (L)   / 191     140 104 27.1 (H) /  80   4.7 24 1.25 (H) \     ALT: 15 AST: 17 AP: 149 TBILI: 0.3   ALB: 4.2 TOT PROTEIN: 7.7 LIPASE: N/A     Trop: 25 (H) BNP: N/A     TSH: N/A T4: N/A A1C: 5.7 (H)     Procal: N/A CRP: 23.74 (H) Lactic Acid: 0.8       INR:  0.95 PTT:  28   D-dimer:  N/A Fibrinogen:  N/A       Imaging results reviewed over the past 24 hrs:   Recent Results (from the past 24 hours)   US Lower Extremity Venous Duplex Right    Narrative    EXAM: US LOWER EXTREMITY VENOUS DUPLEX RIGHT  LOCATION: [facility]  DATE: 2/15/2025    INDICATION: pain. dvt?  COMPARISON: CTA of the abdomen and pelvis from 3/1/2024, right lower extremity arterial " ultrasound from 6/16/2023  TECHNIQUE: Venous Duplex ultrasound of the right lower extremity with and without compression, augmentation and duplex. Color flow and spectral Doppler with waveform analysis performed.    FINDINGS: Exam includes the common femoral, femoral, popliteal, and contralateral common femoral veins as well as segmentally visualized deep calf veins and greater saphenous vein.     RIGHT: No deep vein thrombosis. No superficial thrombophlebitis. No popliteal cyst.    Limited evaluation of the posterior tibial and anterior tibial arteries suggest minimal to no flow, similar to prior ultrasound from 6/16/2023.      Impression    IMPRESSION:  1.  No deep venous thrombosis in the right lower extremity.  2.  Limited evaluation of the posterior tibial and anterior tibial artery suggests minimal to no flow, similar to prior ultrasound from 6/16/2023. Consider follow-up dedicated lower extremity arterial evaluation or CTA runoff if clinically indicated.   IR Lower Extremity Angiogram Right    Narrative    64-year-old patient with acute onset limited blood flow throughout the right lower extremity. Patient has a right femoral to popliteal surgical bypass graft. Ultrasound demonstrates patency of this graft, though diffuse monophasic waveforms. Ultrasound   suggested occlusion of more proximal external iliac arterial stent. Patient has stents throughout the right common and external iliac arteries. Plan is for angiogram and thrombolysis.    TECHNIQUE: Patient was brought to the interventional radiology department and informed consent obtained. Patient was placed in a supine position. Skin overlying the left groin was prepped and draped in standard sterile fashion. Ultrasound was used to   visualize the left common femoral artery, patency confirmed, and image stored for documentation. With continuous ultrasound guidance, micropuncture kit was used to access the left common femoral artery. Over a series of  maneuvers, 6 [place] vascular   sheath was placed. An Omniflush catheter was advanced to the abdominal aorta where angiogram was performed at the level of the renal arteries. The Omniflush catheter was then pulled back to the distal abdominal aorta where angiogram performed. The entire   right common and external iliac arterial systems are occluded. There is reconstitution of the right common femoral artery via relatively developed collateralized arterial system. Omniflush catheter was used with a stiff angled Glidewire to extend   through the iliac arterial system to the right common femoral artery where angiogram was performed in the right lower extremity. Profunda femoral artery is patent. The bypass graft is patent to the thigh, though irregular filling noted in the distal   aspect of the bypass graft. Unable to visualize the runoff arteries as patient unable to hold still throughout the exam, creating large degree of difficulty in performing the exam. A crossover sheath was advanced into the right iliac arterial system, and   a 100 cm x 20 cm infusion length catheter was advanced throughout the right common iliac, external iliac, and common femoral arteries. Plan will be for TPA administration at 0.5 mg/hour and heparin through the sheath of 500 units/hour. Given flush   occlusion at the origin of the right common iliac artery, this will require additional thought and consideration as likely would require proximal extension of the kissing stents into the distal abdominal aorta. Embolic protection may also be necessary.    Sedation: A moderate level of sedation achieved with 4 mg IV Versed and 200 mcg IV fentanyl.  Sedation time: 37 minutes.  Patient was unable to hold still throughout the exam, limiting evaluation.  Fluoroscopic time: 8.4 minutes.  Air Kerma: 125 mGy  Contrast: 50 mL of Visipaque administered intra-arterially without complication.  Local anesthetic: 10 mL of 1% lidocaine.    FINDINGS: A total of  8 spot fluoroscopic images and angiogram sequences were obtained throughout the procedure. Abdominal aortic angiogram demonstrates patency of both renal arteries. The infrarenal abdominal aorta is patent. The left common iliac artery   is patent. There may be mild stenosis of the left external iliac artery. There is flush occlusion of the right common iliac, external iliac, and most of the right common femoral arteries as stents extend into the common femoral artery. There is   reconstitution of the distal common femoral and profunda femoral arteries via relatively robust collateralized arterial blood flow. Attempt was made to perform angiogram throughout the right lower extremity. The proximal profunda femoral artery is   patent. Difficult to obtain images as patient unable to hold still. Catheter was placed into the surgical bypass graft which demonstrated sluggish blood flow, though mostly patent. Irregularity noted in the distal graft, uncertain if stenosis or   thrombus. Unable to visualize runoff arteries given patient's inability to hold still. The infusion catheter is in good position by completion. It is within the right common iliac, external iliac, and most of common femoral arterial segments. Crossover   sheath is at the aortic bifurcation.      Impression    IMPRESSION:   1. Right lower extremity and abdominal aortic angiogram. Patient has flush occlusion throughout the right common iliac, external iliac, and majority of right common femoral arteries. 20 cm infusion length catheter was placed and TPA to be administered at   0.5 mg/hour and heparin through crossover sheath at the aortic bifurcation of 500 units/hour.  2. Unable to visualize right lower extremity arteries well as patient unable to hold still. Irregularity of the distal aspect of the surgical bypass graft, uncertain if stenosis or thrombosis.    Given occlusion is flush with the origin of the right common iliac artery, this will complicate  treatment options as may require more proximal extension of kissing stents and/or surgical cutdown for embolic protection.   CT Abdomen Pelvis w Contrast    Narrative    EXAM: CT ABDOMEN PELVIS W CONTRAST  LOCATION: [facility]  DATE: 2/15/2025    INDICATION: Intermittent abdominal pain post prandial; Assess prostate and evidence of other acute pathology; Potential relapse in prostate cancer  COMPARISON: 3/1/2024  TECHNIQUE: CT scan of the abdomen and pelvis was performed following injection of IV contrast. Multiplanar reformats were obtained. Dose reduction techniques were used.  CONTRAST: 103 mL of Isovue-370    FINDINGS:   LOWER CHEST: Dependent atelectasis. Mildly enlarged heart.    HEPATOBILIARY: Normal.    PANCREAS: Normal.    SPLEEN: Normal.    ADRENAL GLANDS: Normal.    KIDNEYS/BLADDER: Normal.    BOWEL: Scattered diverticulosis. No diverticulitis, colitis, or obstruction. No free air or fluid.    LYMPH NODES: Normal.    VASCULATURE: Left common iliac and right common to common femoral stent grafts. Occlusion of the bilateral right common, internal and external iliac arteries. Occlusion of the left common external and internal iliac arteries with TPA catheters in situ.   Patent celiac, superior mesenteric, inferior mesenteric, and renal arteries. Mild atherosclerotic calcifications. No aneurysm.    PELVIC ORGANS: Normal.    MUSCULOSKELETAL: Mild multilevel discogenic degenerative change. Posterior fusion L5-S1.      Impression    IMPRESSION:   1.  Scattered diverticulosis. No diverticulitis, colitis, obstruction, or appendicitis.  2.  Mildly enlarged heart with atherosclerotic vascular disease.  3.  Left common iliac and right common to common femoral stent grafts. Occlusion of the bilateral common, internal, and external iliac arteries with bilateral TPA catheters in situ.  4.  No obstructing renal or ureteral stones. No hydroureteronephrosis.  5.  Celiac, superior mesenteric,  inferior mesenteric, and renal arteries widely patent.

## 2025-02-15 NOTE — IR NOTE
Procedure Note for IR Procedure Dictation  Conscious sedation: 4 mg IVP Versed, 200 mcg IVP fentanyl  Sedation time: 37 minutes  Fluoro time: 8.4 minutes  Total Fluoro Dose: 125.08 mGy (Air Kerma)  Contrast: 50 ml VISI  Local anesthetic: 10 ml 1% lidocaine

## 2025-02-15 NOTE — PHARMACY-ADMISSION MEDICATION HISTORY
Pharmacy Intern Admission Medication History    Admission medication history is complete. The information provided in this note is only as accurate as the sources available at the time of the update.    Information Source(s): Patient and CareEverywhere/SureScripts via in-person    Pertinent Information: Patient reports taking aspirin 500 mg tablets as needed for pain, takes about 3 tablets at a time. He states he does not take his metoprolol succinate daily, but still has a supply at home. Also states having Nitrostat supply at home. Patient states last doses for meds was about 2 days ago, but took 2 tablets of clopidogrel this morning.    Changes made to PTA medication list:  Added: aspirin 500 mg  Deleted: alendronate 70 mg, ezetimibe 10 mg, oxycodone 5 mg, aspirin 81 mg chews  Changed: Vitamin D3: Take 1 cap by mouth daily --> Take 1 cap by mouth daily prn    Allergies reviewed with patient and updates made in EHR: no    Medication History Completed By: Puja Moore 2/15/2025 12:00 PM    PTA Med List   Medication Sig Last Dose/Taking    Aspirin 500 MG TABS Take 500 tablets by mouth daily as needed (For Pain). Past Week    atorvastatin (LIPITOR) 80 MG tablet Take 1 tablet (80 mg) by mouth daily. Past Week    cholecalciferol (VITAMIN D3) 25 mcg (1000 units) capsule Take 1 capsule by mouth daily as needed. Past Week    clopidogrel (PLAVIX) 75 MG tablet Take 1 tablet (75 mg) by mouth daily. 2/15/2025 Morning    evolocumab (REPATHA SURECLICK) 140 MG/ML prefilled autoinjector Inject 1 mL (140 mg) subcutaneously every 14 days Past Week    ferrous sulfate (FEROSUL) 325 (65 Fe) MG tablet Take 325 mg by mouth every other day Past Week Morning    metoprolol succinate ER (TOPROL XL) 25 MG 24 hr tablet Take 1 tablet (25 mg) by mouth daily. Unknown    nitroGLYcerin (NITROSTAT) 0.4 MG sublingual tablet For chest pain place 1 tablet under the tongue every 5 minutes for 3 doses. If symptoms persist 5 minutes after 1st dose call 911.  Unknown    sertraline (ZOLOFT) 100 MG tablet Take 200 mg by mouth every morning. (Some times takes 1 tablet BID) Past Week Morning

## 2025-02-15 NOTE — IR NOTE
IR    Aware of patient and plan for thrombolysis of RLE surgical bypass graft.  US of graft pending, though clinically, no flow in graft.  To start heparin now, keep patient NPO.  Awaiting ICU bed before able to proceed with procedure.  I called ICU to make aware of patient and will be in contact once a bed is available.  Unable to proceed until confirmation of ICU bed.    I spoke with Eliz Amor and Tye, as well as the ICU.    Sree Núñez MD

## 2025-02-15 NOTE — PROGRESS NOTES
"Vascular Surgery Brief Note    Patient seen, chart reviewed.   Full consult to follow    H/o b/l iliac stents for lifestyle limiting claudication, with SFA occlusion as well now s/p R CFA to BK pop bypass w/ in situ gsv in August of this year with Dr. Gamble. Had been doing okay w/ baseline hyperesthesia and RLE pain until 2 days ago when he noted sudden onset pain in RLE with RLE \"feeling like an ice cube\"   Reports he \"tries\" to take plavix every day, thinks days more days than not, only missing few days here and there.     On exam RLE bypass is occluded, no doppler signals in RLE DP or PT, none in bypass graft.     RLE is slightly cooler to touch than LLE.   Sensation in tact - has hyperalgesia to touch, not able to wiggle toes - which is baseline for him however plantar/dorsiflexion 5/5 BLE and able to bend knee.     No wounds RLE.     Will plan for lytics of RLE bypass graft, discussed with IR team. Currently awaiting ICU bed in order to initiate lytics.     - NPO   - High intensity heparin drip   - Lytics to start when ICU bed available    Seen and discussed with Dr. Gamble who is in agreement w/ the above.     Tanmay Malave MD  Vascular Surgery PGY4  To reach vascular surgery [place] team on call, please go to [facility] and from the drop down find the following:   VASCULAR SURGERY/[facility]  Then page the person listed to the right of day/night call. Can click the pager to page.         "

## 2025-02-15 NOTE — ED NOTES
[facility]  ED Nurse Handoff Report    ED Chief complaint: Leg Pain      ED Diagnosis:   Final diagnoses:   Pain of right lower extremity due to ischemia       Code Status: see orders    Allergies:   Allergies   Allergen Reactions    Penicillins Hives, Shortness Of Breath and Swelling     Reports also having throat swelling- took medication as a child.         Patient Story: Hx of bilateral iliac stents, two days of RLE pain, needs IR, on heparin  Focused Assessment:    Labs Ordered and Resulted from Time of ED Arrival to Time of ED Departure   BASIC METABOLIC PANEL - Abnormal       Result Value    Sodium 140      Potassium 4.7      Chloride 104      Carbon Dioxide (CO2) 24      Anion Gap 12      Urea Nitrogen 27.1 (*)     Creatinine 1.25 (*)     GFR Estimate 64      Calcium 10.0      Glucose 119 (*)    CBC WITH PLATELETS AND DIFFERENTIAL - Abnormal    WBC Count 10.1      RBC Count 4.57      Hemoglobin 13.3      Hematocrit 39.8 (*)     MCV 87      MCH 29.1      MCHC 33.4      RDW 14.8      Platelet Count 198      % Neutrophils 74      % Lymphocytes 19      % Monocytes 6      % Eosinophils 1      % Basophils 1      % Immature Granulocytes 0      NRBCs per 100 WBC 0      Absolute Neutrophils 7.5      Absolute Lymphocytes 1.9      Absolute Monocytes 0.6      Absolute Eosinophils 0.1      Absolute Basophils 0.1      Absolute Immature Granulocytes 0.0      Absolute NRBCs 0.0     CRP INFLAMMATION - Abnormal    CRP Inflammation 23.74 (*)    TROPONIN T, HIGH SENSITIVITY - Abnormal    Troponin T, High Sensitivity 25 (*)    HEMOGLOBIN A1C - Abnormal    Estimated Average Glucose 117 (*)     Hemoglobin A1C 5.7 (*)    CBC WITH PLATELETS - Abnormal    WBC Count 8.3      RBC Count 4.06 (*)     Hemoglobin 11.9 (*)     Hematocrit 35.3 (*)     MCV 87      MCH 29.3      MCHC 33.7      RDW 14.9      Platelet Count 191     INR - Normal    INR 0.95     LACTIC ACID WHOLE BLOOD - Normal    Lactic Acid 0.8     PARTIAL  THROMBOPLASTIN TIME - Normal    aPTT 28     HEPATIC FUNCTION PANEL - Normal    Protein Total 7.7      Albumin 4.2      Bilirubin Total 0.3      Alkaline Phosphatase 149      AST 17      ALT 15      Bilirubin Direct <0.20     TROPONIN T, HIGH SENSITIVITY   TYPE AND SCREEN, ADULT    ABO/RH(D) A POS      Antibody Screen Negative      SPECIMEN EXPIRATION DATE [id number]     BLOOD CULTURE   ABO/RH TYPE AND SCREEN     US Lower Extremity Arterial Duplex Right   Final Result      US Aorta/IVC/Iliac Duplex Limited   Final Result      US Lower Extremity Venous Duplex Right   Final Result   IMPRESSION:   1.  No deep venous thrombosis in the right lower extremity.   2.  Limited evaluation of the posterior tibial and anterior tibial artery suggests minimal to no flow, similar to prior ultrasound from 6/16/2023. Consider follow-up dedicated lower extremity arterial evaluation or CTA runoff if clinically indicated.      IR Lower Extremity Angiogram Right    (Results Pending)         Treatments and/or interventions provided: see MAR  Patient's response to treatments and/or interventions: IR pending    To be done/followed up on inpatient unit:  see orders    Does this patient have any cognitive concerns?:  none    Activity level - Baseline/Home:  Independent  Activity Level - Current:   Unknown    Patient's Preferred language: English   Needed?: No    Isolation: None  Infection: Not Applicable  Patient tested for COVID 19 prior to admission: NO  Bariatric?: No    Vital Signs:   Vitals:    02/15/25 0601 02/15/25 1003   BP: 137/90    Pulse: 92    Resp: 18    Temp: 97.8  F (36.6  C)    TempSrc: Oral    SpO2: 98%    Weight:  92.4 kg (203 lb 12.8 oz)       Cardiac Rhythm:     Was the PSS-3 completed:   Yes  What interventions are required if any?               Family Comments: none at bedside  OBS brochure/video discussed/provided to patient/family: No              Name of person given brochure if not patient:                Relationship to patient:     For the majority of the shift this patient's behavior was Green.   Behavioral interventions performed were none.    ED NURSE PHONE NUMBER: [id number]

## 2025-02-15 NOTE — ED PROVIDER NOTES
Emergency Department Note      History of Present Illness     Chief Complaint   Leg Pain      HPI   Adam Sahni is a 64 year old male with a history of coronary artery disease, hyperlipidemia, peripheral artery disease, peripheral neuropathy, lumbar radiculopathy, STEMI, and prostate cancer, presenting to the emergency department for evaluation of right lower extremity pain. The patient reports experiencing 3-4 years of chronic pain. He notes that two days ago, he began experiencing increased pain down the back of his right leg and his right lower leg went cold abruptly while he was sitting in his recliner. He notes he is concerned about a blood clot. He endorses a pressure like pain in his right leg with ambulation, and has been using crutches to help with ambulation.  No trauma recently.  He notes a history of a foot drop in his right foot. He also reports a history of a graft in his right lower extremity. The patient notes the last time he ate was at 0730 last night.     Independent Historian   None    Review of External Notes   none    Past Medical History     Medical History and Problem List   STEMI  Hyperlipidemia  Depression  PAD  Tobacco dependence   Coronary artery disease  Chronic anemia  Lumbar radiculopathy   Peripheral neuropathy  Prostate cancer     Medications   Fosamax  ASA  Lipitor  Plavix  Evolocumab   Zetia  Ferosul  Toprol XL  Nitrostat  Oxycodone   Zoloft    Surgical History   Appendectomy   Bypass graft insitu femoropopliteal   CV coronary angiogram  IR lower extremity angiogram right  Orthopedic surgery     Physical Exam     Patient Vitals for the past 24 hrs:   BP Temp Temp src Pulse Resp SpO2 Weight   02/15/25 1249 122/77 -- -- 56 -- -- --   02/15/25 1227 132/84 -- -- 54 -- 98 % --   02/15/25 1226 132/84 -- -- 54 18 98 % --   02/15/25 1003 -- -- -- -- -- -- 92.4 kg (203 lb 12.8 oz)   02/15/25 0601 137/90 97.8  F (36.6  C) Oral 92 18 98 % --     Physical Exam  Nursing note and vitals  reviewed.  Constitutional:  Appears well-developed and well-nourished.   HENT:   Head:    Atraumatic.   Mouth/Throat:   Oropharynx is clear and moist. No oropharyngeal exudate.   Eyes:    Pupils are equal, round, and reactive to light.   Neck:    Normal range of motion. Neck supple.      No tracheal deviation present. No thyromegaly present.   Cardiovascular:  Normal rate, regular rhythm, no murmur   Pulmonary/Chest: Breath sounds are clear and equal without wheezes or crackles.  Abdominal:   Soft. Bowel sounds are normal. Exhibits no distension and      no mass. There is no tenderness.      There is no rebound and no guarding.   Right lower extremity: Leg appears normal without swelling, redness, or warmth. Also, specifically, there is no knee joint swelling, redness, or tenderness. Sensation intact distally. Good dorsiflexion and plantar flexion strength. Old scar to the medial aspect of the proximal calf.    Lymphadenopathy:  No cervical adenopathy.   Neurological:   Alert and oriented to person, place, and time.   Skin:    Skin is warm and dry. No rash noted. No pallor.       Diagnostics     Lab Results   Labs Ordered and Resulted from Time of ED Arrival to Time of ED Departure   BASIC METABOLIC PANEL - Abnormal       Result Value    Sodium 140      Potassium 4.7      Chloride 104      Carbon Dioxide (CO2) 24      Anion Gap 12      Urea Nitrogen 27.1 (*)     Creatinine 1.25 (*)     GFR Estimate 64      Calcium 10.0      Glucose 119 (*)    CBC WITH PLATELETS AND DIFFERENTIAL - Abnormal    WBC Count 10.1      RBC Count 4.57      Hemoglobin 13.3      Hematocrit 39.8 (*)     MCV 87      MCH 29.1      MCHC 33.4      RDW 14.8      Platelet Count 198      % Neutrophils 74      % Lymphocytes 19      % Monocytes 6      % Eosinophils 1      % Basophils 1      % Immature Granulocytes 0      NRBCs per 100 WBC 0      Absolute Neutrophils 7.5      Absolute Lymphocytes 1.9      Absolute Monocytes 0.6      Absolute Eosinophils  0.1      Absolute Basophils 0.1      Absolute Immature Granulocytes 0.0      Absolute NRBCs 0.0     CRP INFLAMMATION - Abnormal    CRP Inflammation 23.74 (*)    TROPONIN T, HIGH SENSITIVITY - Abnormal    Troponin T, High Sensitivity 25 (*)    HEMOGLOBIN A1C - Abnormal    Estimated Average Glucose 117 (*)     Hemoglobin A1C 5.7 (*)    CBC WITH PLATELETS - Abnormal    WBC Count 8.3      RBC Count 4.06 (*)     Hemoglobin 11.9 (*)     Hematocrit 35.3 (*)     MCV 87      MCH 29.3      MCHC 33.7      RDW 14.9      Platelet Count 191     INR - Normal    INR 0.95     LACTIC ACID WHOLE BLOOD - Normal    Lactic Acid 0.8     PARTIAL THROMBOPLASTIN TIME - Normal    aPTT 28     HEPATIC FUNCTION PANEL - Normal    Protein Total 7.7      Albumin 4.2      Bilirubin Total 0.3      Alkaline Phosphatase 149      AST 17      ALT 15      Bilirubin Direct <0.20     TROPONIN T, HIGH SENSITIVITY   TYPE AND SCREEN, ADULT    ABO/RH(D) A POS      Antibody Screen Negative      SPECIMEN EXPIRATION DATE [id number]     BLOOD CULTURE   ABO/RH TYPE AND SCREEN       Imaging   US Lower Extremity Arterial Duplex Right   Final Result      US Aorta/IVC/Iliac Duplex Limited   Final Result      US Lower Extremity Venous Duplex Right   Final Result   IMPRESSION:   1.  No deep venous thrombosis in the right lower extremity.   2.  Limited evaluation of the posterior tibial and anterior tibial artery suggests minimal to no flow, similar to prior ultrasound from 6/16/2023. Consider follow-up dedicated lower extremity arterial evaluation or CTA runoff if clinically indicated.      IR Lower Extremity Angiogram Right    (Results Pending)       EKG   ECG results from 02/15/25   EKG 12-lead, tracing only     Value    Systolic Blood Pressure     Diastolic Blood Pressure     Ventricular Rate 59    Atrial Rate 59    ND Interval 202    QRS Duration 94        QTc 394    P Axis 42    R AXIS 20    T Axis -26    Interpretation ECG      Sinus bradycardia  Low  voltage QRS  Inferior infarct (cited on or before 09-Jun-2023)  Abnormal ECG  When compared with ECG of 02-Sep-2023 13:54,  Minimal criteria for Anterior infarct are no longer Present  Read at 0922 by Helen Amor MD            Independent Interpretation   None    ED Course      Medications Administered   Medications   heparin 25,000 units in 0.45% NaCl 250 mL ANTICOAGULANT infusion (1,650 Units/hr Intravenous $New Bag 2/15/25 1147)   sodium chloride 0.9 % infusion ( Intravenous $New Bag 2/15/25 1146)   ketorolac (TORADOL) injection 15 mg (15 mg Intramuscular $Given 2/15/25 0613)   sodium chloride 0.9% BOLUS 1,000 mL (0 mLs Intravenous Stopped 2/15/25 1219)   heparin ANTICOAGULANT Loading dose for HIGH INTENSITY TREATMENT * Give BEFORE starting heparin infusion (7,400 Units Intravenous $Given 2/15/25 1149)       Procedures   Procedures     Discussion of Management   Admitting Hospitalist, Regina Jain PA-C, on behalf of Dr. Shelby   Vascular surgery, Dr. Gamble and Dr. Malave  Interventional radiology, Dr. Tan    ED Course   ED Course as of 02/15/25 1301   Sat Feb 15, 2025   0712 I obtained history and examined the patient as noted above.    0858 I spoke to Dr. Gamble, vascular surgery, regarding the patient.    0918 I spoke to Regina Jain PA-C, of the hospitalist team, regarding admission to the hospital for the patient. She accepted the patient on behalf of Dr. Shelby.   0923 I spoke to Regina Jain PA-C, regarding the patient.   0950 I spoke to Dr. Tan, interventional radiology, regarding the patient.    1001 I spoke to Dr. Malave, vascular surgery, regarding the patient.    1007 I spoke to Regina Jain PA-C, updating her regarding the plan of care for the patient.    1022 I spoke to Dr. Gamble, vascular surgery, regarding the plan of care. He is comfortable with the patient staying in the emergency department until an ICU bed is available.        Additional  Documentation  None    Medical Decision Making / Diagnosis     CMS Diagnoses: None    MIPS       None    MDM   Adam Sahni is a 64 year old male who I found to have claudication due to acute right leg ischemia as a cause of his right lower leg pain.  The patient has arterial insufficiency of the right leg due to occlusion of right lower extremity bypass graft.  I consulted vascular surgery who recommends to start the patient on heparin with a bolus and drip and consult interventional radiology regarding lytics of his right lower extremity bypass graft.  I initiated the patient on heparin bolus and drip and he was taken to the interventional radiology suite for lytic therapy when an ICU bed became available.    Disposition   The patient was admitted to the hospital.     Diagnosis     ICD-10-CM    1. Pain of right lower extremity due to ischemia  M79.604     I99.8            Discharge Medications   New Prescriptions    No medications on file         Scribe Disclosure:  I, Neftaly Martinez, am serving as a scribe at 7:44 AM on 2/15/2025 to document services personally performed by Helen Amor MD based on my observations and the provider's statements to me.        Helen Amor MD  02/15/25 1791

## 2025-02-15 NOTE — PROCEDURES
[facility]    Procedure: IR Procedure Note    Date/Time: 2/15/2025 3:58 PM    Performed by: Sree Tan MD  Authorized by: Sree Tan MD  IR Fellow Physician:    Pre Procedure Diagnosis: RLE thrombosis  Post Procedure Diagnosis: RLE thrombosis    UNIVERSAL PROTOCOL   Site Marked: Yes  Prior Images Obtained and Reviewed:  Yes  Required items: Required blood products, implants, devices and special equipment available    Patient identity confirmed:  Verbally with patient, arm band and provided demographic data  Patient was reevaluated immediately before administering moderate or deep sedation or anesthesia  Confirmation Checklist:  Patient's identity using two indicators, relevant allergies, procedure was appropriate and matched the consent or emergent situation and correct equipment/implants were available  Time out: Immediately prior to the procedure a time out was called    Universal Protocol: the Joint Commission Universal Protocol was followed    Preparation: Patient was prepped and draped in usual sterile fashion    ESBL (mL):  10     ANESTHESIA    Anesthesia:  Local infiltration  Local Anesthetic:  Lidocaine 1% without epinephrine  Anesthetic Total (mL):  10      SEDATION  Patient Sedated: Yes    Sedation Type:  Moderate (conscious) sedation  Sedation:  Fentanyl and midazolam  Vital signs: Vital signs monitored during sedation    Findings: Left CFA access with 6 Fr sheath.    Abdominal aortogram with Right REAGAN and EIA occluded, at REAGAN origin.  Occlusion of origin of REAGAN complicates approach and treatment.  Able to cross through the occlusion and place a 100 cm X 20 cm infusion length catheter to infuse tPA at 0.5 mg/hour and heparin through the crossover sheath (at the aortic bifurcation) at 500 U/hour.    Unable to visualize arterial system in the RLE well due to patient movement and inability to sit still.  Irregularity at the distal bypass graft, though  "mostly patent, nearly stagnant blood flow.  Unable to assess runoff arteries (due to patient movement).    Complicating factor is high suspicion for residual irregular plaque/thrombus at the origin of the right REAGAN, embolic risk to either lower extremity.  This would likely require \"building up\" kissing stents to trap thrombus and/or surgical cutdown for embolic protection.    Patient will require PCA and pain control.  Vascular surgery following, as well, for ongoing assessment for compartment syndrome now, and moving forward.    Specimens: none    Procedural Complications: None    Condition: Stable      PROCEDURE    Patient Tolerance:  Patient tolerated the procedure well with no immediate complications  Length of time physician/provider present for 1:1 monitoring during sedation:  113-127 min      "

## 2025-02-15 NOTE — CONSULTS
IR    Thrombolysis has begun.  Vascular surgery following.  More complicated in that bypass graft is patent and inflow iliac arteries are occluded.  Additionally, patient unable to hold still during exam.    Sree Núñez MD

## 2025-02-15 NOTE — IR NOTE
Interventional Radiology Intra-procedural Nursing Note    Patient Name: Adam Sahni  Medical Record Number: [id number]  Today's Date: February 15, 2025    Procedure: RLE angiogram, initiation of arterial lytics and with IV moderate sedation  Start time: 1515  End time: 1545  Report provided to: YOHANNES Gonzales  Patient depart time and location: 1617 to ICU Room 369    Note: Patient entered Interventional Radiology Suite number 2 via cart. Patient awake, alert and oriented. Assisted onto procedural table in supine position. Prepped and draped.  Dr. Tan in room. Time out and procedure started. Vital signs stable. Telemetry reading SB/SR.    Procedure well tolerated by patient without complications. Procedure end with debrief by Dr. Tan.   Quick clot and tegaderm dressing applied to left interventional procedure access site, dressing is c/d/I.    Administered medication totals:  Lidocaine 1% 10 mL Intradermal  Heparin 500 Units/hr IV continuous through arterial sheath  Alteplase 0.5 mg/hr IV continuous through infusion catheter  Versed 4 mg IVP  Fentanyl 200 mcg IVP    Left calf circumference = 36.5 cm    Last dose of sedation administered at 1545.

## 2025-02-16 ENCOUNTER — APPOINTMENT (OUTPATIENT)
Dept: INTERVENTIONAL RADIOLOGY/VASCULAR | Facility: CLINIC | Age: 65
DRG: 253 | End: 2025-02-16
Attending: RADIOLOGY
Payer: COMMERCIAL

## 2025-02-16 LAB
ANION GAP SERPL CALCULATED.3IONS-SCNC: 9 MMOL/L (ref 7–15)
BASOPHILS # BLD AUTO: 0 10E3/UL (ref 0–0.2)
BASOPHILS NFR BLD AUTO: 1 %
BUN SERPL-MCNC: 18.4 MG/DL (ref 8–23)
CALCIUM SERPL-MCNC: 8.5 MG/DL (ref 8.8–10.4)
CHLORIDE SERPL-SCNC: 109 MMOL/L (ref 98–107)
CREAT SERPL-MCNC: 1.21 MG/DL (ref 0.67–1.17)
EGFRCR SERPLBLD CKD-EPI 2021: 67 ML/MIN/1.73M2
EOSINOPHIL # BLD AUTO: 0.1 10E3/UL (ref 0–0.7)
EOSINOPHIL NFR BLD AUTO: 2 %
ERYTHROCYTE [DISTWIDTH] IN BLOOD BY AUTOMATED COUNT: 15.2 % (ref 10–15)
FIBRINOGEN PPP-MCNC: 342 MG/DL (ref 170–510)
FIBRINOGEN PPP-MCNC: 360 MG/DL (ref 170–510)
FIBRINOGEN PPP-MCNC: 387 MG/DL (ref 170–510)
FIBRINOGEN PPP-MCNC: 392 MG/DL (ref 170–510)
FIBRINOGEN PPP-MCNC: 416 MG/DL (ref 170–510)
GLUCOSE BLDC GLUCOMTR-MCNC: 76 MG/DL (ref 70–99)
GLUCOSE BLDC GLUCOMTR-MCNC: 83 MG/DL (ref 70–99)
GLUCOSE SERPL-MCNC: 91 MG/DL (ref 70–99)
HCO3 SERPL-SCNC: 24 MMOL/L (ref 22–29)
HCT VFR BLD AUTO: 32.7 % (ref 40–53)
HGB BLD-MCNC: 11 G/DL (ref 13.3–17.7)
HGB BLD-MCNC: 11 G/DL (ref 13.3–17.7)
HGB BLD-MCNC: 11.1 G/DL (ref 13.3–17.7)
HGB BLD-MCNC: 11.2 G/DL (ref 13.3–17.7)
HGB BLD-MCNC: 11.3 G/DL (ref 13.3–17.7)
HGB BLD-MCNC: 11.5 G/DL (ref 13.3–17.7)
IMM GRANULOCYTES # BLD: 0 10E3/UL
IMM GRANULOCYTES NFR BLD: 1 %
LYMPHOCYTES # BLD AUTO: 1.7 10E3/UL (ref 0.8–5.3)
LYMPHOCYTES NFR BLD AUTO: 27 %
MCH RBC QN AUTO: 29.8 PG (ref 26.5–33)
MCHC RBC AUTO-ENTMCNC: 33.9 G/DL (ref 31.5–36.5)
MCV RBC AUTO: 88 FL (ref 78–100)
MONOCYTES # BLD AUTO: 0.5 10E3/UL (ref 0–1.3)
MONOCYTES NFR BLD AUTO: 8 %
NEUTROPHILS # BLD AUTO: 4.1 10E3/UL (ref 1.6–8.3)
NEUTROPHILS NFR BLD AUTO: 63 %
NRBC # BLD AUTO: 0 10E3/UL
NRBC BLD AUTO-RTO: 0 /100
PLATELET # BLD AUTO: 142 10E3/UL (ref 150–450)
POTASSIUM SERPL-SCNC: 4 MMOL/L (ref 3.4–5.3)
RBC # BLD AUTO: 3.73 10E6/UL (ref 4.4–5.9)
SODIUM SERPL-SCNC: 142 MMOL/L (ref 135–145)
UFH PPP CHRO-ACNC: <0.1 IU/ML
WBC # BLD AUTO: 6.5 10E3/UL (ref 4–11)

## 2025-02-16 PROCEDURE — 85018 HEMOGLOBIN: CPT

## 2025-02-16 PROCEDURE — 93010 ELECTROCARDIOGRAM REPORT: CPT | Performed by: INTERNAL MEDICINE

## 2025-02-16 PROCEDURE — 250N000013 HC RX MED GY IP 250 OP 250 PS 637: Performed by: RADIOLOGY

## 2025-02-16 PROCEDURE — 82565 ASSAY OF CREATININE: CPT

## 2025-02-16 PROCEDURE — 93005 ELECTROCARDIOGRAM TRACING: CPT

## 2025-02-16 PROCEDURE — 36415 COLL VENOUS BLD VENIPUNCTURE: CPT | Performed by: RADIOLOGY

## 2025-02-16 PROCEDURE — 80048 BASIC METABOLIC PNL TOTAL CA: CPT

## 2025-02-16 PROCEDURE — 99233 SBSQ HOSP IP/OBS HIGH 50: CPT | Performed by: INTERNAL MEDICINE

## 2025-02-16 PROCEDURE — 85014 HEMATOCRIT: CPT | Performed by: RADIOLOGY

## 2025-02-16 PROCEDURE — 85004 AUTOMATED DIFF WBC COUNT: CPT | Performed by: RADIOLOGY

## 2025-02-16 PROCEDURE — 37213 THROMBLYTIC ART/VEN THERAPY: CPT

## 2025-02-16 PROCEDURE — 272N000194 HC ACCESSORY CR3

## 2025-02-16 PROCEDURE — 85025 COMPLETE CBC W/AUTO DIFF WBC: CPT

## 2025-02-16 PROCEDURE — 258N000003 HC RX IP 258 OP 636: Performed by: RADIOLOGY

## 2025-02-16 PROCEDURE — 99222 1ST HOSP IP/OBS MODERATE 55: CPT | Performed by: INTERNAL MEDICINE

## 2025-02-16 PROCEDURE — 250N000013 HC RX MED GY IP 250 OP 250 PS 637

## 2025-02-16 PROCEDURE — C1769 GUIDE WIRE: HCPCS

## 2025-02-16 PROCEDURE — 255N000002 HC RX 255 OP 636: Performed by: RADIOLOGY

## 2025-02-16 PROCEDURE — 120N000013 HC R&B IMCU

## 2025-02-16 PROCEDURE — 85520 HEPARIN ASSAY: CPT | Performed by: RADIOLOGY

## 2025-02-16 PROCEDURE — 85384 FIBRINOGEN ACTIVITY: CPT

## 2025-02-16 PROCEDURE — 82435 ASSAY OF BLOOD CHLORIDE: CPT

## 2025-02-16 PROCEDURE — 36415 COLL VENOUS BLD VENIPUNCTURE: CPT

## 2025-02-16 PROCEDURE — 04HC33Z INSERTION OF INFUSION DEVICE INTO RIGHT COMMON ILIAC ARTERY, PERCUTANEOUS APPROACH: ICD-10-PCS | Performed by: RADIOLOGY

## 2025-02-16 PROCEDURE — 99231 SBSQ HOSP IP/OBS SF/LOW 25: CPT | Performed by: SURGERY

## 2025-02-16 PROCEDURE — 250N000011 HC RX IP 250 OP 636: Performed by: RADIOLOGY

## 2025-02-16 RX ORDER — FENTANYL CITRATE 50 UG/ML
25-50 INJECTION, SOLUTION INTRAMUSCULAR; INTRAVENOUS EVERY 5 MIN PRN
Status: DISCONTINUED | OUTPATIENT
Start: 2025-02-16 | End: 2025-02-16 | Stop reason: HOSPADM

## 2025-02-16 RX ORDER — NALOXONE HYDROCHLORIDE 0.4 MG/ML
0.2 INJECTION, SOLUTION INTRAMUSCULAR; INTRAVENOUS; SUBCUTANEOUS
Status: DISCONTINUED | OUTPATIENT
Start: 2025-02-16 | End: 2025-02-16 | Stop reason: HOSPADM

## 2025-02-16 RX ORDER — IODIXANOL 320 MG/ML
100 INJECTION, SOLUTION INTRAVASCULAR ONCE
Status: COMPLETED | OUTPATIENT
Start: 2025-02-16 | End: 2025-02-16

## 2025-02-16 RX ORDER — NALOXONE HYDROCHLORIDE 0.4 MG/ML
0.4 INJECTION, SOLUTION INTRAMUSCULAR; INTRAVENOUS; SUBCUTANEOUS
Status: DISCONTINUED | OUTPATIENT
Start: 2025-02-16 | End: 2025-02-16 | Stop reason: HOSPADM

## 2025-02-16 RX ORDER — ONDANSETRON 2 MG/ML
4 INJECTION INTRAMUSCULAR; INTRAVENOUS
Status: DISCONTINUED | OUTPATIENT
Start: 2025-02-16 | End: 2025-02-16 | Stop reason: HOSPADM

## 2025-02-16 RX ORDER — HEPARIN SODIUM 200 [USP'U]/100ML
1 INJECTION, SOLUTION INTRAVENOUS EVERY 5 MIN PRN
Status: DISCONTINUED | OUTPATIENT
Start: 2025-02-16 | End: 2025-02-18

## 2025-02-16 RX ORDER — FLUMAZENIL 0.1 MG/ML
0.2 INJECTION, SOLUTION INTRAVENOUS
Status: DISCONTINUED | OUTPATIENT
Start: 2025-02-16 | End: 2025-02-16 | Stop reason: HOSPADM

## 2025-02-16 RX ADMIN — ALTEPLASE 0.5 MG/HR: 2.2 INJECTION, POWDER, LYOPHILIZED, FOR SOLUTION INTRAVENOUS at 01:42

## 2025-02-16 RX ADMIN — ATORVASTATIN CALCIUM 80 MG: 80 TABLET, FILM COATED ORAL at 08:02

## 2025-02-16 RX ADMIN — ALTEPLASE 0.5 MG/HR: 2.2 INJECTION, POWDER, LYOPHILIZED, FOR SOLUTION INTRAVENOUS at 23:42

## 2025-02-16 RX ADMIN — HEPARIN SODIUM 3 BAG: 200 INJECTION, SOLUTION INTRAVENOUS at 10:53

## 2025-02-16 RX ADMIN — SENNOSIDES AND DOCUSATE SODIUM 2 TABLET: 50; 8.6 TABLET ORAL at 19:39

## 2025-02-16 RX ADMIN — HEPARIN SODIUM 500 UNITS/HR: 10000 INJECTION, SOLUTION INTRAVENOUS at 21:08

## 2025-02-16 RX ADMIN — FENTANYL CITRATE 25 MCG: 50 INJECTION, SOLUTION INTRAMUSCULAR; INTRAVENOUS at 10:33

## 2025-02-16 RX ADMIN — MIDAZOLAM 0.5 MG: 1 INJECTION INTRAMUSCULAR; INTRAVENOUS at 10:18

## 2025-02-16 RX ADMIN — MIDAZOLAM 0.5 MG: 1 INJECTION INTRAMUSCULAR; INTRAVENOUS at 10:30

## 2025-02-16 RX ADMIN — SODIUM CHLORIDE: 9 INJECTION, SOLUTION INTRAVENOUS at 23:42

## 2025-02-16 RX ADMIN — MIDAZOLAM 0.5 MG: 1 INJECTION INTRAMUSCULAR; INTRAVENOUS at 10:51

## 2025-02-16 RX ADMIN — ALTEPLASE 0.5 MG/HR: 2.2 INJECTION, POWDER, LYOPHILIZED, FOR SOLUTION INTRAVENOUS at 13:24

## 2025-02-16 RX ADMIN — MIDAZOLAM 1 MG: 1 INJECTION INTRAMUSCULAR; INTRAVENOUS at 10:11

## 2025-02-16 RX ADMIN — FENTANYL CITRATE 25 MCG: 50 INJECTION, SOLUTION INTRAMUSCULAR; INTRAVENOUS at 10:52

## 2025-02-16 RX ADMIN — FENTANYL CITRATE 50 MCG: 50 INJECTION, SOLUTION INTRAMUSCULAR; INTRAVENOUS at 10:13

## 2025-02-16 RX ADMIN — IODIXANOL 55 ML: 320 INJECTION, SOLUTION INTRAVASCULAR at 10:58

## 2025-02-16 RX ADMIN — DOCUSATE SODIUM 100 MG: 100 CAPSULE, LIQUID FILLED ORAL at 19:39

## 2025-02-16 RX ADMIN — SODIUM CHLORIDE: 9 INJECTION, SOLUTION INTRAVENOUS at 12:04

## 2025-02-16 RX ADMIN — FENTANYL CITRATE 25 MCG: 50 INJECTION, SOLUTION INTRAMUSCULAR; INTRAVENOUS at 10:20

## 2025-02-16 ASSESSMENT — ACTIVITIES OF DAILY LIVING (ADL)
ADLS_ACUITY_SCORE: 63
ADLS_ACUITY_SCORE: 64
ADLS_ACUITY_SCORE: 58
ADLS_ACUITY_SCORE: 63
ADLS_ACUITY_SCORE: 64
ADLS_ACUITY_SCORE: 63
ADLS_ACUITY_SCORE: 64
ADLS_ACUITY_SCORE: 58
ADLS_ACUITY_SCORE: 64
ADLS_ACUITY_SCORE: 63
ADLS_ACUITY_SCORE: 64
ADLS_ACUITY_SCORE: 58
ADLS_ACUITY_SCORE: 64
ADLS_ACUITY_SCORE: 63
ADLS_ACUITY_SCORE: 64
ADLS_ACUITY_SCORE: 63
ADLS_ACUITY_SCORE: 64
ADLS_ACUITY_SCORE: 58

## 2025-02-16 NOTE — PLAN OF CARE
Neuro: A&Ox4, CMS intact. PERRLA. Baseline neuropathy. Pt did complain on some joint pain on L big toe intermittently and described it as sharp pain. Comes and goes per pt. No swelling or redness noted but was tender, but able to alleviate with reposition foot.  Pain/CPOT: Overall improving of R leg, 3-4/10.   Cardiac: SB w/1AVB. HR in the 40's-50's. No gtts. Nicardipine off at 2000. SBP at goal.   Pulses present in RLE/LLE and warm  Resp: RA  GI/: No BM on shift, -flatus. Adequate urine output  Diet: NPO at midnight, mouth swabs provided  Mobility/Activity: Strict BR  Skin: intact. LLE groin site soft, blood on dressing marked. Vascular aware, monitor.  LDAs: L/R PIV. L femoral arterial sheath  Drips/Infusions: NS, hep gtt, alteplase, PCA    Major Shift Events:   ~2693-2985: Pt having 5-10 sec pauses, MD aware. Continue to monitor    Goal Outcome Evaluation:      Plan of Care Reviewed With: patient    Overall Patient Progress: improvingOverall Patient Progress: improving    Outcome Evaluation: CMS intact, pulses present in RLE

## 2025-02-16 NOTE — IR NOTE
Procedure Note for IR Procedure Dictation  Conscious sedation: 2.5 mg IVP Versed, 125 mcg IVP fentanyl  Sedation time: 45 minutes  Fluoro time: 3.2 minutes  Total Fluoro Dose: 146.79 mGy (Air Kerma)  Contrast: 55 ml Visi  Local anesthetic: No lidocaine

## 2025-02-16 NOTE — PROGRESS NOTES
VASCULAR SURGERY    In ICU overnight.  Very comfortable and feels that his right foot is back to baseline.    Left groin site=A  Unable to palpate right femoral pulse  Normal motor and sensory to right foot and leg.  Strong biphasic right distal AT bedside Doppler.    Reviewed yesterday afternoon's angiograms and discussed with Dr Tan.  Common pztbzkl-[id number]-[id number] femoral patent.  Right leg bypass graft is also patent no there is a stenosis distally.  This fills via collaterals.  They were able to cannulate the common iliac-external iliac artery and start lytic therapy.      IMPRESSION: Clinically doing much better.  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  Very encouraging at this point.  Will go down for angiography later this morning with the IR team.    Dorian Gamble MD

## 2025-02-16 NOTE — PLAN OF CARE
Problem: Adult Inpatient Plan of Care  Goal: Plan of Care Review    Outcome: Progressing  Flowsheets (Taken 2/16/2025 1711)  Plan of Care Reviewed With: patient  Overall Patient Progress: improving  Goal: Patient-Specific Goal (Individualized)    Outcome: Progressing  Goal: Absence of Hospital-Acquired Illness or Injury  Outcome: Progressing  Intervention: Identify and Manage Fall Risk  Recent Flowsheet Documentation  Taken 2/16/2025 1600 by Lauren Tyler RN  Safety Promotion/Fall Prevention:   activity supervised   clutter free environment maintained   increased rounding and observation   safety round/check completed   treat reversible contributory factors   patient and family education  Taken 2/16/2025 1200 by Lauren Tyler RN  Safety Promotion/Fall Prevention:   activity supervised   clutter free environment maintained   increased rounding and observation   safety round/check completed   treat reversible contributory factors   patient and family education  Taken 2/16/2025 0800 by Lauren Tyler RN  Safety Promotion/Fall Prevention:   activity supervised   clutter free environment maintained   increased rounding and observation   safety round/check completed   treat reversible contributory factors   patient and family education  Intervention: Prevent Skin Injury  Recent Flowsheet Documentation  Taken 2/16/2025 1600 by Lauren Tyler RN  Body Position: turned  Taken 2/16/2025 1400 by Lauren Tyler RN  Body Position:   turned   heels elevated  Taken 2/16/2025 1200 by Lauren Tyler RN  Body Position:   right   side-lying  Taken 2/16/2025 0800 by Lauren Tyler RN  Body Position:   side-lying 30 degrees   heels elevated  Intervention: Prevent and Manage VTE (Venous Thromboembolism) Risk  Recent Flowsheet Documentation  Taken 2/16/2025 1600 by Lauren Tyler RN  VTE Prevention/Management: SCDs off (sequential compression devices)  Taken 2/16/2025 1200 by Lauren Tyler RN  VTE Prevention/Management: SCDs off  (sequential compression devices)  Taken 2/16/2025 0800 by Lauren Tyler RN  VTE Prevention/Management: SCDs off (sequential compression devices)  Goal: Optimal Comfort and Wellbeing  Outcome: Progressing  Intervention: Monitor Pain and Promote Comfort  Recent Flowsheet Documentation  Taken 2/16/2025 1600 by Lauren Tyler RN  Pain Management Interventions: pain pump in use  Taken 2/16/2025 1200 by Lauren Tyler RN  Pain Management Interventions: pain pump in use  Taken 2/16/2025 0800 by Lauren Tyler RN  Pain Management Interventions: pain pump in use  Intervention: Provide Person-Centered Care  Recent Flowsheet Documentation  Taken 2/16/2025 1600 by Lauren Tyler RN  Trust Relationship/Rapport:   care explained   choices provided   questions answered   reassurance provided   thoughts/feelings acknowledged  Taken 2/16/2025 1200 by Lauren Tyler RN  Trust Relationship/Rapport:   care explained   choices provided   questions answered   reassurance provided   thoughts/feelings acknowledged  Taken 2/16/2025 0800 by Lauren Tyler RN  Trust Relationship/Rapport:   care explained   choices provided   questions answered   reassurance provided   thoughts/feelings acknowledged  Goal: Readiness for Transition of Care  Outcome: Progressing   Goal Outcome Evaluation:      Plan of Care Reviewed With: patient    Overall Patient Progress: improvingOverall Patient Progress: improving    A/O x4. Neuros intact. SBP < 160. Tele SB HR 28-50's w/ PACs and occasional pauses- MD aware, EP following. Pain managed w/ PCA dilaudid. L femoral site, WDL.Alteplase into infusion cath @ 0.5, heparin into shealth @ 500 units/hr. Calf circumference 46 cm. BLE pulses present via doppler. Tingling and hypersensitivity in BLE baseline per pt. Voiding adequately. Tolerating regular diet, NPO at midnight. Plan for IR tomorrow.

## 2025-02-16 NOTE — IR NOTE
Interventional Radiology Intra-procedural Nursing Note    Patient Name: Adam Sahni  Medical Record Number: [id number]  Today's Date: February 16, 2025    Procedure: RLE angiogram, follow up lytic therapy with IV moderate sedation  Start time: 1022  End time: 1055  Report provided to: YOHANNES Aguilar  Patient depart time and location: 1125 to ICU Room 369    Note: Patient entered Interventional Radiology Suite number 1 via cart. Patient awake, alert and oriented. Assisted onto procedural table in supine position. Prepped and draped.  Dr. Tan in room. Time out and procedure started. Vital signs stable. Telemetry reading SB with ectopy and pauses noted, asymptomatic per pt, MD aware,.    Procedure well tolerated by patient without complications. Procedure end with debrief by Dr. Tan.   Quick clot and tegaderm dressing applied to left interventional procedure access site, dressing is c/d/I.    Administered medication totals:  Versed 2.5 mg IVP  Fentanyl 125 mcg IVP  Continue Heparin at 500 units/hr through arterial sheath  Continue  Alteplase at 0.5 mg/hr through infusion catheter     Left calf circumference = 36 cm    Last dose of sedation administered at 1052.

## 2025-02-16 NOTE — PLAN OF CARE
Problem: Adult Inpatient Plan of Care  Goal: Plan of Care Review    Outcome: Progressing  Flowsheets (Taken 2/15/2025 1848)  Plan of Care Reviewed With: patient  Overall Patient Progress: improving  Goal: Patient-Specific Goal (Individualized)    Outcome: Progressing  Goal: Absence of Hospital-Acquired Illness or Injury  Outcome: Progressing  Intervention: Identify and Manage Fall Risk  Recent Flowsheet Documentation  Taken 2/15/2025 1700 by Lauren Tyler RN  Safety Promotion/Fall Prevention:   activity supervised   clutter free environment maintained   increased rounding and observation   safety round/check completed   treat reversible contributory factors   patient and family education  Intervention: Prevent Skin Injury  Recent Flowsheet Documentation  Taken 2/15/2025 1830 by Lauren Tyler RN  Body Position: weight shifting  Taken 2/15/2025 1800 by Lauren Tyler RN  Body Position: weight shifting  Taken 2/15/2025 1700 by Lauren Tyler RN  Body Position:   side-lying 30 degrees   heels elevated  Intervention: Prevent and Manage VTE (Venous Thromboembolism) Risk  Recent Flowsheet Documentation  Taken 2/15/2025 1700 by Lauren Tyler RN  VTE Prevention/Management: SCDs off (sequential compression devices)  Goal: Optimal Comfort and Wellbeing  Outcome: Progressing  Intervention: Monitor Pain and Promote Comfort  Recent Flowsheet Documentation  Taken 2/15/2025 1709 by Lauren Tyler RN  Pain Management Interventions: pain pump in use  Intervention: Provide Person-Centered Care  Recent Flowsheet Documentation  Taken 2/15/2025 1700 by Lauren Tyler RN  Trust Relationship/Rapport:   care explained   choices provided   questions answered   reassurance provided   thoughts/feelings acknowledged  Goal: Readiness for Transition of Care  Outcome: Progressing   Goal Outcome Evaluation:      Plan of Care Reviewed With: patient    Overall Patient Progress: improvingOverall Patient Progress: improving    A/O x4. Neuros  intact. Nicardipine @ 2.5 to maintain SBP < 160. Tele SB (HR 50's). Pain managed w/ PCA dilaudid. L femoral site, soft, slightly bleeding. Alteplase into infusion cath @ 0.5, heparin into shealth @ 500 units/hr. LLE calf circumference 46 cm. RLE cool to touch, no pulses present via doppler. Upon arrival to floor pt unable to complete dorsi/plantarflexion of RLE w/ vascular surgery at bedside, now improving and able. RLE numbness. LLE warm, pulses present via doppler. Voiding adequately. Plan to remain NPO d/t OR possibility.

## 2025-02-16 NOTE — PROGRESS NOTES
Vascular Surgery Progress Note    Patient seen this afternoon after lytics check, resting comfortably in bed.     No acute complaints, remains asx from 2-3 second pauses.     Exam:   Afebrile, HR 50s, BP 100s-110s  Awake, alert, nad  NLB on RA   RR per doppler signals in LLE     Extremities;   LLE groin site soft, dressing clean  BLE warm, equal coloring   RLE Calf compartments soft, compressible, no pain on compression.      Vascular:   RLE with Now with monophasic AT and PT doppler signals.   LLE with biphasic PT doppler signal     Neuro:   LLE sensation in tact to light touch- remains w/ hypersensitivity, plantar/dorsiflexion 5/5.   RLE - plantarflexion 5/5, dorsiflexion now also 5/5. Able to bend knee, flex at hip. Unable to wiggle toes. Sensation in tact to light touch with intermittent numbness/hypersensitivty as well.     A/P:   64 yom who presented with [place] 2a ischemia with occluded R REAGAN and EIA stents w/ reconstitution of R CFA, patent SFA, profunda, and notably CFA-BK pop bypass graft however initially with sluggish flow. Now with patent REAGAN and EIA however proximal REAGAN with thrombus. Decision made to pursue 24 hours more lytics to assess if able to break this up, if no improvement will consider taking patient for thrombectomy w/ common femoral cutdowns to avoid distal embolization. Discussed with patient, he was in agreement.      - OK for diet until mn   - NPO @ mn   - remain strict bedrest   - goal sbp 100-160, prns, nicardipine drip available.   - Heparin drip 500u through sheath   - alteplase 0.5mg/hr through catheter  - q6 fibrinogen/hgb   - PCA for pain control   - q1 doppler/motor/sensory and compartment checks - please page vascular surgery stat with any changes in exam, concern for bleeding, progressive loss of motor function and will return sooner.      Discussed with Dr. Gamble, who was in agreement w/ the above.

## 2025-02-16 NOTE — PROGRESS NOTES
VASCULAR SURGERY    Patient in IR.  Discussed with Dr. Weathers.  Still with residual thrombus in the origin of the right common iliac artery where it is stented.  External iliac artery and infrainguinal arteries widely patent including distal portion of right femoral to below-knee popliteal in situ bypass graft.      We discussed the options.  Decided that we will continue with lytic therapy focused on the proximal common iliac artery thrombus.  This will be continued overnight and rechecked.    If this is still persistent tomorrow patient will need to go to the operating room for right femoral cutdown where we can use a balloon in the left common iliac artery and aggressive embolectomy and angioplasty of the inflow stenosis to prevent distal embolization.    Dorian Gamble MD

## 2025-02-16 NOTE — PROVIDER NOTIFICATION
MD Notification  Notified Person: MD  Notified Person Name: Shahid Thompson   Notification Date/Time: 2/16/25 0620  Notification Interaction: face-to-face    Purpose of Notification: 5-10 sec pauses, new. Asymptomatic   Orders Received: none. MD aware. Continue to monitor

## 2025-02-16 NOTE — PROCEDURES
[facility]    Procedure: IR Procedure Note    Date/Time: 2/16/2025 11:25 AM    Performed by: Sree Tan MD  Authorized by: Sree Tan MD  IR Fellow Physician:    Pre Procedure Diagnosis: RLE arterial thrombosis and thrombolysis  Post Procedure Diagnosis: RLE arterial thrombosis and thrombolysis    UNIVERSAL PROTOCOL   Site Marked: Yes  Prior Images Obtained and Reviewed:  Yes  Required items: Required blood products, implants, devices and special equipment available    Patient identity confirmed:  Verbally with patient, arm band and provided demographic data  Patient was reevaluated immediately before administering moderate or deep sedation or anesthesia  Confirmation Checklist:  Patient's identity using two indicators, relevant allergies, procedure was appropriate and matched the consent or emergent situation and correct equipment/implants were available  Time out: Immediately prior to the procedure a time out was called    Universal Protocol: the Joint Commission Universal Protocol was followed    Preparation: Patient was prepped and draped in usual sterile fashion    ESBL (mL):  5     ANESTHESIA    Anesthesia:  Local infiltration  Local Anesthetic:  Lidocaine 1% without epinephrine  Anesthetic Total (mL):  0      SEDATION  Patient Sedated: Yes    Sedation Type:  Moderate (conscious) sedation  Sedation:  Fentanyl and midazolam  Vital signs: Vital signs monitored during sedation    Findings: RLE angiogram with improvement of flow throughout the RLE.    Patient is poor historian, so difficult to assess length of time the right iliac stents may have been occluded.  Majority of the right iliac system is now patent, though irregular thrombus/plaque at the origin of the right REAGAN.  If PTA with this large residual, there would certainly be emboli.  Therefore, placed a short infusion length catheter (5 cm) across the origin of the right REAGAN in an effort to concentrate  tPA into thrombus.  Patient will be reevaluated tomorrow.  If large residual plaque/thrombus, may need surgical cutdown for distal protection and aggressive balloon thrombectomy at the origin of the REAGAN while balloon inflation at origin of the left REAGAN for embolic protection into the LLE.    Specimens: none    Procedural Complications: None    Condition: Stable      PROCEDURE    Patient Tolerance:  Patient tolerated the procedure well with no immediate complications  Length of time physician/provider present for 1:1 monitoring during sedation:  38-52 min

## 2025-02-16 NOTE — PROVIDER NOTIFICATION
Notified MD d/t frequent 5-8 sec pauses. SB w/ HR 30-50's, remains hemodynamically stable/ asymptomatic. Cardiology consult ordered per Dr. Mir.

## 2025-02-16 NOTE — CONSULTS
[facility]    Cardiac Electrophysiology Consultation     Date of Admission:  2/15/2025  Date of Consult (When I saw the patient): 02/16/25    Assessment & Plan   Adam Sahni is a 64 year old male who was admitted on 2/15/2025. I was asked to see the patient for pauses. Delightful pt with the following medical issues:  History of severe hyperlipidemia likely familial  Coronary artery disease with inferior ST elevation myocardial infarction in June 2023, delayed presentation status post RCA stenting.  EF around 45 to 50%.  Moderate LAD and circumflex disease left to medical management  History of smoking trying to quit  Severe peripheral vascular disease with extensive interventions  Presented with RLE ischmia requiring urgent IR intervention and on lytics. Noted to sinus pauses up to ?8 seconds in early AM. Currently, while awake noted to have occasional PACs followed by sinus pause~2 seconds. No on pta Metoprolol. Pain is better. No 8 seconds pauses noted upon review of telemetry.    Sinus pauses after PACs are consider physiologic as they delay the next sinus beat. Sinus pauses not uncommon in early am due to heightened vagal tone. As long as not having sustained sinus pauses > 10 secs during waking hours and asymptomatic ok to monitor. Not a big concern if happens at night when sleeping. Pt may have evette. Consider dobutamine gtt at 5 if freq sustained sinus pauses. Cont to hold beta blocker  Ruperto Chiu MD    Code Status    Full Code    Primary Care Physician   Physician No Ref-Primary    History is obtained from the patient    Past Medical History   I have reviewed this patient's medical history and updated it with pertinent information if needed.   Past Medical History:   Diagnosis Date    CAD (coronary artery disease)     with UGO to RCA on 6/9/23 for inferior STEMI    Chronic anemia     Depression     Hyperlipidemia     Lumbar radiculopathy     PAD (peripheral artery disease)      S/p bilateral kissing iliac stents after thrombolytics 7/16/21-7/17/21 for severe claudication and complete occlusion of the left common iliac artery with tandem occlusions and/or high grade stenoses of the distal SFA and popliteal artery.    Peripheral neuropathy     Prediabetes     Prostate cancer (H)     s/p ext beam radiation therapy, follows at [facility]       Past Surgical History   I have reviewed this patient's surgical history and updated it with pertinent information if needed.  Past Surgical History:   Procedure Laterality Date    APPENDECTOMY      BYPASS GRAFT INSITU FEMOROPOPLITEAL Right 8/23/2024    Procedure: RIGHT COMMON FEMORAL ARTERY TO BELOW KNEE POPLITEAL IN SITU BYPASS GRAFT;  Surgeon: Dorian Gamble MD;  Location:  OR    CV CORONARY ANGIOGRAM N/A 06/09/2023    Procedure: Coronary Angiogram;  Surgeon: Rosenda Pacheco MD;  Location:  HEART CARDIAC CATH LAB    IR LOWER EXTREMITY ANGIOGRAM RIGHT  06/18/2023    IR LOWER EXTREMITY ANGIOGRAM RIGHT  2/15/2025    ORTHOPEDIC SURGERY      previious lumbar surgery       Prior to Admission Medications   Prior to Admission Medications   Prescriptions Last Dose Informant Patient Reported? Taking?   Aspirin 500 MG TABS Past Week  Yes Yes   Sig: Take 500 tablets by mouth daily as needed (For Pain).   atorvastatin (LIPITOR) 80 MG tablet Past Week  No Yes   Sig: Take 1 tablet (80 mg) by mouth daily.   cholecalciferol (VITAMIN D3) 25 mcg (1000 units) capsule Past Week Self Yes Yes   Sig: Take 1 capsule by mouth daily as needed.   clopidogrel (PLAVIX) 75 MG tablet 2/15/2025 Morning  No Yes   Sig: Take 1 tablet (75 mg) by mouth daily.   evolocumab (REPATHA SURECLICK) 140 MG/ML prefilled autoinjector Past Week Self No Yes   Sig: Inject 1 mL (140 mg) subcutaneously every 14 days   ferrous sulfate (FEROSUL) 325 (65 Fe) MG tablet Past Week Morning Self Yes Yes   Sig: Take 325 mg by mouth every other day   metoprolol succinate ER (TOPROL XL) 25 MG 24 hr  tablet Unknown  No Yes   Sig: Take 1 tablet (25 mg) by mouth daily.   nitroGLYcerin (NITROSTAT) 0.4 MG sublingual tablet Unknown Self No Yes   Sig: For chest pain place 1 tablet under the tongue every 5 minutes for 3 doses. If symptoms persist 5 minutes after 1st dose call 911.   sertraline (ZOLOFT) 100 MG tablet Past Week Morning Self Yes Yes   Sig: Take 200 mg by mouth every morning. (Some times takes 1 tablet BID)      Facility-Administered Medications: None     Allergies   Allergies   Allergen Reactions    Penicillins Hives, Shortness Of Breath and Swelling     Reports also having throat swelling- took medication as a child.         Social History   I have reviewed this patient's social history and updated it with pertinent information if needed. Adam Sahni  reports that he has been smoking cigarettes. He has been exposed to tobacco smoke. He has never used smokeless tobacco. He reports current alcohol use. He reports that he does not use drugs.    Family History   I have reviewed this patient's family history and updated it with pertinent information if needed.   Family History   Problem Relation Age of Onset    Anesthesia Reaction No family hx of        Review of Systems   Comprehensive review of systems was performed with pertinent positives and negatives listed in assessment and plan section.    Physical Exam   Temp: 98.6  F (37  C) Temp src: Oral BP: 100/50 Pulse: 59   Resp: 12 SpO2: 98 % O2 Device: None (Room air) Oxygen Delivery: 1 LPM  Vital Signs with Ranges  Temp:  [97.7  F (36.5  C)-99  F (37.2  C)] 98.6  F (37  C)  Pulse:  [44-65] 59  Resp:  [10-23] 12  BP: (100-179)/() 100/50  SpO2:  [87 %-100 %] 98 %  218 lbs .56 oz    Constitutional: awake, alert, cooperative, no apparent distress, and appears stated age  Eyes: Lids and lashes normal, pupils equal, round and reactive to light, extra ocular muscles intact, sclera clear, conjunctiva normal  ENT: Normocephalic, without obvious abnormality,  atraumatic, sinuses nontender on palpation, external ears without lesions, oral pharynx with moist mucous membranes, tonsils without erythema or exudates, gums normal and good dentition.  Hematologic / Lymphatic: no cervical lymphadenopathy  Respiratory: No increased work of breathing, good air exchange, clear to auscultation bilaterally, no crackles or wheezing  Cardiovascular: Normal apical impulse, regular rate and rhythm, normal S1 and S2, no S3 or S4, and no murmur noted  GI: No scars, normal bowel sounds, soft, non-distended,   Musculoskeletal: There is no redness, warmth, or swelling of the joints.    Neurologic: Awake, alert,   Neuropsychiatric: General: normal, calm, and normal eye contact    Data   I personally reviewed all recent ECGs and images.  Results for orders placed or performed during the hospital encounter of 02/15/25 (from the past 24 hours)   IR Lower Extremity Angiogram Right    Narrative    64-year-old patient with acute onset limited blood flow throughout the right lower extremity. Patient has a right femoral to popliteal surgical bypass graft. Ultrasound demonstrates patency of this graft, though diffuse monophasic waveforms. Ultrasound   suggested occlusion of more proximal external iliac arterial stent. Patient has stents throughout the right common and external iliac arteries. Plan is for angiogram and thrombolysis.    TECHNIQUE: Patient was brought to the interventional radiology department and informed consent obtained. Patient was placed in a supine position. Skin overlying the left groin was prepped and draped in standard sterile fashion. Ultrasound was used to   visualize the left common femoral artery, patency confirmed, and image stored for documentation. With continuous ultrasound guidance, micropuncture kit was used to access the left common femoral artery. Over a series of maneuvers, 6 [place] vascular   sheath was placed. An Omniflush catheter was advanced to the abdominal  aorta where angiogram was performed at the level of the renal arteries. The Omniflush catheter was then pulled back to the distal abdominal aorta where angiogram performed. The entire   right common and external iliac arterial systems are occluded. There is reconstitution of the right common femoral artery via relatively developed collateralized arterial system. Omniflush catheter was used with a stiff angled Glidewire to extend   through the iliac arterial system to the right common femoral artery where angiogram was performed in the right lower extremity. Profunda femoral artery is patent. The bypass graft is patent to the thigh, though irregular filling noted in the distal   aspect of the bypass graft. Unable to visualize the runoff arteries as patient unable to hold still throughout the exam, creating large degree of difficulty in performing the exam. A crossover sheath was advanced into the right iliac arterial system, and   a 100 cm x 20 cm infusion length catheter was advanced throughout the right common iliac, external iliac, and common femoral arteries. Plan will be for TPA administration at 0.5 mg/hour and heparin through the sheath of 500 units/hour. Given flush   occlusion at the origin of the right common iliac artery, this will require additional thought and consideration as likely would require proximal extension of the kissing stents into the distal abdominal aorta. Embolic protection may also be necessary.    Sedation: A moderate level of sedation achieved with 4 mg IV Versed and 200 mcg IV fentanyl.  Sedation time: 37 minutes.  Patient was unable to hold still throughout the exam, limiting evaluation.  Fluoroscopic time: 8.4 minutes.  Air Kerma: 125 mGy  Contrast: 50 mL of Visipaque administered intra-arterially without complication.  Local anesthetic: 10 mL of 1% lidocaine.    FINDINGS: A total of 8 spot fluoroscopic images and angiogram sequences were obtained throughout the procedure. Abdominal  aortic angiogram demonstrates patency of both renal arteries. The infrarenal abdominal aorta is patent. The left common iliac artery   is patent. There may be mild stenosis of the left external iliac artery. There is flush occlusion of the right common iliac, external iliac, and most of the right common femoral arteries as stents extend into the common femoral artery. There is   reconstitution of the distal common femoral and profunda femoral arteries via relatively robust collateralized arterial blood flow. Attempt was made to perform angiogram throughout the right lower extremity. The proximal profunda femoral artery is   patent. Difficult to obtain images as patient unable to hold still. Catheter was placed into the surgical bypass graft which demonstrated sluggish blood flow, though mostly patent. Irregularity noted in the distal graft, uncertain if stenosis or   thrombus. Unable to visualize runoff arteries given patient's inability to hold still. The infusion catheter is in good position by completion. It is within the right common iliac, external iliac, and most of common femoral arterial segments. Crossover   sheath is at the aortic bifurcation.      Impression    IMPRESSION:   1. Right lower extremity and abdominal aortic angiogram. Patient has flush occlusion throughout the right common iliac, external iliac, and majority of right common femoral arteries. 20 cm infusion length catheter was placed and TPA to be administered at   0.5 mg/hour and heparin through crossover sheath at the aortic bifurcation of 500 units/hour.  2. Unable to visualize right lower extremity arteries well as patient unable to hold still. Irregularity of the distal aspect of the surgical bypass graft, uncertain if stenosis or thrombosis.    Given occlusion is flush with the origin of the right common iliac artery, this will complicate treatment options as may require more proximal extension of kissing stents and/or surgical cutdown  for embolic protection.   IR Procedure Note    Narrative    Sree Tan MD     2/15/2025  4:05 PM  [facility]    Procedure: IR Procedure Note    Date/Time: 2/15/2025 3:58 PM    Performed by: Sree Tan MD  Authorized by: Sree Tan MD  IR Fellow Physician:    Pre Procedure Diagnosis: RLE thrombosis  Post Procedure Diagnosis: RLE thrombosis    UNIVERSAL PROTOCOL   Site Marked: Yes  Prior Images Obtained and Reviewed:  Yes  Required items: Required blood products, implants, devices and special   equipment available    Patient identity confirmed:  Verbally with patient, arm band and provided   demographic data  Patient was reevaluated immediately before administering moderate or deep   sedation or anesthesia  Confirmation Checklist:  Patient's identity using two indicators, relevant   allergies, procedure was appropriate and matched the consent or emergent   situation and correct equipment/implants were available  Time out: Immediately prior to the procedure a time out was called    Universal Protocol: the Joint Commission Universal Protocol was followed    Preparation: Patient was prepped and draped in usual sterile fashion    ESBL (mL):  10     ANESTHESIA    Anesthesia:  Local infiltration  Local Anesthetic:  Lidocaine 1% without epinephrine  Anesthetic Total (mL):  10      SEDATION  Patient Sedated: Yes    Sedation Type:  Moderate (conscious) sedation  Sedation:  Fentanyl and midazolam  Vital signs: Vital signs monitored during sedation    Findings: Left CFA access with 6 Fr sheath.    Abdominal aortogram with Right REAGAN and EIA occluded, at REAGAN origin.    Occlusion of origin of REAGAN complicates approach and treatment.  Able to   cross through the occlusion and place a 100 cm X 20 cm infusion length   catheter to infuse tPA at 0.5 mg/hour and heparin through the crossover   sheath (at the aortic bifurcation) at 500 U/hour.    Unable to visualize  "arterial system in the RLE well due to patient   movement and inability to sit still.  Irregularity at the distal bypass   graft, though mostly patent, nearly stagnant blood flow.  Unable to assess   runoff arteries (due to patient movement).    Complicating factor is high suspicion for residual irregular   plaque/thrombus at the origin of the right REAGAN, embolic risk to either   lower extremity.  This would likely require \"building up\" kissing stents   to trap thrombus and/or surgical cutdown for embolic protection.    Patient will require PCA and pain control.  Vascular surgery following, as   well, for ongoing assessment for compartment syndrome now, and moving   forward.    Specimens: none    Procedural Complications: None    Condition: Stable      PROCEDURE    Patient Tolerance:  Patient tolerated the procedure well with no immediate   complications  Length of time physician/provider present for 1:1 monitoring during   sedation:  113-127 min   CT Abdomen Pelvis w Contrast    Narrative    EXAM: CT ABDOMEN PELVIS W CONTRAST  LOCATION: [facility]  DATE: 2/15/2025    INDICATION: Intermittent abdominal pain post prandial; Assess prostate and evidence of other acute pathology; Potential relapse in prostate cancer  COMPARISON: 3/1/2024  TECHNIQUE: CT scan of the abdomen and pelvis was performed following injection of IV contrast. Multiplanar reformats were obtained. Dose reduction techniques were used.  CONTRAST: 103 mL of Isovue-370    FINDINGS:   LOWER CHEST: Dependent atelectasis. Mildly enlarged heart.    HEPATOBILIARY: Normal.    PANCREAS: Normal.    SPLEEN: Normal.    ADRENAL GLANDS: Normal.    KIDNEYS/BLADDER: Normal.    BOWEL: Scattered diverticulosis. No diverticulitis, colitis, or obstruction. No free air or fluid.    LYMPH NODES: Normal.    VASCULATURE: Left common iliac and right common to common femoral stent grafts. Occlusion of the bilateral right common, internal and external " iliac arteries. Occlusion of the left common external and internal iliac arteries with TPA catheters in situ.   Patent celiac, superior mesenteric, inferior mesenteric, and renal arteries. Mild atherosclerotic calcifications. No aneurysm.    PELVIC ORGANS: Normal.    MUSCULOSKELETAL: Mild multilevel discogenic degenerative change. Posterior fusion L5-S1.      Impression    IMPRESSION:   1.  Scattered diverticulosis. No diverticulitis, colitis, obstruction, or appendicitis.  2.  Mildly enlarged heart with atherosclerotic vascular disease.  3.  Left common iliac and right common to common femoral stent grafts. Occlusion of the bilateral common, internal, and external iliac arteries with bilateral TPA catheters in situ.  4.  No obstructing renal or ureteral stones. No hydroureteronephrosis.  5.  Celiac, superior mesenteric, inferior mesenteric, and renal arteries widely patent.     Glucose by meter   Result Value Ref Range    GLUCOSE BY METER POCT 80 70 - 99 mg/dL   Influenza A/B, RSV and SARS-CoV2 PCR (COVID-19) Nose    Specimen: Nose; Swab   Result Value Ref Range    Influenza A PCR Negative Negative    Influenza B PCR Negative Negative    RSV PCR Negative Negative    SARS CoV2 PCR Negative Negative    Narrative    Testing was performed using the Xpert Xpress CoV2/Flu/RSV Assay on the Cepheid GeneXpert Instrument. This test should be ordered for the detection of SARS-CoV2, influenza, and RSV viruses in individuals with signs and symptoms of respiratory tract infection. This test is for in vitro diagnostic use under the [place] FDA for laboratories certified under CLIA to perform high or moderate complexity testing. This test has been [place] FDA cleared. A negative result does not rule out the presence of PCR inhibitors in the specimen or target RNA in concentration below the limit of detection for the assay. If only one viral target is positive but coinfection with multiple targets is suspected, the sample should be re-tested  with another FDA cleared, approved, or authorized test, if coninfection would change clinical management. This test was validated by the [facility]. These laboratories are certified under the Clinical Laboratory Improvement Amendments of 1988 (CLIA-88) as qualified to perfom high complexity laboratory testing.   Troponin T, High Sensitivity   Result Value Ref Range    Troponin T, High Sensitivity 21 <=22 ng/L   Fibrinogen activity   Result Value Ref Range    Fibrinogen Activity 355 170 - 510 mg/dL   CBC with platelets   Result Value Ref Range    WBC Count 10.0 4.0 - 11.0 10e3/uL    RBC Count 3.96 (L) 4.40 - 5.90 10e6/uL    Hemoglobin 11.7 (L) 13.3 - 17.7 g/dL    Hematocrit 34.5 (L) 40.0 - 53.0 %    MCV 87 78 - 100 fL    MCH 29.8 26.5 - 33.0 pg    MCHC 34.3 31.5 - 36.5 g/dL    RDW 14.9 10.0 - 15.0 %    Platelet Count 152 150 - 450 10e3/uL   Hemoglobin   Result Value Ref Range    Hemoglobin 11.7 (L) 13.3 - 17.7 g/dL   Fibrinogen activity   Result Value Ref Range    Fibrinogen Activity 342 170 - 510 mg/dL   Hemoglobin   Result Value Ref Range    Hemoglobin 11.5 (L) 13.3 - 17.7 g/dL   CBC with platelets differential    Narrative    The following orders were created for panel order CBC with platelets differential.  Procedure                               Abnormality         Status                     ---------                               -----------         ------                     CBC with platelets and d...[[id number]]  Abnormal            Final result                 Please view results for these tests on the individual orders.   CBC with platelets and differential   Result Value Ref Range    WBC Count 6.5 4.0 - 11.0 10e3/uL    RBC Count 3.73 (L) 4.40 - 5.90 10e6/uL    Hemoglobin 11.1 (L) 13.3 - 17.7 g/dL    Hematocrit 32.7 (L) 40.0 - 53.0 %    MCV 88 78 - 100 fL    MCH 29.8 26.5 - 33.0 pg    MCHC 33.9 31.5 - 36.5 g/dL    RDW 15.2 (H) 10.0 - 15.0 %    Platelet Count 142 (L) 150 - 450 10e3/uL     % Neutrophils 63 %    % Lymphocytes 27 %    % Monocytes 8 %    % Eosinophils 2 %    % Basophils 1 %    % Immature Granulocytes 1 %    NRBCs per 100 WBC 0 <1 /100    Absolute Neutrophils 4.1 1.6 - 8.3 10e3/uL    Absolute Lymphocytes 1.7 0.8 - 5.3 10e3/uL    Absolute Monocytes 0.5 0.0 - 1.3 10e3/uL    Absolute Eosinophils 0.1 0.0 - 0.7 10e3/uL    Absolute Basophils 0.0 0.0 - 0.2 10e3/uL    Absolute Immature Granulocytes 0.0 <=0.4 10e3/uL    Absolute NRBCs 0.0 10e3/uL   Basic metabolic panel   Result Value Ref Range    Sodium 142 135 - 145 mmol/L    Potassium 4.0 3.4 - 5.3 mmol/L    Chloride 109 (H) 98 - 107 mmol/L    Carbon Dioxide (CO2) 24 22 - 29 mmol/L    Anion Gap 9 7 - 15 mmol/L    Urea Nitrogen 18.4 8.0 - 23.0 mg/dL    Creatinine 1.21 (H) 0.67 - 1.17 mg/dL    GFR Estimate 67 >60 mL/min/1.73m2    Calcium 8.5 (L) 8.8 - 10.4 mg/dL    Glucose 91 70 - 99 mg/dL   Heparin Unfractionated Anti Xa Level   Result Value Ref Range    Anti Xa Unfractionated Heparin <0.10 For Reference Range, See Comment IU/mL    Narrative    Therapeutic Range: UFH: 0.25-0.50 IU/mL for low intensity dosing,  0.30-0.70 IU/mL for high intensity dosing DVT and PE.  This test is not validated for other direct factor X inhibitors (e.g. rivaroxaban, apixaban, edoxaban, betrixaban, fondaparinux) and should not be used for monitoring of other medications.   Fibrinogen activity   Result Value Ref Range    Fibrinogen Activity 360 170 - 510 mg/dL   Hemoglobin   Result Value Ref Range    Hemoglobin 11.3 (L) 13.3 - 17.7 g/dL   IR Procedure Note    Narrative    Sree Tan MD     2/16/2025 11:32 AM  [facility]    Procedure: IR Procedure Note    Date/Time: 2/16/2025 11:25 AM    Performed by: Sree Tan MD  Authorized by: Sree Tan MD  IR Fellow Physician:    Pre Procedure Diagnosis: RLE arterial thrombosis and thrombolysis  Post Procedure Diagnosis: RLE arterial thrombosis and  thrombolysis    UNIVERSAL PROTOCOL   Site Marked: Yes  Prior Images Obtained and Reviewed:  Yes  Required items: Required blood products, implants, devices and special   equipment available    Patient identity confirmed:  Verbally with patient, arm band and provided   demographic data  Patient was reevaluated immediately before administering moderate or deep   sedation or anesthesia  Confirmation Checklist:  Patient's identity using two indicators, relevant   allergies, procedure was appropriate and matched the consent or emergent   situation and correct equipment/implants were available  Time out: Immediately prior to the procedure a time out was called    Universal Protocol: the Joint Commission Universal Protocol was followed    Preparation: Patient was prepped and draped in usual sterile fashion    ESBL (mL):  5     ANESTHESIA    Anesthesia:  Local infiltration  Local Anesthetic:  Lidocaine 1% without epinephrine  Anesthetic Total (mL):  0      SEDATION  Patient Sedated: Yes    Sedation Type:  Moderate (conscious) sedation  Sedation:  Fentanyl and midazolam  Vital signs: Vital signs monitored during sedation    Findings: RLE angiogram with improvement of flow throughout the RLE.    Patient is poor historian, so difficult to assess length of time the right   iliac stents may have been occluded.  Majority of the right iliac system   is now patent, though irregular thrombus/plaque at the origin of the right   REAGAN.  If PTA with this large residual, there would certainly be emboli.    Therefore, placed a short infusion length catheter (5 cm) across the   origin of the right REAGAN in an effort to concentrate tPA into thrombus.    Patient will be reevaluated tomorrow.  If large residual plaque/thrombus,   may need surgical cutdown for distal protection and aggressive balloon   thrombectomy at the origin of the REAGAN while balloon inflation at origin of   the left REAGAN for embolic protection into the LLE.    Specimens:  none    Procedural Complications: None    Condition: Stable      PROCEDURE    Patient Tolerance:  Patient tolerated the procedure well with no immediate   complications  Length of time physician/provider present for 1:1 monitoring during   sedation:  38-52 min   Glucose by meter   Result Value Ref Range    GLUCOSE BY METER POCT 83 70 - 99 mg/dL       Clinically Significant Risk Factors          # Hyperchloremia: Highest Cl = 109 mmol/L in last 2 days, will monitor as appropriate      # Hypocalcemia: Lowest Ca = 8.5 mg/dL in last 2 days, will monitor and replace as appropriate

## 2025-02-16 NOTE — PROGRESS NOTES
Vascular Surgery Progress Note    Patient seen this evening, resting comfortably in bed. Reports still has intermittent pain in R calf although improved from prior.     Pt w/ some confusion/forgetfulness related to medical condition. Explained again that he has blockage in iliac artery with clot busting medication going through catheter which is working to break up clot. Explained importance of laying flat.       Exam:   Afebrile, HR 50s, BP 100s-110s  Awake, alert, nad  NLB on RA   RR per doppler signals in LLE    Extremities;   LLE groin site soft, small amount blood on dressing, somewhat more than prior however not concerning amount, slight ooze. .   RLE warmer now, less pale compared to LLE.   RLE Calf compartments soft, compressible, no pain on compression.      Vascular:   RLE with monophasic R femoral doppler signal. Now with monophasic AT doppler signal.   LLE with biphasic PT doppler signal     Neuro:   LLE sensation in tact to light touch- remains w/ hypersensitivity, plantar/dorsiflexion 5/5.   RLE - plantarflexion 5/5, dorsiflexion now also 5/5. Able to bend knee, flex at hip. Unable to wiggle toes.   Diminished sensation of dorsum foot intermittently again,     A/P:   64 yom who presented with [place] 2a ischemia with loss of dorsiflexion earlier this evening however this has now returned, patient able to plantar/dorsiflex 5/5, sensation remains diminished on RLE compared to L however now patient clearly states this has been present for years. Again remains a challenging historian.  Reassuring however now w/ return of dorsiflexion and doppler signals now in RLE. Does not have compartment syndrome at this time, improvement in motor/sensory exam will continue to monitor on lytics, plan for lytic check tmr.     - OK for diet until mn   - NPO @ mn   - remain strict bedrest   - goal sbp 100-160, prns, nicardipine drip available.   - Heparin drip 500u through sheath   - alteplase 0.5mg/hr through  catheter  - q6 fibrinogen/hgb   - PCA for pain control   - q1 doppler/motor/sensory and compartment checks - please page vascular surgery stat with any changes in exam, concern for bleeding, progressive loss of motor function and will return sooner.     Discussed with Dr. Gamble, who was in agreement w/ the above.     Tanmay Malave MD  Vascular Surgery PGY4  To reach vascular surgery [place] team on call, please go to [facility] and from the drop down find the following:   VASCULAR SURGERY/[facility]  Then page the person listed to the right of day/night call. Can click the pager to page.

## 2025-02-16 NOTE — PROGRESS NOTES
[facility]    Medicine Progress Note - Hospitalist Service    Date of Admission:  2/15/2025    Assessment & Plan     Adam Sahni is a 64-year-old male with history of prostate cancer, PAD, s/p bilateral iliac stenting 7/2021 and R femoral-popliteal bypass graft 8/2024, lumbar stenosis s/p fusion 4/2022 with residual right radiculopathy and paresthesias, severe cervical spondylosis and stenosis with left upper extremity radiculopathy, CAD s/p UGO to RCA, severe hyperlipidemia, prediabetes, who presented on 2/15/2025 with 2 days of right lower extremity pain, foot drop and cold extremity due to ischemia.     Acute right lower limb ischemia due to occlusion of right common iliac and external iliac stents, s/p intra-arterial thrombolytics per IR  Severe peripheral arterial disease, s/p bilateral iliac stenting 7/2021  Known right distal SFA and above-knee popliteal artery occlusion  S/p right femoral-popliteal in situ GSV, 8/2024  -PTA on DAPT with aspirin and Plavix.  Follows with vascular surgery, Dr. Gamble  -Presented with 2 days of right lower extremity pain with right foot drop. Cold R leg  -Vascular surgery and IR consulted.   -RLE and abdominal aortic angiogram 2/15 showed flush occlusion throughout R common iliac, external iliac and majority of R common femoral arteries. He was treated intra-arterial tPA and heparin   -repeat IR angiogram 2/16 showed improvement in RLE thrombolysis and iliac arterial system.  Moderate persistent plaque/thrombus at origin of R common iliac artery.  Mild irregularity in distal R iliac/common femoral arterial stents may require angioplasty. RLE bypass graft widely patent.  Mild stenosis in proximal L external iliac artery  -continue intra-arterial thrombolysis per IR and vascular surgery. Foot drop has improved.   -Pain controlled with Dilaudid PCA      PACs with sinus pauses up to 2-3 sec, 0/16/2025  -Patient noted to have baseline hypotension of mid  50s.  Noted to have PACs followed by sinus pauses.  -EP consulted.  Sinus pauses after PACs are considered physiologic as they delay the next sinus beat.  They are not uncommon in early a.m. due to increased vagal tone.  Also not a concern if happens at night  when sleeping.    -EP recommended monitoring for now as long as not having sustained sinus pauses > 10 sec while awake and is asymptomatic.  Consider dobutamine infusion at 5 if frequent sustained sinus pauses.  Avoid beta-blockers    Acute kidney injury  -Baseline creatinine 0.8-0.9.  Presented with creatinine of 1.25  -CT A/P 2/15 showed no evidence of obstruction  -creatinine stable at 1.2.  Continue to monitor     Prostate adenocarcinoma, s/p androgen deprivation therapy and radiation therapy, 2020, with recent concern for recurrence  -Diagnosed with prostate cancer in 2020.  MRI at that time showed almost entire prostate gland replaced with cancer with extraprostatic extension into neurovascular bundle, seminal vesicle, bladder base and probable retroperitoneal and pelvic lymph nodes.  No hayes or osseous metastasis.  -Patient underwent androgen deprivation therapy and radiation therapy, completed  8/2020  -PSA now elevated at 14 in 1/2023.  Concern for relapse  -Patient follows up at [facility] and is scheduled for PET scan on 2/18/2025    Coronary artery disease, s/p interior STEMI 6/2023 with delayed presentation, s/p PCI with UGO to prox-mid RCA, 6/2023  Known severe 70% stenosis of p Lcx (small-medium vessel), mod-severe stenosis of 2 branches of large ramus intermedius, moderate stenosis of mLAD - on medical management  -Echo 10/2023 showed mildly reduced LVEF 51%, inferior and inferior lateral hypokinesis  -Plavix currently on hold      Essential hypertension  -Toprol-XL on hold.  Blood pressure in normal range    Severe hyperlipidemia, likely familial  -Lipid panel 11/2024 showed LDL 65, HDL 46, triglycerides 150  -Continue atorvastatin 80 mg  daily  -Resume Repatha as outpatient    Prediabetes, A1c 5.7  -Follow-up as outpatient    Mild carotid artery stenosis  -Continue to follow-up as outpatient      Intermittent epigastric pain and postprandial nausea  -Reported on admission.  No current issues.  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  B12 and folate were normal.  EGD, colonoscopy, capsule endoscopy showed colonic angioectasias.  Anemia felt to be secondary to chronic blood loss from angioectasias.  -Monitor hemoglobin, currently stable at 11.3    Depression  -Continue sertraline    Tobacco use disorder  -Smokes 10 cigarettes/day, down from 1 pack/day for 30 years  -Uses THC Gummies intermittently to assist with sleep at night  -Nicotine replacement ordered as needed          Diet: NPO per Anesthesia Guidelines for Procedure/Surgery Except for: Meds  Regular Diet Adult    DVT Prophylaxis: Pneumatic Compression Devices  Nova Catheter: Not present  Lines: PRESENT             Cardiac Monitoring: ACTIVE order. Indication: Bradycardias (48 hours)  Code Status: Full Code      Clinically Significant Risk Factors          # Hyperchloremia: Highest Cl = 109 mmol/L in last 2 days, will monitor as appropriate      # Hypocalcemia: Lowest Ca = 8.5 mg/dL in last 2 days, will monitor and replace as appropriate                               Social  Drivers of Health    Depression: At risk (3/22/2024)    Received from [facility]    PHQ-2     PHQ-2 Subtotal: 4   Tobacco Use: High Risk (2/15/2025)    Patient History     Smoking Tobacco Use: Some Days     Smokeless Tobacco Use: Never     Passive Exposure: Current   Physical Activity: Insufficiently Active (1/16/2024)    Received from [facility]    Exercise Vital Sign     Days of Exercise per Week: 1 day     Minutes of Exercise per Session: 20 min   Stress: Stress Concern Present (1/13/2023)    Received from [facility]    [employer] [place] [employer] - Occupational Stress Questionnaire     Feeling of Stress : Rather much   Social Connections: Socially Isolated (1/13/2023)    Received from [facility]    Social Connection and Isolation Panel [NHANES]     Frequency of Communication with Friends and Family: Once a week     Frequency of Social Gatherings with Friends and Family: Once a week     Attends Holiness Services: Never     Active Member of Clubs or Organizations: No     Attends Club or Organization Meetings: 1 to 4 times per year     Marital Status: Never           Disposition Plan         Medically Ready for Discharge: Anticipated in 2-4 Days             Marcela Mir MD  Hospitalist Service  [facility]  Securely message with [employer] (more info)  Text page via [employer] Paging/Directory   ______________________________________________________________________    Interval History     Resting in bed. Feels ok  Reports some pain in right leg but improved from before  RN reported PACs with pauses. HR in 50s. Dips into 30s. Patient denies any symptoms      Physical Exam   Vital Signs: Temp: 98.6  F (37  C) Temp src: Oral BP: 117/66 Pulse: 50   Resp: 12 SpO2: 94 % O2 Device: None (Room air) Oxygen Delivery: 1 LPM  Weight: 218 lbs .56 oz    Constitutional - awake and alert, resting in bed, in no acute distress  Cardiovascular -  regular rhythm, slow rate, no murmurs, no edema  Pulmonary - lungs are clear to auscultation bilaterally, no wheezing or rhonchi  GI - abdomen is soft, nontender, nondistended  Integumentary - skin is warm and dry, no rashes or ulcers  Neurological - awake, alert and oriented x3.  Moving all 4 extremities, normal speech, no focal deficits    Medical Decision Making       60 MINUTES SPENT BY ME on the date of service doing chart review, history, exam, documentation & further activities per the note.      Data     I have personally reviewed the following data over the past 24 hrs:    6.5  \   11.2 (L)   / 142 (L)     142 109 (H) 18.4 /  76   4.0 24 1.21 (H) \     Trop: N/A BNP: N/A     INR:  N/A PTT:  N/A   D-dimer:  N/A Fibrinogen:  392       Imaging results reviewed over the past 24 hrs:   Recent Results (from the past 24 hours)   IR Angiogram through catheter (arterial)    [facility]  IR ANGIOGRAM THROUGH CATHETER (ARTERIAL)  02/16/2025, 10:57 AM CST    INDICATION: 64-year-old patient with right femoropopliteal surgical bypass graft. Patient has stents throughout the right iliac arterial system to the right common femoral artery. Angiogram and thrombolysis was performed the day prior, patient returns   for repeat evaluation. The iliac stents were occluded, though graft was patent.    TECHNIQUE: Patient was brought to the Interventional Radiology Department and informed consent obtained. Patient was placed in a supine position. The existing sheath and infusion catheter as well as surrounding skin were prepped and draped in standard   sterile fashion. 1% lidocaine was not needed for today's exam. Angiogram was performed through the infusion catheter throughout the right iliac arterial system and throughout the right lower extremity. There is improved blood flow in the right lower   extremity. Persistent plaque/thrombus at the origin of the right common iliac artery is noted.  Also noted is mild stenosis of the left external iliac artery, in the proximal segment. There also may be minimal irregularity in the distal aspect of the   stents on the right in the common femoral artery. Remainder of the bypass graft is patent and anterior tibial artery is dominant runoff artery. Given focal area of thrombus in the right common iliac arterial origin, it is felt to have a high likelihood   of embolization if SFA angioplasty is attempted. Therefore, will place a shorter infusion length catheter at the bifurcation to concentrate TPA administration into this area of thrombus. A stiff angled Glidewire was advanced through the existing infusion   catheter and the 20 cm infusion length catheter was removed. A new 5 cm length catheter was placed into the right iliac arterial system and angiogram performed confirming good location. Sheath was pulled back into the proximal aspect of the left   internal iliac artery. Angiogram performed through both the catheter and sheath confirming appropriate positions.    TPA to be administered through the infusion catheter at 0.5 mg/hour heparin through the sheath at 500 units/hour.    SEDATION: A moderate level of sedation was achieved with 2.5 mg IV Versed and 125 mcg IV fentanyl.  SEDATION TIME: 45 minutes.  Please note the above medications were administered by the interventional radiology staff under my direct supervision. Patient's vital signs were monitored and remained stable throughout the procedure.  FLUOROSCOPIC TIME: 3.2 minutes.  AIR KERMA: 147 mGy,  CONTRAST: 55 mL of Visipaque administered intra-arterially without complication.  LOCAL ANESTHETIC: None.    FINDINGS: Total of 10 spot fluoroscopic images obtained throughout the procedure. Initial angiogram through the infusion catheter demonstrates antegrade blood flow through the right iliac arterial system, though limited visibility. Angiogram then   performed through the sheath demonstrating patency of  the right iliac arterial system, though at the origin of the right common iliac artery is moderate amount of plaque/thrombus. There is mild irregularity in the distal aspect of the stents in the   common femoral arterial segment of the stents. Angiogram then performed throughout the right lower extremity demonstrating patent profunda femoral and majority of the superficial femoral arteries. Surgical bypass graft is widely patent. Runoff artery is   via the dominant anterior tibial artery. Posterior tibial artery also patent, though smaller than anterior tibial artery. Once new 5 cm infusion length catheter in place, angiograms were performed through the infusion catheter in the sheath confirming   appropriate position.      Impression    IMPRESSION:   1.  Improvement in right lower extremity thrombolysis in the iliac arterial system. There is moderate persistent plaque/thrombus at the origin of the right common iliac artery, therefore, previous 20 cm infusion length catheter exchanged for a 5 cm   infusion length catheter. Plan will be to continue TPA at 0.5 mg/hour heparin through the sheath near the aortic bifurcation of 500 units/hour.  2.  Mild irregularity in the distal aspects of the right iliac/common femoral arterial stents, to be evaluated tomorrow, may require angioplasty.  3.  Right lower extremity surgical bypass graft otherwise widely patent.  4.  Mild stenosis in the proximal left external iliac artery, may consider improved visualization and angioplasty prior to completion of thrombolysis.

## 2025-02-17 ENCOUNTER — APPOINTMENT (OUTPATIENT)
Dept: INTERVENTIONAL RADIOLOGY/VASCULAR | Facility: CLINIC | Age: 65
DRG: 253 | End: 2025-02-17
Attending: RADIOLOGY
Payer: COMMERCIAL

## 2025-02-17 LAB
ANION GAP SERPL CALCULATED.3IONS-SCNC: 11 MMOL/L (ref 7–15)
BASOPHILS # BLD AUTO: 0 10E3/UL (ref 0–0.2)
BASOPHILS NFR BLD AUTO: 0 %
BUN SERPL-MCNC: 13.8 MG/DL (ref 8–23)
CALCIUM SERPL-MCNC: 8.2 MG/DL (ref 8.8–10.4)
CHLORIDE SERPL-SCNC: 108 MMOL/L (ref 98–107)
CREAT SERPL-MCNC: 1.1 MG/DL (ref 0.67–1.17)
EGFRCR SERPLBLD CKD-EPI 2021: 75 ML/MIN/1.73M2
EOSINOPHIL # BLD AUTO: 0.1 10E3/UL (ref 0–0.7)
EOSINOPHIL NFR BLD AUTO: 2 %
ERYTHROCYTE [DISTWIDTH] IN BLOOD BY AUTOMATED COUNT: 15.2 % (ref 10–15)
FIBRINOGEN PPP-MCNC: 430 MG/DL (ref 170–510)
FIBRINOGEN PPP-MCNC: 433 MG/DL (ref 170–510)
GLUCOSE BLDC GLUCOMTR-MCNC: 100 MG/DL (ref 70–99)
GLUCOSE SERPL-MCNC: 104 MG/DL (ref 70–99)
HCO3 SERPL-SCNC: 21 MMOL/L (ref 22–29)
HCT VFR BLD AUTO: 32.8 % (ref 40–53)
HGB BLD-MCNC: 10.7 G/DL (ref 13.3–17.7)
HGB BLD-MCNC: 11.1 G/DL (ref 13.3–17.7)
HGB BLD-MCNC: 11.1 G/DL (ref 13.3–17.7)
IMM GRANULOCYTES # BLD: 0 10E3/UL
IMM GRANULOCYTES NFR BLD: 0 %
LYMPHOCYTES # BLD AUTO: 1.7 10E3/UL (ref 0.8–5.3)
LYMPHOCYTES NFR BLD AUTO: 24 %
MCH RBC QN AUTO: 29.6 PG (ref 26.5–33)
MCHC RBC AUTO-ENTMCNC: 33.8 G/DL (ref 31.5–36.5)
MCV RBC AUTO: 88 FL (ref 78–100)
MONOCYTES # BLD AUTO: 0.7 10E3/UL (ref 0–1.3)
MONOCYTES NFR BLD AUTO: 10 %
NEUTROPHILS # BLD AUTO: 4.5 10E3/UL (ref 1.6–8.3)
NEUTROPHILS NFR BLD AUTO: 65 %
NRBC # BLD AUTO: 0 10E3/UL
NRBC BLD AUTO-RTO: 0 /100
PLATELET # BLD AUTO: 135 10E3/UL (ref 150–450)
POTASSIUM SERPL-SCNC: 3.9 MMOL/L (ref 3.4–5.3)
RBC # BLD AUTO: 3.75 10E6/UL (ref 4.4–5.9)
SODIUM SERPL-SCNC: 140 MMOL/L (ref 135–145)
UFH PPP CHRO-ACNC: 0.13 IU/ML
UFH PPP CHRO-ACNC: <0.1 IU/ML
WBC # BLD AUTO: 6.9 10E3/UL (ref 4–11)

## 2025-02-17 PROCEDURE — 85018 HEMOGLOBIN: CPT

## 2025-02-17 PROCEDURE — 82565 ASSAY OF CREATININE: CPT | Performed by: INTERNAL MEDICINE

## 2025-02-17 PROCEDURE — 250N000011 HC RX IP 250 OP 636: Performed by: PHYSICIAN ASSISTANT

## 2025-02-17 PROCEDURE — 250N000013 HC RX MED GY IP 250 OP 250 PS 637: Performed by: INTERNAL MEDICINE

## 2025-02-17 PROCEDURE — 250N000009 HC RX 250: Performed by: PHYSICIAN ASSISTANT

## 2025-02-17 PROCEDURE — C1760 CLOSURE DEV, VASC: HCPCS

## 2025-02-17 PROCEDURE — 36415 COLL VENOUS BLD VENIPUNCTURE: CPT | Performed by: PHYSICIAN ASSISTANT

## 2025-02-17 PROCEDURE — 99231 SBSQ HOSP IP/OBS SF/LOW 25: CPT | Performed by: SURGERY

## 2025-02-17 PROCEDURE — 250N000013 HC RX MED GY IP 250 OP 250 PS 637

## 2025-02-17 PROCEDURE — 80048 BASIC METABOLIC PNL TOTAL CA: CPT | Performed by: INTERNAL MEDICINE

## 2025-02-17 PROCEDURE — 250N000011 HC RX IP 250 OP 636

## 2025-02-17 PROCEDURE — C1769 GUIDE WIRE: HCPCS

## 2025-02-17 PROCEDURE — 258N000003 HC RX IP 258 OP 636: Performed by: RADIOLOGY

## 2025-02-17 PROCEDURE — 272N000125 HC CATH CR12

## 2025-02-17 PROCEDURE — 36415 COLL VENOUS BLD VENIPUNCTURE: CPT

## 2025-02-17 PROCEDURE — 250N000013 HC RX MED GY IP 250 OP 250 PS 637: Performed by: RADIOLOGY

## 2025-02-17 PROCEDURE — 272N000302 HC DEVICE INFLATION CR5

## 2025-02-17 PROCEDURE — 36415 COLL VENOUS BLD VENIPUNCTURE: CPT | Performed by: RADIOLOGY

## 2025-02-17 PROCEDURE — 255N000002 HC RX 255 OP 636: Performed by: RADIOLOGY

## 2025-02-17 PROCEDURE — 99232 SBSQ HOSP IP/OBS MODERATE 35: CPT | Performed by: INTERNAL MEDICINE

## 2025-02-17 PROCEDURE — 85520 HEPARIN ASSAY: CPT | Performed by: RADIOLOGY

## 2025-02-17 PROCEDURE — 85025 COMPLETE CBC W/AUTO DIFF WBC: CPT | Performed by: RADIOLOGY

## 2025-02-17 PROCEDURE — 84132 ASSAY OF SERUM POTASSIUM: CPT | Performed by: INTERNAL MEDICINE

## 2025-02-17 PROCEDURE — 75710 ARTERY X-RAYS ARM/LEG: CPT | Mod: XS,LT

## 2025-02-17 PROCEDURE — 99153 MOD SED SAME PHYS/QHP EA: CPT

## 2025-02-17 PROCEDURE — 85384 FIBRINOGEN ACTIVITY: CPT

## 2025-02-17 PROCEDURE — 272N000116 HC CATH CR1

## 2025-02-17 PROCEDURE — C1876 STENT, NON-COA/NON-COV W/DEL: HCPCS

## 2025-02-17 PROCEDURE — 85520 HEPARIN ASSAY: CPT | Performed by: PHYSICIAN ASSISTANT

## 2025-02-17 PROCEDURE — 047J3DZ DILATION OF LEFT EXTERNAL ILIAC ARTERY WITH INTRALUMINAL DEVICE, PERCUTANEOUS APPROACH: ICD-10-PCS | Performed by: RADIOLOGY

## 2025-02-17 PROCEDURE — 120N000001 HC R&B MED SURG/OB

## 2025-02-17 PROCEDURE — 272N000196 HC ACCESSORY CR5

## 2025-02-17 RX ORDER — NALOXONE HYDROCHLORIDE 0.4 MG/ML
0.4 INJECTION, SOLUTION INTRAMUSCULAR; INTRAVENOUS; SUBCUTANEOUS
Status: DISCONTINUED | OUTPATIENT
Start: 2025-02-17 | End: 2025-02-17 | Stop reason: HOSPADM

## 2025-02-17 RX ORDER — IODIXANOL 320 MG/ML
100 INJECTION, SOLUTION INTRAVASCULAR ONCE
Status: COMPLETED | OUTPATIENT
Start: 2025-02-17 | End: 2025-02-17

## 2025-02-17 RX ORDER — NALOXONE HYDROCHLORIDE 0.4 MG/ML
0.2 INJECTION, SOLUTION INTRAMUSCULAR; INTRAVENOUS; SUBCUTANEOUS
Status: DISCONTINUED | OUTPATIENT
Start: 2025-02-17 | End: 2025-02-17 | Stop reason: HOSPADM

## 2025-02-17 RX ORDER — FENTANYL CITRATE 50 UG/ML
25-50 INJECTION, SOLUTION INTRAMUSCULAR; INTRAVENOUS EVERY 5 MIN PRN
Status: DISCONTINUED | OUTPATIENT
Start: 2025-02-17 | End: 2025-02-17 | Stop reason: HOSPADM

## 2025-02-17 RX ORDER — MORPHINE SULFATE 2 MG/ML
4 INJECTION, SOLUTION INTRAMUSCULAR; INTRAVENOUS
Status: DISCONTINUED | OUTPATIENT
Start: 2025-02-17 | End: 2025-02-18 | Stop reason: HOSPADM

## 2025-02-17 RX ORDER — HEPARIN SODIUM 10000 [USP'U]/100ML
0-5000 INJECTION, SOLUTION INTRAVENOUS CONTINUOUS
Status: DISCONTINUED | OUTPATIENT
Start: 2025-02-17 | End: 2025-02-18

## 2025-02-17 RX ORDER — HEPARIN SODIUM 10000 [USP'U]/100ML
1200 INJECTION, SOLUTION INTRAVENOUS CONTINUOUS
Status: DISCONTINUED | OUTPATIENT
Start: 2025-02-17 | End: 2025-02-17

## 2025-02-17 RX ORDER — ONDANSETRON 2 MG/ML
4 INJECTION INTRAMUSCULAR; INTRAVENOUS
Status: DISCONTINUED | OUTPATIENT
Start: 2025-02-17 | End: 2025-02-17 | Stop reason: HOSPADM

## 2025-02-17 RX ORDER — HEPARIN SODIUM 200 [USP'U]/100ML
1 INJECTION, SOLUTION INTRAVENOUS EVERY 5 MIN PRN
Status: DISCONTINUED | OUTPATIENT
Start: 2025-02-17 | End: 2025-02-17 | Stop reason: HOSPADM

## 2025-02-17 RX ORDER — HEPARIN SODIUM 10000 [USP'U]/100ML
0-5000 INJECTION, SOLUTION INTRAVENOUS CONTINUOUS
Status: DISCONTINUED | OUTPATIENT
Start: 2025-02-17 | End: 2025-02-17

## 2025-02-17 RX ORDER — AMLODIPINE BESYLATE 5 MG/1
5 TABLET ORAL DAILY
Status: DISCONTINUED | OUTPATIENT
Start: 2025-02-17 | End: 2025-02-18 | Stop reason: HOSPADM

## 2025-02-17 RX ORDER — MORPHINE SULFATE 2 MG/ML
2 INJECTION, SOLUTION INTRAMUSCULAR; INTRAVENOUS
Status: DISCONTINUED | OUTPATIENT
Start: 2025-02-17 | End: 2025-02-18 | Stop reason: HOSPADM

## 2025-02-17 RX ORDER — FLUMAZENIL 0.1 MG/ML
0.2 INJECTION, SOLUTION INTRAVENOUS
Status: DISCONTINUED | OUTPATIENT
Start: 2025-02-17 | End: 2025-02-17 | Stop reason: HOSPADM

## 2025-02-17 RX ADMIN — SENNOSIDES AND DOCUSATE SODIUM 2 TABLET: 50; 8.6 TABLET ORAL at 08:00

## 2025-02-17 RX ADMIN — FENTANYL CITRATE 50 MCG: 50 INJECTION, SOLUTION INTRAMUSCULAR; INTRAVENOUS at 10:37

## 2025-02-17 RX ADMIN — DOCUSATE SODIUM 100 MG: 100 CAPSULE, LIQUID FILLED ORAL at 20:31

## 2025-02-17 RX ADMIN — POLYETHYLENE GLYCOL 3350 17 G: 17 POWDER, FOR SOLUTION ORAL at 07:59

## 2025-02-17 RX ADMIN — FENTANYL CITRATE 50 MCG: 50 INJECTION, SOLUTION INTRAMUSCULAR; INTRAVENOUS at 10:09

## 2025-02-17 RX ADMIN — MIDAZOLAM 1 MG: 1 INJECTION INTRAMUSCULAR; INTRAVENOUS at 10:35

## 2025-02-17 RX ADMIN — SODIUM CHLORIDE: 9 INJECTION, SOLUTION INTRAVENOUS at 03:17

## 2025-02-17 RX ADMIN — MORPHINE SULFATE 1 MG: 2 INJECTION, SOLUTION INTRAMUSCULAR; INTRAVENOUS at 08:18

## 2025-02-17 RX ADMIN — MIDAZOLAM 1 MG: 1 INJECTION INTRAMUSCULAR; INTRAVENOUS at 10:22

## 2025-02-17 RX ADMIN — FENTANYL CITRATE 50 MCG: 50 INJECTION, SOLUTION INTRAMUSCULAR; INTRAVENOUS at 10:14

## 2025-02-17 RX ADMIN — MIDAZOLAM 1 MG: 1 INJECTION INTRAMUSCULAR; INTRAVENOUS at 10:12

## 2025-02-17 RX ADMIN — HYDROMORPHONE HYDROCHLORIDE 1 MG: 2 TABLET ORAL at 08:19

## 2025-02-17 RX ADMIN — SENNOSIDES AND DOCUSATE SODIUM 1 TABLET: 50; 8.6 TABLET ORAL at 20:31

## 2025-02-17 RX ADMIN — MIDAZOLAM 1 MG: 1 INJECTION INTRAMUSCULAR; INTRAVENOUS at 10:06

## 2025-02-17 RX ADMIN — ATORVASTATIN CALCIUM 80 MG: 80 TABLET, FILM COATED ORAL at 08:00

## 2025-02-17 RX ADMIN — IODIXANOL 55 ML: 320 INJECTION, SOLUTION INTRAVASCULAR at 10:47

## 2025-02-17 RX ADMIN — ACETAMINOPHEN 650 MG: 325 TABLET, FILM COATED ORAL at 08:20

## 2025-02-17 RX ADMIN — HYDROMORPHONE HYDROCHLORIDE 1 MG: 2 TABLET ORAL at 20:37

## 2025-02-17 RX ADMIN — FENTANYL CITRATE 50 MCG: 50 INJECTION, SOLUTION INTRAMUSCULAR; INTRAVENOUS at 10:25

## 2025-02-17 RX ADMIN — AMLODIPINE BESYLATE 5 MG: 5 TABLET ORAL at 20:31

## 2025-02-17 RX ADMIN — LIDOCAINE HYDROCHLORIDE 5 ML: 10 INJECTION, SOLUTION INFILTRATION; PERINEURAL at 10:34

## 2025-02-17 RX ADMIN — DOCUSATE SODIUM 100 MG: 100 CAPSULE, LIQUID FILLED ORAL at 08:01

## 2025-02-17 RX ADMIN — HEPARIN SODIUM 4 BAG: 200 INJECTION, SOLUTION INTRAVENOUS at 10:15

## 2025-02-17 ASSESSMENT — ACTIVITIES OF DAILY LIVING (ADL)
ADLS_ACUITY_SCORE: 63
DEPENDENT_IADLS:: INDEPENDENT
ADLS_ACUITY_SCORE: 63
ADLS_ACUITY_SCORE: 62
ADLS_ACUITY_SCORE: 63
ADLS_ACUITY_SCORE: 63

## 2025-02-17 NOTE — PROGRESS NOTES
VASCULAR SURGERY    Just completed interventional radiology.  Images reviewed and discussed with Dr. Jones.    Significant improvement of proximal right common iliac stent thrombus.  Essentially resolved with some very mild narrowing.  External iliac-common femoral-native arteries and bypass graft widely patent with brisk flow.  Mild stenosis of left proximal extrailiac artery successfully angioplastied.      IMPRESSION: Excellent results of lytic therapy.  Fortunately no surgical intervention is required.   Patient had been on aspirin and Plavix and likely this will be adequate though I will discuss with Dr. Jones.        Likely will continue IV heparin overnight due to interventions and likely discharge in morning.  Dr. Jones with the extensive clot burden that apparently occurred on fairly regular use of aspirin and Plavix feels that we should use anticoagulation such as Eliquis and for 3 to 6 months along with baby aspirin.           Dorian Gamble MD

## 2025-02-17 NOTE — IR NOTE
Procedure Note for IR Procedure Dictation  Conscious sedation: 4 mg IVP Versed, 200 mcg IVP fentanyl  Sedation time: 35 minutes  Fluoro time: 4.1 minutes  Total Fluoro Dose: 409.49 mGy (Air Kerma)  Contrast: 55 ml visi  Local anesthetic: 5 ml 1% lidocaine

## 2025-02-17 NOTE — PRE-PROCEDURE
GENERAL PRE-PROCEDURE:   Procedure:  Lytics recheck with possible thrombectomy and angioplasty with as needed moderate sedation  Date/Time:  2/17/2025 9:27 AM    Written consent obtained?: Yes    Risks and benefits: Risks, benefits and alternatives were discussed    Consent given by:  Patient  Patient states understanding of procedure being performed: Yes    Patient's understanding of procedure matches consent: Yes    Procedure consent matches procedure scheduled: Yes    Expected level of sedation:  Moderate  Appropriately NPO:  Yes  ASA Class:  3  Mallampati  :  Grade 3- soft palate visible, posterior pharyngeal wall not visible  Lungs:  Lungs clear with good breath sounds bilaterally  Heart:  Normal heart sounds and rate  History & Physical reviewed:  History and physical reviewed and no updates needed  Statement of review:  I have reviewed the lab findings, diagnostic data, medications, and the plan for sedation

## 2025-02-17 NOTE — PROGRESS NOTES
VASCULAR SURGERY    Had a good day yesterday and last evening.  No pain in right leg.  Left groin site=A    Palpable right common femoral and DP pulse with good Doppler (improved from yesterday morning)  Normal CMS.      IMPRESSION: Doing much better.  Obvious some resolution of common iliac thrombus.  Plan repeat angiogram with Dr. Jones this morning to evaluate this better and see any further intervention.  Also plan to look at left iliac focal stenosis.    Discussed plan with patient.    NOTE: He was scheduled have a PET scan for his prostate cancer today that will need to be rescheduled.         Dorian aGmble MD

## 2025-02-17 NOTE — PLAN OF CARE
Goal Outcome Evaluation:      Plan of Care Reviewed With: patient    Overall Patient Progress: improvingOverall Patient Progress: improving    Outcome Evaluation: Assumed cares from [id number] when patient transferred out of ICU. Pt has an excitable personality with difficulty staying on topic with conversations. Pt cooperative and pleasant, makes needs known via call light. Q2h neuros completed. L femoral site; sheath removed during IR procedure today. On Room air, LSC. Voids in urinal without difficulty.      Problem: Lower Extremity Revascularization  Goal: Effective Bowel Elimination  Outcome: Not Progressing     Problem: Adult Inpatient Plan of Care  Goal: Plan of Care Review  Description: The Plan of Care Review/Shift note should be completed every shift.  The Outcome Evaluation is a brief statement about your assessment that the patient is improving, declining, or no change.  This information will be displayed automatically on your shift  note.  Outcome: Progressing  Flowsheets (Taken 2/17/2025 1552)  Outcome Evaluation:   Assumed cares from [id number] when patient transferred out of ICU. Pt has an excitable personality with difficulty staying on topic with conversations. Pt cooperative and pleasant, makes needs known via call light. Q2h neuros completed. L femoral site   sheath removed during IR procedure today. On Room air, LSC. Voids in urinal without difficulty.  Plan of Care Reviewed With: patient  Overall Patient Progress: improving     Problem: Lower Extremity Revascularization  Goal: Absence of Bleeding  Outcome: Progressing  Goal: Anesthesia/Sedation Recovery  Outcome: Progressing  Intervention: Optimize Anesthesia Recovery  Recent Flowsheet Documentation  Taken 2/17/2025 1200 by Ivelisse Naraayn, RN  Safety Promotion/Fall Prevention: lighting adjusted  Taken 2/17/2025 0800 by Ivelisse Narayan, RN  Safety Promotion/Fall Prevention: lighting adjusted  Goal: Effective Tissue Perfusion  Outcome:  Progressing  Intervention: Optimize Tissue Perfusion  Recent Flowsheet Documentation  Taken 2/17/2025 1110 by Ivelisse Narayan, RN  Body Position:   turned   heels elevated   upper extremity elevated     Problem: Pain Acute  Goal: Optimal Pain Control and Function  Outcome: Progressing  Intervention: Prevent or Manage Pain  Recent Flowsheet Documentation  Taken 2/17/2025 1200 by Ivelisse Narayan, RN  Medication Review/Management: medications reviewed  Taken 2/17/2025 0800 by Ivelisse Narayan, RN  Medication Review/Management: medications reviewed

## 2025-02-17 NOTE — PROGRESS NOTES
Date & Time: 2/17/25 @ [id number]  Surgery/POD#0: L groin stent placement in IR  Behavior & Aggression: Green  Fall Risk: Yes  Orientation:x4  ABNL VS/O2:HTN   ABNL Labs: Hgb 10.7  Pain Management:PCA pump discontinued. PO dilaudid available.   Bowel/Bladder: BM x1. Urinal   Drains: None  Wounds/incisions: L groin incision.  Diet:Regular  Activity Level: A1/wlk/gb  Tests/Procedures: IR L groin stent placement  Anticipated  DC Date: TBD  Significant Information: Tele- occasional F[place]. Hep @ 1200 units/hr. Starting Eliquis tomorrow per Vascular. Baseline BLE neuropathy.

## 2025-02-17 NOTE — PLAN OF CARE
Neuro: A&Ox4, PERRLA, CMS intact and pulses present w/doppler. Baseline neuropathy, tenderness bilateral LE. Afebrile.  Pain/CPOT: controlled with PCA. Rating pain from 2-4/10  Cardiac: SR/SB with HR in the 50's-60's. No pauses overnight. SBP in range, no gtts initiated.   Resp: RA. 1.5LPM overnight for MICHELLE. Pt desat to mid 80's while asleep. Otherwise, RA with O2 above 90s  GI/: No BM on shift, +flatus. Urinal at bedside with adequate output  Diet: Regular. NPO at midnight  Mobility/Activity: Strict BR  Skin: intact and scattered bruising   LDAs: x2 PIV. L femoral sheath/cath  Drips/Infusions: NS, PCA dilaudid, heparin and alteplase     Major Shift Events:  uneventful night    Plan: Reassess with IR. See Vascular note    Goal Outcome Evaluation:      Plan of Care Reviewed With: patient    Overall Patient Progress: no changeOverall Patient Progress: no change    Outcome Evaluation: uneventful night, CMS intact with doppler pulses

## 2025-02-17 NOTE — IR NOTE
Interventional Radiology Intra-procedural Nursing Note    Patient Name: Adam Sahni  Medical Record Number: [id number]  Today's Date: February 17, 2025    Procedure: Lytics recheck with angioplasty, stent placement and with IV moderate sedation   Start time: 1010  End time: 1040  Report provided to: YOHANNES Oviedo  Patient depart time and location: 1055 to ICU Room 369    Note: Patient entered Interventional Radiology Suite number 1 via cart. Patient awake, alert and oriented. Assisted onto procedural table in supine position. Prepped and draped.  Dr. Jones in room. Time out and procedure started. Vital signs stable. Telemetry reading SB.    Procedure well tolerated by patient without complications. Procedure end with debrief by Dr. Jones.  6 Fr angioseal deployed at 1040, and manual pressure applied until hemostasis achieved at 1041. Quick clot and tegaderm dressing applied to left interventional procedure access site, dressing is c/d/I.    3 hours bedrest per MD, until 1340 today.  Restart peripheral heparin drip at 1200 units/hr now, and follow RN managed heparin protocol per MD.    Administered medication totals:  Lidocaine 1% 5 mL Intradermal  Versed 4 mg IVP  Fentanyl 200 mcg IVP    Last dose of sedation administered at 1037.

## 2025-02-17 NOTE — CONSULTS
Care Management Initial Consult    General Information  Assessment completed with: Patient,    Type of CM/SW Visit: Initial Assessment    Primary Care Provider verified and updated as needed: Yes ([facility] to [facility] for regular  appointments)   Readmission within the last 30 days: no previous admission in last 30 days      Reason for Consult: discharge planning  Advance Care Planning: Advance Care Planning Reviewed: no concerns identified          Communication Assessment  Patient's communication style: spoken language (English or Bilingual)             Cognitive  Cognitive/Neuro/Behavioral: .WDL except, mood/behavior           Mood/Behavior: excitable, cooperative          Living Environment:   People in home: alone     Current living Arrangements: condominium      Able to return to prior arrangements: yes       Family/Social Support:  Care provided by: self  Provides care for: no one  Marital Status: Single  Support system: Sibling(s), Friend          Description of Support System: Supportive, Uninvolved         Current Resources:   Patient receiving home care services: No        Community Resources: None  Equipment currently used at home: cane, straight, walker, standard  Supplies currently used at home: None    Employment/Financial:  Employment Status: retired        Financial Concerns: none   Referral to Financial Worker: No       Does the patient's insurance plan have a 3 day qualifying hospital stay waiver?  No    Lifestyle & Psychosocial Needs:  Social Drivers of Health     Food Insecurity: No Food Insecurity (1/16/2024)    Received from [facility]    Hunger Vital Sign     Worried About Running Out of Food in the Last Year: Never true     Ran Out of Food in the Last Year: Never true   Depression: At risk (3/22/2024)    Received from [facility]    PHQ-2     PHQ-2 Subtotal: 4   Housing Stability: Low Risk  (1/16/2024)    Received from [facility]    Housing Stability     What is  your living situation today?: I have a steady place to live   Tobacco Use: High Risk (2/17/2025)    Patient History     Smoking Tobacco Use: Some Days     Smokeless Tobacco Use: Never     Passive Exposure: Current   Financial Resource Strain: Low Risk  (1/13/2023)    Received from [facility]    Overall Financial Resource Strain (CARDIA)     Difficulty of Paying Living Expenses: Not hard at all   Alcohol Use: Not At Risk (1/13/2023)    Received from [facility]    AUDIT-C     Frequency of Alcohol Consumption: 2-4 times a month     Average Number of Drinks: 1 or 2     Frequency of Binge Drinking: Never   Transportation Needs: No Transportation Needs (1/16/2024)    Received from [facility]    PRAPARE - Transportation     Lack of Transportation (Medical): No     Lack of Transportation (Non-Medical): No   Physical Activity: Insufficiently Active (1/16/2024)    Received from [facility]    Exercise Vital Sign     Days of Exercise per Week: 1 day     Minutes of Exercise per Session: 20 min   Interpersonal Safety: Low Risk  (8/23/2024)    Interpersonal Safety     Do you feel physically and emotionally safe where you currently live?: Yes     Within the past 12 months, have you been hit, slapped, kicked or otherwise physically hurt by someone?: No     Within the past 12 months, have you been humiliated or emotionally abused in other ways by your partner or ex-partner?: No   Stress: Stress Concern Present (1/13/2023)    Received from [facility]    [employer] - Occupational Stress Questionnaire     Feeling of Stress : Rather much   Social Connections: Socially Isolated (1/13/2023)    Received from [facility]    Social Connection and Isolation Panel [NHANES]     Frequency of Communication with Friends and Family: Once a week     Frequency of Social Gatherings with Friends and Family: Once a week     Attends Jain  Services: Never     Active Member of Clubs or Organizations: No     Attends Club or Organization Meetings: 1 to 4 times per year     Marital Status: Never    Health Literacy: Not on file       Functional Status:  Prior to admission patient needed assistance:   Dependent ADLs:: Independent  Dependent IADLs:: Independent       Mental Health Status:          Chemical Dependency Status:                Values/Beliefs:  Spiritual, Cultural Beliefs, Zoroastrian Practices, Values that affect care:                 Discussed  Partnership in Safe Discharge Planning  document with patient/family: No    Additional Information:    Consult for discharge planning. Per H&P, patient is a 64 year old male with past medical history significant for prostate cancer s/p androgen suppression and radiation therapy (2020) with recent elevated PSA concerning for relapse, peripheral artery disease with peripheral neuropathy s/p bilateral iliac stenting, who was admitted on 02/15/2025 for further evaluation and management of right lower extremity pain.     Writer reviewed chart and recommendations at discharge. Writer met with patient at bedside and introduced self and role. Writer confirmed patient's primary doctor and home address as accurate. Patient does not have a primary MD, but goes to [facility] for regular appointments.     Patient states he is retired and lives alone in a condo. 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 Patient reports no financial concerns and no need for financial counseling. Patient reports being fully independent in all ADL's and IADL's prior to hospitalization. Patient does not want to go to TCU at discharge but would consider home care if that is what therapy recommends.        Next Steps: therapy recs, continue to follow for discharge     LINCOLN Akhtar

## 2025-02-18 VITALS
TEMPERATURE: 98.1 F | OXYGEN SATURATION: 97 % | DIASTOLIC BLOOD PRESSURE: 70 MMHG | HEART RATE: 69 BPM | SYSTOLIC BLOOD PRESSURE: 110 MMHG | RESPIRATION RATE: 18 BRPM | WEIGHT: 216.93 LBS | BODY MASS INDEX: 32.02 KG/M2

## 2025-02-18 LAB
ATRIAL RATE - MUSE: 38 BPM
DIASTOLIC BLOOD PRESSURE - MUSE: NORMAL MMHG
INTERPRETATION ECG - MUSE: NORMAL
P AXIS - MUSE: 19 DEGREES
PR INTERVAL - MUSE: 180 MS
QRS DURATION - MUSE: 88 MS
QT - MUSE: 434 MS
QTC - MUSE: 345 MS
R AXIS - MUSE: 41 DEGREES
SYSTOLIC BLOOD PRESSURE - MUSE: NORMAL MMHG
T AXIS - MUSE: -25 DEGREES
UFH PPP CHRO-ACNC: 0.29 IU/ML
VENTRICULAR RATE- MUSE: 38 BPM

## 2025-02-18 PROCEDURE — 250N000013 HC RX MED GY IP 250 OP 250 PS 637: Performed by: INTERNAL MEDICINE

## 2025-02-18 PROCEDURE — 36415 COLL VENOUS BLD VENIPUNCTURE: CPT | Performed by: INTERNAL MEDICINE

## 2025-02-18 PROCEDURE — 97161 PT EVAL LOW COMPLEX 20 MIN: CPT | Mod: GP | Performed by: PHYSICAL THERAPIST

## 2025-02-18 PROCEDURE — 250N000011 HC RX IP 250 OP 636: Performed by: PHYSICIAN ASSISTANT

## 2025-02-18 PROCEDURE — 85520 HEPARIN ASSAY: CPT | Performed by: INTERNAL MEDICINE

## 2025-02-18 PROCEDURE — 99239 HOSP IP/OBS DSCHRG MGMT >30: CPT | Performed by: INTERNAL MEDICINE

## 2025-02-18 PROCEDURE — 250N000013 HC RX MED GY IP 250 OP 250 PS 637: Performed by: SURGERY

## 2025-02-18 PROCEDURE — 99231 SBSQ HOSP IP/OBS SF/LOW 25: CPT | Performed by: SURGERY

## 2025-02-18 RX ORDER — NICOTINE 21 MG/24HR
1 PATCH, TRANSDERMAL 24 HOURS TRANSDERMAL DAILY PRN
Qty: 30 PATCH | Refills: 0 | Status: SHIPPED | OUTPATIENT
Start: 2025-02-18

## 2025-02-18 RX ORDER — ASPIRIN 81 MG/1
81 TABLET, CHEWABLE ORAL DAILY
Qty: 60 TABLET | Refills: 0 | Status: SHIPPED | OUTPATIENT
Start: 2025-02-18

## 2025-02-18 RX ORDER — HYDROMORPHONE HYDROCHLORIDE 2 MG/1
1 TABLET ORAL EVERY 6 HOURS PRN
Qty: 8 TABLET | Refills: 0 | Status: SHIPPED | OUTPATIENT
Start: 2025-02-18 | End: 2025-02-21

## 2025-02-18 RX ADMIN — APIXABAN 5 MG: 5 TABLET, FILM COATED ORAL at 09:02

## 2025-02-18 RX ADMIN — HEPARIN SODIUM 1350 UNITS/HR: 10000 INJECTION, SOLUTION INTRAVENOUS at 00:04

## 2025-02-18 RX ADMIN — AMLODIPINE BESYLATE 5 MG: 5 TABLET ORAL at 09:02

## 2025-02-18 RX ADMIN — POLYETHYLENE GLYCOL 3350 17 G: 17 POWDER, FOR SOLUTION ORAL at 09:02

## 2025-02-18 RX ADMIN — ATORVASTATIN CALCIUM 80 MG: 80 TABLET, FILM COATED ORAL at 09:02

## 2025-02-18 RX ADMIN — SENNOSIDES AND DOCUSATE SODIUM 1 TABLET: 50; 8.6 TABLET ORAL at 09:02

## 2025-02-18 RX ADMIN — HYDROMORPHONE HYDROCHLORIDE 1 MG: 2 TABLET ORAL at 09:22

## 2025-02-18 RX ADMIN — DOCUSATE SODIUM 100 MG: 100 CAPSULE, LIQUID FILLED ORAL at 09:02

## 2025-02-18 ASSESSMENT — ACTIVITIES OF DAILY LIVING (ADL)
ADLS_ACUITY_SCORE: 62
ADLS_ACUITY_SCORE: 63
ADLS_ACUITY_SCORE: 62
ADLS_ACUITY_SCORE: 63
ADLS_ACUITY_SCORE: 62
ADLS_ACUITY_SCORE: 63
ADLS_ACUITY_SCORE: 63
ADLS_ACUITY_SCORE: 62

## 2025-02-18 NOTE — PROGRESS NOTES
Care Management Discharge Note    Discharge Date: 02/18/2025       Discharge Disposition:  home    Discharge Services:  outpatient PT referral    Discharge DME:      Discharge Transportation:      Private pay costs discussed: Not applicable    Does the patient's insurance plan have a 3 day qualifying hospital stay waiver?  No    PAS Confirmation Code:    Patient/family educated on Medicare website which has current facility and service quality ratings:      Education Provided on the Discharge Plan:    Persons Notified of Discharge Plans:   Patient/Family in Agreement with the Plan:      Handoff Referral Completed: No, handoff not indicated or clinically appropriate    Additional Information:  PT evaluated and recommended home with outpatient PT. Referral present on discharge.     No further care management intervention anticipated at this time.  Please re-consult if further needs arise.  Care management signing off.       Chyna Bowen RN   [facility]   Phone [phone], [place] or [phone]

## 2025-02-18 NOTE — DISCHARGE SUMMARY
[facility]  Hospitalist Discharge Summary      Date of Admission:  2/15/2025  Date of Discharge:  2/18/2025  2:25 PM  Discharging Provider: Marcela Mir MD  Discharge Service: Hospitalist Service    Discharge Diagnoses     Acute right lower limb ischemia due to occlusion of right common iliac and external iliac stents, s/p intra-arterial thrombolytics per IR    Severe peripheral arterial disease, s/p bilateral iliac stenting 7/2021  Known right distal SFA and above-knee popliteal artery occlusion  S/p right femoral-popliteal in situ GSV, 8/2024    Left external iliac artery stenosis, s/p stenting by IR, 2/17/2025     PACs with sinus pauses up to 2-3 sec, 0/16/2025     Acute kidney injury     Prostate adenocarcinoma, s/p androgen deprivation therapy and radiation therapy, 2020, with recent concern for recurrence    Coronary artery disease, s/p interior STEMI 6/2023 with delayed presentation, s/p PCI with UGO to prox-mid RCA, 6/2023  Known severe 70% stenosis of p Lcx (small-medium vessel), mod-severe stenosis of 2 branches of large ramus intermedius, moderate stenosis of mLAD - on medical management       Essential hypertension     Severe hyperlipidemia, likely familial     Prediabetes, A1c 5.7     Mild carotid artery stenosis     Intermittent epigastric pain and postprandial nausea     Moderate cervical stenosis at C5-C6 and C6-C7   Severe left foraminal stenosis at C4-C5 and C6-C7   Severe right foraminal stenosis at C3-C4, C5-C6 and C6-C7   Left upper extremity radiculopathy    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 to evaluate after surgery. The following labs/tests are recommended: Iliac and right graft duplex in 3 months will be scheduled.        Hospital to Primary Care - Establish PCP Referral      Please be aware that coverage of these services is subject to the terms and limitations of your health insurance plan.  Call member services at your health plan with any benefit or coverage questions.    Schedule Primary Care visit within: 30 Days           {Additional follow-up instructions/to-do's for PCP    :    Unresulted Labs Ordered in the Past 30 Days of this Admission       Date and Time Order Name Status Description    2/15/2025  9:40 AM Blood Culture Peripheral Blood Preliminary             Discharge Disposition   Discharged to home  Condition at discharge: Stable    Hospital Course     Adam Sahni is a 64-year-old male with history of prostate cancer, PAD, s/p bilateral iliac stenting 7/2021 and R femoral-popliteal bypass graft 8/2024, lumbar stenosis s/p fusion 4/2022 with residual right radiculopathy and paresthesias, severe cervical spondylosis and stenosis with left upper extremity radiculopathy, CAD s/p UGO to RCA, severe hyperlipidemia, prediabetes, who presented on 2/15/2025 with 2 days of right lower extremity pain, foot drop and cold extremity due to ischemia.      Acute right lower limb ischemia due to occlusion of right common iliac and external iliac stents, s/p intra-arterial thrombolytics per IR  Severe peripheral arterial disease, s/p bilateral iliac stenting 7/2021  Known right distal SFA and above-knee popliteal artery occlusion  S/p right femoral-popliteal in situ GSV, 8/2024  Left external iliac artery stenosis, s/p stenting by IR, 2/17/2025  -PTA on DAPT with aspirin and Plavix.  Follows with vascular surgery, Dr. Gamble  -Presented with 2 days of right lower extremity pain with right foot drop. Cold R  leg  -Vascular surgery and IR consulted.   -RLE and abdominal aortic angiogram 2/15 showed flush occlusion throughout R common iliac, external iliac and majority of R common femoral arteries.   -He was treated intra-arterial tPA and heparin   -Angiogram 2/17 showed near complete resolution of thrombus from R common iliac artery stent and distal R external iliac to common femoral artery stent. He also underwent stenting of L external iliac artery  -received catheter directed intra-arterial thrombolysis from 2/15 - 2/17. Subsequently treated with IV heparin on 2/17. Transitioned to po eliquis + aspirin 81 mg daily on 2/18  -since etiology of thrombotic occlusion of stents was unclear, IR and vascular surgery recommended at least 3 months of eliquis.   -continue aspirin 81 mg daily. Discontinue plavix  -Foot drop resolved. Pain improved. Ambulating without difficulty  -discharged home. Will follow up with vascular surgery in clinic        PACs with sinus pauses up to 2-3 sec, 0/16/2025  -was noted to have PACs followed by sinus pauses of 2-3 sec during this hospital stay.  -EP consulted.  Sinus pauses after PACs are considered physiologic as they delay the next sinus beat.  They are not uncommon in early a.m. due to increased vagal tone.  Also not a concern if happens at night  when sleeping.    -EP recommended monitoring as long as not having sustained sinus pauses > 10 sec while awake and is asymptomatic.    -HR improved and in 60s at discharge      Acute kidney injury  -Baseline creatinine 0.8-0.9.  Presented with creatinine of 1.25  -CT A/P 2/15 showed no evidence of obstruction  -creatinine stable at 1.1.      Prostate adenocarcinoma, s/p androgen deprivation therapy and radiation therapy, 2020, with recent concern for recurrence  -Diagnosed with prostate cancer in 2020.  MRI at that time showed almost entire prostate gland replaced with cancer with extraprostatic extension into neurovascular bundle, seminal  vesicle, bladder base and probable retroperitoneal and pelvic lymph nodes.  No hayes or osseous metastasis.  -Patient underwent androgen deprivation therapy and radiation therapy, completed  8/2020  -PSA now elevated at 14 in 1/2023.  Concern for relapse  -Patient follows up at [facility] and has PET scan pending        Coronary artery disease, s/p interior STEMI 6/2023 with delayed presentation, s/p PCI with UGO to prox-mid RCA, 6/2023  Known severe 70% stenosis of p Lcx (small-medium vessel), mod-severe stenosis of 2 branches of large ramus intermedius, moderate stenosis of mLAD - on medical management  -Echo 10/2023 showed mildly reduced LVEF 51%, inferior and inferior lateral hypokinesis  -continue aspirin 81 mg daily. Plavix discontinued as patient now started on eliquis        Essential hypertension  -continue toprol XL     Severe hyperlipidemia, likely familial  -Lipid panel 11/2024 showed LDL 65, HDL 46, triglycerides 150  -Continue atorvastatin 80 mg daily  -Resume Repatha as outpatient     Prediabetes, A1c 5.7  -Follow-up as outpatient     Mild carotid artery stenosis  -Continue to follow-up as outpatient     Intermittent epigastric pain and postprandial nausea  -Reported on admission.  No current issues.  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 Hb 11.9 on admission  -Previous workup showed iron deficiency.  B12 and folate were normal.  EGD, colonoscopy, capsule  endoscopy showed colonic angioectasias.  Anemia felt to be secondary to chronic blood loss from angioectasias.  -Hb 10.7 at discharge      Depression  -Continue sertraline     Tobacco use disorder  -Smokes 10 cigarettes/day, down from 1 pack/day for 30 years  -Uses THC Gummies intermittently to assist with sleep at night  -smoking cessation strongly advised        Consultations This Hospital Stay   VASCULAR SURGERY IP CONSULT  INTERVENTIONAL RADIOLOGY ADULT/PEDS IP CONSULT  INTERVENTIONAL RADIOLOGY ADULT/PEDS IP CONSULT  PHARMACY IP CONSULT  PHARMACY IP CONSULT  VASCULAR SURGERY IP CONSULT  PHYSICAL THERAPY ADULT IP CONSULT  CARE MANAGEMENT / SOCIAL WORK IP CONSULT  VASCULAR ACCESS ADULT IP CONSULT  CARDIOLOGY IP CONSULT  CARE MANAGEMENT / SOCIAL WORK IP CONSULT  PHARMACY IP CONSULT  PHARMACY IP CONSULT  PHARMACY LIAISON FOR MEDICATION COVERAGE CONSULT  SMOKING CESSATION PROGRAM IP CONSULT    Code Status   Full Code    Time Spent on this Encounter   I, Marcela Mir MD, personally saw the patient today and spent greater than 30 minutes discharging this patient.       Marcela Mir MD  [address] [address]  Phone: [phone]  Fax: [phone]  ______________________________________________________________________    Physical Exam   Vital Signs: Temp: 98.1  F (36.7  C) Temp src: Oral BP: 110/70 Pulse: 69   Resp: 18 SpO2: 97 % O2 Device: None (Room air)    Weight: 216 lbs 14.92 oz    Constitutional - awake and alert, resting in bed, in no acute distress  Cardiovascular - regular rate and rhythm, no murmurs, no edema  Pulmonary - lungs are clear to auscultation bilaterally, no wheezing or rhonchi  GI - abdomen is soft, nontender, nondistended  Integumentary - skin is warm and dry, no rashes or ulcers  Neurological - awake, alert and oriented x3.  Moving all 4 extremities, normal speech, no focal deficits       Primary Care Physician   Physician No  Ref-Primary    Discharge Orders      Hospital to Primary Care - Establish PCP Referral      Physical Therapy  Referral      Activity    Your activity upon discharge: activity as tolerated.  May shower.     Follow Up    Follow up with Dr Gamble, within 3 weeks in office. to evaluate after surgery. The following labs/tests are recommended: Iliac and right graft duplex in 3 months will be scheduled.     Reason for your hospital stay    Loss of blood supply of right leg due to blood clot     Diet    Follow this diet upon discharge: Advance to a regular diet as tolerated       Significant Results and Procedures   Results for orders placed or performed during the hospital encounter of 02/15/25   US Lower Extremity Venous Duplex Right    Narrative    EXAM: US LOWER EXTREMITY VENOUS DUPLEX RIGHT  LOCATION: [facility]  DATE: 2/15/2025    INDICATION: pain. dvt?  COMPARISON: CTA of the abdomen and pelvis from 3/1/2024, right lower extremity arterial ultrasound from 6/16/2023  TECHNIQUE: Venous Duplex ultrasound of the right lower extremity with and without compression, augmentation and duplex. Color flow and spectral Doppler with waveform analysis performed.    FINDINGS: Exam includes the common femoral, femoral, popliteal, and contralateral common femoral veins as well as segmentally visualized deep calf veins and greater saphenous vein.     RIGHT: No deep vein thrombosis. No superficial thrombophlebitis. No popliteal cyst.    Limited evaluation of the posterior tibial and anterior tibial arteries suggest minimal to no flow, similar to prior ultrasound from 6/16/2023.      Impression    IMPRESSION:  1.  No deep venous thrombosis in the right lower extremity.  2.  Limited evaluation of the posterior tibial and anterior tibial artery suggests minimal to no flow, similar to prior ultrasound from 6/16/2023. Consider follow-up dedicated lower extremity arterial evaluation or CTA runoff if  clinically indicated.   US Lower Extremity Arterial Duplex Right    Narrative    EXAM: ULTRASOUND LOWER EXTREMITY ARTERIAL DUPLEX RIGHT  LOCATION: [facility]  DATE: 02/15/2025    INDICATION: Bypass graft and arterial evaluation.  COMPARISON: October 17, 2024.  TECHNIQUE: Duplex utilizing 2D gray-scale imaging, Doppler  interrogation with color-flow and spectral waveform analysis.    FINDINGS: The bypass graft is patent with velocities ranging from  20-40 cm/s throughout the graft. Diffusely monophasic waveforms.  Distal native artery is 19/12 cm/s. Posterior tibial and anterior  tibial arteries with monophasic waveforms. Patient has stents in the  common iliac and external iliac arteries. The external iliac portion  of the stent is occluded. The distal aorta is 1.9 cm and patent with  velocities of 54/12 cm/s. Mid abdominal aorta is 2.2 cm and proximal  abdominal aorta is 2.8 cm.      Impression    IMPRESSION:  1.  Occluded right external iliac arterial stent.  2.  Right lower extremity surgical bypass graft is patent, though with  diffusely monophasic waveforms. Runoff arteries also with monophasic  waveforms.   IR Lower Extremity Angiogram Right    Narrative    64-year-old patient with acute onset limited blood flow throughout the right lower extremity. Patient has a right femoral to popliteal surgical bypass graft. Ultrasound demonstrates patency of this graft, though diffuse monophasic waveforms. Ultrasound   suggested occlusion of more proximal external iliac arterial stent. Patient has stents throughout the right common and external iliac arteries. Plan is for angiogram and thrombolysis.    TECHNIQUE: Patient was brought to the interventional radiology department and informed consent obtained. Patient was placed in a supine position. Skin overlying the left groin was prepped and draped in standard sterile fashion. Ultrasound was used to   visualize the left common femoral artery,  patency confirmed, and image stored for documentation. With continuous ultrasound guidance, micropuncture kit was used to access the left common femoral artery. Over a series of maneuvers, 6 Armenian vascular   sheath was placed. An Omniflush catheter was advanced to the abdominal aorta where angiogram was performed at the level of the renal arteries. The Omniflush catheter was then pulled back to the distal abdominal aorta where angiogram performed. The entire   right common and external iliac arterial systems are occluded. There is reconstitution of the right common femoral artery via relatively developed collateralized arterial system. Omniflush catheter was used with a stiff angled Glidewire to extend   through the iliac arterial system to the right common femoral artery where angiogram was performed in the right lower extremity. Profunda femoral artery is patent. The bypass graft is patent to the thigh, though irregular filling noted in the distal   aspect of the bypass graft. Unable to visualize the runoff arteries as patient unable to hold still throughout the exam, creating large degree of difficulty in performing the exam. A crossover sheath was advanced into the right iliac arterial system, and   a 100 cm x 20 cm infusion length catheter was advanced throughout the right common iliac, external iliac, and common femoral arteries. Plan will be for TPA administration at 0.5 mg/hour and heparin through the sheath of 500 units/hour. Given flush   occlusion at the origin of the right common iliac artery, this will require additional thought and consideration as likely would require proximal extension of the kissing stents into the distal abdominal aorta. Embolic protection may also be necessary.    Sedation: A moderate level of sedation achieved with 4 mg IV Versed and 200 mcg IV fentanyl.  Sedation time: 37 minutes.  Patient was unable to hold still throughout the exam, limiting evaluation.  Fluoroscopic time:  8.4 minutes.  Air Kerma: 125 mGy  Contrast: 50 mL of Visipaque administered intra-arterially without complication.  Local anesthetic: 10 mL of 1% lidocaine.    FINDINGS: A total of 8 spot fluoroscopic images and angiogram sequences were obtained throughout the procedure. Abdominal aortic angiogram demonstrates patency of both renal arteries. The infrarenal abdominal aorta is patent. The left common iliac artery   is patent. There may be mild stenosis of the left external iliac artery. There is flush occlusion of the right common iliac, external iliac, and most of the right common femoral arteries as stents extend into the common femoral artery. There is   reconstitution of the distal common femoral and profunda femoral arteries via relatively robust collateralized arterial blood flow. Attempt was made to perform angiogram throughout the right lower extremity. The proximal profunda femoral artery is   patent. Difficult to obtain images as patient unable to hold still. Catheter was placed into the surgical bypass graft which demonstrated sluggish blood flow, though mostly patent. Irregularity noted in the distal graft, uncertain if stenosis or   thrombus. Unable to visualize runoff arteries given patient's inability to hold still. The infusion catheter is in good position by completion. It is within the right common iliac, external iliac, and most of common femoral arterial segments. Crossover   sheath is at the aortic bifurcation.      Impression    IMPRESSION:   1. Right lower extremity and abdominal aortic angiogram. Patient has flush occlusion throughout the right common iliac, external iliac, and majority of right common femoral arteries. 20 cm infusion length catheter was placed and TPA to be administered at   0.5 mg/hour and heparin through crossover sheath at the aortic bifurcation of 500 units/hour.  2. Unable to visualize right lower extremity arteries well as patient unable to hold still. Irregularity of  the distal aspect of the surgical bypass graft, uncertain if stenosis or thrombosis.    Given occlusion is flush with the origin of the right common iliac artery, this will complicate treatment options as may require more proximal extension of kissing stents and/or surgical cutdown for embolic protection.   US Aorta/IVC/Iliac Duplex Limited    Narrative    EXAM: ULTRASOUND LOWER EXTREMITY ARTERIAL DUPLEX RIGHT  LOCATION: [facility]  DATE: 02/15/2025    INDICATION: Bypass graft and arterial evaluation.  COMPARISON: October 17, 2024.  TECHNIQUE: Duplex utilizing 2D gray-scale imaging, Doppler  interrogation with color-flow and spectral waveform analysis.    FINDINGS: The bypass graft is patent with velocities ranging from  20-40 cm/s throughout the graft. Diffusely monophasic waveforms.  Distal native artery is 19/12 cm/s. Posterior tibial and anterior  tibial arteries with monophasic waveforms. Patient has stents in the  common iliac and external iliac arteries. The external iliac portion  of the stent is occluded. The distal aorta is 1.9 cm and patent with  velocities of 54/12 cm/s. Mid abdominal aorta is 2.2 cm and proximal  abdominal aorta is 2.8 cm.      Impression    IMPRESSION:  1.  Occluded right external iliac arterial stent.  2.  Right lower extremity surgical bypass graft is patent, though with  diffusely monophasic waveforms. Runoff arteries also with monophasic  waveforms.   CT Abdomen Pelvis w Contrast    Narrative    EXAM: CT ABDOMEN PELVIS W CONTRAST  LOCATION: [facility]  DATE: 2/15/2025    INDICATION: Intermittent abdominal pain post prandial; Assess prostate and evidence of other acute pathology; Potential relapse in prostate cancer  COMPARISON: 3/1/2024  TECHNIQUE: CT scan of the abdomen and pelvis was performed following injection of IV contrast. Multiplanar reformats were obtained. Dose reduction techniques were used.  CONTRAST: 103 mL of  Isovue-[id number]    FINDINGS:   LOWER CHEST: Dependent atelectasis. Mildly enlarged heart.    HEPATOBILIARY: Normal.    PANCREAS: Normal.    SPLEEN: Normal.    ADRENAL GLANDS: Normal.    KIDNEYS/BLADDER: Normal.    BOWEL: Scattered diverticulosis. No diverticulitis, colitis, or obstruction. No free air or fluid.    LYMPH NODES: Normal.    VASCULATURE: Left common iliac and right common to common femoral stent grafts. Occlusion of the bilateral right common, internal and external iliac arteries. Occlusion of the left common external and internal iliac arteries with TPA catheters in situ.   Patent celiac, superior mesenteric, inferior mesenteric, and renal arteries. Mild atherosclerotic calcifications. No aneurysm.    PELVIC ORGANS: Normal.    MUSCULOSKELETAL: Mild multilevel discogenic degenerative change. Posterior fusion L5-S1.      Impression    IMPRESSION:   1.  Scattered diverticulosis. No diverticulitis, colitis, obstruction, or appendicitis.  2.  Mildly enlarged heart with atherosclerotic vascular disease.  3.  Left common iliac and right common to common femoral stent grafts. Occlusion of the bilateral common, internal, and external iliac arteries with bilateral TPA catheters in situ.  4.  No obstructing renal or ureteral stones. No hydroureteronephrosis.  5.  Celiac, superior mesenteric, inferior mesenteric, and renal arteries widely patent.     IR Angiogram through catheter (arterial)    [facility]  IR ANGIOGRAM THROUGH CATHETER (ARTERIAL)  02/16/2025, 10:57 AM CST    INDICATION: 64-year-old patient with right femoropopliteal surgical bypass graft. Patient has stents throughout the right iliac arterial system to the right common femoral artery. Angiogram and thrombolysis was performed the day prior, patient returns   for repeat evaluation. The iliac stents were occluded, though graft was patent.    TECHNIQUE: Patient was brought to the Interventional Radiology Department and  informed consent obtained. Patient was placed in a supine position. The existing sheath and infusion catheter as well as surrounding skin were prepped and draped in standard   sterile fashion. 1% lidocaine was not needed for today's exam. Angiogram was performed through the infusion catheter throughout the right iliac arterial system and throughout the right lower extremity. There is improved blood flow in the right lower   extremity. Persistent plaque/thrombus at the origin of the right common iliac artery is noted. Also noted is mild stenosis of the left external iliac artery, in the proximal segment. There also may be minimal irregularity in the distal aspect of the   stents on the right in the common femoral artery. Remainder of the bypass graft is patent and anterior tibial artery is dominant runoff artery. Given focal area of thrombus in the right common iliac arterial origin, it is felt to have a high likelihood   of embolization if SFA angioplasty is attempted. Therefore, will place a shorter infusion length catheter at the bifurcation to concentrate TPA administration into this area of thrombus. A stiff angled Glidewire was advanced through the existing infusion   catheter and the 20 cm infusion length catheter was removed. A new 5 cm length catheter was placed into the right iliac arterial system and angiogram performed confirming good location. Sheath was pulled back into the proximal aspect of the left   internal iliac artery. Angiogram performed through both the catheter and sheath confirming appropriate positions.    TPA to be administered through the infusion catheter at 0.5 mg/hour heparin through the sheath at 500 units/hour.    SEDATION: A moderate level of sedation was achieved with 2.5 mg IV Versed and 125 mcg IV fentanyl.  SEDATION TIME: 45 minutes.  Please note the above medications were administered by the interventional radiology staff under my direct supervision. Patient's vital signs were  monitored and remained stable throughout the procedure.  FLUOROSCOPIC TIME: 3.2 minutes.  AIR KERMA: 147 mGy,  CONTRAST: 55 mL of Visipaque administered intra-arterially without complication.  LOCAL ANESTHETIC: None.    FINDINGS: Total of 10 spot fluoroscopic images obtained throughout the procedure. Initial angiogram through the infusion catheter demonstrates antegrade blood flow through the right iliac arterial system, though limited visibility. Angiogram then   performed through the sheath demonstrating patency of the right iliac arterial system, though at the origin of the right common iliac artery is moderate amount of plaque/thrombus. There is mild irregularity in the distal aspect of the stents in the   common femoral arterial segment of the stents. Angiogram then performed throughout the right lower extremity demonstrating patent profunda femoral and majority of the superficial femoral arteries. Surgical bypass graft is widely patent. Runoff artery is   via the dominant anterior tibial artery. Posterior tibial artery also patent, though smaller than anterior tibial artery. Once new 5 cm infusion length catheter in place, angiograms were performed through the infusion catheter in the sheath confirming   appropriate position.      Impression    IMPRESSION:   1.  Improvement in right lower extremity thrombolysis in the iliac arterial system. There is moderate persistent plaque/thrombus at the origin of the right common iliac artery, therefore, previous 20 cm infusion length catheter exchanged for a 5 cm   infusion length catheter. Plan will be to continue TPA at 0.5 mg/hour heparin through the sheath near the aortic bifurcation of 500 units/hour.  2.  Mild irregularity in the distal aspects of the right iliac/common femoral arterial stents, to be evaluated tomorrow, may require angioplasty.  3.  Right lower extremity surgical bypass graft otherwise widely patent.  4.  Mild stenosis in the proximal left external  iliac artery, may consider improved visualization and angioplasty prior to completion of thrombolysis.       IR Angiogram through catheter (arterial)    Narrative    IR ANGIOGRAM THROUGH CATHETER (ARTERIAL)  2/17/2025 10:47 AM CST     HISTORY:  The patient is undergoing catheter directed thrombolysis for treatment of occlusions of the right common and external iliac artery stents.    COMPARISON: Angiogram dated 2/16/2025.    DESCRIPTION OF PROCEDURE: After obtaining informed consent, the patient was placed in a supine position on the fluoroscopy table. Both groins including a left-sided infusion catheter and sheath were prepped and draped in the usual sterile manner. An   angiogram was performed through the infusion catheter. Subsequently, the infusion catheter was replaced with an Omni flush catheter which was positioned in the distal abdominal aorta for a pelvic angiogram and right lower extremity angiogram.    FINDINGS: There had been near complete resolution of thrombus from the right common iliac artery stent as well as stents in the distal right external iliac artery extending into the common femoral artery. Minimal thrombus persisted. This did not appear   to be limiting flow. There was no evidence for new distal emboli to the level of the left tibial trifurcation. There was a significant stenosis at the distal tibial peroneal trunk.    There was a moderate to significant stenosis of the left external iliac artery proximally.    INTERVENTION: The stenosis in the left external iliac artery was stented with a 7 mm x 4 cm self-expanding nitinol stent. This was dilated with a 6 mm balloon. Follow-up angiogram showed improvement in luminal diameter and improved flow.    The left femoral sheath was removed. The puncture site was closed with a 6 Montenegrin Angio-Seal.    Conscious moderate sedation administered. The patient tolerated the procedure well. There were no immediate postprocedure complications. The patient's  vital signs were monitored by radiology nursing staff under my supervision and remained stable   throughout the study.    MEDICATIONS: Versed 4 mg, fentanyl 200 mcg    Sedation Time: 35 minutes    Fluoroscopy Time: 4.1 minutes    Fluoroscopy dose: 409.49 mGy air kerma    Contrast: 55 mL Visipaque      Impression    IMPRESSION: Post catheter directed thrombolysis. There has been near complete resolution of thrombus from the right common iliac artery stent and right distal external iliac to common femoral artery stents. Given the minimal residual thrombus, the   patient will be kept on anticoagulation and may benefit from longer term anticoagulation post discharge. A stenosis in the left external iliac artery was successfully stented as above.       Discharge Medications   Discharge Medication List as of 2/18/2025  1:27 PM        START taking these medications    Details   apixaban ANTICOAGULANT (ELIQUIS) 5 MG tablet Take 1 tablet (5 mg) by mouth 2 times daily., Disp-60 tablet, R-3, E-Prescribe      aspirin (ASA) 81 MG chewable tablet Take 1 tablet (81 mg) by mouth daily., Disp-60 tablet, R-0, E-Prescribe      HYDROmorphone (DILAUDID) 2 MG tablet Take 0.5 tablets (1 mg) by mouth every 6 hours as needed for pain., Disp-8 tablet, R-0, E-Prescribe      nicotine (NICODERM CQ) 14 MG/24HR 24 hr patch Place 1 patch over 24 hours onto the skin daily as needed for nicotine withdrawal symptoms.Disp-30 patch, [id number]           CONTINUE these medications which have NOT CHANGED    Details   atorvastatin (LIPITOR) 80 MG tablet Take 1 tablet (80 mg) by mouth daily., Disp-90 tablet, R-3, E-Prescribe      cholecalciferol (VITAMIN D3) 25 mcg (1000 units) capsule Take 1 capsule by mouth daily as needed., Historical      evolocumab (REPATHA SURECLICK) 140 MG/ML prefilled autoinjector Inject 1 mL (140 mg) subcutaneously every 14 days, Disp-6 mL, R-3, E-Prescribe      ferrous sulfate (FEROSUL) 325 (65 Fe) MG tablet Take 325 mg by  mouth every other day, Historical      metoprolol succinate ER (TOPROL XL) 25 MG 24 hr tablet Take 1 tablet (25 mg) by mouth daily., Disp-90 tablet, R-3, E-Prescribe      nitroGLYcerin (NITROSTAT) 0.4 MG sublingual tablet For chest pain place 1 tablet under the tongue every 5 minutes for 3 doses. If symptoms persist 5 minutes after 1st dose call 911., Disp-25 tablet, R-0, E-Prescribe      sertraline (ZOLOFT) 100 MG tablet Take 200 mg by mouth every morning. (Some times takes 1 tablet BID), Historical           STOP taking these medications       Aspirin 500 MG TABS Comments:   Reason for Stopping:         clopidogrel (PLAVIX) 75 MG tablet Comments:   Reason for Stopping:             Allergies   Allergies   Allergen Reactions    Penicillins Hives, Shortness Of Breath and Swelling     Reports also having throat swelling- took medication as a child.

## 2025-02-18 NOTE — PROGRESS NOTES
[facility]    Medicine Progress Note - Hospitalist Service    Date of Admission:  2/15/2025    Assessment & Plan     Adam Sahni is a 64-year-old male with history of prostate cancer, PAD, s/p bilateral iliac stenting 7/2021 and R femoral-popliteal bypass graft 8/2024, lumbar stenosis s/p fusion 4/2022 with residual right radiculopathy and paresthesias, severe cervical spondylosis and stenosis with left upper extremity radiculopathy, CAD s/p UGO to RCA, severe hyperlipidemia, prediabetes, who presented on 2/15/2025 with 2 days of right lower extremity pain, foot drop and cold extremity due to ischemia.     Acute right lower limb ischemia due to occlusion of right common iliac and external iliac stents, s/p intra-arterial thrombolytics per IR  Severe peripheral arterial disease, s/p bilateral iliac stenting 7/2021  Known right distal SFA and above-knee popliteal artery occlusion  S/p right femoral-popliteal in situ GSV, 8/2024  -PTA on DAPT with aspirin and Plavix.  Follows with vascular surgery, Dr. Gamble  -Presented with 2 days of right lower extremity pain with right foot drop. Cold R leg  -Vascular surgery and IR consulted.   -RLE and abdominal aortic angiogram 2/15 showed flush occlusion throughout R common iliac, external iliac and majority of R common femoral arteries. He was treated intra-arterial tPA and heparin   -repeat IR angiogram 2/16 showed improvement in RLE thrombolysis and iliac arterial system.  Moderate persistent plaque/thrombus at origin of R common iliac artery.  Mild irregularity in distal R iliac/common femoral arterial stents may require angioplasty. RLE bypass graft widely patent.  Mild stenosis in proximal L external iliac artery  -Angiogram 2/17 showed near complete resolution of thrombus from R common iliac artery stent and distal R external iliac to common femoral artery stent. S/p stenting of left external iliac artery  -completed catheter directed  intra-arterial thrombolysis on 2/17  -now on IV heparin infusion, continue  -long term plan - resume DAPT with aspirin and plavix or anticoagulation + aspirin.   -Foot drop has improved.   -discontinue Dilaudid PCA. Has po dilaudid and IV morphine available for pain        PACs with sinus pauses up to 2-3 sec, 0/16/2025  -Patient noted to have baseline hypotension of mid 50s.  Noted to have PACs followed by sinus pauses.  -EP consulted.  Sinus pauses after PACs are considered physiologic as they delay the next sinus beat.  They are not uncommon in early a.m. due to increased vagal tone.  Also not a concern if happens at night  when sleeping.    -EP recommended monitoring for now as long as not having sustained sinus pauses > 10 sec while awake and is asymptomatic.  Consider dobutamine infusion at 5 if frequent sustained sinus pauses.  Avoid beta-blockers    Acute kidney injury  -Baseline creatinine 0.8-0.9.  Presented with creatinine of 1.25  -CT A/P 2/15 showed no evidence of obstruction  -creatinine stable at 1.1.  Continue to monitor     Prostate adenocarcinoma, s/p androgen deprivation therapy and radiation therapy, 2020, with recent concern for recurrence  -Diagnosed with prostate cancer in 2020.  MRI at that time showed almost entire prostate gland replaced with cancer with extraprostatic extension into neurovascular bundle, seminal vesicle, bladder base and probable retroperitoneal and pelvic lymph nodes.  No hayes or osseous metastasis.  -Patient underwent androgen deprivation therapy and radiation therapy, completed  8/2020  -PSA now elevated at 14 in 1/2023.  Concern for relapse  -Patient follows up at [facility] and is scheduled for PET scan on 2/18/2025    Coronary artery disease, s/p interior STEMI 6/2023 with delayed presentation, s/p PCI with UGO to prox-mid RCA, 6/2023  Known severe 70% stenosis of p Lcx (small-medium vessel), mod-severe stenosis of 2 branches of large ramus intermedius, moderate  stenosis of mLAD - on medical management  -Echo 10/2023 showed mildly reduced LVEF 51%, inferior and inferior lateral hypokinesis  -Plavix currently on hold      Essential hypertension  -Toprol-XL on hold.  Blood pressure in normal range  -start on amlodipine     Severe hyperlipidemia, likely familial  -Lipid panel 11/2024 showed LDL 65, HDL 46, triglycerides 150  -Continue atorvastatin 80 mg daily  -Resume Repatha as outpatient    Prediabetes, A1c 5.7  -Follow-up as outpatient    Mild carotid artery stenosis  -Continue to follow-up as outpatient      Intermittent epigastric pain and postprandial nausea  -Reported on admission.  No current issues.  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  B12 and folate were normal.  EGD, colonoscopy, capsule endoscopy showed colonic angioectasias.  Anemia felt to be secondary to chronic blood loss from angioectasias.  -Monitor hemoglobin, currently stable at 10.7    Depression  -Continue sertraline    Tobacco use disorder  -Smokes 10 cigarettes/day, down from 1 pack/day for 30 years  -Uses THC Gummies intermittently to assist with sleep at night  -Nicotine replacement ordered as needed          Diet: Regular Diet Adult    DVT Prophylaxis: Pneumatic Compression Devices  Nova Catheter: Not present  Lines: None       Cardiac Monitoring: ACTIVE order. Indication:  Bradycardias (48 hours)  Code Status: Full Code      Clinically Significant Risk Factors          # Hyperchloremia: Highest Cl = 109 mmol/L in last 2 days, will monitor as appropriate                             # Financial/Environmental Concerns: none         Social Drivers of Health    Depression: At risk (3/22/2024)    Received from [facility]    PHQ-2     PHQ-2 Subtotal: 4   Tobacco Use: High Risk (2/17/2025)    Patient History     Smoking Tobacco Use: Some Days     Smokeless Tobacco Use: Never     Passive Exposure: Current   Physical Activity: Insufficiently Active (1/16/2024)    Received from [facility]    Exercise Vital Sign     Days of Exercise per Week: 1 day     Minutes of Exercise per Session: 20 min   Stress: Stress Concern Present (1/13/2023)    Received from [facility]    [place] [employer] - Occupational Stress Questionnaire     Feeling of Stress : Rather much   Social Connections: Socially Isolated (1/13/2023)    Received from [facility]    Social Connection and Isolation Panel [NHANES]     Frequency of Communication with Friends and Family: Once a week     Frequency of Social Gatherings with Friends and Family: Once a week     Attends Christianity Services: Never     Active Member of Clubs or Organizations: No     Attends Club or Organization Meetings: 1 to 4 times per year     Marital Status: Never           Disposition Plan         Medically Ready for Discharge: Anticipated Tomorrow             Marcela Mir MD  Hospitalist Service  [facility]  Securely message with [employer] (more info)  Text page via [employer] Paging/Directory   ______________________________________________________________________    Interval History     Resting in bed. Feels ok  Vitals ok  No complains other than mild pain      Physical Exam   Vital Signs: Temp: 99.4  F (37.4  C) Temp src: Oral BP: (!) 145/80 Pulse: 71   Resp: 10  SpO2: 98 % O2 Device: None (Room air) Oxygen Delivery: 1 LPM  Weight: 216 lbs 14.92 oz    Constitutional - awake and alert, resting in bed, in no acute distress  Cardiovascular - regular rhythm, slow rate, no murmurs, no edema  Pulmonary - lungs are clear to auscultation bilaterally, no wheezing or rhonchi  GI - abdomen is soft, nontender, nondistended  Integumentary - skin is warm and dry, no rashes or ulcers  Neurological - awake, alert and oriented x3.  Moving all 4 extremities, normal speech, no focal deficits    Medical Decision Making       40 MINUTES SPENT BY ME on the date of service doing chart review, history, exam, documentation & further activities per the note.      Data     I have personally reviewed the following data over the past 24 hrs:    6.9  \   10.7 (L)   / 135 (L)     140 108 (H) 13.8 /  100 (H)   3.9 21 (L) 1.10 \     INR:  N/A PTT:  N/A   D-dimer:  N/A Fibrinogen:  430       Imaging results reviewed over the past 24 hrs:   Recent Results (from the past 24 hours)   IR Angiogram through catheter (arterial)    Narrative    IR ANGIOGRAM THROUGH CATHETER (ARTERIAL)  2/17/2025 10:47 AM CST     HISTORY:  The patient is undergoing catheter directed thrombolysis for treatment of occlusions of the right common and external iliac artery stents.    COMPARISON: Angiogram dated 2/16/2025.    DESCRIPTION OF PROCEDURE: After obtaining informed consent, the patient was placed in a supine position on the fluoroscopy table. Both groins including a left-sided infusion catheter and sheath were prepped and draped in the usual sterile manner. An   angiogram was performed through the infusion catheter. Subsequently, the infusion catheter was replaced with an Omni flush catheter which was positioned in the distal abdominal aorta for a pelvic angiogram and right lower extremity angiogram.    FINDINGS: There had been near complete resolution of thrombus from the right common iliac artery stent as well as stents in the  distal right external iliac artery extending into the common femoral artery. Minimal thrombus persisted. This did not appear   to be limiting flow. There was no evidence for new distal emboli to the level of the left tibial trifurcation. There was a significant stenosis at the distal tibial peroneal trunk.    There was a moderate to significant stenosis of the left external iliac artery proximally.    INTERVENTION: The stenosis in the left external iliac artery was stented with a 7 mm x 4 cm self-expanding nitinol stent. This was dilated with a 6 mm balloon. Follow-up angiogram showed improvement in luminal diameter and improved flow.    The left femoral sheath was removed. The puncture site was closed with a 6 Kyr[place]z Angio-Seal.    Conscious moderate sedation administered. The patient tolerated the procedure well. There were no immediate postprocedure complications. The patient's vital signs were monitored by radiology nursing staff under my supervision and remained stable   throughout the study.    MEDICATIONS: Versed 4 mg, fentanyl 200 mcg    Sedation Time: 35 minutes    Fluoroscopy Time: 4.1 minutes    Fluoroscopy dose: 409.49 mGy air kerma    Contrast: 55 mL Visipaque      Impression    IMPRESSION: Post catheter directed thrombolysis. There has been near complete resolution of thrombus from the right common iliac artery stent and right distal external iliac to common femoral artery stents. Given the minimal residual thrombus, the   patient will be kept on anticoagulation and may benefit from longer term anticoagulation post discharge. A stenosis in the left external iliac artery was successfully stented as above.

## 2025-02-18 NOTE — PROGRESS NOTES
Vitally stable on RA, needs met this shift, Not OOB this shift, voids adequately using urinal, L groin site CDI, Heparin infusing@1350 units/hr, pain controlled with PRN dilaudid.pending discharge, safe deposition plan.

## 2025-02-18 NOTE — CONSULTS
Patient has [employer] through the marketplace with $3300 deductible; may have HSA.    Xarelto/Eliquis:  $596/mo. Upon receipt of RX Discharge Pharmacy can provide copay savings card from [employer] or eliquis.com, respectively, to reduce this to $10/mo ongoing with no expiration.    Joann Berman  Pharmacy Technician/Liaison, Discharge Pharmacy   [phone] (voice or text)  [email or web address]  Pharmacy test claims are estimates and may not reflect final costs.  Suggested alternatives aim to be cost-effective and may not be therapeutically equivalent as this consult is informational and does not constitute medical advice.  Clinical decisions should be made by qualified healthcare providers.

## 2025-02-18 NOTE — PROGRESS NOTES
02/18/25 1300   Appointment Info   Signing Clinician's Name / Credentials (PT) Ivana Ruiz PT   Living Environment   People in Home alone   Current Living Arrangements apartment   Home Accessibility no concerns   Self-Care   Usual Activity Tolerance good   Current Activity Tolerance good   Equipment Currently Used at Home none   Fall history within last six months yes   Number of times patient has fallen within last six months 1   General Information   Onset of Illness/Injury or Date of Surgery 02/18/25   Pertinent History of Current Problem (include personal factors and/or comorbidities that impact the POC) 64-year-old male with history of prostate cancer, PAD, s/p bilateral iliac stenting 7/2021 and R femoral-popliteal bypass graft 8/2024, lumbar stenosis s/p fusion 4/2022 with residual right radiculopathy and paresthesias, severe cervical spondylosis and stenosis with left upper extremity radiculopathy, CAD s/p UGO to RCA, severe hyperlipidemia, prediabetes, who presented on 2/15/2025 with 2 days of right lower extremity pain, foot drop and cold extremity due to ischemia.   Existing Precautions/Restrictions weight bearing   Cognition   Affect/Mental Status (Cognition) WFL   Orientation Status (Cognition) oriented x 4   Follows Commands (Cognition) WFL   Range of Motion (ROM)   Range of Motion ROM is WFL   Strength (Manual Muscle Testing)   Strength (Manual Muscle Testing) strength is WFL   Bed Mobility   Comment, (Bed Mobility) independent   Transfers   Comment, (Transfers) independent   Gait/Stairs (Locomotion)   Comment, (Gait/Stairs) SBA amb over 400 ft mildly antalgic pattern able to negotiated changes in direction and pace   Balance   Balance Comments dynamic mildly impaired baseline d/t neuropathy   Sensory Examination   Sensory Perception Comments numbness in both feet   Clinical Impression   Criteria for Skilled Therapeutic Intervention Evaluation only   PT Diagnosis (PT) s/p vascular surgery    Clinical Presentation (PT Evaluation Complexity) stable   Clinical Decision Making (Complexity) low complexity   Risk & Benefits of therapy have been explained evaluation/treatment results reviewed;care plan/treatment goals reviewed   PT Discharge Planning   PT Discharge Recommendation (DC Rec) home with outpatient physical therapy   PT Rationale for DC Rec Pt mobilizing well, SBA or better, limited functional endurance and LE fatigue, may benefit from cont OPPT for strengthening, conditioning and balance, orders req, no further IP PT needs.

## 2025-02-18 NOTE — PROGRESS NOTES
Date & Time: 2/18/25 @ 8821-0572  Surgery/POD#1: L groin stent placement in IR  Behavior & Aggression: Green  Fall Risk: Yes  Orientation:x4  ABNL VS/O2:HTN   ABNL Labs: Hgb 10.7  Pain Management:PRN Dilaudid given @ 0800.    Bowel/Bladder: BM x1. Urinal   Drains: None  Wounds/incisions: L groin incision CDI.  Diet:Regular  Activity Level: A1/wlk/gb  Tests/Procedures: None  Anticipated  DC Date: TBD  Significant Information: Tele- occasional Fahad. Hep dri[ discontinued. Eligarrick stared today. Baseline BLE neuropath. Discharge orders placed. PIV removed. Pt will drive himself home. Charge nurse notified. Pt sent home with meds and AVS.

## 2025-02-18 NOTE — PROGRESS NOTES
Vascular Surgery Progress Note    S: Transferred to surgical floor yesterday morning.  Did well through the night.  No issues with legs--right feels completely normal.    O:   Vitals:  BP  Min: 124/64  Max: 150/86  Temp  Av.5  F (37.5  C)  Min: 98.2  F (36.8  C)  Max: 100.5  F (38.1  C)  Pulse  Av.2  Min: 45  Max: 84  I/O last 3 completed shifts:  In: 769.92 [I.V.:769.92]  Out:  [Urine:]    Physical Exam: Alert and appropriate.  Comfortable.  Conversant.  Left groin access site =A  +3 right DP pulse.     Hgb= 10.7 A1c= 5.7          Assessment/Plan: #1.  Doing very well following recanalization with clinic of occluded right iliac artery system.  Distal embolus to femoral to popliteal in situ graft that has completely resolved with excellent outflow.  Also angioplastied moderate left external neck artery stenosis.     #2.  Etiology unclear since we did not find a high-grade stenosis of the right common iliac artery old stent.  Due to this would like to have patient on DOVC and baby aspirin for a minimum of 3 months.  Will repeat duplex ultrasound of iliacs and grafts at that time in clinic.  Discussed reasons with patient.    #3.  Mild acute blood loss anemia most likely from angio sheath and access.  Over-the-counter iron supplements.    #4.  History of prostate cancer.  Had PET scan scheduled at [facility] in [place] later this afternoon that he will have to reschedule.    #5.  Ongoing tobacco abuse.  With multiple vascular problems including coronary and PAD needs to quit smoking and he is aware of this.    Home today.  Follow-up with me in the clinic in 2 to 3 weeks.  No specific restrictions to activities.      Wm. Tye MD

## 2025-02-19 ENCOUNTER — TELEPHONE (OUTPATIENT)
Dept: OTHER | Facility: CLINIC | Age: 65
End: 2025-02-19
Payer: COMMERCIAL

## 2025-02-19 NOTE — TELEPHONE ENCOUNTER
Per Dr. Gamble, please contact patient to coordinate the following:  In clinic visit with Dr. Gamble  No imaging needed.  Schedule this in 2-3 weeks      Appt note:  Follow up to 2/15/25 - 2/18/25 hospitalization for occluded graft.    Meme Michael, JOANNN, RN, CV-BC, CNOR  [facility]

## 2025-02-20 LAB — BACTERIA BLD CULT: NO GROWTH

## 2025-02-20 NOTE — TELEPHONE ENCOUNTER
Left voicemail for patient to call back to schedule appointment(s), provided telephone number for patient to call back to schedule.      Cindy Sharp MA

## 2025-02-24 NOTE — TELEPHONE ENCOUNTER
Left second attempt voicemail for patient to call back to schedule appointment(s), provided telephone number for patient to call back to schedule.      Cindy Sharp MA

## 2025-03-02 ENCOUNTER — APPOINTMENT (OUTPATIENT)
Dept: GENERAL RADIOLOGY | Facility: CLINIC | Age: 65
End: 2025-03-02
Attending: EMERGENCY MEDICINE
Payer: COMMERCIAL

## 2025-03-02 ENCOUNTER — HOSPITAL ENCOUNTER (EMERGENCY)
Facility: CLINIC | Age: 65
Discharge: HOME | End: 2025-03-02
Attending: EMERGENCY MEDICINE | Admitting: EMERGENCY MEDICINE
Payer: COMMERCIAL

## 2025-03-02 VITALS
DIASTOLIC BLOOD PRESSURE: 90 MMHG | BODY MASS INDEX: 28.63 KG/M2 | OXYGEN SATURATION: 97 % | SYSTOLIC BLOOD PRESSURE: 141 MMHG | TEMPERATURE: 97.4 F | HEART RATE: 68 BPM | HEIGHT: 70 IN | WEIGHT: 200 LBS | RESPIRATION RATE: 19 BRPM

## 2025-03-02 DIAGNOSIS — M25.511 ACUTE PAIN OF BOTH SHOULDERS: ICD-10-CM

## 2025-03-02 DIAGNOSIS — M79.10 MYALGIA: ICD-10-CM

## 2025-03-02 DIAGNOSIS — B34.9 VIRAL SYNDROME: Primary | ICD-10-CM

## 2025-03-02 DIAGNOSIS — M25.512 ACUTE PAIN OF BOTH SHOULDERS: ICD-10-CM

## 2025-03-02 DIAGNOSIS — R05.1 ACUTE COUGH: ICD-10-CM

## 2025-03-02 LAB
ANION GAP SERPL CALCULATED.3IONS-SCNC: 14 MMOL/L (ref 7–15)
ATRIAL RATE - MUSE: 68 BPM
BASOPHILS # BLD AUTO: 0 10E3/UL (ref 0–0.2)
BASOPHILS NFR BLD AUTO: 1 %
BUN SERPL-MCNC: 14.7 MG/DL (ref 8–23)
CALCIUM SERPL-MCNC: 8.7 MG/DL (ref 8.8–10.4)
CHLORIDE SERPL-SCNC: 102 MMOL/L (ref 98–107)
CREAT SERPL-MCNC: 1.13 MG/DL (ref 0.67–1.17)
DIASTOLIC BLOOD PRESSURE - MUSE: NORMAL MMHG
EGFRCR SERPLBLD CKD-EPI 2021: 73 ML/MIN/1.73M2
EOSINOPHIL # BLD AUTO: 0.3 10E3/UL (ref 0–0.7)
EOSINOPHIL NFR BLD AUTO: 4 %
ERYTHROCYTE [DISTWIDTH] IN BLOOD BY AUTOMATED COUNT: 14.8 % (ref 10–15)
FLUAV RNA SPEC QL NAA+PROBE: NEGATIVE
FLUBV RNA RESP QL NAA+PROBE: NEGATIVE
GLUCOSE SERPL-MCNC: 108 MG/DL (ref 70–99)
HCO3 SERPL-SCNC: 21 MMOL/L (ref 22–29)
HCT VFR BLD AUTO: 35.5 % (ref 40–53)
HGB BLD-MCNC: 11.9 G/DL (ref 13.3–17.7)
IMM GRANULOCYTES # BLD: 0 10E3/UL
IMM GRANULOCYTES NFR BLD: 0 %
INTERPRETATION ECG - MUSE: NORMAL
LYMPHOCYTES # BLD AUTO: 1.6 10E3/UL (ref 0.8–5.3)
LYMPHOCYTES NFR BLD AUTO: 25 %
MAGNESIUM SERPL-MCNC: 2 MG/DL (ref 1.7–2.3)
MCH RBC QN AUTO: 28.9 PG (ref 26.5–33)
MCHC RBC AUTO-ENTMCNC: 33.5 G/DL (ref 31.5–36.5)
MCV RBC AUTO: 86 FL (ref 78–100)
MONOCYTES # BLD AUTO: 0.5 10E3/UL (ref 0–1.3)
MONOCYTES NFR BLD AUTO: 7 %
NEUTROPHILS # BLD AUTO: 3.9 10E3/UL (ref 1.6–8.3)
NEUTROPHILS NFR BLD AUTO: 62 %
NRBC # BLD AUTO: 0 10E3/UL
NRBC BLD AUTO-RTO: 0 /100
P AXIS - MUSE: 42 DEGREES
PLATELET # BLD AUTO: 227 10E3/UL (ref 150–450)
POTASSIUM SERPL-SCNC: 4 MMOL/L (ref 3.4–5.3)
PR INTERVAL - MUSE: 186 MS
QRS DURATION - MUSE: 94 MS
QT - MUSE: 388 MS
QTC - MUSE: 412 MS
R AXIS - MUSE: 7 DEGREES
RBC # BLD AUTO: 4.12 10E6/UL (ref 4.4–5.9)
RSV RNA SPEC NAA+PROBE: NEGATIVE
SARS-COV-2 RNA RESP QL NAA+PROBE: NEGATIVE
SODIUM SERPL-SCNC: 137 MMOL/L (ref 135–145)
SYSTOLIC BLOOD PRESSURE - MUSE: NORMAL MMHG
T AXIS - MUSE: 17 DEGREES
TROPONIN T SERPL HS-MCNC: 26 NG/L
TROPONIN T SERPL HS-MCNC: 27 NG/L
VENTRICULAR RATE- MUSE: 68 BPM
WBC # BLD AUTO: 6.2 10E3/UL (ref 4–11)

## 2025-03-02 PROCEDURE — 71046 X-RAY EXAM CHEST 2 VIEWS: CPT

## 2025-03-02 PROCEDURE — 99285 EMERGENCY DEPT VISIT HI MDM: CPT | Mod: 25

## 2025-03-02 PROCEDURE — 83735 ASSAY OF MAGNESIUM: CPT | Performed by: EMERGENCY MEDICINE

## 2025-03-02 PROCEDURE — 80048 BASIC METABOLIC PNL TOTAL CA: CPT | Performed by: EMERGENCY MEDICINE

## 2025-03-02 PROCEDURE — 85004 AUTOMATED DIFF WBC COUNT: CPT | Performed by: EMERGENCY MEDICINE

## 2025-03-02 PROCEDURE — 82565 ASSAY OF CREATININE: CPT | Performed by: EMERGENCY MEDICINE

## 2025-03-02 PROCEDURE — 36415 COLL VENOUS BLD VENIPUNCTURE: CPT | Performed by: EMERGENCY MEDICINE

## 2025-03-02 PROCEDURE — 84484 ASSAY OF TROPONIN QUANT: CPT | Performed by: EMERGENCY MEDICINE

## 2025-03-02 PROCEDURE — 87637 SARSCOV2&INF A&B&RSV AMP PRB: CPT | Performed by: EMERGENCY MEDICINE

## 2025-03-02 PROCEDURE — 93005 ELECTROCARDIOGRAM TRACING: CPT

## 2025-03-02 PROCEDURE — 250N000013 HC RX MED GY IP 250 OP 250 PS 637: Performed by: EMERGENCY MEDICINE

## 2025-03-02 RX ORDER — BENZONATATE 200 MG/1
200 CAPSULE ORAL 3 TIMES DAILY PRN
Qty: 20 CAPSULE | Refills: 0 | Status: SHIPPED | OUTPATIENT
Start: 2025-03-02

## 2025-03-02 RX ORDER — ACETAMINOPHEN 500 MG
1000 TABLET ORAL ONCE
Status: COMPLETED | OUTPATIENT
Start: 2025-03-02 | End: 2025-03-02

## 2025-03-02 RX ORDER — CYCLOBENZAPRINE HCL 10 MG
10 TABLET ORAL 3 TIMES DAILY PRN
Qty: 21 TABLET | Refills: 0 | Status: SHIPPED | OUTPATIENT
Start: 2025-03-02

## 2025-03-02 RX ADMIN — ACETAMINOPHEN 1000 MG: 500 TABLET, FILM COATED ORAL at 07:22

## 2025-03-02 ASSESSMENT — ACTIVITIES OF DAILY LIVING (ADL)
ADLS_ACUITY_SCORE: 59

## 2025-03-02 NOTE — ED TRIAGE NOTES
Complaint of cough, neck pan and bilateral arm pain. Symptoms started Friday night.       Triage Assessment (Adult)       Row Name 03/02/25 0620          Triage Assessment    Airway WDL WDL        Respiratory WDL    Respiratory WDL X;cough     Cough Frequency frequent        Skin Circulation/Temperature WDL    Skin Circulation/Temperature WDL WDL        Cardiac WDL    Cardiac WDL WDL        Peripheral/Neurovascular WDL    Peripheral Neurovascular WDL WDL        Cognitive/Neuro/Behavioral WDL    Cognitive/Neuro/Behavioral WDL WDL

## 2025-03-02 NOTE — ED PROVIDER NOTES
Emergency Department Note      History of Present Illness     Chief Complaint   Flu Symptoms    HPI   Adam Sahni is a 64 year old male, anticoagulated with Eliquis, with history of STEMI, peripheral artery disease, CAD, and hyperlipidemia who presents to the ED via EMS for evaluation of flu symptoms. The patient reports he got a PET scan on Friday for his prostate. After this, he developed vomiting and diarrhea which has since subsided. He tried to eat something, and began sneezing. When he went to go to bed Friday night, he developed a dull but strong pain in his neck that radiates to his back and shoulders. After this, he had a productive cough which has persisted to today. He did take a decongestant, which helped, but he has not tried Tylenol. Patient notes he has a history of neck pain, but it has never radiated to his shoulders. Also has history of abdominal pain, but he's had this for a while. Denies fever, sore throat, rash, or sick contacts. Patient did not get his flu shot this year. Of note, patient is on Eliquis and missed one dose for his PET scan.    Independent Historian   None    Review of External Notes   I reviewed the discharge summary with Dr. Mir on 2/18/25. Patient was admitted for right lower extremity pain and cold due to ischemia.    Past Medical History     Medical History and Problem List   Adenomatous polyp of colon  Anemia  Arterial occlusion   CAD  Cervical radiculopathy   Cervical stenosis   Depression  Hyperlipidemia   Iron deficiency anemia   Lumbar radiculopathy  Migraines   Nephrolithiasis   Osteopenia   Osteoporosis   Peripheral artery disease  Peripheral neuropathy  Prediabetes  Prostate cancer   Prostatitis   Reactive gastropathy   STEMI    Medications   Aspirin 81 mg  Cymbalta   Eliquis   Flexeril   Fosamax   Keppra   Lipitor   Lyrica   Neurontin   Nitrostat   Pletal   Toprol   Zoloft    Surgical History   Appendectomy   Artery surgery   Coronary angiogram   Coronary  "stent placement   Cystoscopy x2  GI colon diagnostic   GI colon with polyp by biopsy  GI colon with snare x4  GI EGD diagnostic x2  GI EGD with biopsy   IR angiogram through catheter, arterial x2  IR lower extremity angiogram, right x2  Prostate biopsy   Right common femoral artery to below knee popliteal in situ bypass graft  Right L4-5 hemilaminectomy and lateral recess decompression and associated L5 foraminotomy down into the L5-S1 level   Spine surgery   Video capsule endoscopy     Physical Exam     Patient Vitals for the past 24 hrs:   BP Temp Temp src Pulse Resp SpO2 Height Weight   03/02/25 0720 133/78 -- -- 70 13 96 % -- --   03/02/25 0618 (!) 141/91 97.4  F (36.3  C) Temporal 96 18 96 % 1.778 m (5' 10\") 90.7 kg (200 lb)     Physical Exam  General: Alert, appears well-developed and well-nourished. Cooperative.     In mild distress  HEENT:  Head:  Atraumatic  Ears:  External ears are normal  Mouth/Throat:  Oropharynx is without erythema or exudate and mucous membranes are moist.   Eyes:   Conjunctivae normal and EOM are normal. No scleral icterus.  CV:  Normal rate, regular rhythm, normal heart sounds and radial pulses are 2+ and symmetric.  No murmur.  Resp:  Breath sounds are clear bilaterally, dry cough.    Non-labored, no retractions or accessory muscle use  GI:  Abdomen is soft, no distension, no tenderness. No rebound or guarding.  No CVA tenderness bilaterally  MS:  Normal range of motion. No edema.    Normal strength in all 4 extremities.     Back atraumatic.    No midline cervical, thoracic, or lumbar tenderness  Skin:  Warm and dry.  No rash or lesions noted.  Neuro:   Alert. Normal strength, including bilateral upper extremities.  Sensation intact in all 4 extremities. GCS: 15.  Psych: Normal mood and affect.    Diagnostics     Lab Results   Labs Ordered and Resulted from Time of ED Arrival to Time of ED Departure   BASIC METABOLIC PANEL - Abnormal       Result Value    Sodium 137      Potassium " 4.0      Chloride 102      Carbon Dioxide (CO2) 21 (*)     Anion Gap 14      Urea Nitrogen 14.7      Creatinine 1.13      GFR Estimate 73      Calcium 8.7 (*)     Glucose 108 (*)    TROPONIN T, HIGH SENSITIVITY - Abnormal    Troponin T, High Sensitivity 27 (*)    CBC WITH PLATELETS AND DIFFERENTIAL - Abnormal    WBC Count 6.2      RBC Count 4.12 (*)     Hemoglobin 11.9 (*)     Hematocrit 35.5 (*)     MCV 86      MCH 28.9      MCHC 33.5      RDW 14.8      Platelet Count 227      % Neutrophils 62      % Lymphocytes 25      % Monocytes 7      % Eosinophils 4      % Basophils 1      % Immature Granulocytes 0      NRBCs per 100 WBC 0      Absolute Neutrophils 3.9      Absolute Lymphocytes 1.6      Absolute Monocytes 0.5      Absolute Eosinophils 0.3      Absolute Basophils 0.0      Absolute Immature Granulocytes 0.0      Absolute NRBCs 0.0     TROPONIN T, HIGH SENSITIVITY - Abnormal    Troponin T, High Sensitivity 26 (*)    INFLUENZA A/B, RSV AND SARS-COV2 PCR - Normal    Influenza A PCR Negative      Influenza B PCR Negative      RSV PCR Negative      SARS CoV2 PCR Negative     MAGNESIUM - Normal    Magnesium 2.0       Imaging   XR Chest 2 Views   Final Result   IMPRESSION: Negative chest. No interval change.        EKG   ECG taken at 0654, ECG read at 0706  Normal sinus rhythm  Inferior infarct, age undetermined  Cannot rule out anterior infarct, age undetermined   No significant change as compared to prior, dated 2/16/25.  Rate 68 bpm. AZ interval 186 ms. QRS duration 94 ms. QT/QTc 388/412 ms. P-R-T axes 42 7 17.    Independent Interpretation   CXR: No pneumothorax or infiltrate.    ED Course      Medications Administered   Medications   acetaminophen (TYLENOL) tablet 1,000 mg (1,000 mg Oral $Given 3/2/25 2104)     Procedures   None      Discussion of Management   None    ED Course   ED Course as of 03/02/25 1011   Sun Mar 02, 2025   0651 I obtained history and examined the patient as noted above.    1005 I rechecked  and updated the patient.      Additional Documentation  None    Medical Decision Making / Diagnosis     CMS Diagnoses: None    MIPS       None    MDM   Adam Sahni is a 64 year old male with a complex past medical history pertinent for CAD, PAD, hypertension and hyperlipidemia who presents with cough, bilateral shoulder discomfort, cough and body fatigue.  Patient has no focal neurologic deficits that are suspicious for for cervical radiculopathy or discogenic cervical pain today.  Patient has no active numbness or tingling in the upper extremities.  He has reproducible muscle aches/tenderness to palpation of bilateral trapezius mm.  Given concerns for potential atypical chest pain presentation we did obtain EKG which showed no evidence of arrhythmias nor ischemic changes in comparison with historic EKGs.  He had 2 troponin studies here in the emergency department which have been nondynamic and very low suspicion this would be representative of an atypical ACS presentation.  His vital signs are stable with no presence of hypoxia or severe hypertension.  Very low suspicion for aortic disease such as dissection or aneurysm given the ongoing viral symptoms associated with his presentation today.  Electrolytes, renal function, and CBC appear to be within normal range for the patient.  Influenza, RSV, COVID is negative.  This may represent a likely viral syndrome given his body aches and cough.  We discussed use of cough medications and close follow-up with primary care in the next 1 week for recheck.  At this time, given the length of symptoms would not recommend antibiotics as low suspicion for bacterial bronchitis or other illness.  Chest x-ray also did not reveal pneumonia today.  Follow-up with primary in the next 1 week.  Return precautions understood and after all questions answered, discharged home.    Disposition   The patient was discharged.     Diagnosis     ICD-10-CM    1. Viral syndrome  B34.9       2.  Acute cough  R05.1       3. Acute pain of both shoulders  M25.511     M25.512       4. Myalgia  M79.10          Discharge Medications   New Prescriptions    BENZONATATE (TESSALON) 200 MG CAPSULE    Take 1 capsule (200 mg) by mouth 3 times daily as needed for cough.    CYCLOBENZAPRINE (FLEXERIL) 10 MG TABLET    Take 1 tablet (10 mg) by mouth 3 times daily as needed for muscle spasms.     Scribe Disclosure:  BHAVNA ROD, am serving as a scribe at 6:36 AM on 3/2/2025 to document services personally performed by Olman Rico MD based on my observations and the provider's statements to me.      Olman Rico MD  03/02/25 4365

## 2025-03-22 ENCOUNTER — HEALTH MAINTENANCE LETTER (OUTPATIENT)
Age: 65
End: 2025-03-22

## 2025-04-16 ENCOUNTER — TRANSFERRED RECORDS (OUTPATIENT)
Dept: HEALTH INFORMATION MANAGEMENT | Facility: CLINIC | Age: 65
End: 2025-04-16
Payer: COMMERCIAL

## 2025-04-18 NOTE — PHARMACY-ADMISSION MEDICATION HISTORY
Good Morning.  Patient received a letter for jury duty, and was wondering if you could write a letter for her stating she is unable to serve at this time.     Pharmacist Admission Medication History    Admission medication history is complete. The information provided in this note is only as accurate as the sources available at the time of the update.    Medication reconciliation/reorder completed by provider prior to medication history? Yes    Information Source(s): Patient and CareEverywhere/SureScripts via in-person    Pertinent Information: Patient takes medication inconsistently feeling they do not work.  Fill history suggest patient is nonadherent to regimen.    Changes made to PTA medication list:  Added: Levetiracetam  Deleted: Gabapentenvitamins, fish oil  Changed: Pregabalin, sertraline        Allergies reviewed with patient and updates made in EHR: no    Medication History Completed By: Arjun Beck [facility] 9/2/2023 11:03 PM    Prior to Admission medications    Medication Sig Last Dose Taking? Auth Provider Long Term End Date   alendronate (FOSAMAX) 70 MG tablet Take 70 mg by mouth every 7 days Past Month Yes Unknown, Entered By History Yes    aspirin (ASA) 81 MG chewable tablet Take 1 tablet (81 mg) by mouth daily Past Month Yes Glen Padilla MD     atorvastatin (LIPITOR) 80 MG tablet Take 80 mg by mouth daily Unknown Yes Unknown, Entered By History No    cilostazol (PLETAL) 100 MG tablet Take 100 mg by mouth 2 times daily Unknown Yes Reported, Patient Yes    clopidogrel (PLAVIX) 75 MG tablet Take 1 tablet (75 mg) by mouth daily 9/1/2023 Yes Dorian Gamble MD Yes    ferrous sulfate (FEROSUL) 325 (65 Fe) MG tablet Take 325 mg by mouth every other day Past Week Yes Unknown, Entered By History     levETIRAcetam (KEPPRA) 500 MG tablet Take 500 mg by mouth 2 times daily 9/1/2023 Yes Unknown, Entered By History Yes    metoprolol tartrate (LOPRESSOR) 25 MG tablet Take 0.25 tablets (6.25 mg) by mouth 2 times daily Unknown Yes Vivien Gama,  Yes    pregabalin (LYRICA) 150 MG capsule Take 150 mg by mouth 2 times daily Past Week Yes  Unknown, Entered By History Yes    sertraline (ZOLOFT) 100 MG tablet Take 200 mg by mouth daily 9/2/2023 at AM Yes Unknown, Entered By History No    nitroGLYcerin (NITROSTAT) 0.4 MG sublingual tablet For chest pain place 1 tablet under the tongue every 5 minutes for 3 doses. If symptoms persist 5 minutes after 1st dose call 911.   Glen Padilla MD Yes

## 2025-04-25 ENCOUNTER — TRANSFERRED RECORDS (OUTPATIENT)
Dept: HEALTH INFORMATION MANAGEMENT | Facility: CLINIC | Age: 65
End: 2025-04-25
Payer: COMMERCIAL

## 2025-05-04 ENCOUNTER — HOSPITAL ENCOUNTER (OUTPATIENT)
Dept: MRI IMAGING | Facility: CLINIC | Age: 65
Discharge: HOME | End: 2025-05-04
Attending: NURSE PRACTITIONER
Payer: COMMERCIAL

## 2025-05-04 PROCEDURE — 70553 MRI BRAIN STEM W/O & W/DYE: CPT

## 2025-05-04 PROCEDURE — 72157 MRI CHEST SPINE W/O & W/DYE: CPT

## 2025-05-04 PROCEDURE — 72156 MRI NECK SPINE W/O & W/DYE: CPT

## 2025-05-04 PROCEDURE — 72158 MRI LUMBAR SPINE W/O & W/DYE: CPT | Mod: 52

## 2025-05-04 PROCEDURE — A9585 GADOBUTROL INJECTION: HCPCS | Performed by: NURSE PRACTITIONER

## 2025-05-04 PROCEDURE — 255N000002 HC RX 255 OP 636: Performed by: NURSE PRACTITIONER

## 2025-05-04 RX ADMIN — GADOBUTROL 10 ML: 604.72 INJECTION INTRAVENOUS at 08:37

## 2025-05-10 ENCOUNTER — HOSPITAL ENCOUNTER (OUTPATIENT)
Dept: MRI IMAGING | Facility: CLINIC | Age: 65
Discharge: HOME | End: 2025-05-10
Attending: INTERNAL MEDICINE | Admitting: INTERNAL MEDICINE
Payer: COMMERCIAL

## 2025-05-10 DIAGNOSIS — C61 MALIGNANT NEOPLASM OF PROSTATE (H): ICD-10-CM

## 2025-05-10 PROCEDURE — A9585 GADOBUTROL INJECTION: HCPCS | Performed by: INTERNAL MEDICINE

## 2025-05-10 PROCEDURE — 255N000002 HC RX 255 OP 636: Performed by: INTERNAL MEDICINE

## 2025-05-10 PROCEDURE — 72158 MRI LUMBAR SPINE W/O & W/DYE: CPT

## 2025-05-10 RX ORDER — GADOBUTROL 604.72 MG/ML
9 INJECTION INTRAVENOUS ONCE
Status: COMPLETED | OUTPATIENT
Start: 2025-05-10 | End: 2025-05-10

## 2025-05-10 RX ADMIN — GADOBUTROL 9 ML: 604.72 INJECTION INTRAVENOUS at 16:10

## 2025-05-15 ENCOUNTER — OFFICE VISIT (OUTPATIENT)
Dept: FAMILY MEDICINE | Facility: CLINIC | Age: 65
End: 2025-05-15
Payer: COMMERCIAL

## 2025-05-15 VITALS
DIASTOLIC BLOOD PRESSURE: 68 MMHG | RESPIRATION RATE: 20 BRPM | HEART RATE: 104 BPM | HEIGHT: 68 IN | SYSTOLIC BLOOD PRESSURE: 116 MMHG | OXYGEN SATURATION: 98 % | BODY MASS INDEX: 28.34 KG/M2 | TEMPERATURE: 97.3 F | WEIGHT: 187 LBS

## 2025-05-15 DIAGNOSIS — Z00.00 ANNUAL PHYSICAL EXAM: Primary | ICD-10-CM

## 2025-05-15 DIAGNOSIS — Z87.891 HISTORY OF TOBACCO USE, PRESENTING HAZARDS TO HEALTH: ICD-10-CM

## 2025-05-15 PROBLEM — F33.2 SEVERE EPISODE OF RECURRENT MAJOR DEPRESSIVE DISORDER, WITHOUT PSYCHOTIC FEATURES (H): Status: RESOLVED | Noted: 2023-03-09 | Resolved: 2025-05-15

## 2025-05-15 SDOH — HEALTH STABILITY: PHYSICAL HEALTH: ON AVERAGE, HOW MANY DAYS PER WEEK DO YOU ENGAGE IN MODERATE TO STRENUOUS EXERCISE (LIKE A BRISK WALK)?: 2 DAYS

## 2025-05-15 ASSESSMENT — PAIN SCALES - GENERAL: PAINLEVEL_OUTOF10: NO PAIN (0)

## 2025-05-15 ASSESSMENT — PATIENT HEALTH QUESTIONNAIRE - PHQ9
10. IF YOU CHECKED OFF ANY PROBLEMS, HOW DIFFICULT HAVE THESE PROBLEMS MADE IT FOR YOU TO DO YOUR WORK, TAKE CARE OF THINGS AT HOME, OR GET ALONG WITH OTHER PEOPLE: SOMEWHAT DIFFICULT
SUM OF ALL RESPONSES TO PHQ QUESTIONS 1-9: 5
SUM OF ALL RESPONSES TO PHQ QUESTIONS 1-9: 5

## 2025-05-15 ASSESSMENT — SOCIAL DETERMINANTS OF HEALTH (SDOH): HOW OFTEN DO YOU GET TOGETHER WITH FRIENDS OR RELATIVES?: ONCE A WEEK

## 2025-05-15 NOTE — PROGRESS NOTES
"Preventive Care Visit  [facility] APOLLO Bower MD, Family Medicine  May 15, 2025      Assessment & Plan     Annual physical exam      History of tobacco use, presenting hazards to health    - CT Chest Lung Cancer Screen Low Dose Without; Future            Nicotine/Tobacco Cessation  He reports that he has been smoking cigarettes. He has been exposed to tobacco smoke. He has never used smokeless tobacco.  Nicotine/Tobacco Cessation Plan  Information offered: Patient not interested at this time      BMI  Estimated body mass index is 28.43 kg/m  as calculated from the following:    Height as of this encounter: 1.727 m (5' 8\").    Weight as of this encounter: 84.8 kg (187 lb).     Payal Medina is a 64 year old, presenting for the following:  Physical        5/15/2025     1:03 PM   Additional Questions   Roomed by Casey VUONG     Reports that he is working with oncology team for management of prostate cancer.  Has been using cane and walker once in a while to help with stability.  He has done physical therapy as well.      Advance Care Planning            5/15/2025   General Health   How would you rate your overall physical health? (!) FAIR   Feel stress (tense, anxious, or unable to sleep) Only a little   (!) STRESS CONCERN      5/15/2025   Nutrition   Three or more servings of calcium each day? (!) NO   Diet: Regular (no restrictions)   How many servings of fruit and vegetables per day? (!) 0-1   How many sweetened beverages each day? 0-1         5/15/2025   Exercise   Days per week of moderate/strenous exercise 2 days   (!) EXERCISE CONCERN      5/15/2025   Social Factors   Frequency of gathering with friends or relatives Once a week   Worry food won't last until get money to buy more No   Food not last or not have enough money for food? No   Do you have housing? (Housing is defined as stable permanent housing and does not include staying outside in a car, in a tent, in an " "abandoned building, in an overnight shelter, or couch-surfing.) Yes   Are you worried about losing your housing? No   Lack of transportation? No   Unable to get utilities (heat,electricity)? No         5/15/2025   Fall Risk   Fallen 2 or more times in the past year? No   Trouble with walking or balance? Yes   Reason Gait Speed Test Not Completed Patient does not tolerate an upright or standing position (e.g. wheelchair)          5/15/2025   Dental   Dentist two times every year? Yes       Today's PHQ-9 Score:       5/15/2025    12:56 PM   PHQ-9 SCORE   PHQ-9 Total Score MyChart 5 (Mild depression)   PHQ-9 Total Score 5        Patient-reported         5/15/2025   Substance Use   If I could quit smoking, I would Completely agree   I want to quit somking, worry about health affects Completely agree   Willing to make a plan to quit smoking Completely agree   Willing to cut down before quitting Completely agree   Alcohol more than 3/day or more than 7/wk No   Do you use any other substances recreationally? No     Social History     Tobacco Use    Smoking status: Some Days     Current packs/day: 0.20     Types: Cigarettes     Passive exposure: Current    Smokeless tobacco: Never    Tobacco comments:     2-3 cigs a day   Vaping Use    Vaping status: Never Used   Substance Use Topics    Alcohol use: Yes     Comment: 1 - 2 drinks less than a week    Drug use: Never           5/15/2025   STI Screening   New sexual partner(s) since last STI/HIV test? No   Last PSA: No results found for: \"PSA\"  ASCVD Risk   The ASCVD Risk score (Frankie BARON, et al., 2019) failed to calculate for the following reasons:    Risk score cannot be calculated because patient has a medical history suggesting prior/existing ASCVD           Reviewed and updated as needed this visit by Provider    Allergies    Med Hx              Patient Active Problem List   Diagnosis    ST elevation myocardial infarction (STEMI), unspecified artery (H)    " Hyperlipidemia with target low density lipoprotein (LDL) cholesterol less than 100 mg/dL    Idiopathic peripheral neuropathy    Major depression, single episode    Elevated PSA, greater than or equal to 20 ng/ml    Peripheral artery disease    Tobacco dependence    Arterial occlusion    Cervical stenosis of spinal canal    Left arm weakness    Pain of right lower extremity due to ischemia     Past Surgical History:   Procedure Laterality Date    APPENDECTOMY      BACK SURGERY  4/21,  6/23    foraminotomy,  L5/S1 fusion    BIOPSY  5/20    prostate    BYPASS GRAFT INSITU FEMOROPOPLITEAL Right 08/23/2024    Procedure: RIGHT COMMON FEMORAL ARTERY TO BELOW KNEE POPLITEAL IN SITU BYPASS GRAFT;  Surgeon: Dorian Gamble MD;  Location:  OR    COLONOSCOPY  every 3 years 2013    CV CORONARY ANGIOGRAM N/A 06/09/2023    Procedure: Coronary Angiogram;  Surgeon: Rosenda Pacheco MD;  Location:  [facility]    IR ANGIOGRAM THROUGH CATHETER (ARTERIAL)  02/16/2025    IR ANGIOGRAM THROUGH CATHETER (ARTERIAL)  02/17/2025    IR LOWER EXTREMITY ANGIOGRAM RIGHT  06/18/2023    IR LOWER EXTREMITY ANGIOGRAM RIGHT  02/15/2025    ORTHOPEDIC SURGERY      previious lumbar surgery       Social History     Tobacco Use    Smoking status: Some Days     Current packs/day: 0.20     Types: Cigarettes     Passive exposure: Current    Smokeless tobacco: Never    Tobacco comments:     2-3 cigs a day   Substance Use Topics    Alcohol use: Yes     Comment: 1 - 2 drinks less than a week     Family History   Problem Relation Age of Onset    Hyperlipidemia Mother     Breast Cancer Mother     Osteoporosis Mother     Osteoporosis Father     Hyperlipidemia Sister     Anesthesia Reaction No family hx of          Current Outpatient Medications   Medication Sig Dispense Refill    apixaban ANTICOAGULANT (ELIQUIS) 5 MG tablet Take 1 tablet (5 mg) by mouth 2 times daily. 60 tablet 3    aspirin (ASA) 81 MG chewable tablet Take 1 tablet (81 mg)  by mouth daily. 60 tablet 0    atorvastatin (LIPITOR) 80 MG tablet Take 1 tablet (80 mg) by mouth daily. 90 tablet 3    cholecalciferol (VITAMIN D3) 25 mcg (1000 units) capsule Take 1 capsule by mouth daily as needed.      evolocumab (REPATHA SURECLICK) 140 MG/ML prefilled autoinjector Inject 1 mL (140 mg) subcutaneously every 14 days 6 mL 3    ferrous sulfate (FEROSUL) 325 (65 Fe) MG tablet Take 325 mg by mouth every other day      metoprolol succinate ER (TOPROL XL) 25 MG 24 hr tablet Take 1 tablet (25 mg) by mouth daily. 90 tablet 3    nicotine (NICODERM CQ) 14 MG/24HR 24 hr patch Place 1 patch over 24 hours onto the skin daily as needed for nicotine withdrawal symptoms. 30 patch 0    nitroGLYcerin (NITROSTAT) 0.4 MG sublingual tablet For chest pain place 1 tablet under the tongue every 5 minutes for 3 doses. If symptoms persist 5 minutes after 1st dose call 911. 25 tablet 0    sertraline (ZOLOFT) 100 MG tablet Take 200 mg by mouth every morning. (Some times takes 1 tablet BID)       Allergies   Allergen Reactions    Penicillins Hives, Shortness Of Breath and Swelling     Reports also having throat swelling- took medication as a child.           Review of Systems  CONSTITUTIONAL: NEGATIVE for fever, chills, change in weight  INTEGUMENTARY/SKIN: NEGATIVE for worrisome rashes, moles or lesions  EYES: NEGATIVE for vision changes or irritation  ENT/MOUTH: NEGATIVE for ear, mouth and throat problems  RESP: NEGATIVE for significant cough or SOB  BREAST: NEGATIVE for masses, tenderness or discharge  CV: NEGATIVE for chest pain, palpitations or peripheral edema  GI: NEGATIVE for nausea, abdominal pain, heartburn, or change in bowel habits  : NEGATIVE for frequency, dysuria, or hematuria  MUSCULOSKELETAL: NEGATIVE for significant arthralgias or myalgia  NEURO: NEGATIVE for weakness, dizziness or paresthesias  ENDOCRINE: NEGATIVE for temperature intolerance, skin/hair changes  HEME: NEGATIVE for bleeding  "problems  PSYCHIATRIC: NEGATIVE for changes in mood or affect     Objective    Exam  /68   Pulse 104   Temp 97.3  F (36.3  C) (Temporal)   Resp 20   Ht 1.727 m (5' 8\")   Wt 84.8 kg (187 lb)   SpO2 98%   BMI 28.43 kg/m     Estimated body mass index is 28.43 kg/m  as calculated from the following:    Height as of this encounter: 1.727 m (5' 8\").    Weight as of this encounter: 84.8 kg (187 lb).    Physical Exam  GENERAL: alert and no distress  EYES: Eyes grossly normal to inspection, PERRL and conjunctivae and sclerae normal  HENT: ear canals and TM's normal, nose and mouth without ulcers or lesions  NECK: no adenopathy, no asymmetry, masses, or scars  RESP: lungs clear to auscultation - no rales, rhonchi or wheezes  CV: regular rate and rhythm, normal S1 S2, no S3 or S4, no murmur, click or rub, no peripheral edema  ABDOMEN: soft, nontender, no hepatosplenomegaly, no masses and bowel sounds normal  MS: no gross musculoskeletal defects noted, no edema  SKIN: no suspicious lesions or rashes  NEURO: Normal strength and tone, mentation intact and speech normal  PSYCH: mentation appears normal, affect normal/bright        Signed Electronically by: Catrina Bower MD    "

## 2025-05-20 ENCOUNTER — ANCILLARY PROCEDURE (OUTPATIENT)
Dept: CT IMAGING | Facility: CLINIC | Age: 65
End: 2025-05-20
Attending: INTERNAL MEDICINE
Payer: COMMERCIAL

## 2025-05-20 DIAGNOSIS — C61 PROSTATE CANCER (H): ICD-10-CM

## 2025-05-20 DIAGNOSIS — M25.551 RIGHT HIP PAIN: ICD-10-CM

## 2025-05-20 PROCEDURE — 73701 CT LOWER EXTREMITY W/DYE: CPT | Mod: RT

## 2025-05-20 PROCEDURE — 250N000011 HC RX IP 250 OP 636: Performed by: INTERNAL MEDICINE

## 2025-05-20 PROCEDURE — 250N000009 HC RX 250: Performed by: INTERNAL MEDICINE

## 2025-05-20 RX ORDER — IOPAMIDOL 755 MG/ML
91 INJECTION, SOLUTION INTRAVASCULAR ONCE
Status: COMPLETED | OUTPATIENT
Start: 2025-05-20 | End: 2025-05-20

## 2025-05-20 RX ADMIN — SODIUM CHLORIDE 50 ML: 9 INJECTION, SOLUTION INTRAVENOUS at 13:17

## 2025-05-20 RX ADMIN — IOPAMIDOL 91 ML: 755 INJECTION, SOLUTION INTRAVENOUS at 13:18

## 2025-05-28 ENCOUNTER — APPOINTMENT (OUTPATIENT)
Dept: URBAN - METROPOLITAN AREA CLINIC 255 | Age: 65
Setting detail: DERMATOLOGY
End: 2025-05-28

## 2025-05-28 VITALS — HEIGHT: 60 IN | WEIGHT: 185 LBS

## 2025-05-28 DIAGNOSIS — D49.2 NEOPLASM OF UNSPECIFIED BEHAVIOR OF BONE, SOFT TISSUE, AND SKIN: ICD-10-CM

## 2025-05-28 PROCEDURE — OTHER BIOPSY BY SHAVE METHOD: OTHER

## 2025-05-28 PROCEDURE — OTHER MIPS QUALITY: OTHER

## 2025-05-28 PROCEDURE — OTHER COUNSELING: OTHER

## 2025-05-28 PROCEDURE — OTHER PHOTO-DOCUMENTATION: OTHER

## 2025-05-28 ASSESSMENT — LOCATION ZONE DERM: LOCATION ZONE: TRUNK

## 2025-05-28 ASSESSMENT — LOCATION DETAILED DESCRIPTION DERM: LOCATION DETAILED: SUPRAPUBIC SKIN

## 2025-05-28 ASSESSMENT — LOCATION SIMPLE DESCRIPTION DERM: LOCATION SIMPLE: GROIN

## 2025-06-02 ENCOUNTER — TRANSCRIBE ORDERS (OUTPATIENT)
Dept: OTHER | Age: 65
End: 2025-06-02

## 2025-06-02 DIAGNOSIS — M54.17 LUMBOSACRAL RADICULOPATHY: ICD-10-CM

## 2025-06-02 DIAGNOSIS — C61 PROSTATE CANCER (H): ICD-10-CM

## 2025-06-02 DIAGNOSIS — C79.51 METASTASIS TO BONE (H): ICD-10-CM

## 2025-06-02 DIAGNOSIS — G95.20 SPINAL CORD COMPRESSION (H): Primary | ICD-10-CM

## 2025-06-03 ENCOUNTER — PATIENT OUTREACH (OUTPATIENT)
Dept: CARE COORDINATION | Facility: CLINIC | Age: 65
End: 2025-06-03
Payer: COMMERCIAL

## 2025-06-03 ENCOUNTER — ANCILLARY PROCEDURE (OUTPATIENT)
Dept: CT IMAGING | Facility: CLINIC | Age: 65
End: 2025-06-03
Attending: FAMILY MEDICINE
Payer: COMMERCIAL

## 2025-06-03 DIAGNOSIS — Z87.891 HISTORY OF TOBACCO USE, PRESENTING HAZARDS TO HEALTH: ICD-10-CM

## 2025-06-03 PROCEDURE — 71271 CT THORAX LUNG CANCER SCR C-: CPT

## 2025-06-05 ENCOUNTER — RESULTS FOLLOW-UP (OUTPATIENT)
Dept: FAMILY MEDICINE | Facility: CLINIC | Age: 65
End: 2025-06-05

## 2025-06-05 ENCOUNTER — PATIENT OUTREACH (OUTPATIENT)
Dept: CARE COORDINATION | Facility: CLINIC | Age: 65
End: 2025-06-05
Payer: COMMERCIAL

## 2025-06-09 ENCOUNTER — APPOINTMENT (OUTPATIENT)
Dept: URBAN - METROPOLITAN AREA CLINIC 255 | Age: 65
Setting detail: DERMATOLOGY
End: 2025-06-09

## 2025-06-09 ENCOUNTER — OFFICE VISIT (OUTPATIENT)
Dept: NEUROSURGERY | Facility: CLINIC | Age: 65
End: 2025-06-09
Attending: INTERNAL MEDICINE
Payer: COMMERCIAL

## 2025-06-09 ENCOUNTER — HOSPITAL ENCOUNTER (OUTPATIENT)
Dept: GENERAL RADIOLOGY | Facility: CLINIC | Age: 65
Discharge: HOME | End: 2025-06-09
Attending: NEUROLOGICAL SURGERY | Admitting: NEUROLOGICAL SURGERY
Payer: COMMERCIAL

## 2025-06-09 VITALS — OXYGEN SATURATION: 99 % | DIASTOLIC BLOOD PRESSURE: 70 MMHG | SYSTOLIC BLOOD PRESSURE: 119 MMHG | HEART RATE: 97 BPM

## 2025-06-09 DIAGNOSIS — M54.17 LUMBOSACRAL RADICULOPATHY: Primary | ICD-10-CM

## 2025-06-09 DIAGNOSIS — M54.17 LUMBOSACRAL RADICULOPATHY: ICD-10-CM

## 2025-06-09 PROBLEM — D04.5 CARCINOMA IN SITU OF SKIN OF TRUNK: Status: ACTIVE | Noted: 2025-06-09

## 2025-06-09 PROCEDURE — OTHER COUNSELING: OTHER

## 2025-06-09 PROCEDURE — 1125F AMNT PAIN NOTED PAIN PRSNT: CPT | Performed by: NEUROLOGICAL SURGERY

## 2025-06-09 PROCEDURE — OTHER CURETTAGE AND DESTRUCTION: OTHER

## 2025-06-09 PROCEDURE — 72110 X-RAY EXAM L-2 SPINE 4/>VWS: CPT

## 2025-06-09 PROCEDURE — OTHER PHOTO-DOCUMENTATION: OTHER

## 2025-06-09 PROCEDURE — OTHER PATIENT SPECIFIC COUNSELING: OTHER

## 2025-06-09 PROCEDURE — OTHER MIPS QUALITY: OTHER

## 2025-06-09 PROCEDURE — 3078F DIAST BP <80 MM HG: CPT | Performed by: NEUROLOGICAL SURGERY

## 2025-06-09 PROCEDURE — 17261 DSTRJ MAL LES T/A/L .6-1.0CM: CPT

## 2025-06-09 PROCEDURE — 3074F SYST BP LT 130 MM HG: CPT | Performed by: NEUROLOGICAL SURGERY

## 2025-06-09 PROCEDURE — 99214 OFFICE O/P EST MOD 30 MIN: CPT | Performed by: NEUROLOGICAL SURGERY

## 2025-06-09 RX ORDER — ACETAMINOPHEN 500 MG
1000 TABLET ORAL
COMMUNITY
Start: 2025-03-20

## 2025-06-09 RX ORDER — PREGABALIN 50 MG/1
50 CAPSULE ORAL
COMMUNITY
Start: 2025-06-04

## 2025-06-09 ASSESSMENT — PAIN SCALES - GENERAL: PAINLEVEL_OUTOF10: MODERATE PAIN (5)

## 2025-06-09 NOTE — PROGRESS NOTES
I was asked by Dr. Dreyer to see this patient in consultation    History of Present Illness:  Adam Sahni, 64-year-old male, reports severe pain in both legs, more pronounced in the right leg, affecting his ability to walk. He has a history of spinal issues, including a prior decompression surgery at L2-3 and fusion surgery at L5-S1, performed by Dr. Aguilar at [facility] in 2021. Despite these surgeries, he continues to experience pain and tingling in his legs, with a lack of sensation in his feet. He recalls having balance issues and foot drop following his first surgery.  He also underwent cervical corpectomy at [facility] in March and is recovering from this.  He has undergone multiple imaging studies, including X-rays and an MRI, personally reviewed, which revealed L4-5 severe disc degeneration and right>left foraminal stenosis, and L2-3 left sided post-op changes and disc degeneration with foraminal stenosis. Previous cortisone injections provided temporary relief, but he continues to experience hypersensitivity and motor issues.        Past Medical History:   Diagnosis Date    CAD (coronary artery disease)     with UGO to RCA on 6/9/23 for inferior STEMI    Chronic anemia     Depression     Hyperlipidemia     Lumbar radiculopathy     PAD (peripheral artery disease)     S/p bilateral kissing iliac stents after thrombolytics 7/16/21-7/17/21 for severe claudication and complete occlusion of the left common iliac artery with tandem occlusions and/or high grade stenoses of the distal SFA and popliteal artery.    Peripheral neuropathy     Prediabetes     Prostate cancer (H)     s/p ext beam radiation therapy, follows at [facility]     Past Surgical History:   Procedure Laterality Date    APPENDECTOMY      BACK SURGERY  4/21,  6/23    foraminotomy,  L5/S1 fusion    BIOPSY  5/20    prostate    BYPASS GRAFT INSITU FEMOROPOPLITEAL Right 08/23/2024    Procedure: RIGHT COMMON FEMORAL ARTERY TO BELOW KNEE POPLITEAL IN SITU BYPASS  GRAFT;  Surgeon: Dorian Gamble MD;  Location:  OR    COLONOSCOPY  every 3 years 2013    CV CORONARY ANGIOGRAM N/A 06/09/2023    Procedure: Coronary Angiogram;  Surgeon: Rosenda Pacheco MD;  Location:  HEART CARDIAC CATH LAB    IR ANGIOGRAM THROUGH CATHETER (ARTERIAL)  02/16/2025    IR ANGIOGRAM THROUGH CATHETER (ARTERIAL)  02/17/2025    IR LOWER EXTREMITY ANGIOGRAM RIGHT  06/18/2023    IR LOWER EXTREMITY ANGIOGRAM RIGHT  02/15/2025    ORTHOPEDIC SURGERY      previious lumbar surgery           Physical Exam  /70 (BP Location: Right arm, Patient Position: Sitting, Cuff Size: Adult Regular)   Pulse 97   SpO2 99%         Mental status:  Alert and Oriented x 3, speech is fluent.  HEENT:  PERRL.  EOM s intact.  Visual fields full to gross exam  Cranial nerves:  II-XII intact.   Motor:    Shoulder Abduction:  Right:  5/5   Left:  5/5  Biceps:                      Right:  5/5   Left:  5/5  Triceps:                     Right:  5/5   Left:  5/5  Interosseus :             Right:  5/5   Left:  5/5  Hip Flexion:               Right: 5/5  Left:  5/5  Quadriceps:              Right:  5/5  Left:  5/5  Hamstrings:              Right:  5/5  Left:  5/5  Gastroc Soleus:        Right:  5/5  Left:  5/5  Tib/Ant:                      Right:  5/5  Left:  5/5  EHL:                          Right:  5/5  Left:  5/5  Sensation:  Intact  Reflexes:  Negative Menendez's.  Smooth tandem walking.      Assessment & Plan  S/P Lumbar fusion  Lumbar radiculopathy    He is continuing recovery from corpectomy with Dr. Toussaint at [place]  He is having worsening back and leg pain, and imaging shows marked adjacent segment degeneration at L4-5 and L2-3  Will order TROY  He also wants to discuss lumbar imaging with Dr. Toussaint and will consider surgery, likely L2-S1 extension PSF, if symptoms not improving       Consent was obtained from the patient to use an AI documentation tool in the creation of this note.

## 2025-06-09 NOTE — PATIENT INSTRUCTIONS
Patient Next Steps:    Order placed for epidural steroid injection  The steroid can take 10-14 days to reach max effect  Please call our clinic if symptoms persist after this timeframe.  You can call [facility] to schedule your injection: [phone]    Please call us if you have any further questions or concerns.    [facility]   Phone: [phone]  Fax: [phone]

## 2025-06-09 NOTE — NURSING NOTE
"Adma Sahni is a 64 year old male who presents for:  Chief Complaint   Patient presents with    Consult     \"Pins/needles/numbness, worse on the R LE\"   \"My leg is super hypersensitive\"         Initial Vitals:  /70 (BP Location: Right arm, Patient Position: Sitting, Cuff Size: Adult Regular)   Pulse 97   SpO2 99%  Estimated body mass index is 28.43 kg/m  as calculated from the following:    Height as of 5/15/25: 5' 8\" (1.727 m).    Weight as of 5/15/25: 187 lb (84.8 kg). BP completed using cuff size: regular  Moderate Pain (5)    Aristides Ceballos    "

## 2025-06-09 NOTE — LETTER
6/9/2025      Adam Sahni  [address]   [place] [id number]      Dear Colleague,    Thank you for referring your patient, Adam Sahni, to the [facility]. Please see a copy of my visit note below.    I was asked by Dr. Dreyer to see this patient in consultation    History of Present Illness:  Adam Sahni, 64-year-old male, reports severe pain in both legs, more pronounced in the right leg, affecting his ability to walk. He has a history of spinal issues, including a prior decompression surgery at L2-3 and fusion surgery at L5-S1, performed by Dr. Aguilar at [facility] in 2021. Despite these surgeries, he continues to experience pain and tingling in his legs, with a lack of sensation in his feet. He recalls having balance issues and foot drop following his first surgery.  He also underwent cervical corpectomy at [facility] in March and is recovering from this.  He has undergone multiple imaging studies, including X-rays and an MRI, personally reviewed, which revealed L4-5 severe disc degeneration and right>left foraminal stenosis, and L2-3 left sided post-op changes and disc degeneration with foraminal stenosis. Previous cortisone injections provided temporary relief, but he continues to experience hypersensitivity and motor issues.        Past Medical History:   Diagnosis Date     CAD (coronary artery disease)     with UGO to RCA on 6/9/23 for inferior STEMI     Chronic anemia      Depression      Hyperlipidemia      Lumbar radiculopathy      PAD (peripheral artery disease)     S/p bilateral kissing iliac stents after thrombolytics 7/16/21-7/17/21 for severe claudication and complete occlusion of the left common iliac artery with tandem occlusions and/or high grade stenoses of the distal SFA and popliteal artery.     Peripheral neuropathy      Prediabetes      Prostate cancer (H)     s/p ext beam radiation therapy, follows at [facility]     Past Surgical History:   Procedure  Laterality Date     APPENDECTOMY       BACK SURGERY  4/21,  6/23    foraminotomy,  L5/S1 fusion     BIOPSY  5/20    prostate     BYPASS GRAFT INSITU FEMOROPOPLITEAL Right 08/23/2024    Procedure: RIGHT COMMON FEMORAL ARTERY TO BELOW KNEE POPLITEAL IN SITU BYPASS GRAFT;  Surgeon: Dorian Gamble MD;  Location:  OR     COLONOSCOPY  every 3 years 2013     CV CORONARY ANGIOGRAM N/A 06/09/2023    Procedure: Coronary Angiogram;  Surgeon: Rosenda Pacheco MD;  Location:  HEART CARDIAC CATH LAB     IR ANGIOGRAM THROUGH CATHETER (ARTERIAL)  02/16/2025     IR ANGIOGRAM THROUGH CATHETER (ARTERIAL)  02/17/2025     IR LOWER EXTREMITY ANGIOGRAM RIGHT  06/18/2023     IR LOWER EXTREMITY ANGIOGRAM RIGHT  02/15/2025     ORTHOPEDIC SURGERY      previious lumbar surgery           Physical Exam  /70 (BP Location: Right arm, Patient Position: Sitting, Cuff Size: Adult Regular)   Pulse 97   SpO2 99%         Mental status:  Alert and Oriented x 3, speech is fluent.  HEENT:  PERRL.  EOM s intact.  Visual fields full to gross exam  Cranial nerves:  II-XII intact.   Motor:    Shoulder Abduction:  Right:  5/5   Left:  5/5  Biceps:                      Right:  5/5   Left:  5/5  Triceps:                     Right:  5/5   Left:  5/5  Interosseus :             Right:  5/5   Left:  5/5  Hip Flexion:               Right: 5/5  Left:  5/5  Quadriceps:              Right:  5/5  Left:  5/5  Hamstrings:              Right:  5/5  Left:  5/5  Gastroc Soleus:        Right:  5/5  Left:  5/5  Tib/Ant:                      Right:  5/5  Left:  5/5  EHL:                          Right:  5/5  Left:  5/5  Sensation:  Intact  Reflexes:  Negative Menendez's.  Smooth tandem walking.      Assessment & Plan  S/P Lumbar fusion  Lumbar radiculopathy    He is continuing recovery from corpectomy with Dr. Toussaint at [place]  He is having worsening back and leg pain, and imaging shows marked adjacent segment degeneration at L4-5 and L2-3  Will order  TROY  He also wants to discuss lumbar imaging with Dr. Toussaint and will consider surgery, likely L2-S1 extension PSF, if symptoms not improving       Consent was obtained from the patient to use an AI documentation tool in the creation of this note.           Again, thank you for allowing me to participate in the care of your patient.        Sincerely,        Humberto Lizarraga MD    Electronically signed

## 2025-06-11 ENCOUNTER — TELEPHONE (OUTPATIENT)
Dept: ANTICOAGULATION | Facility: CLINIC | Age: 65
End: 2025-06-11
Payer: COMMERCIAL

## 2025-06-11 NOTE — TELEPHONE ENCOUNTER
ANTICOAGULATION DIRECT ORAL ANTICOAGULANT MONITORING    SUBJECTIVE     [facility] is evaluating Adam Sahni's Apixaban (Eliquis) as part of its Anticoagulation Monitoring Program.    Indication:arterial occlusion  Current dose per medication list: Apixaban 5 mg TWICE daily  Recent hospitalizations/ED/Office Visits for bleeding/clotting concerns: No  Other bleeding or side effect concerns: No  Additional findings: none    OBJECTIVE     Age: 64 year old    Adherence score:3 as of 06/07/2025  Last dispensed: 6/5/25 for #60 tablets/30-day supply     Wt Readings from Last 2 Encounters:   05/15/25 84.8 kg (187 lb)   03/02/25 90.7 kg (200 lb)      Lab Results   Component Value Date    CR 1.13 03/02/2025    CR 1.10 02/17/2025    CR 1.21 (H) 02/16/2025     Creatinine Clearance (using actual bodyweight, mL/min):     Lab Results   Component Value Date    HGB 11.9 03/02/2025    HGB 12.7 06/19/2021     03/02/2025     06/19/2021       ASSESSMENT/PLAN     A chart review for Direct Oral Anticoagulant (DOAC) Stewardship has been completed for:     Epic adherence score < 80% (based on available claims data). Patient appears compliant: supply remaining from most recent fill. No intervention recommended.      Meggan An RN  Anticoagulation Clinic

## 2025-06-18 ENCOUNTER — TELEPHONE (OUTPATIENT)
Dept: NEUROSURGERY | Facility: CLINIC | Age: 65
End: 2025-06-18
Payer: COMMERCIAL

## 2025-06-18 NOTE — TELEPHONE ENCOUNTER
Returned call and spoke with patient. He is wondering if he could complete the injection sooner through [facility]. Provided pain management scheduling number for patient to call and inquire on their openings. There were no further questions at this time.

## 2025-06-18 NOTE — TELEPHONE ENCOUNTER
[facility] Call Center    Phone Message    May a detailed message be left on voicemail: yes     Reason for Call: Other: Patient called and requested a call back regarding his injections that Dr Lizarraga had referred him to get. Pt stated Dr Lizarraga sent the order to the [facility] but he is wanting to try and get it done within [facility]. Pt also stated he prefers to have the injection done at the [place] location. Pt has some additional questions he would like to ask as well. Please call when available.      Action Taken: Message routed to:  Other: Neurosurgery    Travel Screening: Not Applicable     Date of Service:

## 2025-07-01 NOTE — PROGRESS NOTES
[place] [facility]    Adam Sahni returns for vascular follow-up.    --Bilateral iliac stenting 2023    --8/23/2024: Right mid common femoral to below-knee popliteal in situ bypass graft for occluded distal SFA and proximal popliteal artery.  +2 DP pulse.    --2/15/2025: Presented with acute right leg pain and felt to have an occluded bypass.  However following that the graft itself was widely patent.  Found to have occlusion of the right iliac system..  Treated with lytic therapy.  This was recanalized and angioplastied.  Distal emboli to the popliteal resolved with lytic therapy.  Also angioplasty left external iliac artery stenosis.  Right graft is widely patent with no stenosis  We did not find a high-grade stenosis in the iliac stent and thus place patient on Eliquis and aspirin for minimum of 3 months..  +3 DP pulse    PMH: Medications: Toprol-XL, Lyrica, Zoloft, Repatha injections, Eliquis, aspirin   Medical: Hypertension    Hyperlipidemia medications    History of lumbar stenosis    CAD with prior PCI    History of prostrate cancer s/p radiation.  Followed at [facility]    Ongoing smoking    6/9/2025 consultation by Dr. Lizarraga neurosurgery for bilateral leg pain with prior decompression L2-3 and fusion L5-S1 in 2021.  Planned further lumbar imaging.    ROS back issues as described above.  May require further intervention.  Right leg continues to bother him.  Less issues on the left.  Also PSA became markedly elevated.  Found at [place] to have recurrence of his prostate cancer.  Initiated on Zytiga 250 mg 4 times daily with return of PSA to negligible.    Exam: Alert and appropriate.  Normal affect.  Very verbose.  Uses a walking cane.   Blood pressure 117/86 right.  Pulse 97 regular   Chest= clear   Cardiovascular= regular rate   Extremities= no edema.  Normal sensation.  No calluses or ulcers.    +3 palpable right graft and DP pulse    Triphasic bedside graft and DP Doppler        IMPRESSION:    #1.  Doing very well following recanalization and angioplasty/stenting of occluded iliac artery.  This is markedly improved the blood supply to his bypass graft and foot.  Fortunately the graft itself was not occluded which is very important for long-term patency.    #2.  Will plan duplex of graft and iliac arteries in 3 months.    #3.  Prostate and back issues as described above.    20 minutes with patient today.  Follow-up in 3 months.    Dorian Gamble MD   This note was created using Dragon voice recognition software which may result in transcription errors.

## 2025-07-03 ENCOUNTER — OFFICE VISIT (OUTPATIENT)
Dept: OTHER | Facility: CLINIC | Age: 65
End: 2025-07-03
Attending: SURGERY
Payer: COMMERCIAL

## 2025-07-03 VITALS — SYSTOLIC BLOOD PRESSURE: 117 MMHG | DIASTOLIC BLOOD PRESSURE: 86 MMHG | HEART RATE: 97 BPM

## 2025-07-03 DIAGNOSIS — I73.9 PAD (PERIPHERAL ARTERY DISEASE): Primary | ICD-10-CM

## 2025-07-03 DIAGNOSIS — I74.5 ILIAC ARTERY OCCLUSION, RIGHT (H): ICD-10-CM

## 2025-07-03 PROBLEM — K59.03 DRUG-INDUCED CONSTIPATION: Status: ACTIVE | Noted: 2025-07-03

## 2025-07-03 PROBLEM — G60.9 HEREDITARY AND IDIOPATHIC NEUROPATHY, UNSPECIFIED: Status: ACTIVE | Noted: 2023-06-09

## 2025-07-03 PROBLEM — R73.9 HYPERGLYCEMIA: Status: ACTIVE | Noted: 2025-07-03

## 2025-07-03 PROBLEM — D64.9 ANEMIA: Status: ACTIVE | Noted: 2025-03-14

## 2025-07-03 PROBLEM — R13.12 OROPHARYNGEAL DYSPHAGIA: Status: ACTIVE | Noted: 2025-07-03

## 2025-07-03 PROBLEM — D50.9 IRON DEFICIENCY ANEMIA: Status: ACTIVE | Noted: 2024-02-21

## 2025-07-03 PROBLEM — E53.8 COBALAMIN DEFICIENCY: Status: ACTIVE | Noted: 2025-07-03

## 2025-07-03 PROBLEM — M51.26 LUMBAR DISC HERNIATION: Status: ACTIVE | Noted: 2022-01-25

## 2025-07-03 PROBLEM — C79.51 MALIGNANT NEOPLASM METASTATIC TO BONE (H): Status: ACTIVE | Noted: 2025-03-14

## 2025-07-03 PROBLEM — R51.9 CHRONIC INTRACTABLE HEADACHE: Status: ACTIVE | Noted: 2019-09-26

## 2025-07-03 PROBLEM — C61 CARCINOMA OF PROSTATE (H): Status: ACTIVE | Noted: 2020-05-14

## 2025-07-03 PROBLEM — R63.4 ABNORMAL WEIGHT LOSS: Status: ACTIVE | Noted: 2025-07-03

## 2025-07-03 PROBLEM — R20.0 NUMBNESS OF FEET: Status: ACTIVE | Noted: 2021-09-14

## 2025-07-03 PROBLEM — Z19.1: Status: ACTIVE | Noted: 2025-07-03

## 2025-07-03 PROBLEM — G62.0 DRUG-INDUCED POLYNEUROPATHY: Status: ACTIVE | Noted: 2025-05-07

## 2025-07-03 PROBLEM — K31.89 REACTIVE GASTROPATHY: Status: ACTIVE | Noted: 2024-02-21

## 2025-07-03 PROBLEM — R53.82 CHRONIC FATIGUE, UNSPECIFIED: Status: ACTIVE | Noted: 2025-07-03

## 2025-07-03 PROBLEM — C78.1: Status: ACTIVE | Noted: 2025-03-28

## 2025-07-03 PROBLEM — M81.0 OSTEOPOROSIS: Status: ACTIVE | Noted: 2025-03-18

## 2025-07-03 PROBLEM — N35.919 STRICTURE OF MALE URETHRA: Status: ACTIVE | Noted: 2020-05-14

## 2025-07-03 PROBLEM — Z98.1 S/P LUMBAR FUSION: Status: ACTIVE | Noted: 2022-10-03

## 2025-07-03 PROBLEM — G89.29 CHRONIC INTRACTABLE HEADACHE: Status: ACTIVE | Noted: 2019-09-26

## 2025-07-03 PROBLEM — C79.51 SPINAL CORD COMPRESSION DUE TO MALIGNANT NEOPLASM METASTATIC TO SPINE (H): Status: ACTIVE | Noted: 2025-07-03

## 2025-07-03 PROBLEM — R60.0 EDEMA OF LOWER EXTREMITY: Status: ACTIVE | Noted: 2025-07-03

## 2025-07-03 PROBLEM — I70.90 ARTERIOSCLEROSIS OBLITERANS: Status: ACTIVE | Noted: 2023-06-16

## 2025-07-03 PROBLEM — G95.29 SPINAL CORD COMPRESSION DUE TO MALIGNANT NEOPLASM METASTATIC TO SPINE (H): Status: ACTIVE | Noted: 2025-07-03

## 2025-07-03 PROBLEM — R20.0 ANESTHESIA OF SKIN: Status: ACTIVE | Noted: 2021-09-14

## 2025-07-03 PROCEDURE — G0463 HOSPITAL OUTPT CLINIC VISIT: HCPCS | Performed by: SURGERY

## 2025-07-03 RX ORDER — LOPERAMIDE HYDROCHLORIDE 2 MG/1
2 TABLET ORAL
COMMUNITY

## 2025-07-03 RX ORDER — ONDANSETRON 4 MG/1
4 TABLET, ORALLY DISINTEGRATING ORAL
COMMUNITY
Start: 2025-03-20

## 2025-07-03 RX ORDER — ABIRATERONE ACETATE 250 MG/1
4 TABLET ORAL
COMMUNITY
Start: 2025-03-28

## 2025-07-03 RX ORDER — DEXAMETHASONE 4 MG/1
8 TABLET ORAL
COMMUNITY
Start: 2025-03-28

## 2025-07-03 RX ORDER — GRANISETRON HYDROCHLORIDE 1 MG/ML
INJECTION INTRAVENOUS
COMMUNITY
Start: 2025-05-08

## 2025-07-03 RX ORDER — POLYETHYLENE GLYCOL 3350 17 G/17G
17 POWDER, FOR SOLUTION ORAL
COMMUNITY
Start: 2025-03-20

## 2025-07-03 NOTE — PROGRESS NOTES
[facility]        Patient is here for a  follow up.    Pt is currently taking Aspirin, Statin, and Eliquis.    /86 (BP Location: Right arm, Patient Position: Chair, Cuff Size: Adult Regular)   Pulse 97     The provider has been notified that the patient has no concerns.     Questions patient would like addressed today are: N/A.    Refills are needed: N/A    Has homecare services and agency name:  No    Patient has been identified as a fall risk.    Interventions were completed as noted below:  []Exam tables/chairs remained in the lowest position.   []Patient remained in wheelchair.    []Provider requested patient in exam chair.  Patient required assist and use of transfer belt.  [x]Exam room door remained open unless with a provider.  [x]A bell provided to patient to notify staff if assistance was needed.  [x]Provider notified patient was identified as a fall risk.      Fabiola Valente MA

## 2025-07-07 ENCOUNTER — MEDICAL CORRESPONDENCE (OUTPATIENT)
Dept: HEALTH INFORMATION MANAGEMENT | Facility: CLINIC | Age: 65
End: 2025-07-07
Payer: COMMERCIAL

## 2025-07-07 ENCOUNTER — PATIENT OUTREACH (OUTPATIENT)
Dept: GASTROENTEROLOGY | Facility: CLINIC | Age: 65
End: 2025-07-07
Payer: COMMERCIAL

## 2025-07-07 ENCOUNTER — TRANSFERRED RECORDS (OUTPATIENT)
Dept: HEALTH INFORMATION MANAGEMENT | Facility: CLINIC | Age: 65
End: 2025-07-07
Payer: COMMERCIAL

## 2025-07-08 ENCOUNTER — TRANSCRIBE ORDERS (OUTPATIENT)
Dept: OTHER | Age: 65
End: 2025-07-08

## 2025-07-08 DIAGNOSIS — C61 PROSTATE CANCER (H): ICD-10-CM

## 2025-07-08 DIAGNOSIS — M54.17 LUMBOSACRAL RADICULOPATHY: ICD-10-CM

## 2025-07-08 DIAGNOSIS — M25.551 RIGHT HIP PAIN: ICD-10-CM

## 2025-07-08 DIAGNOSIS — G95.20 SPINAL CORD COMPRESSION (H): Primary | ICD-10-CM

## (undated) DEVICE — SU SILK 3-0 TIE 24" SA74H

## (undated) DEVICE — SU SILK 4-0 TIE 24" SA73H

## (undated) DEVICE — LINEN LEG ROLL 5489

## (undated) DEVICE — SU MONOCRYL 4-0 PS-2 18" UND Y496G

## (undated) DEVICE — SU VICRYL 2-0 CT-1 27" J339H

## (undated) DEVICE — SURGICEL HEMOSTAT 2X3" 1953

## (undated) DEVICE — DRSG KERLIX 4 1/2"X4YDS ROLL 6715

## (undated) DEVICE — CATH ANGIO INFINITI 3DRC 6FRX100CM 534676T

## (undated) DEVICE — PREP CHLORAPREP 26ML TINTED HI-LITE ORANGE 930815

## (undated) DEVICE — SU SILK 2-0 TIE 24" SA75H

## (undated) DEVICE — NDL 22GA 1.5"

## (undated) DEVICE — PHOTON GUIDE INVUITY WIDE FLAT 104015

## (undated) DEVICE — PACK VASCULAR SCV15VAFSB

## (undated) DEVICE — NDL 19GA 1.5"

## (undated) DEVICE — CANISTER ENGINE FOR INDIGO SYSTEM

## (undated) DEVICE — 5FR X 100CM INFINITI TL DIAGNOSTIC CATHETER, JUDKINS LEFT CORONARY, JL 3.5, FEMORAL SELECTIVE THRULUMEN, SMALL, 0.038IN MAX GUIDEWIRE (EA/1)

## (undated) DEVICE — CATH LAUNCHER 6FR JR 4.0 LA6JR40

## (undated) DEVICE — RAD G/W INQWIRE .035X260CM J-TIP EXCHANGE IQ35F260J1O5RS

## (undated) DEVICE — INTRO TERUMO 6FRX25CM W/MARKER RSB603

## (undated) DEVICE — DECANTER BAG 2002S

## (undated) DEVICE — SU DERMABOND ADVANCED .7ML DNX12

## (undated) DEVICE — CLIP ETHICON LIGACLIP SM BLUE LT100

## (undated) DEVICE — SYR 05ML LL W/O NDL

## (undated) DEVICE — SYR 03ML LL W/O NDL 309657

## (undated) DEVICE — SOL NACL 0.9% IRRIG 1000ML BOTTLE 2F7124

## (undated) DEVICE — INTRO GLIDESHEATH SLENDER 6FR 10X45CM 60-1060

## (undated) DEVICE — INFL DVC BASIXCOMPAK PLYCRB 30 ATM 13IN 20ML IN4530

## (undated) DEVICE — SLEEVE TR BAND RADIAL COMPRESSION DEVICE 24CM TRB24-REG

## (undated) DEVICE — MANIFOLD KIT ANGIO AUTOMATED 014613

## (undated) DEVICE — SU PROLENE 5-0 RB-1DA 36"  8556H

## (undated) DEVICE — SU PROLENE 6-0 C-1DA 30" 8706H

## (undated) DEVICE — SU VICRYL 3-0 SH 27" J316H

## (undated) DEVICE — VALVULOTOME LEMAITRE 1.5MMX98CM HYDRO 1009-00

## (undated) DEVICE — RAD INTRODUCER KIT MICRO 5FRX10CM .018 NITINOL G/W

## (undated) DEVICE — KIT CATHETER INDIGO RX 140CM WITH LG LUMEN ASP TUBING

## (undated) DEVICE — TOTE ANGIO CORP PC15AT SAN32CC83O

## (undated) DEVICE — KIT HAND CONTROL ANGIOTOUCH ACIST 65CM AT-P65

## (undated) DEVICE — DRSG STERI STRIP 1/2X4" R1547

## (undated) DEVICE — DRSG TEGADERM 4X4 3/4" 1626

## (undated) DEVICE — LINEN TOWEL PACK X5 5464

## (undated) DEVICE — BLN CUT WOLVERINE 3.50MM X 15M

## (undated) DEVICE — CATH BALLOON EMERGE 2.5X15MM H7493918915250

## (undated) DEVICE — DEFIB PRO-PADZ LVP LQD GEL ADULT 8900-2105-01

## (undated) DEVICE — DRSG TEGADERM 2 1/2X 2 3/4"

## (undated) DEVICE — Device

## (undated) DEVICE — SOL NACL 0.9% INJ 250ML BAG 2B1322Q

## (undated) DEVICE — CATH ANGIO INFINITI JR4 4FRX100CM 538421

## (undated) DEVICE — CLIP SPRING FOGARTY SOFTJAW CSOFT6

## (undated) DEVICE — ESU GROUND PAD UNIVERSAL W/O CORD

## (undated) DEVICE — GUIDEWIRE VASC 0.014INX180CM RUNTHROUGH 25-1011

## (undated) DEVICE — GLOVE BIOGEL PI ULTRATOUCH SZ 7.5 41175

## (undated) DEVICE — SU PROLENE 7-0 BV-1DA 24" 8702H

## (undated) DEVICE — CATH ANGIO JUDKINS JL4 6FRX100CM INFINITI 534620T

## (undated) DEVICE — SYR 10ML FINGER CONTROL W/O NDL 309695

## (undated) DEVICE — CATH ON-Q PAIN SILVER SKR 10" PM040-A

## (undated) DEVICE — CATH BALLOON NC EMERGE 4.00X20MM H7493926720400

## (undated) DEVICE — SYR ANGIOGRAPHY MULTIUSE KIT ACIST 014612

## (undated) DEVICE — SOL WATER IRRIG 1000ML BOTTLE 2F7114

## (undated) DEVICE — SPONGE KITTNER 31001010

## (undated) DEVICE — SYR 10ML LL W/O NDL 302995

## (undated) DEVICE — GOWN IMPERVIOUS ZONED LG

## (undated) DEVICE — NDL ANGIOCATH 18GA 1.25" 4055

## (undated) RX ORDER — BUPIVACAINE HYDROCHLORIDE 5 MG/ML
INJECTION, SOLUTION EPIDURAL; INTRACAUDAL
Status: DISPENSED
Start: 2024-08-23

## (undated) RX ORDER — FENTANYL CITRATE 0.05 MG/ML
INJECTION, SOLUTION INTRAMUSCULAR; INTRAVENOUS
Status: DISPENSED
Start: 2024-08-23

## (undated) RX ORDER — SODIUM CHLORIDE, SODIUM GLUCONATE, SODIUM ACETATE, POTASSIUM CHLORIDE AND MAGNESIUM CHLORIDE 526; 502; 368; 37; 30 MG/100ML; MG/100ML; MG/100ML; MG/100ML; MG/100ML
INJECTION, SOLUTION INTRAVENOUS
Status: DISPENSED
Start: 2024-08-23

## (undated) RX ORDER — EPTIFIBATIDE 2 MG/ML
INJECTION, SOLUTION INTRAVENOUS
Status: DISPENSED
Start: 2023-06-09

## (undated) RX ORDER — LIDOCAINE HYDROCHLORIDE 10 MG/ML
INJECTION, SOLUTION INFILTRATION; PERINEURAL
Status: DISPENSED
Start: 2025-02-15

## (undated) RX ORDER — HEPARIN SODIUM 200 [USP'U]/100ML
INJECTION, SOLUTION INTRAVENOUS
Status: DISPENSED
Start: 2025-02-15

## (undated) RX ORDER — FENTANYL CITRATE 50 UG/ML
INJECTION, SOLUTION INTRAMUSCULAR; INTRAVENOUS
Status: DISPENSED
Start: 2024-08-23

## (undated) RX ORDER — HEPARIN SODIUM 200 [USP'U]/100ML
INJECTION, SOLUTION INTRAVENOUS
Status: DISPENSED
Start: 2025-02-17

## (undated) RX ORDER — HEPARIN SODIUM 1000 [USP'U]/ML
INJECTION, SOLUTION INTRAVENOUS; SUBCUTANEOUS
Status: DISPENSED
Start: 2024-08-23

## (undated) RX ORDER — PAPAVERINE HYDROCHLORIDE 30 MG/ML
INJECTION INTRAMUSCULAR; INTRAVENOUS
Status: DISPENSED
Start: 2024-08-23

## (undated) RX ORDER — CLINDAMYCIN PHOSPHATE 900 MG/50ML
INJECTION, SOLUTION INTRAVENOUS
Status: DISPENSED
Start: 2024-08-23

## (undated) RX ORDER — HYDROMORPHONE HCL IN WATER/PF 6 MG/30 ML
PATIENT CONTROLLED ANALGESIA SYRINGE INTRAVENOUS
Status: DISPENSED
Start: 2024-08-23

## (undated) RX ORDER — KETOROLAC TROMETHAMINE 30 MG/ML
INJECTION, SOLUTION INTRAMUSCULAR; INTRAVENOUS
Status: DISPENSED
Start: 2024-08-23

## (undated) RX ORDER — HYDROMORPHONE HYDROCHLORIDE 1 MG/ML
INJECTION, SOLUTION INTRAMUSCULAR; INTRAVENOUS; SUBCUTANEOUS
Status: DISPENSED
Start: 2024-08-23

## (undated) RX ORDER — HEPARIN SODIUM 1000 [USP'U]/ML
INJECTION, SOLUTION INTRAVENOUS; SUBCUTANEOUS
Status: DISPENSED
Start: 2023-06-09

## (undated) RX ORDER — LIDOCAINE HYDROCHLORIDE 10 MG/ML
INJECTION, SOLUTION INFILTRATION; PERINEURAL
Status: DISPENSED
Start: 2025-02-17

## (undated) RX ORDER — HEPARIN SODIUM 200 [USP'U]/100ML
INJECTION, SOLUTION INTRAVENOUS
Status: DISPENSED
Start: 2023-06-18

## (undated) RX ORDER — FENTANYL CITRATE 50 UG/ML
INJECTION, SOLUTION INTRAMUSCULAR; INTRAVENOUS
Status: DISPENSED
Start: 2023-06-18

## (undated) RX ORDER — ADENOSINE 3 MG/ML
INJECTION, SOLUTION INTRAVENOUS
Status: DISPENSED
Start: 2023-06-09

## (undated) RX ORDER — LIDOCAINE HYDROCHLORIDE 10 MG/ML
INJECTION, SOLUTION INFILTRATION; PERINEURAL
Status: DISPENSED
Start: 2025-02-16

## (undated) RX ORDER — HEPARIN SODIUM 200 [USP'U]/100ML
INJECTION, SOLUTION INTRAVENOUS
Status: DISPENSED
Start: 2025-02-16

## (undated) RX ORDER — HEPARIN SODIUM 1000 [USP'U]/ML
INJECTION, SOLUTION INTRAVENOUS; SUBCUTANEOUS
Status: DISPENSED
Start: 2023-06-18

## (undated) RX ORDER — HEPARIN SODIUM 200 [USP'U]/100ML
INJECTION, SOLUTION INTRAVENOUS
Status: DISPENSED
Start: 2023-06-09

## (undated) RX ORDER — FENTANYL CITRATE 50 UG/ML
INJECTION, SOLUTION INTRAMUSCULAR; INTRAVENOUS
Status: DISPENSED
Start: 2023-06-09

## (undated) RX ORDER — LIDOCAINE HYDROCHLORIDE 10 MG/ML
INJECTION, SOLUTION INFILTRATION; PERINEURAL
Status: DISPENSED
Start: 2023-06-18

## (undated) RX ORDER — LIDOCAINE HYDROCHLORIDE 10 MG/ML
INJECTION, SOLUTION EPIDURAL; INFILTRATION; INTRACAUDAL; PERINEURAL
Status: DISPENSED
Start: 2023-06-09